# Patient Record
Sex: MALE | Race: WHITE | NOT HISPANIC OR LATINO | Employment: OTHER | URBAN - METROPOLITAN AREA
[De-identification: names, ages, dates, MRNs, and addresses within clinical notes are randomized per-mention and may not be internally consistent; named-entity substitution may affect disease eponyms.]

---

## 2017-01-05 ENCOUNTER — CLINICAL SUPPORT (OUTPATIENT)
Dept: CARDIOLOGY | Facility: CLINIC | Age: 67
End: 2017-01-05
Payer: MEDICARE

## 2017-01-05 DIAGNOSIS — I49.5 SSS (SICK SINUS SYNDROME): ICD-10-CM

## 2017-01-05 DIAGNOSIS — I47.20 VT (VENTRICULAR TACHYCARDIA): ICD-10-CM

## 2017-01-05 PROCEDURE — 93280 PM DEVICE PROGR EVAL DUAL: CPT | Mod: 26,,, | Performed by: INTERNAL MEDICINE

## 2017-04-06 ENCOUNTER — LAB VISIT (OUTPATIENT)
Dept: LAB | Facility: HOSPITAL | Age: 67
End: 2017-04-06
Attending: INTERNAL MEDICINE
Payer: MEDICARE

## 2017-04-06 DIAGNOSIS — E78.5 HYPERLIPIDEMIA, UNSPECIFIED HYPERLIPIDEMIA TYPE: ICD-10-CM

## 2017-04-06 LAB
ALT SERPL W/O P-5'-P-CCNC: 39 U/L
CHOLEST/HDLC SERPL: 3.9 {RATIO}
HDL/CHOLESTEROL RATIO: 25.4 %
HDLC SERPL-MCNC: 169 MG/DL
HDLC SERPL-MCNC: 43 MG/DL
LDLC SERPL CALC-MCNC: 102 MG/DL
NONHDLC SERPL-MCNC: 126 MG/DL
TRIGL SERPL-MCNC: 120 MG/DL

## 2017-04-06 PROCEDURE — 36415 COLL VENOUS BLD VENIPUNCTURE: CPT | Mod: PO

## 2017-04-06 PROCEDURE — 80061 LIPID PANEL: CPT

## 2017-04-06 PROCEDURE — 84460 ALANINE AMINO (ALT) (SGPT): CPT

## 2017-04-19 ENCOUNTER — OFFICE VISIT (OUTPATIENT)
Dept: INTERNAL MEDICINE | Facility: CLINIC | Age: 67
End: 2017-04-19
Payer: MEDICARE

## 2017-04-19 VITALS
BODY MASS INDEX: 29.38 KG/M2 | OXYGEN SATURATION: 98 % | WEIGHT: 205.25 LBS | HEART RATE: 81 BPM | HEIGHT: 70 IN | TEMPERATURE: 97 F | SYSTOLIC BLOOD PRESSURE: 122 MMHG | DIASTOLIC BLOOD PRESSURE: 80 MMHG

## 2017-04-19 DIAGNOSIS — R56.1 SEIZURES, POST-TRAUMATIC: Chronic | ICD-10-CM

## 2017-04-19 DIAGNOSIS — Z12.5 ENCOUNTER FOR SCREENING FOR MALIGNANT NEOPLASM OF PROSTATE: ICD-10-CM

## 2017-04-19 DIAGNOSIS — E78.5 OTHER AND UNSPECIFIED HYPERLIPIDEMIA: Chronic | ICD-10-CM

## 2017-04-19 PROCEDURE — 99214 OFFICE O/P EST MOD 30 MIN: CPT | Mod: S$PBB,,, | Performed by: INTERNAL MEDICINE

## 2017-04-19 PROCEDURE — 99213 OFFICE O/P EST LOW 20 MIN: CPT | Mod: PBBFAC,PO | Performed by: INTERNAL MEDICINE

## 2017-04-19 PROCEDURE — 99999 PR PBB SHADOW E&M-EST. PATIENT-LVL III: CPT | Mod: PBBFAC,,, | Performed by: INTERNAL MEDICINE

## 2017-04-19 RX ORDER — PRAVASTATIN SODIUM 20 MG/1
20 TABLET ORAL DAILY
Qty: 90 TABLET | Refills: 3 | Status: SHIPPED | OUTPATIENT
Start: 2017-04-19 | End: 2018-01-30 | Stop reason: SDUPTHER

## 2017-04-19 NOTE — PROGRESS NOTES
"Subjective:      Patient ID: Adal Loaiza Jr. is a 67 y.o. male.    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 Feeling well and in his usu state of health today. Never smoked.     Review of Systems   Constitutional: Negative for chills and fever.   HENT: Negative for ear pain and sore throat.    Respiratory: Negative for cough, shortness of breath and wheezing.    Cardiovascular: Negative for chest pain and palpitations.   Gastrointestinal: Negative for abdominal pain and blood in stool.   Genitourinary: Negative for dysuria and hematuria.   Neurological: Negative for seizures and syncope.     Objective:   /80 (BP Location: Right arm, Patient Position: Sitting)  Pulse 81  Temp 96.7 °F (35.9 °C) (Tympanic)   Ht 5' 10" (1.778 m)  Wt 93.1 kg (205 lb 4 oz)  SpO2 98%  BMI 29.45 kg/m2    Physical Exam   Constitutional: He is oriented to person, place, and time. He appears well-developed and well-nourished. No distress.   HENT:   Head: Normocephalic and atraumatic.   Mouth/Throat: Oropharynx is clear and moist.   Eyes: EOM are normal. Pupils are equal, round, and reactive to light.   Neck: Neck supple. Carotid bruit is not present. No thyromegaly present.   Cardiovascular: Normal rate and regular rhythm.    Pulmonary/Chest: Breath sounds normal. He has no wheezes. He has no rales.   Abdominal: Soft. Bowel sounds are normal. There is no tenderness.   Musculoskeletal: He exhibits no edema.   Lymphadenopathy:     He has no cervical adenopathy.   Neurological: He is alert and " oriented to person, place, and time.   Skin: Skin is warm and dry.   Psychiatric: He has a normal mood and affect. His behavior is normal.       Assessment:     1. Other and unspecified hyperlipidemia    2. 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 Encounter for screening for malignant neoplasm of prostate       Plan:   Other and unspecified hyperlipidemia  -     TSH; Future; Expected date: 4/19/18  -     PSA, Screening; Future; Expected date: 4/19/18  -     Lipid panel; Future; Expected date: 4/19/18  -     Comprehensive metabolic panel; Future; Expected date: 4/19/18  -     CBC auto differential; Future; Expected date: 4/19/18  -     pravastatin (PRAVACHOL) 20 MG tablet; Take 1 tablet (20 mg total) by mouth once daily.  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

## 2017-04-19 NOTE — MR AVS SNAPSHOT
[facility]  [address] [place] [address]  Phone: [phone]  Fax: [phone]                  Adal Loaiza JrJulissa   2017 8:20 AM   Office Visit    Description:  Male : 1950   Provider:  Huseyin Marrero MD   Department:  [facility] - Internal Medicine           Reason for Visit     Follow-up           Diagnoses this Visit        Comments    Other and unspecified hyperlipidemia         Seizures, post-traumatic         Routine general medical examination at a health care facility         Encounter for screening for malignant neoplasm of prostate                To Do List           Future Appointments        Provider Department Dept Phone    2017 11:00 AM PACEMAKER CLINIC [facility] - Arrhythmia Procedures [phone]    2018 8:00 AM LABORATORY, [facility] [phone]      Goals (5 Years of Data)     None      Follow-Up and Disposition     Return in about 1 year (around 2018), or if symptoms worsen or fail to improve.       These Medications        Disp Refills Start End    pravastatin (PRAVACHOL) 20 MG tablet 90 tablet 3 2017     Take 1 tablet (20 mg total) by mouth once daily. - Oral    Pharmacy: [facility] [address] [place] [address] #: [phone]         [facility]     [facility] Nurse Care Line -  Assistance  Unless otherwise directed by your provider, please contact [facility], our nurse care line that is available for / assistance.     Registered nurses in the [facility] provide: appointment scheduling, clinical advisement, health education, and other advisory services.  Call: [phone] (toll free)               Medications           Message regarding Medications     Verify the changes and/or additions to your medication regime listed below are the same as discussed with your clinician today.  If any of these changes or additions are incorrect, please  "notify your healthcare provider.             Verify that the below list of medications is an accurate representation of the medications you are currently taking.  If none reported, the list may be blank. If incorrect, please contact your healthcare provider. Carry this list with you in case of emergency.           Current Medications     ketoconazole (NIZORAL) 2 % cream Apply topically once daily.    LACTOBACILLUS ACIDOPHILUS (PROBIOTIC ORAL) Take by mouth once daily.    levetiracetam (KEPPRA) 500 MG Tab Take 1 tablet (500 mg total) by mouth 2 (two) times daily.    multivitamin (ONE DAILY MULTIVITAMIN) per tablet Take 1 tablet by mouth once daily.    pravastatin (PRAVACHOL) 20 MG tablet Take 1 tablet (20 mg total) by mouth once daily.           Clinical Reference Information           Your Vitals Were     BP Pulse Temp Height Weight SpO2    122/80 (BP Location: Right arm, Patient Position: Sitting) 81 96.7 °F (35.9 °C) (Tympanic) 5' 10" (1.778 m) 93.1 kg (205 lb 4 oz) 98%    BMI                29.45 kg/m2          Blood Pressure          Most Recent Value    BP  122/80      Allergies as of 4/19/2017     Nsaids (Non-steroidal Anti-inflammatory Drug)    Aspirin    Celecoxib      Immunizations Administered on Date of Encounter - 4/19/2017     None      Orders Placed During Today's Visit     Future Labs/Procedures Expected by Expires    CBC auto differential  4/19/2018 5/24/2018    Comprehensive metabolic panel  4/19/2018 5/24/2018    Lipid panel  4/19/2018 5/24/2018    PSA, Screening  4/19/2018 4/19/2019    TSH  4/19/2018 5/24/2018      Language Assistance Services     ATTENTION: Language assistance services are available, free of charge. Please call [phone].      ATENCIÓN: Si habla español, tiene a stack disposición servicios gratuitos de asistencia lingüística. Llame al [phone].     CHÚ Ý: N?u b?n nói Ti?ng Vi?t, có các d?ch v? h? tr? ngôn ng? mi?n phí dành cho b?n. G?i s? [phone].         [facility] " [facility] complies with applicable Federal civil rights laws and does not discriminate on the basis of race, color, national origin, age, disability, or sex.

## 2017-05-23 DIAGNOSIS — Z95.0 CARDIAC PACEMAKER IN SITU: Primary | ICD-10-CM

## 2017-05-23 DIAGNOSIS — R00.1 BRADYCARDIA, SEVERE SINUS: ICD-10-CM

## 2017-05-30 ENCOUNTER — CLINICAL SUPPORT (OUTPATIENT)
Dept: CARDIOLOGY | Facility: CLINIC | Age: 67
End: 2017-05-30
Payer: MEDICARE

## 2017-05-30 DIAGNOSIS — R00.1 BRADYCARDIA, SEVERE SINUS: ICD-10-CM

## 2017-05-30 DIAGNOSIS — Z95.0 CARDIAC PACEMAKER IN SITU: ICD-10-CM

## 2017-05-30 PROCEDURE — 93280 PM DEVICE PROGR EVAL DUAL: CPT | Mod: 26,,, | Performed by: INTERNAL MEDICINE

## 2017-09-18 ENCOUNTER — TELEPHONE (OUTPATIENT)
Dept: ELECTROPHYSIOLOGY | Facility: CLINIC | Age: 67
End: 2017-09-18

## 2017-09-18 ENCOUNTER — TELEPHONE (OUTPATIENT)
Dept: CARDIOLOGY | Facility: CLINIC | Age: 67
End: 2017-09-18

## 2017-09-18 NOTE — TELEPHONE ENCOUNTER
----- Message from Letitia Galeas MA sent at 9/18/2017  2:56 PM CDT -----  Contact: pt       ----- Message -----  From: Stormy Oden  Sent: 9/18/2017   2:41 PM  To: Susana SCHROEDER Staff    Call pt regarding results of pacemaker test.      [phone]

## 2017-09-18 NOTE — TELEPHONE ENCOUNTER
Returned call to Mr. Loaiza and needed to confirm he's being followed by Dr. Dean regarding pacemaker and last visit was May 30, 2017.

## 2017-09-18 NOTE — TELEPHONE ENCOUNTER
----- Message from Sonja Cardenas sent at 9/18/2017  3:54 PM CDT -----  Contact: Patient  Patient called to speak with the nurse about his records from his last test. He can be contacted at [phone].    Thanks,  Sonja

## 2017-09-22 ENCOUNTER — CLINICAL SUPPORT (OUTPATIENT)
Dept: CARDIOLOGY | Facility: CLINIC | Age: 67
End: 2017-09-22
Payer: MEDICARE

## 2017-09-22 ENCOUNTER — OFFICE VISIT (OUTPATIENT)
Dept: CARDIOLOGY | Facility: CLINIC | Age: 67
End: 2017-09-22
Payer: MEDICARE

## 2017-09-22 VITALS
OXYGEN SATURATION: 97 % | DIASTOLIC BLOOD PRESSURE: 82 MMHG | SYSTOLIC BLOOD PRESSURE: 124 MMHG | WEIGHT: 202.81 LBS | HEIGHT: 70 IN | BODY MASS INDEX: 29.03 KG/M2 | HEART RATE: 71 BPM

## 2017-09-22 DIAGNOSIS — Z95.0 CARDIAC PACEMAKER IN SITU: Primary | Chronic | ICD-10-CM

## 2017-09-22 DIAGNOSIS — R00.1 BRADYCARDIA, SEVERE SINUS: ICD-10-CM

## 2017-09-22 DIAGNOSIS — R56.1 SEIZURES, POST-TRAUMATIC: Chronic | ICD-10-CM

## 2017-09-22 DIAGNOSIS — Z95.0 CARDIAC PACEMAKER IN SITU: Chronic | ICD-10-CM

## 2017-09-22 DIAGNOSIS — K76.0 FATTY LIVER: ICD-10-CM

## 2017-09-22 DIAGNOSIS — I35.1 AORTIC VALVE REGURGITATION, UNSPECIFIED ETIOLOGY: ICD-10-CM

## 2017-09-22 DIAGNOSIS — Z95.0 CARDIAC PACEMAKER IN SITU: ICD-10-CM

## 2017-09-22 DIAGNOSIS — G47.30 SLEEP APNEA, UNSPECIFIED TYPE: Chronic | ICD-10-CM

## 2017-09-22 DIAGNOSIS — E78.5 HYPERLIPIDEMIA, UNSPECIFIED HYPERLIPIDEMIA TYPE: ICD-10-CM

## 2017-09-22 PROCEDURE — 99212 OFFICE O/P EST SF 10 MIN: CPT | Mod: PBBFAC,PO,25 | Performed by: INTERNAL MEDICINE

## 2017-09-22 PROCEDURE — 93005 ELECTROCARDIOGRAM TRACING: CPT | Mod: PBBFAC,PO | Performed by: INTERNAL MEDICINE

## 2017-09-22 PROCEDURE — 1159F MED LIST DOCD IN RCRD: CPT | Mod: ,,, | Performed by: INTERNAL MEDICINE

## 2017-09-22 PROCEDURE — 99215 OFFICE O/P EST HI 40 MIN: CPT | Mod: S$PBB,,, | Performed by: INTERNAL MEDICINE

## 2017-09-22 PROCEDURE — 99999 PR PBB SHADOW E&M-EST. PATIENT-LVL II: CPT | Mod: PBBFAC,,, | Performed by: INTERNAL MEDICINE

## 2017-09-22 PROCEDURE — 93280 PM DEVICE PROGR EVAL DUAL: CPT | Mod: PBBFAC,PO | Performed by: INTERNAL MEDICINE

## 2017-09-22 PROCEDURE — 93010 ELECTROCARDIOGRAM REPORT: CPT | Mod: S$PBB,,, | Performed by: INTERNAL MEDICINE

## 2017-09-22 NOTE — PROGRESS NOTES
Subjective:   Patient ID:  Adal Loaiza Jr. is a 67 y.o. male     Chief complaint:No chief complaint on file.      HPI  Background as recorded in my last note (Aug 2016):  66 man  Initial PPM implant by Dr OCONNELL for a 6 sec pause (/in the midst of a seizure). 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   He had RV lead fx >. 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   He has had no issues with seizures nor syncope. He had a PPM eval 6/2  There were many AMS but no EGMs noted. He has no cardiac Sx and no palps,.      Update since then:  Has had a good year - still works full time, no CV c/o --if anything, feels better this year than last.   PPM eval -- poor A capture thresholds and P waves @1.0 mV >. Stable >> VDD -- doing well. PPM nearing SIMON -- 6 months or so but voltage OK.   I have reviewed the actual image of the ECG tracing obtained today and it shows NSR with normal intervals      Current Outpatient Prescriptions   Medication Sig    ketoconazole (NIZORAL) 2 % cream Apply topically once daily. (Patient taking differently: Apply topically as needed. )    LACTOBACILLUS ACIDOPHILUS (PROBIOTIC ORAL) Take by mouth once daily.    levetiracetam (KEPPRA) 500 MG Tab Take 1 tablet (500 mg total) by mouth 2 (two) times daily.    multivitamin (ONE DAILY MULTIVITAMIN) per tablet Take 1 tablet by mouth once daily.    pravastatin (PRAVACHOL) 20 MG tablet Take 1 tablet (20 mg total) by mouth once daily.     No current facility-administered medications for this visit.      Review of Systems   Constitution: Negative for decreased appetite, weakness, malaise/fatigue, weight gain and weight loss.   Eyes: Negative for blurred vision.    Cardiovascular: Negative for chest pain, claudication, cyanosis, dyspnea on exertion, irregular heartbeat, leg swelling, near-syncope, orthopnea and palpitations.   Respiratory: Negative for cough, shortness of breath, sleep disturbances due to breathing, snoring and wheezing.    Endocrine: Negative for heat intolerance.   Hematologic/Lymphatic: Does not bruise/bleed easily.   Musculoskeletal: Negative for muscle weakness and myalgias.   Gastrointestinal: Negative for melena, nausea and vomiting.   Genitourinary: Negative for nocturia.   Neurological: Negative for excessive daytime sleepiness, dizziness, headaches and light-headedness.   Psychiatric/Behavioral: Negative for depression, memory loss and substance abuse. The patient does not have insomnia and is not nervous/anxious.        Objective:   Physical Exam   Constitutional: He is oriented to person, place, and time. He appears well-developed and well-nourished.   HENT:   Head: Normocephalic and atraumatic.   Right Ear: External ear normal.   Left Ear: External ear normal.   Eyes: Conjunctivae are normal. Pupils are equal, round, and reactive to light. Left conjunctiva is not injected. Left conjunctiva has no hemorrhage.   Neck: Neck supple. No JVD present. No thyromegaly present.   Cardiovascular: Normal rate, regular rhythm and intact distal pulses.  PMI is not displaced.  Exam reveals no gallop, no friction rub, no midsystolic click and no opening snap.    Murmur heard.  High-pitched blowing decrescendo early diastolic murmur is present with a grade of 2/6  at the upper right sternal border radiating to the apex Widely split S2  Pulses:       Carotid pulses are 2+ on the right side, and 2+ on the left side.       Radial pulses are 2+ on the right side, and 2+ on the left side.        Dorsalis pedis pulses are 2+ on the right side, and 2+ on the left side.        Posterior tibial pulses are 2+ on the right side, and 2+ on the left side.   Pulmonary/Chest:  "Effort normal and breath sounds normal. No respiratory distress. He has no wheezes. He has no rales. He exhibits no tenderness.   Device pocket is in excellent repair     Abdominal: Soft. Normal appearance. He exhibits no pulsatile liver. There is no hepatomegaly. There is no tenderness. There is no rigidity.   Musculoskeletal: Normal range of motion. He exhibits no edema or tenderness.        Right knee: He exhibits no swelling.        Left knee: He exhibits no swelling.        Right ankle: He exhibits no swelling.        Left ankle: He exhibits no swelling.        Right lower leg: He exhibits no swelling.        Left lower leg: He exhibits no swelling.        Right foot: There is no swelling.        Left foot: There is no swelling.   Neurological: He is alert and oriented to person, place, and time. He has normal strength and normal reflexes. No cranial nerve deficit. Coordination normal.   Skin: Skin is warm, dry and intact. No rash noted. No cyanosis.   Psychiatric: He has a normal mood and affect. His behavior is normal.   Nursing note and vitals reviewed.    /82   Pulse 71   Ht 5' 10" (1.778 m)   Wt 92 kg (202 lb 13.2 oz)   SpO2 97%   BMI 29.10 kg/m²      Assessment:    New PE findings -- widely split S2 and a D murmur c/w AI-- will eval  1. Cardiac pacemaker in situ    2. Sleep apnea, unspecified type    3. 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 Fatty liver    5. Hyperlipidemia, unspecified hyperlipidemia type    6. Aortic valve regurgitation, unspecified etiology        Plan:      Orders Placed This Encounter   Procedures    EKG 12-lead     Standing Status:   Future     Number of Occurrences:   1     Standing Expiration Date:   9/22/2018    2D echo with color flow doppler     Return in about 1 year (around 9/22/2018), or if symptoms worsen or fail to improve.  There are no discontinued medications.  New Prescriptions    No medications on file     Modified Medications    No " medications on file

## 2017-09-25 NOTE — TELEPHONE ENCOUNTER
----- Message from Micaela Stevens sent at 9/25/2017 11:47 AM CDT -----  Would like to speak to nurse about results. Please call back at [phone]. thanks

## 2017-10-12 ENCOUNTER — APPOINTMENT (OUTPATIENT)
Dept: CARDIOLOGY | Facility: CLINIC | Age: 67
End: 2017-10-12
Payer: MEDICARE

## 2017-10-12 LAB
DIASTOLIC DYSFUNCTION: NO
ESTIMATED PA SYSTOLIC PRESSURE: 25
MITRAL VALVE MOBILITY: NORMAL
MITRAL VALVE REGURGITATION: NORMAL
RETIRED EF AND QEF - SEE NOTES: 70 (ref 55–65)

## 2017-10-12 PROCEDURE — 93306 TTE W/DOPPLER COMPLETE: CPT | Mod: PBBFAC,PO | Performed by: INTERNAL MEDICINE

## 2017-10-20 ENCOUNTER — TELEPHONE (OUTPATIENT)
Dept: CARDIOLOGY | Facility: CLINIC | Age: 67
End: 2017-10-20

## 2017-10-20 NOTE — TELEPHONE ENCOUNTER
----- Message from Gen Meza MD sent at 10/19/2017  5:38 PM CDT -----  Reviewed results. All OK. Please open telephone encounter, call patient and inform him/ her of results,then document in encounter.  Please do not route back to me.   Thanks.

## 2017-10-25 ENCOUNTER — HOSPITAL ENCOUNTER (OUTPATIENT)
Dept: RADIOLOGY | Facility: HOSPITAL | Age: 67
Discharge: HOME | End: 2017-10-25
Attending: NURSE PRACTITIONER
Payer: MEDICARE

## 2017-10-25 ENCOUNTER — OFFICE VISIT (OUTPATIENT)
Dept: INTERNAL MEDICINE | Facility: CLINIC | Age: 67
End: 2017-10-25
Payer: MEDICARE

## 2017-10-25 VITALS
HEART RATE: 91 BPM | TEMPERATURE: 97 F | SYSTOLIC BLOOD PRESSURE: 148 MMHG | HEIGHT: 70 IN | WEIGHT: 202.38 LBS | DIASTOLIC BLOOD PRESSURE: 80 MMHG | BODY MASS INDEX: 28.97 KG/M2

## 2017-10-25 DIAGNOSIS — R10.12 LEFT UPPER QUADRANT PAIN: ICD-10-CM

## 2017-10-25 DIAGNOSIS — Z87.19 HISTORY OF GI BLEED: ICD-10-CM

## 2017-10-25 DIAGNOSIS — R10.12 LEFT UPPER QUADRANT PAIN: Primary | ICD-10-CM

## 2017-10-25 PROCEDURE — 74020 XR ABDOMEN FLAT AND ERECT: CPT | Mod: TC,PO

## 2017-10-25 PROCEDURE — 99214 OFFICE O/P EST MOD 30 MIN: CPT | Mod: PBBFAC,25,PO | Performed by: NURSE PRACTITIONER

## 2017-10-25 PROCEDURE — 99214 OFFICE O/P EST MOD 30 MIN: CPT | Mod: S$PBB,,, | Performed by: NURSE PRACTITIONER

## 2017-10-25 PROCEDURE — 74020 XR ABDOMEN FLAT AND ERECT: CPT | Mod: 26,,, | Performed by: RADIOLOGY

## 2017-10-25 PROCEDURE — 99999 PR PBB SHADOW E&M-EST. PATIENT-LVL IV: CPT | Mod: PBBFAC,,, | Performed by: NURSE PRACTITIONER

## 2017-10-25 NOTE — PROGRESS NOTES
Subjective:       Patient ID: Adal Loaiza Jr. is a 67 y.o. male.    Chief Complaint: Abdominal Pain    LUQ pain x 4 days. The pain does not radiate. He can reproduce the pain with certain movements and with palpation. Associated symptoms include dark urine, constipation, unusual amount of gas. He reports subjective fever.    He reports that he occasional has reflux symptoms (once or twice a month) takes a sheela and it goes away. He reports having constipation (hard, pellet stools) up until yesterday when he had a large soft stool after consuming [employer]. He reports that he eats out a lot and enjoys spicy, greasy foods. He denies sweats, chills, n/v, blood in stool or urine, dysuria, cp, sob, back pain. He reports hx of duodenal ulcer with stomach bleed due to aspirin use many years ago. He has not taken any nsaids. He does not report any change in appetite. He does admit that pain is better after meals and worse when he begins to become hungry again. No hx of SBO, diverticulitis, IBD, IBS, abd surgery.            Review of Systems   Constitutional: Negative for activity change, appetite change, chills, diaphoresis, fatigue, fever (subj ) and unexpected weight change.   Respiratory: Negative for chest tightness and shortness of breath.    Gastrointestinal: Positive for abdominal distention and abdominal pain. Negative for anal bleeding, blood in stool, constipation, diarrhea, nausea, rectal pain and vomiting.   Genitourinary: Negative for decreased urine volume, difficulty urinating, flank pain, frequency, hematuria, testicular pain and urgency.        Dark urine    Musculoskeletal: Negative for arthralgias and myalgias.   Neurological: Negative for dizziness, weakness, light-headedness and headaches.       Objective:      Physical Exam   Constitutional: He is oriented to person, place, and time.   Cardiovascular: Normal rate, regular rhythm and normal heart sounds.    Pulmonary/Chest: Effort normal and breath  sounds normal.   Abdominal: He exhibits distension. There is tenderness in the left upper quadrant. There is no rigidity, no rebound and no guarding.   Musculoskeletal: Normal range of motion.   Neurological: He is alert and oriented to person, place, and time.   Skin: Skin is warm and dry.   Psychiatric: He has a normal mood and affect.       Assessment:       1. Left upper quadrant pain    2. History of GI bleed        Plan:   Left upper quadrant pain  -     CBC auto differential; Future; Expected date: 10/25/2017  -     Comprehensive metabolic panel; Future; Expected date: 10/25/2017  -     Urinalysis; Future; Expected date: 10/25/2017  -     X-Ray Abdomen Flat And Erect; Future; Expected date: 10/25/2017  -     H. PYLORI ANTIBODY, IGG; Future; Expected date: 11/08/2017  -     Lipase; Future; Expected date: 10/25/2017  -     Amylase; Future; Expected date: 10/25/2017  -     US Abdomen Complete; Future; Expected date: 10/25/2017    History of GI bleed      As above  Decrease greasy/bloating foods  Increase fluids  Labs, xray today  US tomorrow  Will call with results and recommendations

## 2017-10-26 ENCOUNTER — TELEPHONE (OUTPATIENT)
Dept: RADIOLOGY | Facility: HOSPITAL | Age: 67
End: 2017-10-26

## 2017-10-27 ENCOUNTER — HOSPITAL ENCOUNTER (OUTPATIENT)
Dept: RADIOLOGY | Facility: HOSPITAL | Age: 67
Discharge: HOME | End: 2017-10-27
Attending: NURSE PRACTITIONER
Payer: MEDICARE

## 2017-10-27 DIAGNOSIS — N28.1 ACQUIRED COMPLEX CYST OF KIDNEY: Primary | ICD-10-CM

## 2017-10-27 DIAGNOSIS — R10.12 LEFT UPPER QUADRANT PAIN: ICD-10-CM

## 2017-10-27 DIAGNOSIS — N28.1 SIMPLE CYST OF KIDNEY: ICD-10-CM

## 2017-10-27 PROCEDURE — 76700 US EXAM ABDOM COMPLETE: CPT | Mod: 26,,, | Performed by: RADIOLOGY

## 2017-10-27 PROCEDURE — 76700 US EXAM ABDOM COMPLETE: CPT | Mod: TC,PO

## 2017-12-13 ENCOUNTER — OFFICE VISIT (OUTPATIENT)
Dept: UROLOGY | Facility: CLINIC | Age: 67
End: 2017-12-13
Payer: MEDICARE

## 2017-12-13 VITALS
BODY MASS INDEX: 28.33 KG/M2 | WEIGHT: 197.88 LBS | HEIGHT: 70 IN | SYSTOLIC BLOOD PRESSURE: 130 MMHG | DIASTOLIC BLOOD PRESSURE: 80 MMHG | HEART RATE: 74 BPM

## 2017-12-13 DIAGNOSIS — Z12.5 PROSTATE CANCER SCREENING: ICD-10-CM

## 2017-12-13 DIAGNOSIS — N28.1 RENAL CYST: Primary | ICD-10-CM

## 2017-12-13 LAB
BILIRUB SERPL-MCNC: NORMAL MG/DL
BLOOD URINE, POC: NORMAL
COLOR, POC UA: YELLOW
GLUCOSE UR QL STRIP: NORMAL
KETONES UR QL STRIP: NORMAL
LEUKOCYTE ESTERASE URINE, POC: NORMAL
NITRITE, POC UA: NORMAL
PH, POC UA: 5
PROTEIN, POC: NORMAL
SPECIFIC GRAVITY, POC UA: 1.02
UROBILINOGEN, POC UA: NORMAL

## 2017-12-13 PROCEDURE — 99212 OFFICE O/P EST SF 10 MIN: CPT | Mod: PBBFAC | Performed by: UROLOGY

## 2017-12-13 PROCEDURE — 81002 URINALYSIS NONAUTO W/O SCOPE: CPT | Mod: PBBFAC | Performed by: UROLOGY

## 2017-12-13 PROCEDURE — 99204 OFFICE O/P NEW MOD 45 MIN: CPT | Mod: S$PBB,,, | Performed by: UROLOGY

## 2017-12-13 PROCEDURE — 99999 PR PBB SHADOW E&M-EST. PATIENT-LVL II: CPT | Mod: PBBFAC,,, | Performed by: UROLOGY

## 2017-12-13 NOTE — PROGRESS NOTES
Chief Complaint: Renal Cyst?    HPI:   12/13/17: 68 yo man referred after recent US for symptoms of constipation revealed bilateral small simple cysts.  No abd/pelvic pain and no exac/rel factors.  No hematuria.  No urolithiasis.  No urinary bother.  No  history.  Normal sexual function.  Finds that he doesn't have much semen when ejaculating, orgasm and all else is normal.  No ED, good libido.    Allergies:  Nsaids (non-steroidal anti-inflammatory drug); Aspirin; and Celecoxib    Medications:  has a current medication list which includes the following prescription(s): lactobacillus acidophilus, levetiracetam, multivitamin, pravastatin, and ketoconazole.    Review of Systems:  General: No fever, chills, fatigability, or weight loss.  Skin: No rashes, itching, or changes in color or texture of skin.  Chest: Denies COOPER, cyanosis, wheezing, cough, and sputum production.  Abdomen: Appetite fine. No weight loss. Denies diarrhea, abdominal pain, hematemesis, or blood in stool.  Musculoskeletal: No joint stiffness or swelling. Denies back pain.  : As above.  All other review of systems negative.    PMH:   has a past medical history of Anemia due to GI blood loss (2012); Arrhythmia requiring replacement of cardiac pacemaker; Cancer; Encounter for blood transfusion; GI bleed due to NSAIDs (2012); Hyperlipidemia; Schwannoma; Seizures; and Sleep apnea.    PSH:   has a past surgical history that includes Cardiac pacemaker placement; Gamma knife; Brain surgery; and Colonoscopy (N/A, 8/25/2016).    FamHx: family history includes Colon cancer in his father.    SocHx:  reports that he has never smoked. He has never used smokeless tobacco. He reports that he does not drink alcohol or use drugs.      Physical Exam:  Vitals:    12/13/17 1104   BP: 130/80   Pulse: 74     General: A&Ox3, no apparent distress, no deformities  Neck: No masses, normal thyroid  Lungs: normal inspiration, no use of accessory muscles  Heart: normal pulse,  no arrhythmias  Abdomen: Soft, NT, ND, no masses, no hernias, no hepatosplenomegaly  Lymphatic: Neck and groin nodes negative  Skin: The skin is warm and dry. No jaundice.  Ext: No c/c/e.  : Test desc christiano, no abnormalities of epididymus. Penis normal, with normal penile and scrotal skin. Meatus normal. Normal rectal tone, no hemorrhoids. Prost 30 gm no nodules or masses appreciated. SV not palpable. Perineum and anus normal.    Labs/Studies:   Urinalysis performed in clinic, summary: UA normal  PSA    7/16: 2.1    Impression/Plan:   1. Renal cysts simple and likely benign.  US/RTC with PSA 1 year to see if any change.

## 2017-12-13 NOTE — LETTER
December 13, 2017      Sandee Rudolph, JANICE  [address] [facility] [address]           [facility]  [address] [place] [address]  Phone: [phone]  Fax: [phone]          Patient: Adal Loaiza Jr.   MR Number: [id number]   YOB: 1950   Date of Visit: 12/13/2017       Dear Sandee Rudolph:    Thank you for referring Adal Loaiza to me for evaluation. Attached you will find relevant portions of my assessment and plan of care.    If you have questions, please do not hesitate to call me. I look forward to following Adal Loaiza along with you.    Sincerely,    Yoni Oates IV, MD    Enclosure  CC:  No Recipients    If you would like to receive this communication electronically, please contact [email or web address] or [phone] to request more information on [facility] [employer] access.    For providers and/or their staff who would like to refer a patient to [facility], please contact us through our one-stop-shop provider referral line, [facility], at [phone].    If you feel you have received this communication in error or would no longer like to receive these types of communications, please e-mail [email or web address]

## 2018-01-24 ENCOUNTER — IMMUNIZATION (OUTPATIENT)
Dept: INTERNAL MEDICINE | Facility: CLINIC | Age: 68
End: 2018-01-24
Payer: MEDICARE

## 2018-01-24 PROCEDURE — 90662 IIV NO PRSV INCREASED AG IM: CPT | Mod: PBBFAC,PO

## 2018-01-30 DIAGNOSIS — E78.49 OTHER HYPERLIPIDEMIA: ICD-10-CM

## 2018-01-30 RX ORDER — PRAVASTATIN SODIUM 20 MG/1
20 TABLET ORAL DAILY
Qty: 90 TABLET | Refills: 0 | Status: SHIPPED | OUTPATIENT
Start: 2018-01-30 | End: 2018-05-02 | Stop reason: SDUPTHER

## 2018-01-30 NOTE — TELEPHONE ENCOUNTER
Patient states that his Neurologist called in Pravastatin 10 mg but he takes the 20 mg tablets normally.

## 2018-01-30 NOTE — TELEPHONE ENCOUNTER
----- Message from Hina Rahman sent at 1/30/2018  9:58 AM CST -----  Contact: pt  He's calling to get a refill..    1. What is the name of the medication you are requesting? Pravastatin  2. What is the dose? 10 mg  3. How do you take the medication? Orally, topically, etc? orally  4. How often do you take this medication? Once daily  5. Do you need a 30 day or 90 day supply? 30  6. How many refills are you requesting? 1  7. What is your preferred pharmacy and location of the pharmacy? [facility] on [facility]   8. Who can we contact with further questions? [phone] (work)

## 2018-04-19 ENCOUNTER — PATIENT OUTREACH (OUTPATIENT)
Dept: ADMINISTRATIVE | Facility: HOSPITAL | Age: 68
End: 2018-04-19

## 2018-04-19 DIAGNOSIS — E78.49 OTHER HYPERLIPIDEMIA: Primary | ICD-10-CM

## 2018-04-19 DIAGNOSIS — Z29.9 PREVENTIVE MEASURE: ICD-10-CM

## 2018-04-24 ENCOUNTER — LAB VISIT (OUTPATIENT)
Dept: LAB | Facility: HOSPITAL | Age: 68
End: 2018-04-24
Attending: INTERNAL MEDICINE
Payer: MEDICARE

## 2018-04-24 DIAGNOSIS — Z12.5 ENCOUNTER FOR SCREENING FOR MALIGNANT NEOPLASM OF PROSTATE: ICD-10-CM

## 2018-04-24 DIAGNOSIS — E78.5 HYPERLIPIDEMIA, UNSPECIFIED HYPERLIPIDEMIA TYPE: Chronic | ICD-10-CM

## 2018-04-24 LAB
ALBUMIN SERPL BCP-MCNC: 3.6 G/DL
ALP SERPL-CCNC: 72 U/L
ALT SERPL W/O P-5'-P-CCNC: 36 U/L
ANION GAP SERPL CALC-SCNC: 11 MMOL/L
AST SERPL-CCNC: 30 U/L
BASOPHILS # BLD AUTO: 0.04 K/UL
BASOPHILS NFR BLD: 0.7 %
BILIRUB SERPL-MCNC: 0.9 MG/DL
BUN SERPL-MCNC: 14 MG/DL
CALCIUM SERPL-MCNC: 9.8 MG/DL
CHLORIDE SERPL-SCNC: 106 MMOL/L
CHOLEST SERPL-MCNC: 175 MG/DL
CHOLEST/HDLC SERPL: 3.4 {RATIO}
CO2 SERPL-SCNC: 25 MMOL/L
COMPLEXED PSA SERPL-MCNC: 2.7 NG/ML
CREAT SERPL-MCNC: 1.3 MG/DL
DIFFERENTIAL METHOD: ABNORMAL
EOSINOPHIL # BLD AUTO: 0.2 K/UL
EOSINOPHIL NFR BLD: 3.3 %
ERYTHROCYTE [DISTWIDTH] IN BLOOD BY AUTOMATED COUNT: 14.1 %
EST. GFR  (AFRICAN AMERICAN): >60 ML/MIN/1.73 M^2
EST. GFR  (NON AFRICAN AMERICAN): 56.1 ML/MIN/1.73 M^2
GLUCOSE SERPL-MCNC: 95 MG/DL
HCT VFR BLD AUTO: 45.9 %
HDLC SERPL-MCNC: 51 MG/DL
HDLC SERPL: 29.1 %
HGB BLD-MCNC: 14.6 G/DL
IMM GRANULOCYTES # BLD AUTO: 0.03 K/UL
IMM GRANULOCYTES NFR BLD AUTO: 0.5 %
LDLC SERPL CALC-MCNC: 106.6 MG/DL
LYMPHOCYTES # BLD AUTO: 1.4 K/UL
LYMPHOCYTES NFR BLD: 24.6 %
MCH RBC QN AUTO: 27.5 PG
MCHC RBC AUTO-ENTMCNC: 31.8 G/DL
MCV RBC AUTO: 87 FL
MONOCYTES # BLD AUTO: 0.6 K/UL
MONOCYTES NFR BLD: 10.7 %
NEUTROPHILS # BLD AUTO: 3.5 K/UL
NEUTROPHILS NFR BLD: 60.2 %
NONHDLC SERPL-MCNC: 124 MG/DL
NRBC BLD-RTO: 0 /100 WBC
PLATELET # BLD AUTO: 218 K/UL
PMV BLD AUTO: 11.3 FL
POTASSIUM SERPL-SCNC: 4.4 MMOL/L
PROT SERPL-MCNC: 6.9 G/DL
RBC # BLD AUTO: 5.3 M/UL
SODIUM SERPL-SCNC: 142 MMOL/L
TRIGL SERPL-MCNC: 87 MG/DL
TSH SERPL DL<=0.005 MIU/L-ACNC: 2.5 UIU/ML
WBC # BLD AUTO: 5.82 K/UL

## 2018-04-24 PROCEDURE — 36415 COLL VENOUS BLD VENIPUNCTURE: CPT | Mod: PO

## 2018-04-24 PROCEDURE — 80053 COMPREHEN METABOLIC PANEL: CPT

## 2018-04-24 PROCEDURE — 80061 LIPID PANEL: CPT

## 2018-04-24 PROCEDURE — 84153 ASSAY OF PSA TOTAL: CPT

## 2018-04-24 PROCEDURE — 85025 COMPLETE CBC W/AUTO DIFF WBC: CPT

## 2018-04-24 PROCEDURE — 84443 ASSAY THYROID STIM HORMONE: CPT

## 2018-05-02 ENCOUNTER — OFFICE VISIT (OUTPATIENT)
Dept: INTERNAL MEDICINE | Facility: CLINIC | Age: 68
End: 2018-05-02
Payer: MEDICARE

## 2018-05-02 VITALS
HEART RATE: 88 BPM | TEMPERATURE: 98 F | BODY MASS INDEX: 28.81 KG/M2 | SYSTOLIC BLOOD PRESSURE: 122 MMHG | HEIGHT: 70 IN | WEIGHT: 201.25 LBS | DIASTOLIC BLOOD PRESSURE: 84 MMHG | OXYGEN SATURATION: 97 %

## 2018-05-02 DIAGNOSIS — E78.49 OTHER HYPERLIPIDEMIA: Primary | ICD-10-CM

## 2018-05-02 DIAGNOSIS — Z95.0 CARDIAC PACEMAKER IN SITU: Chronic | ICD-10-CM

## 2018-05-02 DIAGNOSIS — K76.0 FATTY LIVER: ICD-10-CM

## 2018-05-02 DIAGNOSIS — Z80.0 FAMILY HISTORY OF COLON CANCER: ICD-10-CM

## 2018-05-02 DIAGNOSIS — R68.89 ABNORMAL DIGITAL RECTAL EXAM: ICD-10-CM

## 2018-05-02 DIAGNOSIS — R56.1 SEIZURES, POST-TRAUMATIC: Chronic | ICD-10-CM

## 2018-05-02 DIAGNOSIS — G47.30 SLEEP APNEA, UNSPECIFIED TYPE: Chronic | ICD-10-CM

## 2018-05-02 DIAGNOSIS — K63.5 POLYP OF COLON, UNSPECIFIED PART OF COLON, UNSPECIFIED TYPE: ICD-10-CM

## 2018-05-02 PROCEDURE — 99214 OFFICE O/P EST MOD 30 MIN: CPT | Mod: S$PBB,,, | Performed by: INTERNAL MEDICINE

## 2018-05-02 PROCEDURE — 99999 PR PBB SHADOW E&M-EST. PATIENT-LVL III: CPT | Mod: PBBFAC,,, | Performed by: INTERNAL MEDICINE

## 2018-05-02 PROCEDURE — 99213 OFFICE O/P EST LOW 20 MIN: CPT | Mod: PBBFAC,PO | Performed by: INTERNAL MEDICINE

## 2018-05-02 RX ORDER — PRAVASTATIN SODIUM 20 MG/1
20 TABLET ORAL DAILY
Qty: 90 TABLET | Refills: 3 | Status: SHIPPED | OUTPATIENT
Start: 2018-05-02 | End: 2018-12-28 | Stop reason: SDUPTHER
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
## 2018-12-19 ENCOUNTER — HOSPITAL ENCOUNTER (OUTPATIENT)
Dept: RADIOLOGY | Facility: HOSPITAL | Age: 68
Discharge: HOME | End: 2018-12-19
Attending: UROLOGY
Payer: MEDICARE

## 2018-12-19 ENCOUNTER — OFFICE VISIT (OUTPATIENT)
Dept: UROLOGY | Facility: CLINIC | Age: 68
End: 2018-12-19
Payer: MEDICARE

## 2018-12-19 VITALS
WEIGHT: 200.38 LBS | HEART RATE: 77 BPM | HEIGHT: 70 IN | SYSTOLIC BLOOD PRESSURE: 132 MMHG | BODY MASS INDEX: 28.69 KG/M2 | DIASTOLIC BLOOD PRESSURE: 86 MMHG

## 2018-12-19 DIAGNOSIS — N28.1 RENAL CYST: ICD-10-CM

## 2018-12-19 DIAGNOSIS — N28.1 RENAL CYST: Primary | ICD-10-CM

## 2018-12-19 DIAGNOSIS — Z12.5 SCREENING PSA (PROSTATE SPECIFIC ANTIGEN): ICD-10-CM

## 2018-12-19 LAB
BILIRUB SERPL-MCNC: NORMAL MG/DL
BLOOD URINE, POC: NORMAL
COLOR, POC UA: YELLOW
GLUCOSE UR QL STRIP: NORMAL
KETONES UR QL STRIP: NORMAL
LEUKOCYTE ESTERASE URINE, POC: NORMAL
NITRITE, POC UA: NORMAL
PH, POC UA: 5
PROTEIN, POC: NORMAL
SPECIFIC GRAVITY, POC UA: 1.01
UROBILINOGEN, POC UA: NORMAL

## 2018-12-19 PROCEDURE — 76770 US EXAM ABDO BACK WALL COMP: CPT | Mod: TC,PO

## 2018-12-19 PROCEDURE — 76770 US EXAM ABDO BACK WALL COMP: CPT | Mod: 26,,, | Performed by: RADIOLOGY

## 2018-12-19 PROCEDURE — 99214 OFFICE O/P EST MOD 30 MIN: CPT | Mod: S$PBB,,, | Performed by: UROLOGY

## 2018-12-19 PROCEDURE — 81002 URINALYSIS NONAUTO W/O SCOPE: CPT | Mod: PBBFAC | Performed by: UROLOGY

## 2018-12-19 PROCEDURE — 99213 OFFICE O/P EST LOW 20 MIN: CPT | Mod: PBBFAC,25 | Performed by: UROLOGY

## 2018-12-19 PROCEDURE — 99999 PR PBB SHADOW E&M-EST. PATIENT-LVL III: CPT | Mod: PBBFAC,,, | Performed by: UROLOGY

## 2018-12-19 NOTE — PROGRESS NOTES
Chief Complaint: Renal Cyst?    HPI:   12/19/18: Hasn't had his US yet.  PSA this year reassuring.  Semen is more productive than it was.  Reviewed history in detail.  12/13/17: 66 yo man referred after recent US for symptoms of constipation revealed bilateral small simple cysts.  No abd/pelvic pain and no exac/rel factors.  No hematuria.  No urolithiasis.  No urinary bother.  No  history.  Normal sexual function.  Finds that he doesn't have much semen when ejaculating, orgasm and all else is normal.  No ED, good libido.    Allergies:  Nsaids (non-steroidal anti-inflammatory drug); Aspirin; and Celecoxib    Medications:  has a current medication list which includes the following prescription(s): ketoconazole, lactobacillus acidophilus, levetiracetam, multivitamin, and pravastatin.    Review of Systems:  General: No fever, chills, fatigability, or weight loss.  Skin: No rashes, itching, or changes in color or texture of skin.  Chest: Denies COOPER, cyanosis, wheezing, cough, and sputum production.  Abdomen: Appetite fine. No weight loss. Denies diarrhea, abdominal pain, hematemesis, or blood in stool.  Musculoskeletal: No joint stiffness or swelling. Denies back pain.  : As above.  All other review of systems negative.    PMH:   has a past medical history of Anemia due to GI blood loss (2012), Arrhythmia requiring replacement of cardiac pacemaker, Cancer, Encounter for blood transfusion, GI bleed due to NSAIDs (2012), Hyperlipidemia, Schwannoma, Seizures, and Sleep apnea.    PSH:   has a past surgical history that includes Cardiac pacemaker placement; Gamma knife; Brain surgery; and COLONOSCOPY (N/A, 8/25/2016).    FamHx: family history includes Colon cancer in his father; Hypertension in his unknown relative.    SocHx:  reports that  has never smoked. he has never used smokeless tobacco. He reports that he does not drink alcohol or use drugs.      Physical Exam:  Vitals:    12/19/18 1254   BP: 132/86   Pulse: 77      General: A&Ox3, no apparent distress, no deformities  Neck: No masses, normal thyroid  Lungs: normal inspiration, no use of accessory muscles  Heart: normal pulse, no arrhythmias  Abdomen: Soft, NT, ND, no masses, no hernias, no hepatosplenomegaly  Lymphatic: Neck and groin nodes negative  Skin: The skin is warm and dry. No jaundice.  Ext: No c/c/e.  :   12/17: Test desc christiano, no abnormalities of epididymus. Penis normal, with normal penile and scrotal skin. Meatus normal. Normal rectal tone, no hemorrhoids. Prost 30 gm no nodules or masses appreciated. SV not palpable. Perineum and anus normal.    Labs/Studies:   Urinalysis performed in clinic, summary: UA normal  PSA    7/16: 2.1    4/18: 2.7    Impression/Plan:   1. Renal cysts simple and likely benign.  US now and a US/RTC with PSA 1 year to see if any change.

## 2018-12-28 ENCOUNTER — HOSPITAL ENCOUNTER (OUTPATIENT)
Dept: RADIOLOGY | Facility: HOSPITAL | Age: 68
Discharge: HOME | End: 2018-12-28
Attending: INTERNAL MEDICINE
Payer: COMMERCIAL

## 2018-12-28 ENCOUNTER — OFFICE VISIT (OUTPATIENT)
Dept: INTERNAL MEDICINE | Facility: CLINIC | Age: 68
End: 2018-12-28
Payer: COMMERCIAL

## 2018-12-28 VITALS
DIASTOLIC BLOOD PRESSURE: 88 MMHG | TEMPERATURE: 98 F | HEIGHT: 70 IN | OXYGEN SATURATION: 98 % | SYSTOLIC BLOOD PRESSURE: 120 MMHG | BODY MASS INDEX: 28.95 KG/M2 | WEIGHT: 202.19 LBS | HEART RATE: 84 BPM

## 2018-12-28 DIAGNOSIS — M25.531 RIGHT WRIST PAIN: ICD-10-CM

## 2018-12-28 DIAGNOSIS — M54.2 CERVICALGIA: ICD-10-CM

## 2018-12-28 DIAGNOSIS — M62.838 MUSCLE SPASM: Primary | ICD-10-CM

## 2018-12-28 DIAGNOSIS — M25.511 ACUTE PAIN OF RIGHT SHOULDER: ICD-10-CM
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
## 2018-12-28 DIAGNOSIS — E78.49 OTHER HYPERLIPIDEMIA: ICD-10-CM

## 2018-12-28 PROCEDURE — 73110 X-RAY EXAM OF WRIST: CPT | Mod: TC,FY,PO,RT

## 2018-12-28 PROCEDURE — 73030 X-RAY EXAM OF SHOULDER: CPT | Mod: 26,50,, | Performed by: RADIOLOGY

## 2018-12-28 PROCEDURE — 99214 OFFICE O/P EST MOD 30 MIN: CPT | Mod: S$GLB,,, | Performed by: INTERNAL MEDICINE

## 2018-12-28 PROCEDURE — 73030 X-RAY EXAM OF SHOULDER: CPT | Mod: TC,50,FY,PO

## 2018-12-28 PROCEDURE — 73110 X-RAY EXAM OF WRIST: CPT | Mod: 26,RT,, | Performed by: RADIOLOGY

## 2018-12-28 PROCEDURE — 99214 OFFICE O/P EST MOD 30 MIN: CPT | Mod: PBBFAC,25,PO | Performed by: INTERNAL MEDICINE

## 2018-12-28 PROCEDURE — 99999 PR PBB SHADOW E&M-EST. PATIENT-LVL IV: CPT | Mod: PBBFAC,,, | Performed by: INTERNAL MEDICINE

## 2018-12-28 RX ORDER — PRAVASTATIN SODIUM 20 MG/1
20 TABLET ORAL DAILY
Qty: 90 TABLET | Refills: 1 | Status: SHIPPED | OUTPATIENT
Start: 2018-12-28 | End: 2019-05-09 | Stop reason: SDUPTHER

## 2018-12-28 RX ORDER — NAPROXEN 500 MG/1
500 TABLET ORAL 2 TIMES DAILY WITH MEALS
Qty: 15 TABLET | Refills: 0 | Status: SHIPPED | OUTPATIENT
Start: 2018-12-28 | End: 2019-01-29

## 2018-12-28 RX ORDER — TIZANIDINE 4 MG/1
TABLET ORAL
Qty: 30 TABLET | Refills: 0 | Status: SHIPPED | OUTPATIENT
Start: 2018-12-28 | End: 2019-01-22

## 2018-12-28 NOTE — PATIENT INSTRUCTIONS
Tylenol 500 to 1000 mg every 8 hours as needed for pain.     No ibuprofen, aleve, motrin, advil while on naproxen.
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
## 2019-01-16 ENCOUNTER — PES CALL (OUTPATIENT)
Dept: ADMINISTRATIVE | Facility: CLINIC | Age: 69
End: 2019-01-16

## 2019-01-22 ENCOUNTER — TELEPHONE (OUTPATIENT)
Dept: CARDIOLOGY | Facility: CLINIC | Age: 69
End: 2019-01-22

## 2019-01-22 ENCOUNTER — HOSPITAL ENCOUNTER (OUTPATIENT)
Facility: HOSPITAL | Age: 69
Discharge: HOME | End: 2019-01-22
Attending: EMERGENCY MEDICINE | Admitting: FAMILY MEDICINE
Payer: MEDICARE

## 2019-01-22 VITALS
OXYGEN SATURATION: 96 % | DIASTOLIC BLOOD PRESSURE: 67 MMHG | HEIGHT: 70 IN | BODY MASS INDEX: 28.75 KG/M2 | RESPIRATION RATE: 18 BRPM | TEMPERATURE: 98 F | WEIGHT: 200.81 LBS | HEART RATE: 72 BPM | SYSTOLIC BLOOD PRESSURE: 109 MMHG

## 2019-01-22 DIAGNOSIS — Z95.0 PACEMAKER: Primary | ICD-10-CM

## 2019-01-22 DIAGNOSIS — R07.9 CHEST PAIN, UNSPECIFIED TYPE: ICD-10-CM

## 2019-01-22 DIAGNOSIS — R07.9 CHEST PAIN: Primary | ICD-10-CM

## 2019-01-22 LAB
ALBUMIN SERPL BCP-MCNC: 3.7 G/DL
ALP SERPL-CCNC: 83 U/L
ALT SERPL W/O P-5'-P-CCNC: 43 U/L
ANION GAP SERPL CALC-SCNC: 8 MMOL/L
AORTIC VALVE REGURGITATION: NORMAL
AST SERPL-CCNC: 43 U/L
BASOPHILS # BLD AUTO: 0.02 K/UL
BASOPHILS NFR BLD: 0.3 %
BILIRUB SERPL-MCNC: 0.5 MG/DL
BNP SERPL-MCNC: 79 PG/ML
BUN SERPL-MCNC: 14 MG/DL
CALCIUM SERPL-MCNC: 10 MG/DL
CHLORIDE SERPL-SCNC: 105 MMOL/L
CO2 SERPL-SCNC: 25 MMOL/L
CREAT SERPL-MCNC: 1.1 MG/DL
DIASTOLIC DYSFUNCTION: NO
DIFFERENTIAL METHOD: NORMAL
EOSINOPHIL # BLD AUTO: 0.2 K/UL
EOSINOPHIL NFR BLD: 3.1 %
ERYTHROCYTE [DISTWIDTH] IN BLOOD BY AUTOMATED COUNT: 14 %
EST. GFR  (AFRICAN AMERICAN): >60 ML/MIN/1.73 M^2
EST. GFR  (NON AFRICAN AMERICAN): >60 ML/MIN/1.73 M^2
ESTIMATED PA SYSTOLIC PRESSURE: 27.46
GLUCOSE SERPL-MCNC: 100 MG/DL
HCT VFR BLD AUTO: 43.6 %
HGB BLD-MCNC: 14.5 G/DL
LYMPHOCYTES # BLD AUTO: 2.2 K/UL
LYMPHOCYTES NFR BLD: 33.9 %
MCH RBC QN AUTO: 27.4 PG
MCHC RBC AUTO-ENTMCNC: 33.3 G/DL
MCV RBC AUTO: 82 FL
MITRAL VALVE MOBILITY: NORMAL
MONOCYTES # BLD AUTO: 0.7 K/UL
MONOCYTES NFR BLD: 11.4 %
NEUTROPHILS # BLD AUTO: 3.3 K/UL
NEUTROPHILS NFR BLD: 51.3 %
PLATELET # BLD AUTO: 205 K/UL
PMV BLD AUTO: 10.2 FL
POTASSIUM SERPL-SCNC: 4.2 MMOL/L
PROT SERPL-MCNC: 7 G/DL
RBC # BLD AUTO: 5.29 M/UL
RETIRED EF AND QEF - SEE NOTES: 60 (ref 55–65)
SODIUM SERPL-SCNC: 138 MMOL/L
TRICUSPID VALVE REGURGITATION: NORMAL
TROPONIN I SERPL DL<=0.01 NG/ML-MCNC: 0.01 NG/ML
TROPONIN I SERPL DL<=0.01 NG/ML-MCNC: 0.01 NG/ML
TROPONIN I SERPL DL<=0.01 NG/ML-MCNC: <0.006 NG/ML
WBC # BLD AUTO: 6.4 K/UL

## 2019-01-22 PROCEDURE — 99285 PR EMERGENCY DEPT VISIT,LEVEL V: ICD-10-PCS | Mod: ,,, | Performed by: INTERNAL MEDICINE

## 2019-01-22 PROCEDURE — 99285 EMERGENCY DEPT VISIT HI MDM: CPT | Mod: 25

## 2019-01-22 PROCEDURE — 93306 2D ECHO WITH COLOR FLOW DOPPLER: ICD-10-PCS | Mod: 26,,, | Performed by: INTERNAL MEDICINE

## 2019-01-22 PROCEDURE — 93306 TTE W/DOPPLER COMPLETE: CPT | Mod: 26,,, | Performed by: INTERNAL MEDICINE

## 2019-01-22 PROCEDURE — 80053 COMPREHEN METABOLIC PANEL: CPT

## 2019-01-22 PROCEDURE — 84484 ASSAY OF TROPONIN QUANT: CPT | Mod: 91

## 2019-01-22 PROCEDURE — 93005 ELECTROCARDIOGRAM TRACING: CPT

## 2019-01-22 PROCEDURE — G0378 HOSPITAL OBSERVATION PER HR: HCPCS

## 2019-01-22 PROCEDURE — 25000003 PHARM REV CODE 250: Performed by: EMERGENCY MEDICINE

## 2019-01-22 PROCEDURE — 93010 ELECTROCARDIOGRAM REPORT: CPT | Mod: ,,, | Performed by: INTERNAL MEDICINE

## 2019-01-22 PROCEDURE — 85025 COMPLETE CBC W/AUTO DIFF WBC: CPT

## 2019-01-22 PROCEDURE — 25000003 PHARM REV CODE 250: Performed by: FAMILY MEDICINE

## 2019-01-22 PROCEDURE — 93306 TTE W/DOPPLER COMPLETE: CPT

## 2019-01-22 PROCEDURE — 93010 EKG 12-LEAD: ICD-10-PCS | Mod: ,,, | Performed by: INTERNAL MEDICINE

## 2019-01-22 PROCEDURE — 83880 ASSAY OF NATRIURETIC PEPTIDE: CPT

## 2019-01-22 PROCEDURE — 99285 EMERGENCY DEPT VISIT HI MDM: CPT | Mod: ,,, | Performed by: INTERNAL MEDICINE

## 2019-01-22 RX ORDER — NAPROXEN SODIUM 220 MG/1
81 TABLET, FILM COATED ORAL DAILY
Status: DISCONTINUED | OUTPATIENT
Start: 2019-01-22 | End: 2019-01-22 | Stop reason: HOSPADM

## 2019-01-22 RX ORDER — PRAVASTATIN SODIUM 20 MG/1
20 TABLET ORAL DAILY
Status: DISCONTINUED | OUTPATIENT
Start: 2019-01-22 | End: 2019-01-22 | Stop reason: HOSPADM

## 2019-01-22 RX ORDER — LEVETIRACETAM 500 MG/1
500 TABLET ORAL 2 TIMES DAILY
Status: DISCONTINUED | OUTPATIENT
Start: 2019-01-22 | End: 2019-01-22 | Stop reason: HOSPADM

## 2019-01-22 RX ORDER — SODIUM CHLORIDE 0.9 % (FLUSH) 0.9 %
5 SYRINGE (ML) INJECTION
Status: DISCONTINUED | OUTPATIENT
Start: 2019-01-22 | End: 2019-01-22 | Stop reason: HOSPADM

## 2019-01-22 RX ORDER — NITROGLYCERIN 0.4 MG/1
0.4 TABLET SUBLINGUAL EVERY 5 MIN PRN
Status: DISCONTINUED | OUTPATIENT
Start: 2019-01-22 | End: 2019-01-22 | Stop reason: HOSPADM

## 2019-01-22 RX ADMIN — ASPIRIN 81 MG 81 MG: 81 TABLET ORAL at 08:01

## 2019-01-22 RX ADMIN — LEVETIRACETAM 500 MG: 500 TABLET ORAL at 08:01

## 2019-01-22 RX ADMIN — NITROGLYCERIN 0.5 INCH: 20 OINTMENT TOPICAL at 02:01

## 2019-01-22 NOTE — TELEPHONE ENCOUNTER
Returned call to patient and scheduled Clinic visit for 1/30/19 but advised would check with [facility] for today's check and speak with Dr. Dean to address if needs to add on to 1/30/Cath Lab cases

## 2019-01-22 NOTE — HPI
Mr. Loaiza is a 68 year old male with PMHx of HTN, HLP, SUSU, s/p PPM, Seizures who [place] [facility] [place] sided chest pain which started last PM with associated diaphoresis and nausea. HE reports relief of symptoms with topical NTG. ED workup revealed troponin negative x 3, BNP 79. Hospital medicine called for admission. Cardiology consulted this AM. Patient seen and examined in ED. Denies chest pain on exam today. No shortness of breath, COOPER or palpitations. HE reports noncompliance with CPAP at home lately. NO orthopnea, PND or abdominal bloating. PPM interrogation reveals device at [facility] and needs generator change. ECHO revealed EF 60-65%, -WMA's. Ok to discharge home today per Dr. Boone. Needs OP cardiology and Pulmonary follow up.

## 2019-01-22 NOTE — SUBJECTIVE & OBJECTIVE
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    Aspirin     Celecoxib        No current facility-administered medications on file prior to encounter.      Current Outpatient Medications on File Prior to Encounter   Medication Sig    LACTOBACILLUS ACIDOPHILUS (PROBIOTIC ORAL) Take by mouth once daily.    levetiracetam (KEPPRA) 500 MG Tab Take 1 tablet (500 mg total) by mouth 2 (two) times daily.    multivitamin (ONE DAILY MULTIVITAMIN) per tablet Take 1 tablet by mouth once daily.    pravastatin (PRAVACHOL) 20 MG tablet Take 1 tablet (20 mg total) by mouth once daily.    ketoconazole (NIZORAL) 2 % cream Apply topically once daily. (Patient taking differently: Apply topically as needed. )    naproxen (NAPROSYN) 500 MG tablet Take 1 tablet (500 mg total) by mouth 2 (two) times daily with meals.    [DISCONTINUED] tiZANidine (ZANAFLEX) 4 MG tablet Take 1/2 to 1 tablet at bedtime as needed for muscle spasm     Family History     Problem Relation (Age of Onset)    Colon cancer Father    Hypertension         Tobacco Use    Smoking status: Never Smoker    Smokeless tobacco: Never Used   Substance and Sexual Activity    Alcohol use: No    Drug use: No    Sexual activity: Not on file     Review of Systems   Constitution: Positive for diaphoresis (resolved) and  malaise/fatigue. Negative for weakness.   HENT: Negative for hearing loss and hoarse voice.    Eyes: Negative for blurred vision and visual disturbance.   Cardiovascular: Positive for chest pain (resolved). Negative for claudication, dyspnea on exertion, irregular heartbeat, leg swelling, near-syncope, orthopnea, palpitations, paroxysmal nocturnal dyspnea and syncope.   Respiratory: Positive for snoring. Negative for cough, hemoptysis, shortness of breath, sleep disturbances due to breathing and wheezing.         Noncompliance with CPAP   Endocrine: Negative for cold intolerance and heat intolerance.   Hematologic/Lymphatic: Does not bruise/bleed easily.   Skin: Negative for color change, dry skin and nail changes.   Musculoskeletal: Positive for arthritis. Negative for back pain, joint pain and myalgias.   Gastrointestinal: Negative for bloating, abdominal pain, constipation, nausea and vomiting.   Genitourinary: Negative for dysuria, flank pain, hematuria and hesitancy.   Neurological: Negative for headaches, light-headedness, loss of balance, numbness and paresthesias.   Psychiatric/Behavioral: Negative for altered mental status.   Allergic/Immunologic: Negative for environmental allergies.     Objective:     Vital Signs (Most Recent):  Temp: 97.7 °F (36.5 °C) (01/22/19 1242)  Pulse: 72 (01/22/19 1242)  Resp: 18 (01/22/19 1032)  BP: 109/67 (01/22/19 1242)  SpO2: 96 % (01/22/19 1242) Vital Signs (24h Range):  Temp:  [97.7 °F (36.5 °C)-98.1 °F (36.7 °C)] 97.7 °F (36.5 °C)  Pulse:  [67-84] 72  Resp:  [10-19] 18  SpO2:  [94 %-99 %] 96 %  BP: (107-208)/(61-98) 109/67     Weight: 91.1 kg (200 lb 13.4 oz)  Body mass index is 28.82 kg/m².    SpO2: 96 %  O2 Device (Oxygen Therapy): room air    No intake or output data in the 24 hours ending 01/22/19 1456    Lines/Drains/Airways          None          Physical Exam   Constitutional: He is oriented to person, place, and time. He appears well-developed and well-nourished. No  distress.   HENT:   Head: Normocephalic and atraumatic.   Eyes: Pupils are equal, round, and reactive to light.   Neck: Normal range of motion and full passive range of motion without pain. Neck supple. No JVD present.   Cardiovascular: Normal rate, regular rhythm, S1 normal, S2 normal and intact distal pulses. PMI is not displaced. Exam reveals no distant heart sounds.   No murmur heard.  Pulses:       Radial pulses are 2+ on the right side, and 2+ on the left side.        Dorsalis pedis pulses are 2+ on the right side, and 2+ on the left side.   Pulmonary/Chest: Effort normal and breath sounds normal. No respiratory distress. He has no wheezes.   Abdominal: Soft. Bowel sounds are normal.   Musculoskeletal: Normal range of motion. He exhibits no edema.        Right ankle: He exhibits no swelling.        Left ankle: He exhibits no swelling.   Neurological: He is alert and oriented to person, place, and time.   Skin: Skin is warm and dry. He is not diaphoretic. No cyanosis. Nails show no clubbing.   Psychiatric: He has a normal mood and affect. His speech is normal and behavior is normal. Judgment and thought content normal. Cognition and memory are normal.   Nursing note and vitals reviewed.      Significant Labs:   BMP:   Recent Labs   Lab 01/22/19  0148         K 4.2      CO2 25   BUN 14   CREATININE 1.1   CALCIUM 10.0   , CMP   Recent Labs   Lab 01/22/19  0148      K 4.2      CO2 25      BUN 14   CREATININE 1.1   CALCIUM 10.0   PROT 7.0   ALBUMIN 3.7   BILITOT 0.5   ALKPHOS 83   AST 43*   ALT 43   ANIONGAP 8   ESTGFRAFRICA >60   EGFRNONAA >60   , CBC   Recent Labs   Lab 01/22/19 0148   WBC 6.40   HGB 14.5   HCT 43.6      , Lipid Panel No results for input(s): CHOL, HDL, LDLCALC, TRIG, CHOLHDL in the last 48 hours., Troponin   Recent Labs   Lab 01/22/19  0148 01/22/19  0436 01/22/19  0838   TROPONINI 0.006 0.006 <0.006    and All pertinent lab results from the last 24  hours have been reviewed.    Significant Imaging: Echocardiogram:   2D echo with color flow doppler:   Results for orders placed or performed during the hospital encounter of 01/22/19   2D echo with color flow doppler   Result Value Ref Range    QEF 60 55 - 65    Diastolic Dysfunction No     Aortic Valve Regurgitation TRIVIAL     Est. PA Systolic Pressure 27.46     Mitral Valve Mobility NORMAL     Tricuspid Valve Regurgitation MILD     and X-Ray: CXR: X-Ray Chest 1 View (CXR): No results found for this visit on 01/22/19. and X-Ray Chest PA and Lateral (CXR): No results found for this visit on 01/22/19.

## 2019-01-22 NOTE — TELEPHONE ENCOUNTER
----- Message from Kaur Jack sent at 1/22/2019  3:15 PM CST -----  Contact: pt  The pt wants to be worked in for a hospital f/u appt, the pt gave no date and time, can be reached at [phone]///[id number]

## 2019-01-22 NOTE — ASSESSMENT & PLAN NOTE
His presentation is very concerning for ACS  Will admit for Obs, ASA, repeat CE and he will likely need Stress test. He is already on a statin which we will continue and initial BP was elevated but has trended down

## 2019-01-22 NOTE — ED NOTES
Pt lying in bed visiting in NAD,VSS,RR equal and unlabored. Bed is low, locked, and call light in reach. Side rails up x 2. Wife at bedside. No needs at this time

## 2019-01-22 NOTE — ASSESSMENT & PLAN NOTE
Troponin negative x 3  Denies chest pain on exam  PPM interrogation revealed SIMON, need for generator change  OP follow up with Cardiology and EP  OP follow up with Pulmonary  Continue ASA, Statin

## 2019-01-22 NOTE — H&P
[facility] Hospital Medicine  History & Physical    Patient Name: Adal Loaiza Jr.  MRN: [id number]  Admission Date: 1/22/2019  Attending Physician: No att. providers found   Primary Care Provider: Huseyin Marrero MD         Patient information was obtained from patient and ER records.     Subjective:     Principal Problem:Chest pain    Chief Complaint:   Chief Complaint   Patient presents with    Chest Pain        HPI: Mr. Loaiza is a 69 yo WM who is fairly healthy who presented to the ER complaining of left side chest pain which he describes as pressure with radiation to his neck. His symptoms started earlier last night prior to eating where he felt like he had indigestion and later states he became nauseated and diaphoretic. He rated pain 10/10 and his symptoms were relieved with topical nitro. He denies any previous history of chest pain or known family history of any cardiac issues.    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    Aspirin     Celecoxib        No current facility-administered medications on file prior to encounter.      Current Outpatient Medications on File Prior to Encounter   Medication Sig    LACTOBACILLUS ACIDOPHILUS (PROBIOTIC ORAL) Take by mouth once daily.    levetiracetam (KEPPRA) 500 MG Tab Take 1 tablet (500 mg total) by  mouth 2 (two) times daily.    multivitamin (ONE DAILY MULTIVITAMIN) per tablet Take 1 tablet by mouth once daily.    pravastatin (PRAVACHOL) 20 MG tablet Take 1 tablet (20 mg total) by mouth once daily.    ketoconazole (NIZORAL) 2 % cream Apply topically once daily. (Patient taking differently: Apply topically as needed. )    naproxen (NAPROSYN) 500 MG tablet Take 1 tablet (500 mg total) by mouth 2 (two) times daily with meals.    [DISCONTINUED] tiZANidine (ZANAFLEX) 4 MG tablet Take 1/2 to 1 tablet at bedtime as needed for muscle spasm     Family History     Problem Relation (Age of Onset)    Colon cancer Father    Hypertension         Tobacco Use    Smoking status: Never Smoker    Smokeless tobacco: Never Used   Substance and Sexual Activity    Alcohol use: No    Drug use: No    Sexual activity: Not on file     Review of Systems   Constitutional: Positive for diaphoresis.   HENT: Negative.    Eyes: Negative.    Respiratory: Negative.    Cardiovascular: Positive for chest pain.   Gastrointestinal: Positive for nausea.   Endocrine: Negative.    Genitourinary: Negative.    Musculoskeletal: Negative.    Skin: Negative.    Allergic/Immunologic: Negative.    Neurological: Negative.    Hematological: Negative.    Psychiatric/Behavioral: Negative.      Objective:     Vital Signs (Most Recent):  Temp: 98.1 °F (36.7 °C) (01/22/19 0133)  Pulse: 69 (01/22/19 0531)  Resp: 15 (01/22/19 0531)  BP: 111/77 (01/22/19 0531)  SpO2: 95 % (01/22/19 0531) Vital Signs (24h Range):  Temp:  [98.1 °F (36.7 °C)] 98.1 °F (36.7 °C)  Pulse:  [69-84] 69  Resp:  [10-18] 15  SpO2:  [94 %-99 %] 95 %  BP: (111-208)/(75-98) 111/77     Weight: 91.1 kg (200 lb 13.4 oz)  Body mass index is 28.82 kg/m².    Physical Exam   Constitutional: He is oriented to person, place, and time. He appears well-developed and well-nourished.   HENT:   Head: Normocephalic and atraumatic.   Right Ear: External ear normal.   Left Ear: External ear normal.   Nose:  Nose normal.   Mouth/Throat: Oropharynx is clear and moist.   Eyes: Conjunctivae and EOM are normal. Pupils are equal, round, and reactive to light.   Neck: Normal range of motion. Neck supple.   Cardiovascular: Normal rate, regular rhythm, normal heart sounds and intact distal pulses.   Pulmonary/Chest: Effort normal and breath sounds normal.   Abdominal: Soft. Bowel sounds are normal.   Genitourinary:   Genitourinary Comments: deferred   Musculoskeletal: Normal range of motion.   Neurological: He is alert and oriented to person, place, and time.   Skin: Skin is warm and dry. Capillary refill takes less than 2 seconds.   Psychiatric: He has a normal mood and affect.   Nursing note and vitals reviewed.        CRANIAL NERVES     CN III, IV, VI   Pupils are equal, round, and reactive to light.  Extraocular motions are normal.        Significant Labs:   Recent Lab Results       01/22/19  0436   01/22/19  0148        Albumin   3.7     Alkaline Phosphatase   83     ALT   43     Anion Gap   8     AST   43     Baso #   0.02     Basophil%   0.3     Total Bilirubin   0.5  Comment:  For infants and newborns, interpretation of results should be based  on gestational age, weight and in agreement with clinical  observations.  Premature Infant recommended reference ranges:  Up to 24 hours.............<8.0 mg/dL  Up to 48 hours............<12.0 mg/dL  3-5 days..................<15.0 mg/dL  6-29 days.................<15.0 mg/dL       BNP   79  Comment:  Values of less than 100 pg/ml are consistent with non-CHF populations.     BUN, Bld   14     Calcium   10.0     Chloride   105     CO2   25     Creatinine   1.1     Differential Method   Automated     eGFR if    >60     eGFR if non    >60  Comment:  Calculation used to obtain the estimated glomerular filtration  rate (eGFR) is the CKD-EPI equation.        Eos #   0.2     Eosinophil%   3.1     Glucose   100     Gran # (ANC)   3.3     Gran%   51.3      Hematocrit   43.6     Hemoglobin   14.5     Lymph #   2.2     Lymph%   33.9     MCH   27.4     MCHC   33.3     MCV   82     Mono #   0.7     Mono%   11.4     MPV   10.2     Platelets   205     Potassium   4.2     Total Protein   7.0     RBC   5.29     RDW   14.0     Sodium   138     Troponin I 0.006  Comment:  The reference interval for Troponin I represents the 99th percentile   cutoff   for our facility and is consistent with 3rd generation assay   performance.   0.006  Comment:  The reference interval for Troponin I represents the 99th percentile   cutoff   for our facility and is consistent with 3rd generation assay   performance.       WBC   6.40           Significant Imaging:   Imaging Results          X-Ray Chest AP Portable (In process)                   Assessment/Plan:     * Chest pain    His presentation is very concerning for ACS  Will admit for Obs, ASA, repeat CE and he will likely need Stress test. He is already on a statin which we will continue and initial BP was elevated but has trended down         VTE Risk Mitigation (From admission, onward)    None             Jes Deluca MD  Department of Hospital Medicine   [facility]

## 2019-01-22 NOTE — CONSULTS
[facility]   Cardiology  Consult Note    Patient Name: Adal Loaiza Jr.  MRN: [id number]  Admission Date: 1/22/2019  Hospital Length of Stay: 0 days  Code Status: Prior   Attending Provider: No att. providers found   Consulting Provider: Amie Arias NP  Primary Care Physician: Huseyin Marrero MD  Principal Problem:Chest pain    Patient information was obtained from patient, past medical records and ER records.     Consults  Subjective:     Chief Complaint:  Chest pain     HPI:   Mr. Loaiza is a 68 year old male with PMHx of HTN, HLP, SUSU, s/p PPM, Seizures who presented to [facility] due to left sided chest pain which started last PM with associated diaphoresis and nausea. HE reports relief of symptoms with topical NTG. ED workup revealed troponin negative x 3, BNP 79. Hospital medicine called for admission. Cardiology consulted this AM. Patient seen and examined in ED. Denies chest pain on exam today. No shortness of breath, COOPER or palpitations. HE reports noncompliance with CPAP at home lately. NO orthopnea, PND or abdominal bloating. PPM interrogation reveals device at SIMON and needs generator change. ECHO revealed EF 60-65%, -WMA's. Ok to discharge home today per Dr. Boone. Needs OP cardiology and Pulmonary follow up.     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    Aspirin     Celecoxib        No current facility-administered medications on file prior to encounter.      Current Outpatient Medications on File Prior to Encounter   Medication Sig    LACTOBACILLUS ACIDOPHILUS (PROBIOTIC ORAL) Take by mouth once daily.    levetiracetam (KEPPRA) 500 MG Tab Take 1 tablet (500 mg total) by mouth 2 (two) times daily.    multivitamin (ONE DAILY MULTIVITAMIN) per tablet Take 1 tablet by mouth once daily.    pravastatin (PRAVACHOL) 20 MG tablet Take 1 tablet (20 mg total) by mouth once daily.    ketoconazole (NIZORAL) 2 % cream Apply topically once daily. (Patient taking differently: Apply topically as needed. )    naproxen (NAPROSYN) 500 MG tablet Take 1 tablet (500 mg total) by mouth 2 (two) times daily with meals.    [DISCONTINUED] tiZANidine (ZANAFLEX) 4 MG tablet Take 1/2 to 1 tablet at bedtime as needed for muscle spasm     Family History     Problem Relation (Age of Onset)    Colon cancer Father    Hypertension         Tobacco Use    Smoking status: Never Smoker    Smokeless tobacco: Never Used   Substance and Sexual Activity    Alcohol use: No    Drug use: No    Sexual activity: Not on file     Review of Systems   Constitution: Positive for diaphoresis (resolved) and malaise/fatigue. Negative for weakness.   HENT: Negative for hearing loss and hoarse voice.    Eyes: Negative for blurred vision and visual disturbance.   Cardiovascular: Positive for chest pain (resolved). Negative for claudication, dyspnea on exertion, irregular heartbeat, leg swelling, near-syncope, orthopnea, palpitations, paroxysmal nocturnal dyspnea and syncope.   Respiratory: Positive for snoring. Negative for cough, hemoptysis, shortness of breath, sleep disturbances due to breathing and wheezing.         Noncompliance with CPAP   Endocrine: Negative for cold intolerance and heat intolerance.   Hematologic/Lymphatic: Does not bruise/bleed easily.   Skin: Negative for color change,  dry skin and nail changes.   Musculoskeletal: Positive for arthritis. Negative for back pain, joint pain and myalgias.   Gastrointestinal: Negative for bloating, abdominal pain, constipation, nausea and vomiting.   Genitourinary: Negative for dysuria, flank pain, hematuria and hesitancy.   Neurological: Negative for headaches, light-headedness, loss of balance, numbness and paresthesias.   Psychiatric/Behavioral: Negative for altered mental status.   Allergic/Immunologic: Negative for environmental allergies.     Objective:     Vital Signs (Most Recent):  Temp: 97.7 °F (36.5 °C) (01/22/19 1242)  Pulse: 72 (01/22/19 1242)  Resp: 18 (01/22/19 1032)  BP: 109/67 (01/22/19 1242)  SpO2: 96 % (01/22/19 1242) Vital Signs (24h Range):  Temp:  [97.7 °F (36.5 °C)-98.1 °F (36.7 °C)] 97.7 °F (36.5 °C)  Pulse:  [67-84] 72  Resp:  [10-19] 18  SpO2:  [94 %-99 %] 96 %  BP: (107-208)/(61-98) 109/67     Weight: 91.1 kg (200 lb 13.4 oz)  Body mass index is 28.82 kg/m².    SpO2: 96 %  O2 Device (Oxygen Therapy): room air    No intake or output data in the 24 hours ending 01/22/19 1456    Lines/Drains/Airways          None          Physical Exam   Constitutional: He is oriented to person, place, and time. He appears well-developed and well-nourished. No distress.   HENT:   Head: Normocephalic and atraumatic.   Eyes: Pupils are equal, round, and reactive to light.   Neck: Normal range of motion and full passive range of motion without pain. Neck supple. No JVD present.   Cardiovascular: Normal rate, regular rhythm, S1 normal, S2 normal and intact distal pulses. PMI is not displaced. Exam reveals no distant heart sounds.   No murmur heard.  Pulses:       Radial pulses are 2+ on the right side, and 2+ on the left side.        Dorsalis pedis pulses are 2+ on the right side, and 2+ on the left side.   Pulmonary/Chest: Effort normal and breath sounds normal. No respiratory distress. He has no wheezes.   Abdominal: Soft. Bowel sounds are  normal.   Musculoskeletal: Normal range of motion. He exhibits no edema.        Right ankle: He exhibits no swelling.        Left ankle: He exhibits no swelling.   Neurological: He is alert and oriented to person, place, and time.   Skin: Skin is warm and dry. He is not diaphoretic. No cyanosis. Nails show no clubbing.   Psychiatric: He has a normal mood and affect. His speech is normal and behavior is normal. Judgment and thought content normal. Cognition and memory are normal.   Nursing note and vitals reviewed.      Significant Labs:   BMP:   Recent Labs   Lab 01/22/19  0148         K 4.2      CO2 25   BUN 14   CREATININE 1.1   CALCIUM 10.0   , CMP   Recent Labs   Lab 01/22/19  0148      K 4.2      CO2 25      BUN 14   CREATININE 1.1   CALCIUM 10.0   PROT 7.0   ALBUMIN 3.7   BILITOT 0.5   ALKPHOS 83   AST 43*   ALT 43   ANIONGAP 8   ESTGFRAFRICA >60   EGFRNONAA >60   , CBC   Recent Labs   Lab 01/22/19  0148   WBC 6.40   HGB 14.5   HCT 43.6      , Lipid Panel No results for input(s): CHOL, HDL, LDLCALC, TRIG, CHOLHDL in the last 48 hours., Troponin   Recent Labs   Lab 01/22/19  0148 01/22/19  0436 01/22/19  0838   TROPONINI 0.006 0.006 <0.006    and All pertinent lab results from the last 24 hours have been reviewed.    Significant Imaging: Echocardiogram:   2D echo with color flow doppler:   Results for orders placed or performed during the hospital encounter of 01/22/19   2D echo with color flow doppler   Result Value Ref Range    QEF 60 55 - 65    Diastolic Dysfunction No     Aortic Valve Regurgitation TRIVIAL     Est. PA Systolic Pressure 27.46     Mitral Valve Mobility NORMAL     Tricuspid Valve Regurgitation MILD     and X-Ray: CXR: X-Ray Chest 1 View (CXR): No results found for this visit on 01/22/19. and X-Ray Chest PA and Lateral (CXR): No results found for this visit on 01/22/19.    Assessment and Plan:     * Chest pain    Troponin negative x 3  Denies chest  pain on exam  PPM interrogation revealed SIMON, need for generator change  OP follow up with Cardiology and EP  OP follow up with Pulmonary  Continue ASA, Statin           VTE Risk Mitigation (From admission, onward)    None          Thank you for your consult. I will follow-up with patient. Please contact us if you have any additional questions.    Amie Arias NP  Cardiology   [facility]

## 2019-01-22 NOTE — SUBJECTIVE & OBJECTIVE
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    Aspirin     Celecoxib        No current facility-administered medications on file prior to encounter.      Current Outpatient Medications on File Prior to Encounter   Medication Sig    LACTOBACILLUS ACIDOPHILUS (PROBIOTIC ORAL) Take by mouth once daily.    levetiracetam (KEPPRA) 500 MG Tab Take 1 tablet (500 mg total) by mouth 2 (two) times daily.    multivitamin (ONE DAILY MULTIVITAMIN) per tablet Take 1 tablet by mouth once daily.    pravastatin (PRAVACHOL) 20 MG tablet Take 1 tablet (20 mg total) by mouth once daily.    ketoconazole (NIZORAL) 2 % cream Apply topically once daily. (Patient taking differently: Apply topically as needed. )    naproxen (NAPROSYN) 500 MG tablet Take 1 tablet (500 mg total) by mouth 2 (two) times daily with meals.    [DISCONTINUED] tiZANidine (ZANAFLEX) 4 MG tablet Take 1/2 to 1 tablet at bedtime as needed for muscle spasm     Family History     Problem Relation (Age of Onset)    Colon cancer Father    Hypertension         Tobacco Use    Smoking status: Never Smoker    Smokeless tobacco: Never Used   Substance and Sexual Activity    Alcohol use: No    Drug use: No    Sexual activity: Not on file     Review of Systems   Constitutional: Positive for diaphoresis.   HENT: Negative.     Eyes: Negative.    Respiratory: Negative.    Cardiovascular: Positive for chest pain.   Gastrointestinal: Positive for nausea.   Endocrine: Negative.    Genitourinary: Negative.    Musculoskeletal: Negative.    Skin: Negative.    Allergic/Immunologic: Negative.    Neurological: Negative.    Hematological: Negative.    Psychiatric/Behavioral: Negative.      Objective:     Vital Signs (Most Recent):  Temp: 98.1 °F (36.7 °C) (01/22/19 0133)  Pulse: 69 (01/22/19 0531)  Resp: 15 (01/22/19 0531)  BP: 111/77 (01/22/19 0531)  SpO2: 95 % (01/22/19 0531) Vital Signs (24h Range):  Temp:  [98.1 °F (36.7 °C)] 98.1 °F (36.7 °C)  Pulse:  [69-84] 69  Resp:  [10-18] 15  SpO2:  [94 %-99 %] 95 %  BP: (111-208)/(75-98) 111/77     Weight: 91.1 kg (200 lb 13.4 oz)  Body mass index is 28.82 kg/m².    Physical Exam   Constitutional: He is oriented to person, place, and time. He appears well-developed and well-nourished.   HENT:   Head: Normocephalic and atraumatic.   Right Ear: External ear normal.   Left Ear: External ear normal.   Nose: Nose normal.   Mouth/Throat: Oropharynx is clear and moist.   Eyes: Conjunctivae and EOM are normal. Pupils are equal, round, and reactive to light.   Neck: Normal range of motion. Neck supple.   Cardiovascular: Normal rate, regular rhythm, normal heart sounds and intact distal pulses.   Pulmonary/Chest: Effort normal and breath sounds normal.   Abdominal: Soft. Bowel sounds are normal.   Genitourinary:   Genitourinary Comments: deferred   Musculoskeletal: Normal range of motion.   Neurological: He is alert and oriented to person, place, and time.   Skin: Skin is warm and dry. Capillary refill takes less than 2 seconds.   Psychiatric: He has a normal mood and affect.   Nursing note and vitals reviewed.        CRANIAL NERVES     CN III, IV, VI   Pupils are equal, round, and reactive to light.  Extraocular motions are normal.        Significant Labs:   Recent Lab Results       01/22/19  0439    01/22/19  0148        Albumin   3.7     Alkaline Phosphatase   83     ALT   43     Anion Gap   8     AST   43     Baso #   0.02     Basophil%   0.3     Total Bilirubin   0.5  Comment:  For infants and newborns, interpretation of results should be based  on gestational age, weight and in agreement with clinical  observations.  Premature Infant recommended reference ranges:  Up to 24 hours.............<8.0 mg/dL  Up to 48 hours............<12.0 mg/dL  3-5 days..................<15.0 mg/dL  6-29 days.................<15.0 mg/dL       BNP   79  Comment:  Values of less than 100 pg/ml are consistent with non-CHF populations.     BUN, Bld   14     Calcium   10.0     Chloride   105     CO2   25     Creatinine   1.1     Differential Method   Automated     eGFR if    >60     eGFR if non    >60  Comment:  Calculation used to obtain the estimated glomerular filtration  rate (eGFR) is the CKD-EPI equation.        Eos #   0.2     Eosinophil%   3.1     Glucose   100     Gran # (ANC)   3.3     Gran%   51.3     Hematocrit   43.6     Hemoglobin   14.5     Lymph #   2.2     Lymph%   33.9     MCH   27.4     MCHC   33.3     MCV   82     Mono #   0.7     Mono%   11.4     MPV   10.2     Platelets   205     Potassium   4.2     Total Protein   7.0     RBC   5.29     RDW   14.0     Sodium   138     Troponin I 0.006  Comment:  The reference interval for Troponin I represents the 99th percentile   cutoff   for our facility and is consistent with 3rd generation assay   performance.   0.006  Comment:  The reference interval for Troponin I represents the 99th percentile   cutoff   for our facility and is consistent with 3rd generation assay   performance.       WBC   6.40           Significant Imaging:   Imaging Results          X-Ray Chest AP Portable (In process)

## 2019-01-22 NOTE — ED NOTES
Pt states that he takes his pravastatin in the evenings and does not want to take it at this time.

## 2019-01-22 NOTE — ED PROVIDER NOTES
"SCRIBE #1 NOTE: I, Laurel Monroe, am scribing for, and in the presence of, Moises Mina MD. I have scribed the entire note.       History     Chief Complaint   Patient presents with    Chest Pain     Review of patient's allergies indicates:   Allergen Reactions    Nsaids (non-steroidal anti-inflammatory drug)      Caused GI bleeding.    Aspirin     Celecoxib          History of Present Illness     HPI    1/22/2019, 1:40 AM  History obtained from the patient      History of Present Illness: Adal Loaiza Jr. is a 68 y.o. male patient with a PMHx of HLD and seizures who presents to the Emergency Department for evaluation of CP radiating to L arm which onset gradually last night. Pt states '"I thought it was indigestion". Symptoms are constant and moderate in severity.  No mitigating or exacerbating factors reported. Associated sxs include diaphoresis. Patient denies any fever, chills, leg swelling, N/V, palpitations, SOB, dizziness, cough, and all other sxs at this time. No prior Tx. No further complaints or concerns at this time.       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 END[facility]    Gamma knife      for schwannoma         Family History:  Family History   Problem Relation Age of Onset    Colon cancer Father     Hypertension Unknown        Social History:  Social History     Tobacco Use    Smoking status: Never Smoker    " Smokeless tobacco: Never Used   Substance and Sexual Activity    Alcohol use: No    Drug use: No    Sexual activity: Unknown        Review of Systems     Review of Systems   Constitutional: Positive for diaphoresis. Negative for chills and fever.   HENT: Negative for sore throat.    Respiratory: Negative for cough and shortness of breath.    Cardiovascular: Positive for chest pain (radiating to L arm). Negative for palpitations and leg swelling.   Gastrointestinal: Negative for nausea and vomiting.   Genitourinary: Negative for dysuria.   Musculoskeletal: Negative for back pain.   Skin: Negative for rash.   Neurological: Negative for dizziness and weakness.   Hematological: Does not bruise/bleed easily.   All other systems reviewed and are negative.       Physical Exam     Initial Vitals [01/22/19 0133]   BP Pulse Resp Temp SpO2   (!) 208/98 84 18 98.1 °F (36.7 °C) 98 %      MAP       --          Physical Exam  Nursing Notes and Vital Signs Reviewed.  Constitutional: Patient is in no acute distress. Well-developed and well-nourished.  Head: Atraumatic. Normocephalic.  Eyes: PERRL. EOM intact. Conjunctivae are not pale. No scleral icterus.  ENT: Mucous membranes are moist. Oropharynx is clear and symmetric.    Neck: Supple. Full ROM. No lymphadenopathy.  Cardiovascular: Regular rate. Regular rhythm. No murmurs, rubs, or gallops. Distal pulses are 2+ and symmetric.  Pulmonary/Chest: No respiratory distress. Clear to auscultation bilaterally. No wheezing or rales. Pacemaker to L side.  Abdominal: Soft and non-distended.  There is no tenderness.  No rebound, guarding, or rigidity. Good bowel sounds.  Genitourinary: No CVA tenderness  Musculoskeletal: Moves all extremities. No obvious deformities. No edema. No calf tenderness.  Skin: Warm and dry.  Neurological:  Alert, awake, and appropriate.  Normal speech.  No acute focal neurological deficits are appreciated.  Psychiatric: Normal affect. Good eye contact.  Appropriate in content.     ED Course   Procedures  ED Vital Signs:  Vitals:    01/22/19 0146 01/22/19 0153 01/22/19 0202 01/22/19 0221   BP: (!) 151/92  (!) 166/91 (!) 137/97   Pulse: 70 72 74 77   Resp: 14  10 12   Temp:       TempSrc:       SpO2: 99%  97% 95%   Weight:       Height:        01/22/19 0251 01/22/19 0312 01/22/19 0451 01/22/19 0531   BP: (!) 137/91 139/86 138/75 111/77   Pulse: 81 77 74 69   Resp: 18 13 10 15   Temp:       TempSrc:       SpO2: 95% (!) 94% 95% 95%   Weight:       Height:        01/22/19 0702 01/22/19 0709 01/22/19 0911 01/22/19 1032   BP: 116/69  115/76 107/79   Pulse: 74  72 67   Resp: 18  19 18   Temp:  97.7 °F (36.5 °C)     TempSrc:  Oral     SpO2: 95%  97% 96%   Weight:       Height:        01/22/19 1039 01/22/19 1123 01/22/19 1242   BP: 113/69 116/61 109/67   Pulse:  67 72   Resp:      Temp:   97.7 °F (36.5 °C)   TempSrc:   Oral   SpO2: 98% 98% 96%   Weight:      Height:          Abnormal Lab Results:  Labs Reviewed   COMPREHENSIVE METABOLIC PANEL - Abnormal; Notable for the following components:       Result Value    AST 43 (*)     All other components within normal limits   CBC W/ AUTO DIFFERENTIAL   TROPONIN I   TROPONIN I   B-TYPE NATRIURETIC PEPTIDE   TROPONIN I        All Lab Results:  Results for orders placed or performed during the hospital encounter of 01/22/19   CBC auto differential   Result Value Ref Range    WBC 6.40 3.90 - 12.70 K/uL    RBC 5.29 4.60 - 6.20 M/uL    Hemoglobin 14.5 14.0 - 18.0 g/dL    Hematocrit 43.6 40.0 - 54.0 %    MCV 82 82 - 98 fL    MCH 27.4 27.0 - 31.0 pg    MCHC 33.3 32.0 - 36.0 g/dL    RDW 14.0 11.5 - 14.5 %    Platelets 205 150 - 350 K/uL    MPV 10.2 9.2 - 12.9 fL    Gran # (ANC) 3.3 1.8 - 7.7 K/uL    Lymph # 2.2 1.0 - 4.8 K/uL    Mono # 0.7 0.3 - 1.0 K/uL    Eos # 0.2 0.0 - 0.5 K/uL    Baso # 0.02 0.00 - 0.20 K/uL    Gran% 51.3 38.0 - 73.0 %    Lymph% 33.9 18.0 - 48.0 %    Mono% 11.4 4.0 - 15.0 %    Eosinophil% 3.1 0.0 - 8.0 %    Basophil%  0.3 0.0 - 1.9 %    Differential Method Automated    Comprehensive metabolic panel   Result Value Ref Range    Sodium 138 136 - 145 mmol/L    Potassium 4.2 3.5 - 5.1 mmol/L    Chloride 105 95 - 110 mmol/L    CO2 25 23 - 29 mmol/L    Glucose 100 70 - 110 mg/dL    BUN, Bld 14 8 - 23 mg/dL    Creatinine 1.1 0.5 - 1.4 mg/dL    Calcium 10.0 8.7 - 10.5 mg/dL    Total Protein 7.0 6.0 - 8.4 g/dL    Albumin 3.7 3.5 - 5.2 g/dL    Total Bilirubin 0.5 0.1 - 1.0 mg/dL    Alkaline Phosphatase 83 55 - 135 U/L    AST 43 (H) 10 - 40 U/L    ALT 43 10 - 44 U/L    Anion Gap 8 8 - 16 mmol/L    eGFR if African American >60 >60 mL/min/1.73 m^2    eGFR if non African American >60 >60 mL/min/1.73 m^2   Troponin I #1   Result Value Ref Range    Troponin I 0.006 0.000 - 0.026 ng/mL   Troponin I #2   Result Value Ref Range    Troponin I 0.006 0.000 - 0.026 ng/mL   B-Type natriuretic peptide (BNP)   Result Value Ref Range    BNP 79 0 - 99 pg/mL   Troponin I   Result Value Ref Range    Troponin I <0.006 0.000 - 0.026 ng/mL   2D echo with color flow doppler   Result Value Ref Range    QEF 60 55 - 65    Diastolic Dysfunction No     Aortic Valve Regurgitation TRIVIAL     Est. PA Systolic Pressure 27.46     Mitral Valve Mobility NORMAL     Tricuspid Valve Regurgitation MILD          Imaging Results:  Imaging Results          X-Ray Chest AP Portable (Final result)  Result time 01/22/19 08:16:34    Final result by Neal Garg MD (01/22/19 08:16:34)                 Impression:      Moderate hiatal hernia.      Electronically signed by: Neal Garg MD  Date:    01/22/2019  Time:    08:16             Narrative:    EXAMINATION:  XR CHEST AP PORTABLE    CLINICAL HISTORY:  Chest Pain;    FINDINGS:  Single view of the chest.  Left-sided pacing device demonstrates similar configuration.  Moderate hiatal hernia is noted.    Cardiac silhouette is normal.  The lungs demonstrate no evidence of active disease.  No evidence of pleural effusion or  pneumothorax.  Bones demonstrate degenerative changes.                               Per ED physician, pt's CXR results unremarkable.    The EKG was ordered, reviewed, and independently interpreted by the ED provider.  Interpretation time: 0140  Rate: 78 BPM  Rhythm: normal sinus rhythm  Interpretation: Possible anterior infarct, age undetermined. No STEMI.           The Emergency Provider reviewed the vital signs and test results, which are outlined above.     ED Discussion     3:55 AM: Discussed case with Neal Gould NP ([facility]). Dr. Deluca agrees with current care and management of pt and accepts admission.   Admitting Service: Hospital medicine   Admitting Physician: Dr. Deluca  Admit to: Obs Tele    3:58 AM: Re-evaluated pt. I have discussed test results, shared treatment plan, and the need for admission with patient and family at bedside. Pt and family express understanding at this time and agree with all information. All questions answered. Pt and family have no further questions or concerns at this time. Pt is ready for admit.      ED Medication(s):  Medications   nitroGLYCERIN 2% TD oint ointment 0.5 inch (0.5 inches Transdermal Given 1/22/19 0209)             Medical Decision Making:   Clinical Tests:   Lab Tests: Ordered and Reviewed  Radiological Study: Ordered and Reviewed  Medical Tests: Ordered and Reviewed             Scribe Attestation:   Scribe #1: I performed the above scribed service and the documentation accurately describes the services I performed. I attest to the accuracy of the note.     Attending:   Physician Attestation Statement for Scribe #1: I, Moises Mina MD, personally performed the services described in this documentation, as scribed by Laurel Monroe, in my presence, and it is both accurate and complete.           Clinical Impression       ICD-10-CM ICD-9-CM   1. Chest pain R07.9 786.50       Disposition:   Disposition: Placed in Observation  Condition: Fair          Moises Mina MD  02/01/19 1909

## 2019-01-22 NOTE — ED NOTES
Pt lying in bed in NAD, RR equal and unlabored. Bed is low, locked, and call light in reach. Side rails up x 2. Family at bedside

## 2019-01-22 NOTE — HPI
Mr. Loaiza is a 67 yo WM who is fairly healthy who presented to the ER complaining of left side chest pain which he describes as pressure with radiation to his neck. His symptoms started earlier last night prior to eating where he felt like he had indigestion and later states he became nauseated and diaphoretic. He rated pain 10/10 and his symptoms were relieved with topical nitro. He denies any previous history of chest pain or known family history of any cardiac issues.

## 2019-01-23 ENCOUNTER — TELEPHONE (OUTPATIENT)
Dept: CARDIOLOGY | Facility: CLINIC | Age: 69
End: 2019-01-23

## 2019-01-23 ENCOUNTER — OFFICE VISIT (OUTPATIENT)
Dept: PHYSICAL MEDICINE AND REHAB | Facility: CLINIC | Age: 69
End: 2019-01-23
Payer: MEDICARE

## 2019-01-23 VITALS
BODY MASS INDEX: 28.63 KG/M2 | HEART RATE: 96 BPM | WEIGHT: 200 LBS | DIASTOLIC BLOOD PRESSURE: 84 MMHG | RESPIRATION RATE: 14 BRPM | HEIGHT: 70 IN | SYSTOLIC BLOOD PRESSURE: 139 MMHG

## 2019-01-23 DIAGNOSIS — M79.18 MYOFASCIAL PAIN: Primary | ICD-10-CM

## 2019-01-23 PROCEDURE — 99213 OFFICE O/P EST LOW 20 MIN: CPT | Mod: PBBFAC,PN | Performed by: PHYSICAL MEDICINE & REHABILITATION

## 2019-01-23 PROCEDURE — 99999 PR PBB SHADOW E&M-EST. PATIENT-LVL III: CPT | Mod: PBBFAC,,, | Performed by: PHYSICAL MEDICINE & REHABILITATION

## 2019-01-23 PROCEDURE — 99204 OFFICE O/P NEW MOD 45 MIN: CPT | Mod: S$PBB,,, | Performed by: PHYSICAL MEDICINE & REHABILITATION

## 2019-01-23 PROCEDURE — 99204 PR OFFICE/OUTPT VISIT, NEW, LEVL IV, 45-59 MIN: ICD-10-PCS | Mod: S$PBB,,, | Performed by: PHYSICAL MEDICINE & REHABILITATION

## 2019-01-23 PROCEDURE — 99999 PR PBB SHADOW E&M-EST. PATIENT-LVL III: ICD-10-PCS | Mod: PBBFAC,,, | Performed by: PHYSICAL MEDICINE & REHABILITATION

## 2019-01-23 NOTE — TELEPHONE ENCOUNTER
Returned call to patient--informed that per Amie Arias NP an appointment needs to be scheduled with Dr. Irby for a two week hospital follow up--appointment has been scheduled--patient has been given all appointment information

## 2019-01-23 NOTE — TELEPHONE ENCOUNTER
----- Message from Amie Arias NP sent at 1/23/2019  7:54 AM CST -----  Please call patient    He needs hospital follow up with myself or Dr. Boone and follow up with Pulmonary within next 2 weeks( Dr. Irby)    Thanks  Amie Viveros Back

## 2019-01-23 NOTE — TELEPHONE ENCOUNTER
Pt is already scheduled to see Dr. Boone next week 1/29/19.      I called pt to schedule appt with Dr. Patel no answer left message for pt to call back.

## 2019-01-23 NOTE — HOSPITAL COURSE
Mr Loaiza is a 68 year old male with PMHx Pacemaker placement who presented to [facility] Emergency Room with complaints of chest pain of sudden onset. Chest pain was to the left side of chest radiating to neck. Troponin negative X 3. 2D Echo showed Normal left ventricular systolic function (EF 60-65%) and normal left ventricular diastolic function.   PPM was interrogated and showed near SIMON . Cardiology consulted,  recommend outpatient follow up with EP, Dr Miller and Dr Bauer, ok to discharge. Pt will no complaints of chest pain or shortness of breath. He was seen, examined and deemed suitable for discharge.

## 2019-01-23 NOTE — PROGRESS NOTES
PM&R CLINIC NOTE   Chief Complaint   Patient presents with    Neck Pain    Shoulder Pain     right, to the arm     HPI: This is a 68 y.o.  male being seen in clinic today for follow up of neck and shoulder pain.  He reports increases symptoms again lately-worse with increased arm usage.  He has tightness up to his head as well.  History obtained from patient    Review of Systems:     General- denies lethargy, weight change, fever, chills  Head/neck- denies swallowing difficulties  ENT- denies hearing changes  Cardiovascular-denies chest pain  Pulmonary- denies shortness of breath  GI- denies constipation or bowel incontinence  - denies bladder incontinence  Skin- denies wounds or rashes  Musculoskeletal- denies weakness, +pain  Neurologic- denies numbness and tingling  Psychiatric- denies depressive or psychotic features, denies anxiety  Lymphatic-denies swelling  Endocrine- denies hypoglycemic symptoms/DM history    Physical Examination:  General: Well developed, well nourished male, NAD  HEENT: NCAT EOMI  Pulmonary: Normal respirations      Spinal Examination: CERVICAL  Active ROM is within normal limits.  Inspection: No deformity of spinal alignment.  Palpation:   Tight and tender band in right trapezius, palpable band, tight at splenius capitus as well    Spinal Examination: LUMBAR or THORACIC  Active ROM is within normal limits.  Inspection: No deformity of spinal alignment.      Bilateral Upper and Lower Extremities:  Shoulder/Elbow/Wrist/Hand ROM wnl except mild rot cuff weakness on right. Still  ttp at right wrist -medial aspect near ulnar  knee/Ankle ROM wnl  Bilateral Extremities show normal capillary refill.  No signs of cyanosis, rubor, edema, skin changes, or dysvascular changes of appendages.  Nails appear intact.    Neurological Exam:  Cranial Nerves:  II-XII grossly intact    Manual Muscle Testing: (Motor 5=normal)    RIGHT Upper extremity: Shoulder abduction 5/5, Biceps 5/5, Triceps 5/5, Wrist  extension 5/5, Abductor pollicis brevis 5/5, Ulnar hand intrinsics 5/5,  LEFT Upper extremity: Shoulder abduction 5/5, Biceps 5/5, Triceps 5/5, Wrist extension 5/5, Abductor pollicis brevis 5/5, Ulnar hand intrinsics 5/5,    No focal atrophy is noted of either upper or lower extremity.    Gait: Narrow base and good arm swing.    IMPRESSION/PLAN: This is a 68 y.o.  male with chronic myofascial pain    1. Reorder PT (lewy)-Myofascial release, stretch, ROM, modalities, strengthening  2. Continue NSAIDs prn  3. Ice/heat modalities  4. Fu prn      Sadaf Huerta M.D.  Physical Medicine and Rehab

## 2019-01-23 NOTE — DISCHARGE SUMMARY
[facility]  Hospital Medicine  Discharge Summary      Patient Name: dAal Loaiza Jr.  MRN: [id number]  Admission Date: 1/22/2019  Hospital Length of Stay: 0 days  Discharge Date and Time: 1/22/2019 12:58 PM  Attending Physician: No att. providers found   Discharging Provider: Mic Lucas NP  Primary Care Provider: Huseyin Marrero MD      HPI:   Mr. Loaiza is a 67 yo WM who is fairly healthy who presented to the ER complaining of left side chest pain which he describes as pressure with radiation to his neck. His symptoms started earlier last night prior to eating where he felt like he had indigestion and later states he became nauseated and diaphoretic. He rated pain 10/10 and his symptoms were relieved with topical nitro. He denies any previous history of chest pain or known family history of any cardiac issues.    * No surgery found *      Hospital Course:   Mr Loaiza is a 68 year old male with PMHx Pacemaker placement who presented to [facility] Emergency Room with complaints of chest pain of sudden onset. Chest pain was to the left side of chest radiating to neck. Troponin negative X 3. 2D Echo showed Normal left ventricular systolic function (EF 60-65%) and normal left ventricular diastolic function.   PPM was interrogated and showed near SIMON . Cardiology consulted,  recommend outpatient follow up with EP, Dr Miller and Dr Bauer, ok to discharge. Pt will no complaints of chest pain or shortness of breath. He was seen, examined and deemed suitable for discharge.      Consults:     No new Assessment & Plan notes have been filed under this hospital service since the last note was generated.  Service: Hospital Medicine    Final Active Diagnoses:    Diagnosis Date Noted POA    PRINCIPAL PROBLEM:  Chest pain [R07.9] 01/22/2019 Yes      Problems Resolved During this Admission:       Discharged Condition: stable    Disposition: Home or Self Care    Follow Up:  Follow-up Information     Gen SCHROEDER  MD Susana. Schedule an appointment as soon as possible for a visit in 1 day.    Specialties:  Electrophysiology, Cardiology  Why:  hospital follow up   Contact information:  Radha Concepcion  [facility] [address] [phone]             Kishor Boone Md, MD. Schedule an appointment as soon as possible for a visit in 2 days.    Specialties:  Cardiology, Internal Medicine  Why:  hospital follow up   Contact information:  [address]  [place] [address] [phone]                 Patient Instructions:      Diet Cardiac     Notify your health care provider if you experience any of the following:  persistent nausea and vomiting or diarrhea     Notify your health care provider if you experience any of the following:  severe uncontrolled pain     Activity as tolerated       Significant Diagnostic Studies: Labs:   CMP   Recent Labs   Lab 01/22/19 0148      K 4.2      CO2 25      BUN 14   CREATININE 1.1   CALCIUM 10.0   PROT 7.0   ALBUMIN 3.7   BILITOT 0.5   ALKPHOS 83   AST 43*   ALT 43   ANIONGAP 8   ESTGFRAFRICA >60   EGFRNONAA >60    and CBC   Recent Labs   Lab 01/22/19 0148   WBC 6.40   HGB 14.5   HCT 43.6          Pending Diagnostic Studies:     None         Medications:  Reconciled Home Medications:      Medication List      CHANGE how you take these medications    ketoconazole 2 % cream  Commonly known as:  NIZORAL  Apply topically once daily.  What changed:    · when to take this  · reasons to take this        CONTINUE taking these medications    levETIRAcetam 500 MG Tab  Commonly known as:  KEPPRA  Take 1 tablet (500 mg total) by mouth 2 (two) times daily.     naproxen 500 MG tablet  Commonly known as:  NAPROSYN  Take 1 tablet (500 mg total) by mouth 2 (two) times daily with meals.     ONE DAILY MULTIVITAMIN per tablet  Generic drug:  multivitamin  Take 1 tablet by mouth once daily.     pravastatin 20 MG tablet  Commonly known as:  PRAVACHOL  Take 1 tablet (20 mg total) by  mouth once daily.     PROBIOTIC ORAL  Take by mouth once daily.            Indwelling Lines/Drains at time of discharge:   Lines/Drains/Airways          None          Time spent on the discharge of patient: 40 minutes  Patient was seen and examined on the date of discharge and determined to be suitable for discharge.         Mic Lucas NP  Department of Hospital Medicine  [facility]

## 2019-01-23 NOTE — TELEPHONE ENCOUNTER
----- Message from Sonja Cardenas sent at 1/23/2019  9:49 AM CST -----  Contact: Patient  Patient returned call. He can be contacted at [phone].    Thanks,  Sonja

## 2019-01-29 ENCOUNTER — OFFICE VISIT (OUTPATIENT)
Dept: CARDIOLOGY | Facility: CLINIC | Age: 69
End: 2019-01-29
Payer: MEDICARE

## 2019-01-29 VITALS
HEART RATE: 92 BPM | HEIGHT: 70 IN | BODY MASS INDEX: 28.5 KG/M2 | SYSTOLIC BLOOD PRESSURE: 130 MMHG | WEIGHT: 199.06 LBS | DIASTOLIC BLOOD PRESSURE: 80 MMHG

## 2019-01-29 DIAGNOSIS — Z95.0 CARDIAC PACEMAKER IN SITU: Chronic | ICD-10-CM

## 2019-01-29 DIAGNOSIS — Z45.010 PACEMAKER AT END OF BATTERY LIFE: ICD-10-CM

## 2019-01-29 DIAGNOSIS — H91.90 HEARING LOSS, UNSPECIFIED HEARING LOSS TYPE, UNSPECIFIED LATERALITY: ICD-10-CM

## 2019-01-29 DIAGNOSIS — T82.110A FAILURE OF PACEMAKER LEAD, INITIAL ENCOUNTER: ICD-10-CM

## 2019-01-29 DIAGNOSIS — G47.39 OTHER SLEEP APNEA: Chronic | ICD-10-CM

## 2019-01-29 DIAGNOSIS — E78.49 OTHER HYPERLIPIDEMIA: ICD-10-CM

## 2019-01-29 DIAGNOSIS — R07.89 OTHER CHEST PAIN: Primary | ICD-10-CM

## 2019-01-29 PROCEDURE — 99214 PR OFFICE/OUTPT VISIT, EST, LEVL IV, 30-39 MIN: ICD-10-PCS | Mod: S$PBB,,, | Performed by: INTERNAL MEDICINE

## 2019-01-29 PROCEDURE — 99214 OFFICE O/P EST MOD 30 MIN: CPT | Mod: PBBFAC | Performed by: INTERNAL MEDICINE

## 2019-01-29 PROCEDURE — 99214 OFFICE O/P EST MOD 30 MIN: CPT | Mod: S$PBB,,, | Performed by: INTERNAL MEDICINE

## 2019-01-29 PROCEDURE — 99999 PR PBB SHADOW E&M-EST. PATIENT-LVL IV: ICD-10-PCS | Mod: PBBFAC,,, | Performed by: INTERNAL MEDICINE

## 2019-01-29 PROCEDURE — 99999 PR PBB SHADOW E&M-EST. PATIENT-LVL IV: CPT | Mod: PBBFAC,,, | Performed by: INTERNAL MEDICINE

## 2019-01-29 NOTE — PROGRESS NOTES
Subjective:   Patient ID:  Adal Loaiza Jr. is a 68 y.o. male who presents for cardiac consult of Chest Pain (hosp f/u)      HPI  The patient came in today for cardiac consult of Chest Pain (hosp f/u)    1/29/19  68 year old male with HTN, HLP, SUSU, s/p PPM, Seizures who presented to [facility]- due to left sided chest pain last week which started with associated diaphoresis and nausea. He reported relief of symptoms with topical NTG. ED workup revealed troponin negative x 3, BNP 79. Hospital medicine called for admission. No shortness of breath, COOPER or palpitations. HE reports noncompliance with CPAP at home lately. NO orthopnea, PND or abdominal bloating. PPM interrogation reveals device at SIMON and needs generator change. ECHO revealed EF 60-65%, -WMA's.     He initially had PPM implanted after brain surgery. Has occasional light chest pain, CXR shows moderate hiatal hernia. PT also has a significant cough.     Patient feels no sob, no leg swelling, no PND, no palpitation, no dizziness, no syncope, no CNS symptoms.    Patient is compliant with medications.      2D ECHO 01/22/2019   CONCLUSIONS     1 - Normal left ventricular systolic function (EF 60-65%).     2 - Normal left ventricular diastolic function.     3 - Normal right ventricular systolic function .     4 - No wall motion abnormalities.     5 - Concentric hypertrophy.     6 - The estimated PA systolic pressure is greater than 27 mmHg.     7 - Trivial aortic regurgitation.     8 - Mild tricuspid regurgitation.               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    Seizures     because of brain tumor    Sleep apnea        Past Surgical History:   Procedure Laterality Date    BRAIN SURGERY      CARDIAC PACEMAKER PLACEMENT       COLONOSCOPY N/A 8/25/2016    Performed by [facility]guin [facility]    Gamma knife      for schwannoma       Social History     Tobacco Use    Smoking status: Never Smoker    Smokeless tobacco: Never Used   Substance Use Topics    Alcohol use: No    Drug use: No       Family History   Problem Relation Age of Onset    Colon cancer Father     Hypertension Unknown           Medication List           Accurate as of 1/29/19  9:15 AM. If you have any questions, ask your nurse or doctor.               CHANGE how you take these medications    ketoconazole 2 % cream  Commonly known as:  NIZORAL  Apply topically once daily.  What changed:    · when to take this  · reasons to take this        CONTINUE taking these medications    levETIRAcetam 500 MG Tab  Commonly known as:  KEPPRA  Take 1 tablet (500 mg total) by mouth 2 (two) times daily.     ONE DAILY MULTIVITAMIN per tablet  Generic drug:  multivitamin     pravastatin 20 MG tablet  Commonly known as:  PRAVACHOL  Take 1 tablet (20 mg total) by mouth once daily.     PROBIOTIC ORAL        STOP taking these medications    naproxen 500 MG tablet  Commonly known as:  NAPROSYN  Stopped by:  Kishor Boone Md, MD            Review of Systems   Constitutional: Negative.    HENT: Negative.    Eyes: Negative.    Respiratory: Positive for cough. Negative for shortness of breath.    Cardiovascular: Positive for chest pain. Negative for palpitations.   Gastrointestinal: Positive for abdominal pain.   Genitourinary: Negative.    Musculoskeletal: Negative.    Skin: Negative.    Neurological: Negative.    Endo/Heme/Allergies: Negative.    Psychiatric/Behavioral: Negative.    All 12 systems otherwise negative.      Wt Readings from Last 3 Encounters:   01/29/19 90.3 kg (199 lb 1.2 oz)   01/23/19 90.7 kg (200 lb)   01/22/19 91.1 kg (200 lb 13.4 oz)     Temp Readings from Last 3 Encounters:   01/22/19 97.7 °F (36.5 °C) (Oral)   12/28/18 98.1 °F (36.7 °C) (Oral)   05/02/18 97.8  "°F (36.6 °C) (Tympanic)     BP Readings from Last 3 Encounters:   01/29/19 130/80   01/23/19 139/84   01/22/19 109/67     Pulse Readings from Last 3 Encounters:   01/29/19 92   01/23/19 96   01/22/19 72       /80 (BP Method: Large (Manual))   Pulse 92   Ht 5' 10" (1.778 m)   Wt 90.3 kg (199 lb 1.2 oz)   BMI 28.56 kg/m²     Objective:   Physical Exam   Constitutional: He is oriented to person, place, and time. He appears well-developed and well-nourished. No distress.   HENT:   Head: Normocephalic and atraumatic.   Nose: Nose normal.   Mouth/Throat: Oropharynx is clear and moist.   Eyes: Conjunctivae and EOM are normal. No scleral icterus.   Neck: Normal range of motion. Neck supple. No JVD present. No thyromegaly present.   Cardiovascular: Normal rate, regular rhythm, S1 normal and S2 normal. Exam reveals no gallop, no S3, no S4 and no friction rub.   No murmur heard.  Pulmonary/Chest: Effort normal and breath sounds normal. No stridor. No respiratory distress. He has no wheezes. He has no rales. He exhibits no tenderness.   Abdominal: Soft. Bowel sounds are normal. He exhibits no distension and no mass. There is no tenderness. There is no rebound.   Genitourinary:   Genitourinary Comments: Deferred   Musculoskeletal: Normal range of motion. He exhibits no edema, tenderness or deformity.   Lymphadenopathy:     He has no cervical adenopathy.   Neurological: He is alert and oriented to person, place, and time. He exhibits normal muscle tone. Coordination normal.   Skin: Skin is warm and dry. No rash noted. He is not diaphoretic. No erythema. No pallor.   Psychiatric: He has a normal mood and affect. His behavior is normal. Judgment and thought content normal.   Nursing note and vitals reviewed.      Lab Results   Component Value Date     01/22/2019    K 4.2 01/22/2019     01/22/2019    CO2 25 01/22/2019    BUN 14 01/22/2019    CREATININE 1.1 01/22/2019     01/22/2019    AST 43 (H) " 01/22/2019    ALT 43 01/22/2019    ALBUMIN 3.7 01/22/2019    PROT 7.0 01/22/2019    BILITOT 0.5 01/22/2019    WBC 6.40 01/22/2019    HGB 14.5 01/22/2019    HCT 43.6 01/22/2019    MCV 82 01/22/2019     01/22/2019    INR 1.0 10/12/2012    TSH 2.495 04/24/2018    CHOL 175 04/24/2018    HDL 51 04/24/2018    LDLCALC 106.6 04/24/2018    TRIG 87 04/24/2018    BNP 79 01/22/2019     Assessment:      1. Other chest pain    2. Hearing loss, unspecified hearing loss type, unspecified laterality    3. Cardiac pacemaker in situ    4. Other hyperlipidemia    5. Failure of pacemaker lead, initial encounter    6. Other sleep apnea    7. Pacemaker at end of battery life        Plan:   1. Chest pain, atypical  - neg enzymes and ECHO  - discussed stress test after PPM and cough resolve  - hernia can be causing atypical referred pain - discuss with PCP    2. Pacemaker, end of life  - Dr. Meza appt tomorrow   - no pacing needed while in hosp    3. SUSU  - refer for sleep study    4. Hearing loss  - ENT referreal    5. HLD  - cont statin    Thank you for allowing me to participate in this patient's care. Please do not hesitate to contact me with any questions or concerns. Consult note has been forwarded to the referral physician.

## 2019-01-30 ENCOUNTER — OFFICE VISIT (OUTPATIENT)
Dept: CARDIOLOGY | Facility: CLINIC | Age: 69
End: 2019-01-30
Payer: MEDICARE

## 2019-01-30 ENCOUNTER — CLINICAL SUPPORT (OUTPATIENT)
Dept: CARDIOLOGY | Facility: CLINIC | Age: 69
End: 2019-01-30
Payer: MEDICARE

## 2019-01-30 ENCOUNTER — OFFICE VISIT (OUTPATIENT)
Dept: OTOLARYNGOLOGY | Facility: CLINIC | Age: 69
End: 2019-01-30
Payer: MEDICARE

## 2019-01-30 ENCOUNTER — CLINICAL SUPPORT (OUTPATIENT)
Dept: AUDIOLOGY | Facility: CLINIC | Age: 69
End: 2019-01-30
Payer: MEDICARE

## 2019-01-30 VITALS
HEART RATE: 76 BPM | HEIGHT: 70 IN | DIASTOLIC BLOOD PRESSURE: 84 MMHG | TEMPERATURE: 98 F | WEIGHT: 199.06 LBS | BODY MASS INDEX: 28.5 KG/M2 | SYSTOLIC BLOOD PRESSURE: 137 MMHG

## 2019-01-30 VITALS
SYSTOLIC BLOOD PRESSURE: 142 MMHG | HEART RATE: 88 BPM | BODY MASS INDEX: 28.5 KG/M2 | DIASTOLIC BLOOD PRESSURE: 86 MMHG | HEIGHT: 70 IN | WEIGHT: 199.06 LBS

## 2019-01-30 DIAGNOSIS — R56.1 SEIZURES, POST-TRAUMATIC: Chronic | ICD-10-CM

## 2019-01-30 DIAGNOSIS — Z95.0 CARDIAC PACEMAKER IN SITU: ICD-10-CM

## 2019-01-30 DIAGNOSIS — T82.110A FAILURE OF PACEMAKER LEAD, INITIAL ENCOUNTER: ICD-10-CM

## 2019-01-30 DIAGNOSIS — H69.92 ETD (EUSTACHIAN TUBE DYSFUNCTION), LEFT: ICD-10-CM

## 2019-01-30 DIAGNOSIS — J20.8 VIRAL BRONCHITIS: ICD-10-CM

## 2019-01-30 DIAGNOSIS — E78.49 OTHER HYPERLIPIDEMIA: ICD-10-CM

## 2019-01-30 DIAGNOSIS — R00.1 BRADYCARDIA, SEVERE SINUS: ICD-10-CM

## 2019-01-30 DIAGNOSIS — H90.3 SENSORINEURAL HEARING LOSS, BILATERAL: ICD-10-CM

## 2019-01-30 DIAGNOSIS — G47.39 OTHER SLEEP APNEA: Chronic | ICD-10-CM

## 2019-01-30 DIAGNOSIS — H90.3 SENSORINEURAL HEARING LOSS (SNHL) OF BOTH EARS: Primary | ICD-10-CM

## 2019-01-30 DIAGNOSIS — Z45.010 PACEMAKER AT END OF BATTERY LIFE: Primary | ICD-10-CM

## 2019-01-30 DIAGNOSIS — H69.92 CHRONIC EUSTACHIAN TUBE DYSFUNCTION, LEFT: Primary | ICD-10-CM

## 2019-01-30 PROCEDURE — 93010 EKG 12-LEAD: ICD-10-PCS | Mod: S$PBB,,, | Performed by: INTERNAL MEDICINE

## 2019-01-30 PROCEDURE — 99203 PR OFFICE/OUTPT VISIT, NEW, LEVL III, 30-44 MIN: ICD-10-PCS | Mod: S$PBB,,, | Performed by: PHYSICIAN ASSISTANT

## 2019-01-30 PROCEDURE — 99213 OFFICE O/P EST LOW 20 MIN: CPT | Mod: PBBFAC,PN | Performed by: PHYSICIAN ASSISTANT

## 2019-01-30 PROCEDURE — 92567 TYMPANOMETRY: CPT | Mod: PBBFAC,PN | Performed by: AUDIOLOGIST-HEARING AID FITTER

## 2019-01-30 PROCEDURE — 93005 ELECTROCARDIOGRAM TRACING: CPT | Mod: PBBFAC,PO | Performed by: INTERNAL MEDICINE

## 2019-01-30 PROCEDURE — 99215 OFFICE O/P EST HI 40 MIN: CPT | Mod: S$PBB,,, | Performed by: INTERNAL MEDICINE

## 2019-01-30 PROCEDURE — 99215 PR OFFICE/OUTPT VISIT, EST, LEVL V, 40-54 MIN: ICD-10-PCS | Mod: S$PBB,,, | Performed by: INTERNAL MEDICINE

## 2019-01-30 PROCEDURE — 93010 ELECTROCARDIOGRAM REPORT: CPT | Mod: S$PBB,,, | Performed by: INTERNAL MEDICINE

## 2019-01-30 PROCEDURE — 99203 OFFICE O/P NEW LOW 30 MIN: CPT | Mod: S$PBB,,, | Performed by: PHYSICIAN ASSISTANT

## 2019-01-30 PROCEDURE — 92557 COMPREHENSIVE HEARING TEST: CPT | Mod: PBBFAC,PN | Performed by: AUDIOLOGIST-HEARING AID FITTER

## 2019-01-30 PROCEDURE — 99999 PR PBB SHADOW E&M-EST. PATIENT-LVL III: ICD-10-PCS | Mod: PBBFAC,,, | Performed by: INTERNAL MEDICINE

## 2019-01-30 PROCEDURE — 99999 PR PBB SHADOW E&M-EST. PATIENT-LVL III: ICD-10-PCS | Mod: PBBFAC,,, | Performed by: PHYSICIAN ASSISTANT

## 2019-01-30 PROCEDURE — 99999 PR PBB SHADOW E&M-EST. PATIENT-LVL III: CPT | Mod: PBBFAC,,, | Performed by: INTERNAL MEDICINE

## 2019-01-30 PROCEDURE — 99999 PR PBB SHADOW E&M-EST. PATIENT-LVL III: CPT | Mod: PBBFAC,,, | Performed by: PHYSICIAN ASSISTANT

## 2019-01-30 PROCEDURE — 99213 OFFICE O/P EST LOW 20 MIN: CPT | Mod: PBBFAC,PN,27,25 | Performed by: INTERNAL MEDICINE

## 2019-01-30 RX ORDER — FLUTICASONE PROPIONATE 50 MCG
2 SPRAY, SUSPENSION (ML) NASAL DAILY
Qty: 1 BOTTLE | Refills: 12 | Status: SHIPPED | OUTPATIENT
Start: 2019-01-30 | End: 2019-05-09

## 2019-01-30 NOTE — LETTER
January 30, 2019      Kishor Boone MD  [address] [facility] [facility] [address] [facility] [place] [address]           [facility]  [address]  [place] [address]  Phone: [phone]  Fax: [phone]          Patient: Adal Loaiza Jr.   MR Number: [id number]   YOB: 1950   Date of Visit: 1/30/2019       Dear Dr. Kishor Boone:    Thank you for referring Adal Loaiza to me for evaluation. Attached you will find relevant portions of my assessment and plan of care.    If you have questions, please do not hesitate to call me. I look forward to following Adal Loaiza along with you.    Sincerely,    Rosa Jack PA-C    Enclosure  CC:  No Recipients    If you would like to receive this communication electronically, please contact [email or web address] or [phone] to request more information on [employer] access.    For providers and/or their staff who would like to refer a patient to [facility], please contact us through our one-stop-shop provider referral line, [facility] , at [phone].    If you feel you have received this communication in error or would no longer like to receive these types of communications, please e-mail [email or web address]

## 2019-01-30 NOTE — PROGRESS NOTES
"Subjective:       Patient ID: Adal Loaiza Jr. is a 68 y.o. male.    Chief Complaint: Hearing screening    Patient is a very pleasant 68 y.o. male here to see me today for the first time for evaluation of hearing loss.  He reports hearing loss that has been gradually progressing over the last 2 years.  He has not noted any difference in hearing between the ears, with either ear being the better hearing ear.  He has not noted any tinnitus in either ear.  He has not had any recent issues with ear pain or ear drainage.  He has a family history of hearing loss, and has not had any previous otologic surgery.  He has a history of significant loud noise exposure (duck hunting). He denies issues with dizziness.  He had issues recently with chest pain, nausea and lightheadedness.  He was seen in ED and has since been told his pacemaker battery is dying.  He had previously audiogram about 2 years ago with "borderline" hearing loss.  He also mentions both ears popping or needing to pop when on flights few times per year.  He does not smoke.        Review of Systems   Constitutional: Negative for activity change, appetite change and fever.   HENT: Positive for hearing loss. Negative for congestion, ear discharge, ear pain, nosebleeds, postnasal drip, rhinorrhea, sinus pressure, sinus pain, sneezing, sore throat, tinnitus and trouble swallowing.    Eyes: Negative for discharge.   Respiratory: Negative for cough and shortness of breath.         SUSU   Cardiovascular: Positive for chest pain (recently seen in ED).   Gastrointestinal: Positive for nausea. Negative for diarrhea and vomiting.   Musculoskeletal: Negative for gait problem.   Allergic/Immunologic: Negative for environmental allergies and food allergies.   Neurological: Positive for seizures and light-headedness. Negative for dizziness and headaches.   Hematological: Negative for adenopathy.   Psychiatric/Behavioral: Negative for confusion.       Objective:    "   Physical Exam   Constitutional: He is oriented to person, place, and time. Vital signs are normal. He appears well-developed and well-nourished. He is cooperative. No distress.   HENT:   Head: Normocephalic and atraumatic.   Right Ear: Hearing, tympanic membrane, external ear and ear canal normal. Tympanic membrane is not erythematous. No middle ear effusion.   Left Ear: Hearing, external ear and ear canal normal. Tympanic membrane is retracted. Tympanic membrane is not erythematous.  No middle ear effusion.   Nose: Mucosal edema present. No rhinorrhea, nasal deformity or septal deviation. Right sinus exhibits no maxillary sinus tenderness and no frontal sinus tenderness. Left sinus exhibits no maxillary sinus tenderness and no frontal sinus tenderness.   Mouth/Throat: Uvula is midline, oropharynx is clear and moist and mucous membranes are normal. No trismus in the jaw. Normal dentition. No uvula swelling. No oropharyngeal exudate or posterior oropharyngeal edema.   Eyes: Conjunctivae and EOM are normal. Pupils are equal, round, and reactive to light. Right eye exhibits no chemosis. Left eye exhibits no chemosis. Right conjunctiva is not injected. Left conjunctiva is not injected. No scleral icterus.   Neck: Trachea normal and phonation normal. No tracheal tenderness present. No tracheal deviation present. No thyroid mass and no thyromegaly present.   Cardiovascular: Intact distal pulses.   Pulmonary/Chest: Effort normal. No accessory muscle usage or stridor. No respiratory distress.   Lymphadenopathy:        Head (right side): No submental, no submandibular, no preauricular and no posterior auricular adenopathy present.        Head (left side): No submental, no submandibular, no preauricular and no posterior auricular adenopathy present.     He has no cervical adenopathy.        Right cervical: No superficial cervical and no deep cervical adenopathy present.       Left cervical: No superficial cervical and no  deep cervical adenopathy present.   Neurological: He is alert and oriented to person, place, and time. No cranial nerve deficit.   Skin: Skin is warm and dry. No rash noted. No erythema.   Psychiatric: He has a normal mood and affect. His behavior is normal. Thought content normal.         AUDIOGRAM:          Assessment:       1. Sensorineural hearing loss (SNHL) of both ears    2. ETD (Eustachian tube dysfunction), left        Plan:         We reviewed the patient's recent audiogram and hearing loss in detail.  We also discussed that he is an excellent candidate for hearing aids, if and when he the patient is motivated.  He was given handouts with information and pricing of hearing aids, and will contact audiology when ready to proceed.  We also discussed the use hearing protection when exposed to loud noise, including lawn equipment.     We had a long discussion regarding the anatomy and function of the eustachian tube.  We discussed that the eustachian tube acts as a pump to keep the appropriate amount of pressure behind the ear drum.  I gave the patient a prescription for a nasal steroid spray to be used on a daily basis, and we discussed that it will take 2-3 weeks of daily use to achieve maximal effectiveness.  I would recommend he use Afrin nasal spray 30 minutes prior to takeoff and landing during flights.

## 2019-01-30 NOTE — PROGRESS NOTES
Subjective:   Patient ID:  Adal Loaiza Jr. is a 68 y.o. male     Chief complaint:Hyperlipidemia      HPI    Background (Aug 2016):  66 man  Initial PPM implant by Dr OCONNELL for a 6 sec pause (in the midst of a seizure). 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   He had RV lead fx >. 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   He has had no issues with seizures nor syncope. He had a PPM eval 6/2  There were many AMS but no EGMs noted. He has no cardiac Sx and no palps,.      Update since 9/22/2017:  Has had a good year - still works full time, no CV c/o --if anything, feels better this year than last.   PPM eval -- poor A capture thresholds and P waves @1.0 mV >. Stable >> VDD -- doing well. PPM nearing SIMON -- 6 months or so but voltage OK.   I have reviewed the actual image of the ECG tracing obtained today and it shows NSR with normal intervals        Update since then:  Last week he was seen in ER at [facility] due to awakening with CP, nausea and clamminess -- near syncopal -- w/up was neg except for PPM at EOL -- he was in NSR   He is here to follow up on that   He feels well today -- getting over a recent, likely viral, bronchitis  I have reviewed the actual image of the ECG tracing obtained today and it shows NSR with normal intervals    Current Outpatient Medications   Medication Sig    fluticasone (FLONASE) 50 mcg/actuation nasal spray 2 sprays (100 mcg total) by Each Nare route once daily.    LACTOBACILLUS ACIDOPHILUS (PROBIOTIC ORAL) Take by mouth once daily.    levetiracetam (KEPPRA) 500 MG Tab Take 1 tablet (500 mg total) by mouth 2 (two) times daily.    multivitamin (ONE DAILY MULTIVITAMIN) per tablet Take 1 tablet by  mouth once daily.    pravastatin (PRAVACHOL) 20 MG tablet Take 1 tablet (20 mg total) by mouth once daily.     No current facility-administered medications for this visit.      Review of Systems   Constitution: Negative for decreased appetite, weakness, malaise/fatigue, weight gain and weight loss.   Eyes: Negative for blurred vision.   Cardiovascular: Negative for chest pain, claudication, cyanosis, dyspnea on exertion, irregular heartbeat, leg swelling, near-syncope, orthopnea and palpitations.   Respiratory: Negative for cough, shortness of breath, sleep disturbances due to breathing, snoring and wheezing.    Endocrine: Negative for heat intolerance.   Hematologic/Lymphatic: Does not bruise/bleed easily.   Musculoskeletal: Negative for muscle weakness and myalgias.   Gastrointestinal: Negative for melena, nausea and vomiting.   Genitourinary: Negative for nocturia.   Neurological: Negative for excessive daytime sleepiness, dizziness, headaches and light-headedness.   Psychiatric/Behavioral: Negative for depression, memory loss and substance abuse. The patient does not have insomnia and is not nervous/anxious.        Objective:   Physical Exam   Constitutional: He is oriented to person, place, and time. He appears well-developed and well-nourished.   HENT:   Head: Normocephalic and atraumatic.   Right Ear: External ear normal.   Left Ear: External ear normal.   Eyes: Conjunctivae are normal. Pupils are equal, round, and reactive to light. Left conjunctiva is not injected. Left conjunctiva has no hemorrhage.   Neck: Neck supple. No JVD present. No thyromegaly present.   Cardiovascular: Normal rate, regular rhythm, normal heart sounds and intact distal pulses. PMI is not displaced. Exam reveals no gallop, no friction rub, no midsystolic click and no opening snap.   No murmur heard.  Pulses:       Carotid pulses are 2+ on the right side, and 2+ on the left side.       Radial pulses are 2+ on the right side, and 2+  "on the left side.        Dorsalis pedis pulses are 2+ on the right side, and 2+ on the left side.        Posterior tibial pulses are 2+ on the right side, and 2+ on the left side.   Pulmonary/Chest: Effort normal. No respiratory distress. He has wheezes. He has rhonchi in the right middle field, the right lower field, the left middle field and the left lower field. He has rales. He exhibits no tenderness.   Device pocket is in excellent repair  Rhonchi become less post cough     Abdominal: Soft. Normal appearance. He exhibits no pulsatile liver. There is no hepatomegaly. There is no tenderness. There is no rigidity.   Musculoskeletal: Normal range of motion. He exhibits no edema or tenderness.        Right knee: He exhibits no swelling.        Left knee: He exhibits no swelling.        Right ankle: He exhibits no swelling.        Left ankle: He exhibits no swelling.        Right lower leg: He exhibits no swelling.        Left lower leg: He exhibits no swelling.        Right foot: There is no swelling.        Left foot: There is no swelling.   Neurological: He is alert and oriented to person, place, and time. He has normal strength and normal reflexes. No cranial nerve deficit. Coordination normal.   Skin: Skin is warm, dry and intact. No rash noted. No cyanosis.   Psychiatric: He has a normal mood and affect. His behavior is normal.   Nursing note and vitals reviewed.    BP (!) 142/86 (BP Location: Left arm, Patient Position: Sitting, BP Method: Medium (Manual))   Pulse 88   Ht 5' 10" (1.778 m)   Wt 90.3 kg (199 lb 1.2 oz)   BMI 28.56 kg/m²      Assessment:      1. Pacemaker at end of battery life    2. Failure of pacemaker lead, initial encounter    3. Other sleep apnea    4. 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 Other hyperlipidemia    6. Viral bronchitis        Plan:    I discussed the fact that his PPM may or may not be still needed -- he prefers a replacement  I have discussed the " procedure in detail with the patient. I described its benefits and risks. I reviewed alternative therapies and discussed their potential value. The patient was given ample opportunity to express concerns and ask questions and I provided appropriate responses and  answers to such.The patient understands and agrees to proceed.  Consent form was signed today by patient and myself and appropriately witnessed.     No orders of the defined types were placed in this encounter.    Follow-up for post op.  Medications Discontinued During This Encounter   Medication Reason    ketoconazole (NIZORAL) 2 % cream Therapy completed           Medication List           Accurate as of 1/30/19  4:44 PM. If you have any questions, ask your nurse or doctor.               START taking these medications    fluticasone 50 mcg/actuation nasal spray  Commonly known as:  FLONASE  2 sprays (100 mcg total) by Each Nare route once daily.  Started by:  Rosa Jack PA-C        CONTINUE taking these medications    levETIRAcetam 500 MG Tab  Commonly known as:  KEPPRA  Take 1 tablet (500 mg total) by mouth 2 (two) times daily.     ONE DAILY MULTIVITAMIN per tablet  Generic drug:  multivitamin     pravastatin 20 MG tablet  Commonly known as:  PRAVACHOL  Take 1 tablet (20 mg total) by mouth once daily.     PROBIOTIC ORAL        STOP taking these medications    ketoconazole 2 % cream  Commonly known as:  NIZORAL  Stopped by:  Gen Meza MD           Where to Get Your Medications      These medications were sent to [facility] [address] [place] [id number] [facility]  [address] [facility] [address]    Phone:  [phone]   · fluticasone 50 mcg/actuation nasal spray

## 2019-01-30 NOTE — PROGRESS NOTES
Referring Provider: OYLANDA Andino Jr. was seen 01/30/2019 for an audiological evaluation.  Patient complains of of bilateral longstanding hearing loss. He had an evaluation completed 2 years ago and reports being a borderline candidate for hearing aids. He reports a decrease in hearing since that time. Denies tinnitus, otalgia, aural fullness, and vertigo.    Results reveal a normal to moderately severe sensorineural hearing loss 250-8000 Hz for the right ear, and a mild-to-severe sensorineural hearing loss 250-8000 Hz for the left ear.   Speech Reception Thresholds were  20 dBHL for the right ear and 20 dBHL for the left ear.   Word recognition scores were excellent for the right ear and excellent for the left ear.   Tympanograms were Type Ad, hypermobile for the right ear and Type C, abnormal for the left ear.    Patient was counseled on the above findings.    Recommendations:  1. ENT for ETD  2. Binaural hearing aids are recommended. Information was provided.

## 2019-01-30 NOTE — H&P (VIEW-ONLY)
Subjective:   Patient ID:  Adal Loaiza Jr. is a 68 y.o. male     Chief complaint:Hyperlipidemia      HPI    Background (Aug 2016):  66 man  Initial PPM implant by Dr OCONNELL for a 6 sec pause (in the midst of a seizure). 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   He had RV lead fx >. 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   He has had no issues with seizures nor syncope. He had a PPM eval 6/2  There were many AMS but no EGMs noted. He has no cardiac Sx and no palps,.      Update since 9/22/2017:  Has had a good year - still works full time, no CV c/o --if anything, feels better this year than last.   PPM eval -- poor A capture thresholds and P waves @1.0 mV >. Stable >> VDD -- doing well. PPM nearing SIMON -- 6 months or so but voltage OK.   I have reviewed the actual image of the ECG tracing obtained today and it shows NSR with normal intervals        Update since then:  Last week he was seen in ER at [facility] due to awakening with CP, nausea and clamminess -- near syncopal -- w/up was neg except for PPM at EOL -- he was in NSR   He is here to follow up on that   He feels well today -- getting over a recent, likely viral, bronchitis  I have reviewed the actual image of the ECG tracing obtained today and it shows NSR with normal intervals    Current Outpatient Medications   Medication Sig    fluticasone (FLONASE) 50 mcg/actuation nasal spray 2 sprays (100 mcg total) by Each Nare route once daily.    LACTOBACILLUS ACIDOPHILUS (PROBIOTIC ORAL) Take by mouth once daily.    levetiracetam (KEPPRA) 500 MG Tab Take 1 tablet (500 mg total) by mouth 2 (two) times daily.    multivitamin (ONE DAILY MULTIVITAMIN) per tablet Take 1 tablet by  mouth once daily.    pravastatin (PRAVACHOL) 20 MG tablet Take 1 tablet (20 mg total) by mouth once daily.     No current facility-administered medications for this visit.      Review of Systems   Constitution: Negative for decreased appetite, weakness, malaise/fatigue, weight gain and weight loss.   Eyes: Negative for blurred vision.   Cardiovascular: Negative for chest pain, claudication, cyanosis, dyspnea on exertion, irregular heartbeat, leg swelling, near-syncope, orthopnea and palpitations.   Respiratory: Negative for cough, shortness of breath, sleep disturbances due to breathing, snoring and wheezing.    Endocrine: Negative for heat intolerance.   Hematologic/Lymphatic: Does not bruise/bleed easily.   Musculoskeletal: Negative for muscle weakness and myalgias.   Gastrointestinal: Negative for melena, nausea and vomiting.   Genitourinary: Negative for nocturia.   Neurological: Negative for excessive daytime sleepiness, dizziness, headaches and light-headedness.   Psychiatric/Behavioral: Negative for depression, memory loss and substance abuse. The patient does not have insomnia and is not nervous/anxious.        Objective:   Physical Exam   Constitutional: He is oriented to person, place, and time. He appears well-developed and well-nourished.   HENT:   Head: Normocephalic and atraumatic.   Right Ear: External ear normal.   Left Ear: External ear normal.   Eyes: Conjunctivae are normal. Pupils are equal, round, and reactive to light. Left conjunctiva is not injected. Left conjunctiva has no hemorrhage.   Neck: Neck supple. No JVD present. No thyromegaly present.   Cardiovascular: Normal rate, regular rhythm, normal heart sounds and intact distal pulses. PMI is not displaced. Exam reveals no gallop, no friction rub, no midsystolic click and no opening snap.   No murmur heard.  Pulses:       Carotid pulses are 2+ on the right side, and 2+ on the left side.       Radial pulses are 2+ on the right side, and 2+  "on the left side.        Dorsalis pedis pulses are 2+ on the right side, and 2+ on the left side.        Posterior tibial pulses are 2+ on the right side, and 2+ on the left side.   Pulmonary/Chest: Effort normal. No respiratory distress. He has wheezes. He has rhonchi in the right middle field, the right lower field, the left middle field and the left lower field. He has rales. He exhibits no tenderness.   Device pocket is in excellent repair  Rhonchi become less post cough     Abdominal: Soft. Normal appearance. He exhibits no pulsatile liver. There is no hepatomegaly. There is no tenderness. There is no rigidity.   Musculoskeletal: Normal range of motion. He exhibits no edema or tenderness.        Right knee: He exhibits no swelling.        Left knee: He exhibits no swelling.        Right ankle: He exhibits no swelling.        Left ankle: He exhibits no swelling.        Right lower leg: He exhibits no swelling.        Left lower leg: He exhibits no swelling.        Right foot: There is no swelling.        Left foot: There is no swelling.   Neurological: He is alert and oriented to person, place, and time. He has normal strength and normal reflexes. No cranial nerve deficit. Coordination normal.   Skin: Skin is warm, dry and intact. No rash noted. No cyanosis.   Psychiatric: He has a normal mood and affect. His behavior is normal.   Nursing note and vitals reviewed.    BP (!) 142/86 (BP Location: Left arm, Patient Position: Sitting, BP Method: Medium (Manual))   Pulse 88   Ht 5' 10" (1.778 m)   Wt 90.3 kg (199 lb 1.2 oz)   BMI 28.56 kg/m²      Assessment:      1. Pacemaker at end of battery life    2. Failure of pacemaker lead, initial encounter    3. Other sleep apnea    4. 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 Other hyperlipidemia    6. Viral bronchitis        Plan:    I discussed the fact that his PPM may or may not be still needed -- he prefers a replacement  I have discussed the " procedure in detail with the patient. I described its benefits and risks. I reviewed alternative therapies and discussed their potential value. The patient was given ample opportunity to express concerns and ask questions and I provided appropriate responses and  answers to such.The patient understands and agrees to proceed.  Consent form was signed today by patient and myself and appropriately witnessed.     No orders of the defined types were placed in this encounter.    Follow-up for post op.  Medications Discontinued During This Encounter   Medication Reason    ketoconazole (NIZORAL) 2 % cream Therapy completed           Medication List           Accurate as of 1/30/19  4:44 PM. If you have any questions, ask your nurse or doctor.               START taking these medications    fluticasone 50 mcg/actuation nasal spray  Commonly known as:  FLONASE  2 sprays (100 mcg total) by Each Nare route once daily.  Started by:  Rosa Jack PA-C        CONTINUE taking these medications    levETIRAcetam 500 MG Tab  Commonly known as:  KEPPRA  Take 1 tablet (500 mg total) by mouth 2 (two) times daily.     ONE DAILY MULTIVITAMIN per tablet  Generic drug:  multivitamin     pravastatin 20 MG tablet  Commonly known as:  PRAVACHOL  Take 1 tablet (20 mg total) by mouth once daily.     PROBIOTIC ORAL        STOP taking these medications    ketoconazole 2 % cream  Commonly known as:  NIZORAL  Stopped by:  Gen Meza MD           Where to Get Your Medications      These medications were sent to [facility] [address] [place] [id number] [facility]  [address] [facility] [address]    Phone:  [phone]   · fluticasone 50 mcg/actuation nasal spray

## 2019-02-01 NOTE — PROGRESS NOTES
Pt presented for device check - seen by Radha Fitzgerald during [facility] admission - unable to interrogate device, no magnet response.  PPM at EOL, pt seen by Dr. Dean today, gen change scheduled for Feb 13th.

## 2019-02-08 ENCOUNTER — OFFICE VISIT (OUTPATIENT)
Dept: PULMONOLOGY | Facility: CLINIC | Age: 69
End: 2019-02-08
Payer: MEDICARE

## 2019-02-08 VITALS
HEIGHT: 70 IN | SYSTOLIC BLOOD PRESSURE: 118 MMHG | BODY MASS INDEX: 28.56 KG/M2 | RESPIRATION RATE: 17 BRPM | OXYGEN SATURATION: 97 % | WEIGHT: 199.5 LBS | DIASTOLIC BLOOD PRESSURE: 84 MMHG | HEART RATE: 75 BPM

## 2019-02-08 DIAGNOSIS — R07.89 OTHER CHEST PAIN: ICD-10-CM

## 2019-02-08 DIAGNOSIS — G47.33 OSA (OBSTRUCTIVE SLEEP APNEA): Primary | ICD-10-CM

## 2019-02-08 PROCEDURE — 99999 PR PBB SHADOW E&M-EST. PATIENT-LVL III: ICD-10-PCS | Mod: PBBFAC,,, | Performed by: NURSE PRACTITIONER

## 2019-02-08 PROCEDURE — 99214 PR OFFICE/OUTPT VISIT, EST, LEVL IV, 30-39 MIN: ICD-10-PCS | Mod: S$PBB,ICN,, | Performed by: NURSE PRACTITIONER

## 2019-02-08 PROCEDURE — 99999 PR PBB SHADOW E&M-EST. PATIENT-LVL III: CPT | Mod: PBBFAC,,, | Performed by: NURSE PRACTITIONER

## 2019-02-08 PROCEDURE — 99214 OFFICE O/P EST MOD 30 MIN: CPT | Mod: S$PBB,ICN,, | Performed by: NURSE PRACTITIONER

## 2019-02-08 PROCEDURE — 99213 OFFICE O/P EST LOW 20 MIN: CPT | Mod: PBBFAC,PN | Performed by: NURSE PRACTITIONER

## 2019-02-08 NOTE — LETTER
February 8, 2019      Kishor Boone MD  [address] [facility] [place] [address]           [facility]  [address]  [place] [address]  Phone: [phone]  Fax: [phone]          Patient: Adal Loaiza Jr.   MR Number: [id number]   YOB: 1950   Date of Visit: 2/8/2019       Dear Dr. Kishor Boone:    Thank you for referring Adal Loaiza to me for evaluation. Attached you will find relevant portions of my assessment and plan of care.    If you have questions, please do not hesitate to call me. I look forward to following Adal Loaiza along with you.    Sincerely,    Elizabeth Lejeune, NP    Enclosure  CC:  No Recipients    If you would like to receive this communication electronically, please contact [email or web address] or [phone] to request more information on [employer] access.    For providers and/or their staff who would like to refer a patient to [facility], please contact us through our one-stop-shop provider referral line, [facility] , at [phone].    If you feel you have received this communication in error or would no longer like to receive these types of communications, please e-mail [email or web address]

## 2019-02-08 NOTE — PROGRESS NOTES
"Subjective:      Patient ID: Adal Loaiza Jr. is a 68 y.o. male.    Chief Complaint: Sleep Apnea    HPI  Patient presents to the office today for evaluation of sleep apnea.  Last seen in 2011 with severe sleep apnea.  His CPAP is outdated, and he has not used in quite some time.  He recently presented to the emergency room for chest pain.  He requires pacemaker replacement.  He has snoring and witnessed apneas.  Restless sleep.  Daytime hypersomnolence.       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    Review of Systems   Respiratory: Positive for snoring.    Psychiatric/Behavioral: Positive for sleep disturbance.   All other systems reviewed and are negative.    Objective:      Physical Exam   Constitutional: He is oriented to person, place, and time. He appears well-developed and well-nourished.   HENT:   Head: Normocephalic and atraumatic.   Mouth/Throat: Oropharynx is clear and moist.   Neck: Normal range of motion. Neck supple.   Cardiovascular: Normal rate and regular rhythm.   Pulmonary/Chest: Effort normal and breath sounds normal.   Abdominal: Soft. Bowel sounds are normal.   Musculoskeletal: He exhibits no edema.   Neurological: He is alert and oriented to person, place, and time.   Skin: Skin is warm and dry.   Psychiatric: He has a normal mood and affect.       Assessment:       1. SUSU (obstructive sleep apnea)    2. Other chest pain        Outpatient Encounter Medications as of 2/8/2019 "   Medication Sig Dispense Refill    fluticasone (FLONASE) 50 mcg/actuation nasal spray 2 sprays (100 mcg total) by Each Nare route once daily. 1 Bottle 12    LACTOBACILLUS ACIDOPHILUS (PROBIOTIC ORAL) Take by mouth once daily.      levetiracetam (KEPPRA) 500 MG Tab Take 1 tablet (500 mg total) by mouth 2 (two) times daily. 60 tablet 11    multivitamin (ONE DAILY MULTIVITAMIN) per tablet Take 1 tablet by mouth once daily.      pravastatin (PRAVACHOL) 20 MG tablet Take 1 tablet (20 mg total) by mouth once daily. 90 tablet 1     No facility-administered encounter medications on file as of 2/8/2019.      Orders Placed This Encounter   Procedures    Polysomnogram (CPAP will be added if patient meets diagnostic criteria.)     Standing Status:   Future     Standing Expiration Date:   2/8/2020     Plan:   Polysomnogram for evaluation of sleep apnea.  Reinitiate CPAP at that time.

## 2019-02-08 NOTE — PATIENT INSTRUCTIONS
Your provider has scheduled you for a sleep study.   You should be receiving a phone call from the sleep lab shortly after your study has been approved by your insurance. Please make sure you have your current phone numbers in the [facility] system. If you do not hear from anyone in the next 10 business days, please call the sleep lab at [phone] to schedule your sleep study. The sleep studies are performed at [facility] seven nights a week.  When you are scheduling your sleep study, they will also make you a follow up appointment with your provider. This follow up appointment will be 10-14 days after your sleep study to review the results. If it is noted that you do have sleep apnea on your initial sleep study, you may receive a call back for a second night study with the CPAP before you come back to the office.

## 2019-02-12 ENCOUNTER — NURSE TRIAGE (OUTPATIENT)
Dept: ADMINISTRATIVE | Facility: CLINIC | Age: 69
End: 2019-02-12

## 2019-02-12 DIAGNOSIS — Z45.010 PACEMAKER GENERATOR END OF LIFE: Primary | ICD-10-CM

## 2019-02-12 DIAGNOSIS — R06.02 SOB (SHORTNESS OF BREATH) ON EXERTION: ICD-10-CM

## 2019-02-13 ENCOUNTER — TELEPHONE (OUTPATIENT)
Dept: CARDIOLOGY | Facility: CLINIC | Age: 69
End: 2019-02-13

## 2019-02-13 ENCOUNTER — ANESTHESIA (OUTPATIENT)
Dept: CARDIOLOGY | Facility: HOSPITAL | Age: 69
End: 2019-02-13
Payer: MEDICARE

## 2019-02-13 ENCOUNTER — ANESTHESIA EVENT (OUTPATIENT)
Dept: CARDIOLOGY | Facility: HOSPITAL | Age: 69
End: 2019-02-13
Payer: MEDICARE

## 2019-02-13 ENCOUNTER — HOSPITAL ENCOUNTER (OUTPATIENT)
Facility: HOSPITAL | Age: 69
Discharge: HOME | End: 2019-02-13
Attending: INTERNAL MEDICINE | Admitting: INTERNAL MEDICINE
Payer: MEDICARE

## 2019-02-13 VITALS
HEIGHT: 70 IN | BODY MASS INDEX: 28.49 KG/M2 | HEART RATE: 72 BPM | RESPIRATION RATE: 15 BRPM | SYSTOLIC BLOOD PRESSURE: 135 MMHG | DIASTOLIC BLOOD PRESSURE: 76 MMHG | TEMPERATURE: 98 F | OXYGEN SATURATION: 99 % | WEIGHT: 199 LBS

## 2019-02-13 DIAGNOSIS — R06.02 SOB (SHORTNESS OF BREATH) ON EXERTION: ICD-10-CM

## 2019-02-13 DIAGNOSIS — I49.9 ARRHYTHMIA: ICD-10-CM

## 2019-02-13 DIAGNOSIS — Z45.010 PACEMAKER AT END OF BATTERY LIFE: ICD-10-CM

## 2019-02-13 DIAGNOSIS — Z45.010 PACEMAKER GENERATOR END OF LIFE: ICD-10-CM

## 2019-02-13 DIAGNOSIS — R56.1 POST TRAUMATIC SEIZURE: ICD-10-CM

## 2019-02-13 LAB
APTT BLDCRRT: 25 SEC
INR PPP: 1
PROTHROMBIN TIME: 10.6 SEC

## 2019-02-13 PROCEDURE — 33228 REMV&REPLC PM GEN DUAL LEAD: CPT

## 2019-02-13 PROCEDURE — 33228 REMV&REPLC PM GEN DUAL LEAD: CPT | Mod: ,,, | Performed by: INTERNAL MEDICINE

## 2019-02-13 PROCEDURE — 85610 PROTHROMBIN TIME: CPT

## 2019-02-13 PROCEDURE — 25000003 PHARM REV CODE 250: Performed by: NURSE ANESTHETIST, CERTIFIED REGISTERED

## 2019-02-13 PROCEDURE — 37000009 HC ANESTHESIA EA ADD 15 MINS: Performed by: INTERNAL MEDICINE

## 2019-02-13 PROCEDURE — 93010 EKG 12-LEAD: ICD-10-PCS | Mod: 76,,, | Performed by: INTERNAL MEDICINE

## 2019-02-13 PROCEDURE — 99024 POSTOP FOLLOW-UP VISIT: CPT | Mod: GC,,, | Performed by: INTERNAL MEDICINE

## 2019-02-13 PROCEDURE — 63600175 PHARM REV CODE 636 W HCPCS: Performed by: NURSE ANESTHETIST, CERTIFIED REGISTERED

## 2019-02-13 PROCEDURE — 33228 EP LAB PROCEDURE: ICD-10-PCS | Mod: ,,, | Performed by: INTERNAL MEDICINE

## 2019-02-13 PROCEDURE — 99024 PR POST-OP FOLLOW-UP VISIT: ICD-10-PCS | Mod: GC,,, | Performed by: INTERNAL MEDICINE

## 2019-02-13 PROCEDURE — 63600175 PHARM REV CODE 636 W HCPCS

## 2019-02-13 PROCEDURE — 63600175 PHARM REV CODE 636 W HCPCS: Performed by: INTERNAL MEDICINE

## 2019-02-13 PROCEDURE — 93010 ELECTROCARDIOGRAM REPORT: CPT | Mod: ,,, | Performed by: INTERNAL MEDICINE

## 2019-02-13 PROCEDURE — 25000003 PHARM REV CODE 250

## 2019-02-13 PROCEDURE — 37000008 HC ANESTHESIA 1ST 15 MINUTES: Performed by: INTERNAL MEDICINE

## 2019-02-13 PROCEDURE — 85730 THROMBOPLASTIN TIME PARTIAL: CPT

## 2019-02-13 RX ORDER — MIDAZOLAM HYDROCHLORIDE 1 MG/ML
INJECTION, SOLUTION INTRAMUSCULAR; INTRAVENOUS
Status: DISCONTINUED | OUTPATIENT
Start: 2019-02-13 | End: 2019-02-13

## 2019-02-13 RX ORDER — CEFADROXIL 500 MG/1
500 CAPSULE ORAL EVERY 12 HOURS
Qty: 5 CAPSULE | Refills: 0 | Status: SHIPPED | OUTPATIENT
Start: 2019-02-13 | End: 2019-02-19

## 2019-02-13 RX ORDER — CEFAZOLIN SODIUM 2 G/50ML
2 SOLUTION INTRAVENOUS
Status: COMPLETED | OUTPATIENT
Start: 2019-02-13 | End: 2019-02-13

## 2019-02-13 RX ORDER — SODIUM CHLORIDE 9 MG/ML
INJECTION, SOLUTION INTRAVENOUS CONTINUOUS PRN
Status: DISCONTINUED | OUTPATIENT
Start: 2019-02-13 | End: 2019-02-13

## 2019-02-13 RX ORDER — PROPOFOL 10 MG/ML
VIAL (ML) INTRAVENOUS
Status: DISCONTINUED | OUTPATIENT
Start: 2019-02-13 | End: 2019-02-13

## 2019-02-13 RX ORDER — LIDOCAINE HYDROCHLORIDE 10 MG/ML
INJECTION INFILTRATION; PERINEURAL
Status: DISCONTINUED | OUTPATIENT
Start: 2019-02-13 | End: 2019-02-13

## 2019-02-13 RX ORDER — OXYCODONE AND ACETAMINOPHEN 5; 325 MG/1; MG/1
1 TABLET ORAL EVERY 4 HOURS PRN
Status: DISCONTINUED | OUTPATIENT
Start: 2019-02-13 | End: 2019-02-13 | Stop reason: HOSPADM

## 2019-02-13 RX ORDER — ACETAMINOPHEN 325 MG/1
650 TABLET ORAL EVERY 4 HOURS PRN
Status: DISCONTINUED | OUTPATIENT
Start: 2019-02-13 | End: 2019-02-13 | Stop reason: HOSPADM

## 2019-02-13 RX ADMIN — PROPOFOL 20 MG: 10 INJECTION, EMULSION INTRAVENOUS at 12:02

## 2019-02-13 RX ADMIN — SODIUM CHLORIDE: 0.9 INJECTION, SOLUTION INTRAVENOUS at 11:02

## 2019-02-13 RX ADMIN — CEFAZOLIN SODIUM 2 G: 2 SOLUTION INTRAVENOUS at 12:02

## 2019-02-13 RX ADMIN — MIDAZOLAM HYDROCHLORIDE 2 MG: 1 INJECTION, SOLUTION INTRAMUSCULAR; INTRAVENOUS at 12:02

## 2019-02-13 RX ADMIN — MIDAZOLAM HYDROCHLORIDE 2 MG: 1 INJECTION, SOLUTION INTRAMUSCULAR; INTRAVENOUS at 11:02

## 2019-02-13 RX ADMIN — SODIUM CHLORIDE 1.5 G: 900 INJECTION, SOLUTION INTRAVENOUS at 01:02

## 2019-02-13 RX ADMIN — LIDOCAINE HYDROCHLORIDE 50 MG: 10 INJECTION, SOLUTION INFILTRATION; PERINEURAL at 12:02

## 2019-02-13 NOTE — ANESTHESIA PREPROCEDURE EVALUATION
02/13/2019  Adal Loaiza Jr. is a 68 y.o., male.    Anesthesia Evaluation    I have reviewed the Patient Summary Reports.    I have reviewed the Nursing Notes.   I have reviewed the Medications.     Review of Systems  Anesthesia Hx:  No problems with previous Anesthesia    Hematology/Oncology:         -- Anemia:   Cardiovascular:   Pacemaker Dysrhythmias atrial fibrillation hyperlipidemia ECG has been reviewed. CONCLUSIONS     1 - Normal left ventricular systolic function (EF 60-65%).     2 - Normal left ventricular diastolic function.     3 - Normal right ventricular systolic function .     4 - No wall motion abnormalities.     5 - Concentric hypertrophy.     6 - The estimated PA systolic pressure is greater than 27 mmHg.     7 - Trivial aortic regurgitation.     8 - Mild tricuspid regurgitation.     Normal sinus rhythm  Possible Anterior infarct (cited on or before 22-SEP-2017)  Abnormal ECG  When compared with ECG of 22-JAN-2019 01:40,  No significant change was found  Confirmed by MD WOO, MONA ([id number]) on 1/31/2019 10:07:50 PM   Pulmonary:   Sleep Apnea, CPAP    Neurological:   Seizures, well controlled        Physical Exam  General:  Well nourished    Airway/Jaw/Neck:  Airway Findings: Tongue: Normal General Airway Assessment: Adult  Mallampati: I  TM Distance: Normal, at least 6 cm      Dental:  Dental Findings: In tact   Chest/Lungs:  Chest/Lungs Findings:    Heart/Vascular:  Heart Findings:            Anesthesia Plan  Type of Anesthesia, risks & benefits discussed:  Anesthesia Type:  MAC  Patient's Preference:   Intra-op Monitoring Plan: standard ASA monitors  Intra-op Monitoring Plan Comments:   Post Op Pain Control Plan: multimodal analgesia  Post Op Pain Control Plan Comments:   Induction:   IV  Beta Blocker:  Patient is not currently on a Beta-Blocker (No further documentation required).        Informed Consent: Patient understands risks and agrees with Anesthesia plan.  Questions answered. Anesthesia consent signed with patient.  ASA Score: 3     Day of Surgery Review of History & Physical:  There are no significant changes.          Ready For Surgery From Anesthesia Perspective.

## 2019-02-13 NOTE — TRANSFER OF CARE
"Anesthesia Transfer of Care Note    Patient: Adal Loaiza Jr.    Procedure(s) Performed: Procedure(s) (LRB):  REPLACEMENT, PULSE GENERATOR, CARDIAC PACEMAKER (Left)    Patient location: Cath Lab    Anesthesia Type: MAC    Transport from OR: Transported from OR on room air with adequate spontaneous ventilation    Post pain: adequate analgesia    Post assessment: no apparent anesthetic complications    Post vital signs: stable    Level of consciousness: awake, alert and oriented    Nausea/Vomiting: no nausea/vomiting    Complications: none    Transfer of care protocol was followed      Last vitals:   Visit Vitals  BP (!) 138/90   Pulse 78   Temp 36.5 °C (97.7 °F) (Oral)   Resp 20   Ht 5' 10" (1.778 m)   Wt 90.3 kg (199 lb)   SpO2 98%   BMI 28.55 kg/m²     "

## 2019-02-13 NOTE — TELEPHONE ENCOUNTER
Returned call to clarify and needs 5 doses not days.    Adair PASTRANA    ----- Message from Jocelin Taveras sent at 2/13/2019  3:33 PM CST -----  Contact: [facility]  Pharmacy is calling to clarify an RX.  RX name:  cefadroxil (DURICEF) 500 MG Cap  What do they need to clarify:  QTY and Dir  Comments: [facility] [phone].

## 2019-02-13 NOTE — ANESTHESIA POSTPROCEDURE EVALUATION
"Anesthesia Post Evaluation    Patient: Adal Loaiza Jr.    Procedure(s) Performed: Procedure(s) (LRB):  REPLACEMENT, PULSE GENERATOR, CARDIAC PACEMAKER (Left)    Final Anesthesia Type: MAC  Patient location during evaluation: Cath Lab  Patient participation: Yes- Able to Participate  Level of consciousness: awake and alert  Post-procedure vital signs: reviewed and stable  Pain management: adequate  Airway patency: patent  PONV status at discharge: No PONV  Anesthetic complications: no      Cardiovascular status: blood pressure returned to baseline  Respiratory status: unassisted and spontaneous ventilation  Hydration status: euvolemic  Follow-up not needed.        Visit Vitals  BP (!) 138/90   Pulse 78   Temp 36.5 °C (97.7 °F) (Oral)   Resp 20   Ht 5' 10" (1.778 m)   Wt 90.3 kg (199 lb)   SpO2 98%   BMI 28.55 kg/m²       Pain/Dona Score: No Data Recorded      "

## 2019-02-13 NOTE — DISCHARGE INSTRUCTIONS
ACTIVITY: Avoid heavy lifting or straining for 6 weeks          You may shower after 2 days          No tub bath until incision site is healed                   WOUND CARE: It is not unusual to have tenderness at the incision site. Remove the dressing as instructed by your physician    DISCOMFORT: For general discomfort at the incision site, ou may take over the counter acetaminophen as directed on the bottle.    SIGNS AND SYMPTOMS TO REPORT: Call your physician for the following:          Fever greater than 101          Pain not relieved by medication          Swelling, drainage, warmth, or redness at incision site          Shortness of breath                     Hiccoughs within the first 48 hours that are persistent                     Increased fatigue with normal activity          Drowsiness that doesn't get better                 Weakness or dizziness that doesn't get better                 Repeated vomiting      IF YOU RECEIVED SEDATION TODAY, PLEASE FOLLOW THE  INSTRUCTIONS BELOW:     · For the next 8 hours, you should be watched by a responsible adult. This person should make sure your condition is not getting worse.  · Don't drink any alcohol for the next 24 hours.  · Don't drive, operate dangerous machinery, or make important business or personal decisions during the next 24 hours.  · Your healthcare provider may tell you not to take any medicine by mouth for pain or sleep in the next 4 hours. These medicines may react with the medicines you were given in the hospital. This could cause a much stronger response than usual.

## 2019-02-13 NOTE — INTERVAL H&P NOTE
The patient has been examined and the H&P has been reviewed:  There have been no sig changes since above was noted first   He is here for elective PPM replacement  I have discussed the procedure in detail with the patient. I described its benefits and risks. I reviewed alternative therapies and discussed their potential value. The patient was given ample opportunity to express concerns and ask questions and I provided appropriate responses and  answers to such.The patient understands and agrees to proceed.  Consent form was signed  by patient and myself and appropriately witnessed.       Anesthesia/Surgery risks, benefits and alternative options discussed and understood by patient/family.          Active Hospital Problems    Diagnosis  POA    Pacemaker generator end of life [Z45.010]  Not Applicable     Priority: High      Resolved Hospital Problems   No resolved problems to display.

## 2019-02-13 NOTE — ANESTHESIA RELEASE NOTE
"Anesthesia Release from PACU Note    Patient: Adal Loaiza Jr.    Procedure(s) Performed: Procedure(s) (LRB):  REPLACEMENT, PULSE GENERATOR, CARDIAC PACEMAKER (Left)    Anesthesia type: MAC    Post pain: Adequate analgesia    Post assessment: no apparent anesthetic complications, tolerated procedure well and no evidence of recall    Last Vitals:   Visit Vitals  BP (!) 138/90   Pulse 78   Temp 36.5 °C (97.7 °F) (Oral)   Resp 20   Ht 5' 10" (1.778 m)   Wt 90.3 kg (199 lb)   SpO2 98%   BMI 28.55 kg/m²       Post vital signs: stable    Level of consciousness: awake, alert  and oriented    Nausea/Vomiting: no nausea/no vomiting    Complications: none    Airway Patency: patent    Respiratory: unassisted, spontaneous ventilation, room air    Cardiovascular: stable and blood pressure at baseline    Hydration: euvolemic  "

## 2019-02-14 ENCOUNTER — TELEPHONE (OUTPATIENT)
Dept: ELECTROPHYSIOLOGY | Facility: CLINIC | Age: 69
End: 2019-02-14

## 2019-02-14 NOTE — TELEPHONE ENCOUNTER
----- Message from Keli Dias LPN sent at 2/14/2019  2:05 PM CST -----  Contact: [facility]   It's done already.  ----- Message -----  From: Ning Ritchie RN  Sent: 2/14/2019   2:02 PM  To: VICTORIANO Stokes,    Looks like patient had device procedure on 2-13-19 in [place]. Can you please follow-up.    Thank you,  Ning Ritchie RN        ----- Message -----  From: Patti Damon  Sent: 2/14/2019  12:29 PM  To: Ning Ritchie RN    [facility] need clarification on medication cefadroxil (DURICEF) 500 MG Cap. Please call [facility] [address] [place] [id number] [facility] [phone] (Phone)[phone] (Fax). Thank you.

## 2019-02-14 NOTE — TELEPHONE ENCOUNTER
----- Message from Patti Damon sent at 2/14/2019 12:29 PM CST -----  Contact: [facility]   [facility] need clarification on medication cefadroxil (DURICEF) 500 MG Cap. Please call [facility] [address] [place] [id number] [facility] [phone] (Phone)[phone] (Fax). Thank you.

## 2019-02-14 NOTE — TELEPHONE ENCOUNTER
Forwarded message to 's nurse Keli Dias in [place]- as lucia had device procexdure in [place] on 2-13-19.      Zeyad KIDD CCM

## 2019-02-15 NOTE — DISCHARGE SUMMARY
Patient admitted for PPM generator change due to EOL characteristics-- this was done w/o issues and the pocket was revised too.   He recovered promptly from his moderate sedation and he will be sent home to his own care --- see instructions and follow up in separate note

## 2019-02-18 ENCOUNTER — CLINICAL SUPPORT (OUTPATIENT)
Dept: CARDIOLOGY | Facility: CLINIC | Age: 69
End: 2019-02-18
Attending: INTERNAL MEDICINE
Payer: MEDICARE

## 2019-02-18 DIAGNOSIS — R00.1 SEVERE SINUS BRADYCARDIA: ICD-10-CM

## 2019-02-18 DIAGNOSIS — Z95.0 CARDIAC PACEMAKER IN SITU: ICD-10-CM

## 2019-02-18 PROCEDURE — 93294 PACEMAKER REMOTE: ICD-10-PCS | Mod: ,,, | Performed by: INTERNAL MEDICINE

## 2019-02-18 PROCEDURE — 93296 REM INTERROG EVL PM/IDS: CPT | Mod: PBBFAC,PN | Performed by: INTERNAL MEDICINE

## 2019-02-18 PROCEDURE — 93294 REM INTERROG EVL PM/LDLS PM: CPT | Mod: ,,, | Performed by: INTERNAL MEDICINE

## 2019-02-19 ENCOUNTER — OFFICE VISIT (OUTPATIENT)
Dept: INTERNAL MEDICINE | Facility: CLINIC | Age: 69
End: 2019-02-19
Payer: MEDICARE

## 2019-02-19 VITALS
HEART RATE: 63 BPM | WEIGHT: 202.63 LBS | TEMPERATURE: 98 F | HEIGHT: 70 IN | OXYGEN SATURATION: 100 % | SYSTOLIC BLOOD PRESSURE: 138 MMHG | BODY MASS INDEX: 29.01 KG/M2 | DIASTOLIC BLOOD PRESSURE: 84 MMHG

## 2019-02-19 DIAGNOSIS — R68.89 ABNORMAL DIGITAL RECTAL EXAM: ICD-10-CM

## 2019-02-19 DIAGNOSIS — Z80.0 FAMILY HISTORY OF COLON CANCER: ICD-10-CM

## 2019-02-19 DIAGNOSIS — K76.0 FATTY LIVER: ICD-10-CM

## 2019-02-19 DIAGNOSIS — G47.30 SLEEP APNEA, UNSPECIFIED TYPE: Chronic | ICD-10-CM

## 2019-02-19 DIAGNOSIS — Z45.010 PACEMAKER GENERATOR END OF LIFE: ICD-10-CM

## 2019-02-19 DIAGNOSIS — T82.110A FAILURE OF PACEMAKER LEAD, INITIAL ENCOUNTER: ICD-10-CM

## 2019-02-19 DIAGNOSIS — Z95.0 CARDIAC PACEMAKER IN SITU: Chronic | ICD-10-CM

## 2019-02-19 DIAGNOSIS — M79.18 MYOFASCIAL PAIN: ICD-10-CM

## 2019-02-19 DIAGNOSIS — E78.49 OTHER HYPERLIPIDEMIA: ICD-10-CM

## 2019-02-19 DIAGNOSIS — R56.1 SEIZURES, POST-TRAUMATIC: Chronic | ICD-10-CM

## 2019-02-19 DIAGNOSIS — N28.1 RENAL CYST: ICD-10-CM

## 2019-02-19 DIAGNOSIS — H90.3 SENSORINEURAL HEARING LOSS (SNHL) OF BOTH EARS: ICD-10-CM

## 2019-02-19 DIAGNOSIS — K63.5 POLYP OF COLON, UNSPECIFIED PART OF COLON, UNSPECIFIED TYPE: ICD-10-CM

## 2019-02-19 DIAGNOSIS — Z00.00 ENCOUNTER FOR PREVENTIVE HEALTH EXAMINATION: Primary | ICD-10-CM

## 2019-02-19 DIAGNOSIS — Z45.010 PACEMAKER AT END OF BATTERY LIFE: ICD-10-CM

## 2019-02-19 DIAGNOSIS — D32.9 MENINGIOMA: ICD-10-CM

## 2019-02-19 DIAGNOSIS — R07.89 OTHER CHEST PAIN: ICD-10-CM

## 2019-02-19 DIAGNOSIS — G25.0 ESSENTIAL TREMOR: ICD-10-CM

## 2019-02-19 DIAGNOSIS — K64.8 INTERNAL HEMORRHOIDS: ICD-10-CM

## 2019-02-19 PROBLEM — J20.8 VIRAL BRONCHITIS: Status: RESOLVED | Noted: 2019-01-30 | Resolved: 2019-02-19

## 2019-02-19 PROCEDURE — 99214 OFFICE O/P EST MOD 30 MIN: CPT | Mod: PBBFAC,PN | Performed by: PHYSICIAN ASSISTANT

## 2019-02-19 PROCEDURE — G0439 PPPS, SUBSEQ VISIT: HCPCS | Mod: S$GLB,,, | Performed by: PHYSICIAN ASSISTANT

## 2019-02-19 PROCEDURE — 99999 PR PBB SHADOW E&M-EST. PATIENT-LVL IV: CPT | Mod: PBBFAC,,, | Performed by: PHYSICIAN ASSISTANT

## 2019-02-19 PROCEDURE — 99999 PR PBB SHADOW E&M-EST. PATIENT-LVL IV: ICD-10-PCS | Mod: PBBFAC,,, | Performed by: PHYSICIAN ASSISTANT

## 2019-02-19 PROCEDURE — G0439 PR MEDICARE ANNUAL WELLNESS SUBSEQUENT VISIT: ICD-10-PCS | Mod: S$GLB,,, | Performed by: PHYSICIAN ASSISTANT

## 2019-02-19 NOTE — PATIENT INSTRUCTIONS
Counseling and Referral of Other Preventative  (Italic type indicates deductible and co-insurance are waived)    Patient Name: Adal Loaiza  Today's Date: 2/19/2019    Health Maintenance       Date Due Completion Date    TETANUS VACCINE 12/19/2017 12/19/2007    Pneumococcal Vaccine (65+ Low/Medium Risk) (2 of 2 - PPSV23) 04/22/2019 6/2/2015    Lipid Panel 04/24/2019 4/24/2018    Colonoscopy 08/25/2021 8/25/2016        No orders of the defined types were placed in this encounter.    The following information is provided to all patients.  This information is to help you find resources for any of the problems found today that may be affecting your health:                Living healthy guide: www.[facility].[place].gov      Understanding Diabetes: www.diabetes.org      Eating healthy: www.cdc.gov/healthyweight      CDC home safety checklist: www.cdc.gov/steadi/patient.html      Agency on Aging: www.goea.[place].[facility]      Alcoholics anonymous (AA): www.aa.org      Physical Activity: www.gosia.nih.gov/su4xzie      Tobacco use: www.quitwithusla.org

## 2019-02-19 NOTE — PROGRESS NOTES
"Adal Loaiza presented for a  Medicare AWV and comprehensive Health Risk Assessment today. The following components were reviewed and updated:    · Medical history  · Family History  · Social history  · Allergies and Current Medications  · Health Risk Assessment  · Health Maintenance  · Care Team     ** See Completed Assessments for Annual Wellness Visit within the encounter summary.**       The following assessments were completed:  · Living Situation  · CAGE  · Depression Screening  · Timed Get Up and Go  · Whisper Test  · Cognitive Function Screening  · Nutrition Screening  · ADL Screening  · PAQ Screening    Vitals:    02/19/19 1405   BP: 138/84   Pulse: 63   Temp: 97.5 °F (36.4 °C)   TempSrc: Tympanic   SpO2: 100%   Weight: 91.9 kg (202 lb 9.6 oz)   Height: 5' 10" (1.778 m)     Body mass index is 29.07 kg/m².  Physical Exam   Constitutional: He is oriented to person, place, and time. Vital signs are normal. He appears well-developed and well-nourished. He is active. No distress.   HENT:   Head: Normocephalic and atraumatic.   Cardiovascular: Normal rate, regular rhythm and normal heart sounds. Exam reveals no gallop and no friction rub.   No murmur heard.  Pulmonary/Chest: Effort normal and breath sounds normal. No respiratory distress. He has no wheezes. He has no rales.   Neurological: He is alert and oriented to person, place, and time.   Skin: Skin is warm. He is not diaphoretic.   Psychiatric: He has a normal mood and affect. His behavior is normal. Judgment and thought content normal.   Nursing note and vitals reviewed.        Diagnoses and health risks identified today and associated recommendations/orders:    1. Encounter for preventive health examination  -completed today. Due for tetanus. Advised that if he gets bit or cut, needs to come in for tetanus.    2. Sleep apnea, unspecified type  -Diagnosed years ago. CPAP outdated. Scheduled for sleep study this week  -Continue current treatment plan as " previously prescribed with your pulmonologist    3. Sensorineural hearing loss (SNHL) of both ears  -audiogram 1/30/2019  -in process of getting hearing aids    4. 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 Continue current treatment plan as previously prescribed with your neurologist, Dr. Sepulveda    5. Failure of pacemaker lead, initial encounter  -resolved. Replaced 2/13/2019 Dr. Cleo Rouse.    6. Pacemaker generator end of life  -resolved. Replaced 2/13/2019 Dr. Cleo Rouse.    7. Pacemaker at end of battery life  -resolved. Replaced 2/13/2019 Dr. Cleo Rouse.    8. Myofascial pain  --Stable and controlled. Continue current treatment plan as previously prescribed with your PCP.     9. Meningioma  -resected 80%. Closely monitored by  Dr. Sepulveda.     10. Internal hemorrhoids  -Stable and controlled. No bleeding. Continue current treatment plan as previously prescribed with your PCP.     11. Other hyperlipidemia  -Stable and controlled on pravastatin. Continue current treatment plan as previously prescribed with your PCP.   Lab Results   Component Value Date    CHOL 175 04/24/2018    CHOL 169 04/06/2017    CHOL 157 02/02/2016     Lab Results   Component Value Date    HDL 51 04/24/2018    HDL 43 04/06/2017    HDL 48 02/02/2016     Lab Results   Component Value Date    LDLCALC 106.6 04/24/2018    LDLCALC 102.0 04/06/2017    LDLCALC 94.8 02/02/2016     Lab Results   Component Value Date    TRIG 87 04/24/2018    TRIG 120 04/06/2017    TRIG 71 02/02/2016     Lab Results   Component Value Date    CHOLHDL 29.1 04/24/2018    CHOLHDL 25.4 04/06/2017    CHOLHDL 30.6 02/02/2016       12. Fatty liver  Lab Results   Component Value Date    ALT 43 01/22/2019    AST 43 (H) 01/22/2019    ALKPHOS 83 01/22/2019    BILITOT 0.5 01/22/2019   -Stable and controlled. Continue current treatment plan as previously prescribed with your PCP.     13. Family  history of colon cancer  -father at age 90.     14. Essential tremor  -genetic. Stable not causing him any distress.     15. Polyp of colon, unspecified part of colon, unspecified type  -up to date on colonoscopy  -every 5 years.    16. Other chest pain  -resolved. Had this due to his pacemaker battery being dead. Resolved since replacement of pacemaker.    17. Cardiac pacemaker in situ  -Stable and controlled. Continue current treatment plan as previously prescribed with your cardiologsit.     18. Abnormal digital rectal exam  -Stable and controlled. Continue current treatment plan as previously prescribed with your urologist, Dr. Oates  Lab Results   Component Value Date    PSA 2.7 04/24/2018    PSA 2.1 07/25/2016    PSA 1.76 03/29/2012     19. Renal cyst  -monitored by urology.   -U/S retroperitoneal 12/19/2018  -Stable and controlled. Continue current treatment plan as previously prescribed with your urologist, Dr. Oates.    Provided Adal with a 5-10 year written screening schedule and personal prevention plan. Recommendations were developed using the USPSTF age appropriate recommendations. Education, counseling, and referrals were provided as needed. After Visit Summary printed and given to patient which includes a list of additional screenings\tests needed.    Follow-up if symptoms worsen or fail to improve.    Gayathri Jett PA-C  I offered to discuss end of life issues, including information on how to make advance directives that the patient could use to name someone who would make medical decisions on their behalf if they became too ill to make themselves.    ___Patient declined  _X_Patient is interested, I provided paper work and offered to discuss.

## 2019-02-20 DIAGNOSIS — R00.1 SEVERE SINUS BRADYCARDIA: ICD-10-CM

## 2019-02-20 DIAGNOSIS — Z95.0 CARDIAC PACEMAKER IN SITU: Primary | ICD-10-CM

## 2019-02-23 ENCOUNTER — HOSPITAL ENCOUNTER (OUTPATIENT)
Dept: SLEEP MEDICINE | Facility: HOSPITAL | Age: 69
Discharge: HOME | End: 2019-02-23
Attending: INTERNAL MEDICINE
Payer: MEDICARE

## 2019-02-23 DIAGNOSIS — Z72.821 INADEQUATE SLEEP HYGIENE: ICD-10-CM

## 2019-02-23 DIAGNOSIS — G47.33 OBSTRUCTIVE SLEEP APNEA: Primary | ICD-10-CM

## 2019-02-23 PROCEDURE — 95810 POLYSOM 6/> YRS 4/> PARAM: CPT | Mod: 26,,, | Performed by: PSYCHOLOGIST

## 2019-02-23 PROCEDURE — 95810 POLYSOM 6/> YRS 4/> PARAM: CPT

## 2019-02-23 PROCEDURE — 95810 PR POLYSOMNOGRAPHY, 4 OR MORE: ICD-10-PCS | Mod: 26,,, | Performed by: PSYCHOLOGIST

## 2019-02-27 ENCOUNTER — CLINICAL SUPPORT (OUTPATIENT)
Dept: CARDIOLOGY | Facility: CLINIC | Age: 69
End: 2019-02-27
Payer: MEDICARE

## 2019-02-27 DIAGNOSIS — R00.1 SEVERE SINUS BRADYCARDIA: ICD-10-CM

## 2019-02-27 DIAGNOSIS — Z95.0 CARDIAC PACEMAKER IN SITU: ICD-10-CM

## 2019-02-27 PROCEDURE — 93280 PM DEVICE PROGR EVAL DUAL: CPT | Mod: PBBFAC,PN | Performed by: INTERNAL MEDICINE

## 2019-02-27 PROCEDURE — 93280 PACEMAKER PROGRAMMING: ICD-10-PCS | Mod: 26,S$PBB,, | Performed by: INTERNAL MEDICINE

## 2019-03-04 NOTE — PROCEDURES
Patient Name: Mohamud Loaiza  Date of Report: 3-4-19     Date of PS2019   [facility] No.: [id number]   : 1950                         Time of PS:26:49 PM - 4:51:37 AM  Sex:  Male   Age:  69   Weight:  199.0 lbs Height:  5  10             Type of PSG:  Diagnostic and CPAP revaluation    REASONS FOR REFERRAL: Mr. Loaiza is a 69 year old male, referred to Elizabeth LeJeune, APRN, and the SD by Dr. Kishor Boone for diagnostic (and possibly CPAP) re - evaluation of previously diagnosed obstructive sleep apnea / hypopnea syndrome (OSAHS) established on 11, with an Apnea + Hypopnea Index = 42.1 events / hr asleep, severe OSAHS; the CPAP titration found 8 cm to be an optimal treatment pressure.  His CPAP machine is outdated, and he has not been using it for quite some time, but appears to want to start treatment again.  Ms LeJeune requested polysomnography  based on the patients reported snoring, witnessed apneas, and daytime hypersomnolence.  Dr. Huseyin Marrero is the patients primary care physician.     STUDY PARAMETERS: This diagnostic study involved analysis of the patient's sleep pattern while breathing unassisted. The study was performed with a sleep technologist in attendance for the entire test period, with video monitoring throughout the study, and routine laboratory clinical parameters recorded:  NOTE: The polysomnography electrophysiological record for the patient has been reviewed in its entirety by Dr. Gonzalez.    SUMMARY STATEMENTS  DIAGNOSTIC IMPRESSIONS  G47.33  /  327.23  Moderate Obstructive Sleep Apnea, Adult (OSAHS)  Z72.821 /  V69.4  Inadequate Sleep Hygiene  K21.9     /  530.1  r/o Sleep - Related Gastroesophageal Reflux      PRIMARY TREATMENT RECOMMENDATIONS  Treat, or refer to Sleep Disorders Center.  1. The diagnostic polysomnography revealed a moderate obstructive sleep apnea / hypopnea syndrome (A + H Index = 26.6 events / hr asleep with 16.9 respiratory event - related  arousals / hr asleep for the study, plus an additional 10.9  RERAs / hr asleep (respiratory effort -  related arousals).  The mean SpO2 value was 92.6 %, moderate, minimum oxygen saturation during sleep was 85 %, and waking baseline SpO2 was 99 %.  Sporadic, mild to moderately loud snoring was noted.  A  CPAP titration polysomnography is recommended.      2. As respiratory events had a significant supine positional tendency, a device to discourage sleep in the supine position is recommended to reduce respiratory events and snoring. Weight loss to the normal range is recommended as it can decrease respiratory events and snoring in overweight patients.  3. The following changes in sleep hygiene / sleep - related behavior are recommended after medical treatments are successful   Set time for sleep to number of hours of sleep needed for adequate daytime functioning (7.5 to 9.0 hrs / night).   Regular bedtimes and wake times, including weekends: Total sleep time / night should not be more than one hour more             than usual, and bedtime or wake time should not be more than one hour earlier or later than usual.     Do not attempt to make up lost sleep by extending sleep periods.     Avoid caffeinated beverages after 6:00 pm or within 4 hours of bedtime.    SECONDARY TREATMENT RECOMMENDATIONS  Treat, or refer to SDC if problems are not satisfactorily resolved by the above.  1. Follow - up inquiry regarding possible sleep - related gastroesophageal reflux (please see SDI).    See below for a complete interpretation of data from the polysomnography and Sleep Disorders Inventory.     Thank you for referring this patient to the [facility].      Isaak Gonzalez, Ph.D., ABPP; Diplomate, American Board of Sleep Medicine

## 2019-03-05 ENCOUNTER — PATIENT MESSAGE (OUTPATIENT)
Dept: PULMONOLOGY | Facility: CLINIC | Age: 69
End: 2019-03-05

## 2019-03-05 ENCOUNTER — TELEPHONE (OUTPATIENT)
Dept: PULMONOLOGY | Facility: CLINIC | Age: 69
End: 2019-03-05

## 2019-03-05 DIAGNOSIS — G47.33 OSA (OBSTRUCTIVE SLEEP APNEA): Primary | ICD-10-CM

## 2019-03-08 ENCOUNTER — CLINICAL SUPPORT (OUTPATIENT)
Dept: PULMONOLOGY | Facility: CLINIC | Age: 69
End: 2019-03-08
Payer: MEDICARE

## 2019-03-08 ENCOUNTER — HOSPITAL ENCOUNTER (OUTPATIENT)
Dept: RADIOLOGY | Facility: HOSPITAL | Age: 69
Discharge: HOME | End: 2019-03-08
Attending: INTERNAL MEDICINE
Payer: MEDICARE

## 2019-03-08 ENCOUNTER — TELEPHONE (OUTPATIENT)
Dept: PULMONOLOGY | Facility: CLINIC | Age: 69
End: 2019-03-08

## 2019-03-08 ENCOUNTER — OFFICE VISIT (OUTPATIENT)
Dept: PULMONOLOGY | Facility: CLINIC | Age: 69
End: 2019-03-08
Payer: MEDICARE

## 2019-03-08 VITALS
RESPIRATION RATE: 16 BRPM | DIASTOLIC BLOOD PRESSURE: 80 MMHG | HEART RATE: 102 BPM | BODY MASS INDEX: 28.92 KG/M2 | SYSTOLIC BLOOD PRESSURE: 132 MMHG | HEIGHT: 70 IN | OXYGEN SATURATION: 95 % | WEIGHT: 202 LBS

## 2019-03-08 DIAGNOSIS — Z87.09 HISTORY OF ASTHMA: ICD-10-CM

## 2019-03-08 DIAGNOSIS — R06.02 SOB (SHORTNESS OF BREATH): ICD-10-CM

## 2019-03-08 DIAGNOSIS — R06.02 SOB (SHORTNESS OF BREATH): Primary | ICD-10-CM

## 2019-03-08 DIAGNOSIS — G47.33 OBSTRUCTIVE SLEEP APNEA: Primary | Chronic | ICD-10-CM

## 2019-03-08 LAB
BRPFT: NORMAL
FEF 25 75 CHG: 20 %
FEF 25 75 LLN: 1.07
FEF 25 75 POST REF: 77.3 %
FEF 25 75 PRE REF: 64.4 %
FEF 25 75 REF: 2.43
FET100 CHG: -1.7 %
FEV1 CHG: 2.9 %
FEV1 FVC CHG: 3 %
FEV1 FVC LLN: 63
FEV1 FVC POST REF: 88.5 %
FEV1 FVC PRE REF: 85.9 %
FEV1 FVC REF: 76
FEV1 LLN: 2.28
FEV1 POST REF: 107 %
FEV1 PRE REF: 104 %
FEV1 REF: 3.15
FEV6 CHG: 1.5 %
FEV6 LLN: 3.17
FEV6 POST REF: 110.3 %
FEV6 POST: 4.47 L (ref 3.17–4.93)
FEV6 PRE REF: 108.7 %
FEV6 PRE: 4.41 L (ref 3.17–4.93)
FEV6 REF: 4.05
FVC CHG: -0.1 %
FVC LLN: 3.08
FVC POST REF: 120.6 %
FVC PRE REF: 120.7 %
FVC REF: 4.14
PEF CHG: -3 %
PEF LLN: 5.98
PEF POST REF: 78 %
PEF PRE REF: 80.4 %
PEF REF: 8.23
POST FEF 25 75: 1.88 L/S (ref 1.07–4.35)
POST FET 100: 16.47 SEC
POST FEV1 FVC: 67.49 % (ref 63.01–88.03)
POST FEV1: 3.37 L (ref 2.28–3.96)
POST FVC: 4.99 L (ref 3.08–5.21)
POST PEF: 6.42 L/S (ref 5.98–10.48)
PRE FEF 25 75: 1.57 L/S (ref 1.07–4.35)
PRE FET 100: 16.75 SEC
PRE FEV1 FVC: 65.51 % (ref 63.01–88.03)
PRE FEV1: 3.27 L (ref 2.28–3.96)
PRE FVC: 5 L (ref 3.08–5.21)
PRE PEF: 6.62 L/S (ref 5.98–10.48)

## 2019-03-08 PROCEDURE — 99213 OFFICE O/P EST LOW 20 MIN: CPT | Mod: PBBFAC,25,PN | Performed by: INTERNAL MEDICINE

## 2019-03-08 PROCEDURE — 94060 PR EVAL OF BRONCHOSPASM: ICD-10-PCS | Mod: 26,S$PBB,, | Performed by: INTERNAL MEDICINE

## 2019-03-08 PROCEDURE — 71046 XR CHEST PA AND LATERAL: ICD-10-PCS | Mod: 26,,, | Performed by: RADIOLOGY

## 2019-03-08 PROCEDURE — 99214 PR OFFICE/OUTPT VISIT, EST, LEVL IV, 30-39 MIN: ICD-10-PCS | Mod: 25,S$PBB,, | Performed by: INTERNAL MEDICINE

## 2019-03-08 PROCEDURE — 99999 PR PBB SHADOW E&M-EST. PATIENT-LVL III: ICD-10-PCS | Mod: PBBFAC,,, | Performed by: INTERNAL MEDICINE

## 2019-03-08 PROCEDURE — 71046 X-RAY EXAM CHEST 2 VIEWS: CPT | Mod: TC

## 2019-03-08 PROCEDURE — 99999 PR PBB SHADOW E&M-EST. PATIENT-LVL III: CPT | Mod: PBBFAC,,, | Performed by: INTERNAL MEDICINE

## 2019-03-08 PROCEDURE — 94060 EVALUATION OF WHEEZING: CPT | Mod: PBBFAC,PN

## 2019-03-08 PROCEDURE — 71046 X-RAY EXAM CHEST 2 VIEWS: CPT | Mod: 26,,, | Performed by: RADIOLOGY

## 2019-03-08 PROCEDURE — 99214 OFFICE O/P EST MOD 30 MIN: CPT | Mod: 25,S$PBB,, | Performed by: INTERNAL MEDICINE

## 2019-03-08 PROCEDURE — 94060 EVALUATION OF WHEEZING: CPT | Mod: 26,S$PBB,, | Performed by: INTERNAL MEDICINE

## 2019-04-01 ENCOUNTER — TELEPHONE (OUTPATIENT)
Dept: PULMONOLOGY | Facility: CLINIC | Age: 69
End: 2019-04-01

## 2019-04-01 NOTE — TELEPHONE ENCOUNTER
returned patient call advised to call wilda @ [phone] regarding insurance.   patient stated understanding      ----- Message from Berta Paredes sent at 4/1/2019 11:13 AM CDT -----  Contact: [phone]  Would like to consult with the nurse, patients  Would like to know what is going on with his CPAP machine, patients would like to speak with someone as soon as possible, please call back at [phone], thank gilbert

## 2019-04-08 ENCOUNTER — TELEPHONE (OUTPATIENT)
Dept: PULMONOLOGY | Facility: CLINIC | Age: 69
End: 2019-04-08

## 2019-04-08 NOTE — TELEPHONE ENCOUNTER
Returned patient call and advised of appointment with Rae with [facility] 04/09/19 @11 am . Patient stated understanding

## 2019-04-30 ENCOUNTER — OFFICE VISIT (OUTPATIENT)
Dept: CARDIOLOGY | Facility: CLINIC | Age: 69
End: 2019-04-30
Payer: MEDICARE

## 2019-04-30 VITALS
DIASTOLIC BLOOD PRESSURE: 78 MMHG | SYSTOLIC BLOOD PRESSURE: 122 MMHG | BODY MASS INDEX: 29.2 KG/M2 | WEIGHT: 203.94 LBS | HEART RATE: 76 BPM | HEIGHT: 70 IN

## 2019-04-30 DIAGNOSIS — G47.33 OBSTRUCTIVE SLEEP APNEA: Chronic | ICD-10-CM

## 2019-04-30 DIAGNOSIS — E78.49 OTHER HYPERLIPIDEMIA: ICD-10-CM

## 2019-04-30 DIAGNOSIS — R07.89 OTHER CHEST PAIN: ICD-10-CM

## 2019-04-30 DIAGNOSIS — Z95.0 CARDIAC PACEMAKER IN SITU: Primary | Chronic | ICD-10-CM

## 2019-04-30 PROCEDURE — 99214 PR OFFICE/OUTPT VISIT, EST, LEVL IV, 30-39 MIN: ICD-10-PCS | Mod: S$PBB,,, | Performed by: INTERNAL MEDICINE

## 2019-04-30 PROCEDURE — 99999 PR PBB SHADOW E&M-EST. PATIENT-LVL III: ICD-10-PCS | Mod: PBBFAC,,, | Performed by: INTERNAL MEDICINE

## 2019-04-30 PROCEDURE — 99999 PR PBB SHADOW E&M-EST. PATIENT-LVL III: CPT | Mod: PBBFAC,,, | Performed by: INTERNAL MEDICINE

## 2019-04-30 PROCEDURE — 99214 OFFICE O/P EST MOD 30 MIN: CPT | Mod: S$PBB,,, | Performed by: INTERNAL MEDICINE

## 2019-04-30 PROCEDURE — 99213 OFFICE O/P EST LOW 20 MIN: CPT | Mod: PBBFAC | Performed by: INTERNAL MEDICINE

## 2019-04-30 NOTE — PROGRESS NOTES
Subjective:   Patient ID:  Adal Loaiza Jr. is a 69 y.o. male who presents for cardiac consult of Follow-up      HPI  The patient came in today for cardiac consult of Follow-up      Adal Loaiza Jr. is a 69 y.o. male pt with  HTN, HLP, SUSU, s/p PPM, Seizures here for follow up CV eval.     1/29/19  [place] [facility] [place] sided chest pain last week which started with associated diaphoresis and nausea. He reported relief of symptoms with topical NTG. ED workup revealed troponin negative x 3, BNP 79. Hospital medicine called for admission. No shortness of breath, COOPER or palpitations. HE reports noncompliance with CPAP at home lately. NO orthopnea, PND or abdominal bloating. PPM interrogation reveals device at SIMON and needs generator change. ECHO revealed EF 60-65%, -WMA's. He initially had PPM implanted after brain surgery. Has occasional light chest pain, CXR shows moderate hiatal hernia. PT also has a significant cough.     4/30/19  He had PPM implanted in Feb 2019 by Dr. Cleo Rouse. No further CP/SOB. Does have large hiatal hernia on recent CXR otherwise doing well. BP and HR normal today. Overall doing well. Had sleep study, abnormal using CPAP compliant.     Patient feels no sob, no leg swelling, no PND, no palpitation, no dizziness, no syncope, no CNS symptoms.    Patient is compliant with medications.      2D ECHO 01/22/2019   CONCLUSIONS     1 - Normal left ventricular systolic function (EF 60-65%).     2 - Normal left ventricular diastolic function.     3 - Normal right ventricular systolic function .     4 - No wall motion abnormalities.     5 - Concentric hypertrophy.     6 - The estimated PA systolic pressure is greater than 27 mmHg.     7 - Trivial aortic regurgitation.     8 - Mild tricuspid regurgitation.               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    Seizures     because of brain tumor    Sleep apnea     Spinal cord disease     meningioma       Past Surgical History:   Procedure Laterality Date    BRAIN SURGERY      CARDIAC PACEMAKER PLACEMENT      COLONOSCOPY N/A 8/25/2016    Performed by Morris [facility]guin [facility] at [facility] ENDO    Gamma knife      for schwannoma    REPLACEMENT, PULSE GENERATOR, CARDIAC PACEMAKER Left 2/13/2019    Performed by Gen Meza MD at [facility] CATH LAB       Social History     Tobacco Use    Smoking status: Never Smoker    Smokeless tobacco: Never Used   Substance Use Topics    Alcohol use: No     Comment: socially    Drug use: No       Family History   Problem Relation Age of Onset    Colon cancer Father     Hypertension Unknown        Patient's Medications   New Prescriptions    No medications on file   Previous Medications    FLUTICASONE (FLONASE) 50 MCG/ACTUATION NASAL SPRAY    2 sprays (100 mcg total) by Each Nare route once daily.    LACTOBACILLUS ACIDOPHILUS (PROBIOTIC ORAL)    Take by mouth once daily.    LEVETIRACETAM (KEPPRA) 500 MG TAB    Take 1 tablet (500 mg total) by mouth 2 (two) times daily.    MULTIVITAMIN (ONE DAILY MULTIVITAMIN) PER TABLET    Take 1 tablet by mouth once daily.    PRAVASTATIN (PRAVACHOL) 20 MG TABLET    Take 1 tablet (20 mg total) by mouth once daily.   Modified Medications    No medications on file   Discontinued Medications    No medications on file       Review of Systems   Constitutional: Negative.    HENT: Negative.    Eyes: Negative.    Respiratory: Positive for cough. Negative for shortness of breath.    Cardiovascular: Positive for chest pain. Negative for palpitations.   Gastrointestinal: Positive for abdominal pain.   Genitourinary: Negative.    Musculoskeletal: Negative.    Skin: Negative.    Neurological: Negative.   "  Endo/Heme/Allergies: Negative.    Psychiatric/Behavioral: Negative.    All 12 systems otherwise negative.      Wt Readings from Last 3 Encounters:   04/30/19 92.5 kg (203 lb 14.8 oz)   03/08/19 91.6 kg (202 lb)   02/19/19 91.9 kg (202 lb 9.6 oz)     Temp Readings from Last 3 Encounters:   02/19/19 97.5 °F (36.4 °C) (Tympanic)   02/13/19 97.8 °F (36.6 °C)   01/30/19 97.6 °F (36.4 °C) (Tympanic)     BP Readings from Last 3 Encounters:   04/30/19 122/78   03/08/19 132/80   02/19/19 138/84     Pulse Readings from Last 3 Encounters:   04/30/19 76   03/08/19 102   02/19/19 63       /78 (Patient Position: Sitting, BP Method: Small (Manual))   Pulse 76   Ht 5' 10" (1.778 m)   Wt 92.5 kg (203 lb 14.8 oz)   BMI 29.26 kg/m²     Objective:   Physical Exam   Constitutional: He is oriented to person, place, and time. He appears well-developed and well-nourished. No distress.   HENT:   Head: Normocephalic and atraumatic.   Nose: Nose normal.   Mouth/Throat: Oropharynx is clear and moist.   Eyes: Conjunctivae and EOM are normal. No scleral icterus.   Neck: Normal range of motion. Neck supple. No JVD present. No thyromegaly present.   Cardiovascular: Normal rate, regular rhythm, S1 normal and S2 normal. Exam reveals no gallop, no S3, no S4 and no friction rub.   No murmur heard.  Pulmonary/Chest: Effort normal and breath sounds normal. No stridor. No respiratory distress. He has no wheezes. He has no rales. He exhibits no tenderness.   Chest wall s/p PPM - no erythema, nontender   Abdominal: Soft. Bowel sounds are normal. He exhibits no distension and no mass. There is no tenderness. There is no rebound.   Genitourinary:   Genitourinary Comments: Deferred   Musculoskeletal: Normal range of motion. He exhibits no edema, tenderness or deformity.   Lymphadenopathy:     He has no cervical adenopathy.   Neurological: He is alert and oriented to person, place, and time. He exhibits normal muscle tone. Coordination normal. "   Skin: Skin is warm and dry. No rash noted. He is not diaphoretic. No erythema. No pallor.   Psychiatric: He has a normal mood and affect. His behavior is normal. Judgment and thought content normal.   Nursing note and vitals reviewed.      Lab Results   Component Value Date     01/22/2019    K 4.2 01/22/2019     01/22/2019    CO2 25 01/22/2019    BUN 14 01/22/2019    CREATININE 1.1 01/22/2019     01/22/2019    AST 43 (H) 01/22/2019    ALT 43 01/22/2019    ALBUMIN 3.7 01/22/2019    PROT 7.0 01/22/2019    BILITOT 0.5 01/22/2019    WBC 6.40 01/22/2019    HGB 14.5 01/22/2019    HCT 43.6 01/22/2019    MCV 82 01/22/2019     01/22/2019    INR 1.0 02/13/2019    TSH 2.495 04/24/2018    CHOL 175 04/24/2018    HDL 51 04/24/2018    LDLCALC 106.6 04/24/2018    TRIG 87 04/24/2018    BNP 79 01/22/2019     Assessment:      1. Cardiac pacemaker in situ    2. Other hyperlipidemia    3. Obstructive sleep apnea    4. Other chest pain        Plan:   1. Chest pain, atypical - resolved  - neg enzymes and ECHO  - discussed stress test after PPM if needed but defer for now  - hernia can be causing atypical referred pain - discuss with PCP    2. Pacemaker implanted  - f/u in pacemaker clinic as scheduled     3. SUSU  - cont CPAP    4. Hearing loss  - needs hearing aids, had ENT eval    5. HLD  - cont statin    Thank you for allowing me to participate in this patient's care. Please do not hesitate to contact me with any questions or concerns. Consult note has been forwarded to the referral physician.

## 2019-05-01 ENCOUNTER — LAB VISIT (OUTPATIENT)
Dept: LAB | Facility: HOSPITAL | Age: 69
End: 2019-05-01
Attending: INTERNAL MEDICINE
Payer: MEDICARE

## 2019-05-01 DIAGNOSIS — E78.49 OTHER HYPERLIPIDEMIA: ICD-10-CM

## 2019-05-01 LAB
ALBUMIN SERPL BCP-MCNC: 3.5 G/DL (ref 3.5–5.2)
ALP SERPL-CCNC: 61 U/L (ref 55–135)
ALT SERPL W/O P-5'-P-CCNC: 24 U/L (ref 10–44)
ANION GAP SERPL CALC-SCNC: 6 MMOL/L (ref 8–16)
AST SERPL-CCNC: 26 U/L (ref 10–40)
BASOPHILS # BLD AUTO: 0.03 K/UL (ref 0–0.2)
BASOPHILS NFR BLD: 0.6 % (ref 0–1.9)
BILIRUB SERPL-MCNC: 0.7 MG/DL (ref 0.1–1)
BUN SERPL-MCNC: 14 MG/DL (ref 8–23)
CALCIUM SERPL-MCNC: 9.2 MG/DL (ref 8.7–10.5)
CHLORIDE SERPL-SCNC: 107 MMOL/L (ref 95–110)
CHOLEST SERPL-MCNC: 160 MG/DL (ref 120–199)
CHOLEST/HDLC SERPL: 3.6 {RATIO} (ref 2–5)
CO2 SERPL-SCNC: 27 MMOL/L (ref 23–29)
CREAT SERPL-MCNC: 1.1 MG/DL (ref 0.5–1.4)
DIFFERENTIAL METHOD: ABNORMAL
EOSINOPHIL # BLD AUTO: 0.2 K/UL (ref 0–0.5)
EOSINOPHIL NFR BLD: 3.9 % (ref 0–8)
ERYTHROCYTE [DISTWIDTH] IN BLOOD BY AUTOMATED COUNT: 13.5 % (ref 11.5–14.5)
EST. GFR  (AFRICAN AMERICAN): >60 ML/MIN/1.73 M^2
EST. GFR  (NON AFRICAN AMERICAN): >60 ML/MIN/1.73 M^2
GLUCOSE SERPL-MCNC: 89 MG/DL (ref 70–110)
HCT VFR BLD AUTO: 40.9 % (ref 40–54)
HDLC SERPL-MCNC: 44 MG/DL (ref 40–75)
HDLC SERPL: 27.5 % (ref 20–50)
HGB BLD-MCNC: 12.7 G/DL (ref 14–18)
IMM GRANULOCYTES # BLD AUTO: 0.01 K/UL (ref 0–0.04)
IMM GRANULOCYTES NFR BLD AUTO: 0.2 % (ref 0–0.5)
LDLC SERPL CALC-MCNC: 99.4 MG/DL (ref 63–159)
LYMPHOCYTES # BLD AUTO: 1.4 K/UL (ref 1–4.8)
LYMPHOCYTES NFR BLD: 26.6 % (ref 18–48)
MCH RBC QN AUTO: 25.6 PG (ref 27–31)
MCHC RBC AUTO-ENTMCNC: 31.1 G/DL (ref 32–36)
MCV RBC AUTO: 82 FL (ref 82–98)
MONOCYTES # BLD AUTO: 0.7 K/UL (ref 0.3–1)
MONOCYTES NFR BLD: 13.2 % (ref 4–15)
NEUTROPHILS # BLD AUTO: 2.9 K/UL (ref 1.8–7.7)
NEUTROPHILS NFR BLD: 55.5 % (ref 38–73)
NONHDLC SERPL-MCNC: 116 MG/DL
NRBC BLD-RTO: 0 /100 WBC
PLATELET # BLD AUTO: 229 K/UL (ref 150–350)
PMV BLD AUTO: 11.2 FL (ref 9.2–12.9)
POTASSIUM SERPL-SCNC: 4.7 MMOL/L (ref 3.5–5.1)
PROT SERPL-MCNC: 6.6 G/DL (ref 6–8.4)
RBC # BLD AUTO: 4.97 M/UL (ref 4.6–6.2)
SODIUM SERPL-SCNC: 140 MMOL/L (ref 136–145)
TRIGL SERPL-MCNC: 83 MG/DL (ref 30–150)
WBC # BLD AUTO: 5.16 K/UL (ref 3.9–12.7)

## 2019-05-01 PROCEDURE — 80061 LIPID PANEL: CPT

## 2019-05-01 PROCEDURE — 36415 COLL VENOUS BLD VENIPUNCTURE: CPT

## 2019-05-01 PROCEDURE — 80053 COMPREHEN METABOLIC PANEL: CPT

## 2019-05-01 PROCEDURE — 85025 COMPLETE CBC W/AUTO DIFF WBC: CPT

## 2019-05-09 ENCOUNTER — OFFICE VISIT (OUTPATIENT)
Dept: INTERNAL MEDICINE | Facility: CLINIC | Age: 69
End: 2019-05-09
Payer: MEDICARE

## 2019-05-09 VITALS
OXYGEN SATURATION: 97 % | DIASTOLIC BLOOD PRESSURE: 86 MMHG | HEART RATE: 97 BPM | SYSTOLIC BLOOD PRESSURE: 126 MMHG | TEMPERATURE: 98 F | WEIGHT: 202.81 LBS | BODY MASS INDEX: 29.1 KG/M2

## 2019-05-09 DIAGNOSIS — R56.1 SEIZURES, POST-TRAUMATIC: Chronic | ICD-10-CM

## 2019-05-09 DIAGNOSIS — G47.33 OBSTRUCTIVE SLEEP APNEA: Chronic | ICD-10-CM

## 2019-05-09 DIAGNOSIS — Z23 NEED FOR PNEUMOCOCCAL VACCINATION: ICD-10-CM

## 2019-05-09 DIAGNOSIS — E78.49 OTHER HYPERLIPIDEMIA: Primary | ICD-10-CM

## 2019-05-09 PROCEDURE — 99214 PR OFFICE/OUTPT VISIT, EST, LEVL IV, 30-39 MIN: ICD-10-PCS | Mod: S$PBB,,, | Performed by: INTERNAL MEDICINE

## 2019-05-09 PROCEDURE — 99213 OFFICE O/P EST LOW 20 MIN: CPT | Mod: PBBFAC,25 | Performed by: INTERNAL MEDICINE

## 2019-05-09 PROCEDURE — 99999 PR PBB SHADOW E&M-EST. PATIENT-LVL III: ICD-10-PCS | Mod: PBBFAC,,, | Performed by: INTERNAL MEDICINE

## 2019-05-09 PROCEDURE — G0009 ADMIN PNEUMOCOCCAL VACCINE: HCPCS | Mod: PBBFAC

## 2019-05-09 PROCEDURE — 99214 OFFICE O/P EST MOD 30 MIN: CPT | Mod: S$PBB,,, | Performed by: INTERNAL MEDICINE

## 2019-05-09 PROCEDURE — 99999 PR PBB SHADOW E&M-EST. PATIENT-LVL III: CPT | Mod: PBBFAC,,, | Performed by: INTERNAL MEDICINE

## 2019-05-09 RX ORDER — PRAVASTATIN SODIUM 20 MG/1
20 TABLET ORAL DAILY
Qty: 90 TABLET | Refills: 3 | Status: SHIPPED | OUTPATIENT
Start: 2019-05-09 | End: 2020-05-26 | Stop reason: SDUPTHER

## 2019-05-12 NOTE — PROGRESS NOTES
Subjective:      Patient ID: Adal Loaiza Jr. is a 69 y.o. male.    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)    Cardiac pacemaker in situ    Fatty liver    Obstructive sleep apnea    Myofascial pain    Hyperlipidemia    Pacemaker lead fracture    Abnormal digital rectal exam    Family history of colon cancer    Colon polyps    Internal hemorrhoids    Chest pain    Pacemaker at end of battery life    Pacemaker generator end of life    Sensorineural hearing loss (SNHL) of both ears    Essential tremor    Meningioma    Renal cyst    Inadequate sleep hygiene     Past Medical History:   Diagnosis Date    Anemia due to GI blood loss 2012    Arrhythmia requiring replacement of cardiac pacemaker     Brain tumor (benign) 1999    Meningioma    Encounter for blood transfusion     GI bleed due to NSAIDs 2012    Hyperlipidemia     Renal cyst 2/19/2019    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  Compliant with meds without significant side effects.   Feeling well. No new c/o.     Review of Systems   Constitutional: Negative for chills and fever.   HENT: Negative for ear pain and sore throat.    Respiratory: Negative for cough.    Cardiovascular: Negative for chest pain.   Gastrointestinal: Negative for abdominal pain and blood in stool.   Genitourinary: Negative for dysuria and hematuria.   Neurological: Negative for seizures and syncope.     Objective:   /86 (BP Location: Left arm, Patient Position: Sitting, BP Method: Medium (Manual))   Pulse 97   Temp 98.2 °F (36.8 °C) (Tympanic)   Wt 92 kg (202 lb 13.2 oz)   SpO2 97%   BMI 29.10 kg/m²     Physical Exam   Constitutional: He is oriented to person, place, and time. He appears  well-developed and well-nourished. No distress.   HENT:   Head: Normocephalic and atraumatic.   Mouth/Throat: Oropharynx is clear and moist.   Eyes: Pupils are equal, round, and reactive to light. EOM are normal.   Neck: Neck supple. Carotid bruit is not present. No thyromegaly present.   Cardiovascular: Normal rate and regular rhythm.   Pulmonary/Chest: Breath sounds normal. He has no wheezes. He has no rales.   Abdominal: Soft. Bowel sounds are normal. There is no tenderness.   Musculoskeletal: He exhibits no edema.   Lymphadenopathy:     He has no cervical adenopathy.   Neurological: He is alert and oriented to person, place, and time.   Skin: Skin is warm and dry.   Psychiatric: He has a normal mood and affect. His behavior is normal.       Assessment:     1. Other hyperlipidemia    2. Obstructive sleep apnea    3. 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 Need for pneumococcal vaccination      Plan:   Other hyperlipidemia  Controlled    -     pravastatin (PRAVACHOL) 20 MG tablet; Take 1 tablet (20 mg total) by mouth once daily.  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)  Stable  Need for pneumococcal vaccination  -     (In Office Administered) Pneumococcal Polysaccharide Vaccine (23 Valent) (SQ/IM)          Lab Frequency Next Occurrence   US Retroperitoneal Complete (Kidney and Once 12/19/2018   PSA, Screening Once 12/19/2018   Comprehensive metabolic panel Once 05/08/2020   Lipid panel Once 05/08/2020   TSH Once 05/08/2020   Pacemaker programming     Pacemaker remote     Pacemaker programming         Problem List Items Addressed This Visit        Neuro    Seizures, 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 Sees asif            Cardiac/Vascular    Hyperlipidemia - Primary    Relevant Medications    pravastatin (PRAVACHOL) 20 MG tablet    Other Relevant Orders    Comprehensive metabolic panel    Lipid panel    TSH       Other    Obstructive sleep apnea (Chronic)    Overview     On Cpap           Other Visit Diagnoses     Need for pneumococcal vaccination        Relevant Orders    (In Office Administered) Pneumococcal Polysaccharide Vaccine (23 Valent) (SQ/IM) (Completed)          Follow up if symptoms worsen or fail to improve.

## 2019-05-14 ENCOUNTER — OFFICE VISIT (OUTPATIENT)
Dept: SLEEP MEDICINE | Facility: CLINIC | Age: 69
End: 2019-05-14
Payer: MEDICARE

## 2019-05-14 VITALS
HEART RATE: 83 BPM | OXYGEN SATURATION: 96 % | DIASTOLIC BLOOD PRESSURE: 74 MMHG | BODY MASS INDEX: 29.35 KG/M2 | SYSTOLIC BLOOD PRESSURE: 120 MMHG | HEIGHT: 70 IN | WEIGHT: 205 LBS | RESPIRATION RATE: 18 BRPM

## 2019-05-14 DIAGNOSIS — G47.33 OBSTRUCTIVE SLEEP APNEA: Chronic | ICD-10-CM

## 2019-05-14 PROCEDURE — 99999 PR PBB SHADOW E&M-EST. PATIENT-LVL III: ICD-10-PCS | Mod: PBBFAC,,, | Performed by: NURSE PRACTITIONER

## 2019-05-14 PROCEDURE — 99999 PR PBB SHADOW E&M-EST. PATIENT-LVL III: CPT | Mod: PBBFAC,,, | Performed by: NURSE PRACTITIONER

## 2019-05-14 PROCEDURE — 99213 PR OFFICE/OUTPT VISIT, EST, LEVL III, 20-29 MIN: ICD-10-PCS | Mod: S$PBB,,, | Performed by: NURSE PRACTITIONER

## 2019-05-14 PROCEDURE — 99213 OFFICE O/P EST LOW 20 MIN: CPT | Mod: PBBFAC,PN | Performed by: NURSE PRACTITIONER

## 2019-05-14 PROCEDURE — 99213 OFFICE O/P EST LOW 20 MIN: CPT | Mod: S$PBB,,, | Performed by: NURSE PRACTITIONER

## 2019-05-14 NOTE — PROGRESS NOTES
"Subjective:      Patient ID: Adal Loaiza Jr. is a 69 y.o. male.    Chief Complaint: Sleep Apnea    Presents to office for review of AutoPAP therapy. Recent replacement. Patient states improved symptoms with use of AutoPAP. Sleeping more soundly. Waking up feeling more refreshed. Improved daytime sleepiness. Patient states he is benefiting from use of the AutoPAP.       /74   Pulse 83   Resp 18   Ht 5' 10" (1.778 m)   Wt 93 kg (205 lb 0.4 oz)   SpO2 96%   BMI 29.42 kg/m²   Body mass index is 29.42 kg/m².    Review of Systems   Constitutional: Negative.    HENT: Negative.    Respiratory: Negative.    Cardiovascular: Negative.    Musculoskeletal: Negative.    Gastrointestinal: Negative.    Neurological: Negative.    Psychiatric/Behavioral: Negative.      Objective:      Physical Exam   Constitutional: He is oriented to person, place, and time. He appears well-developed and well-nourished.   HENT:   Head: Normocephalic and atraumatic.   Nose: Nose normal.   Mouth/Throat: Uvula is midline and oropharynx is clear and moist.   Neck: Trachea normal and normal range of motion. Neck supple. No thyroid mass and no thyromegaly present.   Cardiovascular: Normal rate, regular rhythm and normal heart sounds.   Pulmonary/Chest: Effort normal and breath sounds normal. He has no wheezes. He has no rhonchi. He has no rales. Chest wall is not dull to percussion.   Abdominal: Soft. He exhibits no mass. There is no hepatosplenomegaly or splenomegaly. There is no tenderness.   Musculoskeletal: Normal range of motion. He exhibits no edema.   Neurological: He is alert and oriented to person, place, and time.   Skin: Skin is warm and dry.   Psychiatric: He has a normal mood and affect.     Personal Diagnostic Review    Initial Compliance Period  Mask:  Compliance Summary  4/9/2019 - 5/8/2019 (30 days)  Days with Device Usage 27 days  Days without Device Usage 3 days  Percent Days with Device Usage 90.0%  Cumulative Usage 9 " days 20 hrs. 46 mins.  Maximum Usage (1 Day) 12 hrs. 11 mins. 38 secs.  Average Usage (All Days) 7 hrs. 53 mins. 32 secs.  Average Usage (Days Used) 8 hrs. 46 mins. 8 secs.  Minimum Usage (1 Day) 6 hrs. 29 mins. 32 secs.  Percent of Days with Usage >= 4 Hours 90.0%  Percent of Days with Usage < 4 Hours 10.0%  Date Range  Total Blower Time 10 days 1 hrs. 52 mins. 21 secs.  Average AHI 1.9  Auto-CPAP Summary  Auto-CPAP Mean Pressure 6.9 cmH2O  Auto-CPAP Peak Average Pressure 8.0 cmH2O  Average Device Pressure <= 90% of Time 8.4 cmH2O  Average Time in Large Leak Per Day 2 mins. 22 sec    Assessment:       1. Obstructive sleep apnea        Outpatient Encounter Medications as of 5/14/2019   Medication Sig Dispense Refill    LACTOBACILLUS ACIDOPHILUS (PROBIOTIC ORAL) Take by mouth once daily.      levetiracetam (KEPPRA) 500 MG Tab Take 1 tablet (500 mg total) by mouth 2 (two) times daily. 60 tablet 11    multivitamin (ONE DAILY MULTIVITAMIN) per tablet Take 1 tablet by mouth once daily.      pravastatin (PRAVACHOL) 20 MG tablet Take 1 tablet (20 mg total) by mouth once daily. 90 tablet 3     No facility-administered encounter medications on file as of 5/14/2019.      No orders of the defined types were placed in this encounter.    Plan:        Problem List Items Addressed This Visit        Other    Obstructive sleep apnea (Chronic)    Overview     On Autopap. Doing well on PAP settings. Patient is compliant. Follow up annually in sleep clinic

## 2019-06-06 ENCOUNTER — CLINICAL SUPPORT (OUTPATIENT)
Dept: CARDIOLOGY | Facility: CLINIC | Age: 69
End: 2019-06-06
Attending: INTERNAL MEDICINE
Payer: MEDICARE

## 2019-06-06 ENCOUNTER — CLINICAL SUPPORT (OUTPATIENT)
Dept: CARDIOLOGY | Facility: CLINIC | Age: 69
End: 2019-06-06
Payer: MEDICARE

## 2019-06-06 ENCOUNTER — OFFICE VISIT (OUTPATIENT)
Dept: CARDIOLOGY | Facility: CLINIC | Age: 69
End: 2019-06-06
Payer: MEDICARE

## 2019-06-06 VITALS
SYSTOLIC BLOOD PRESSURE: 112 MMHG | WEIGHT: 200 LBS | DIASTOLIC BLOOD PRESSURE: 76 MMHG | BODY MASS INDEX: 28.63 KG/M2 | HEIGHT: 70 IN

## 2019-06-06 DIAGNOSIS — I48.0 PAF (PAROXYSMAL ATRIAL FIBRILLATION): Primary | ICD-10-CM

## 2019-06-06 DIAGNOSIS — R56.1 SEIZURES, POST-TRAUMATIC: Chronic | ICD-10-CM

## 2019-06-06 DIAGNOSIS — G25.0 ESSENTIAL TREMOR: ICD-10-CM

## 2019-06-06 DIAGNOSIS — Z95.0 CARDIAC PACEMAKER IN SITU: Chronic | ICD-10-CM

## 2019-06-06 DIAGNOSIS — I47.20 VT (VENTRICULAR TACHYCARDIA): Primary | ICD-10-CM

## 2019-06-06 DIAGNOSIS — I47.20 VT (VENTRICULAR TACHYCARDIA): ICD-10-CM

## 2019-06-06 DIAGNOSIS — Z95.0 CARDIAC PACEMAKER IN SITU: ICD-10-CM

## 2019-06-06 DIAGNOSIS — R00.1 SEVERE SINUS BRADYCARDIA: ICD-10-CM

## 2019-06-06 DIAGNOSIS — K64.8 INTERNAL HEMORRHOIDS: ICD-10-CM

## 2019-06-06 DIAGNOSIS — E78.49 OTHER HYPERLIPIDEMIA: ICD-10-CM

## 2019-06-06 DIAGNOSIS — D32.9 MENINGIOMA: ICD-10-CM

## 2019-06-06 PROCEDURE — 93280 PACEMAKER PROGRAMMING: ICD-10-PCS | Mod: 26,S$PBB,, | Performed by: INTERNAL MEDICINE

## 2019-06-06 PROCEDURE — 99999 PR PBB SHADOW E&M-EST. PATIENT-LVL II: ICD-10-PCS | Mod: PBBFAC,,, | Performed by: INTERNAL MEDICINE

## 2019-06-06 PROCEDURE — 99214 OFFICE O/P EST MOD 30 MIN: CPT | Mod: S$PBB,,, | Performed by: INTERNAL MEDICINE

## 2019-06-06 PROCEDURE — 99999 PR PBB SHADOW E&M-EST. PATIENT-LVL II: CPT | Mod: PBBFAC,,, | Performed by: INTERNAL MEDICINE

## 2019-06-06 PROCEDURE — 93270 REMOTE 30 DAY ECG REV/REPORT: CPT | Mod: PBBFAC | Performed by: INTERNAL MEDICINE

## 2019-06-06 PROCEDURE — 99212 OFFICE O/P EST SF 10 MIN: CPT | Mod: PBBFAC,25 | Performed by: INTERNAL MEDICINE

## 2019-06-06 PROCEDURE — 93280 PM DEVICE PROGR EVAL DUAL: CPT | Mod: PBBFAC | Performed by: INTERNAL MEDICINE

## 2019-06-06 PROCEDURE — 99214 PR OFFICE/OUTPT VISIT, EST, LEVL IV, 30-39 MIN: ICD-10-PCS | Mod: S$PBB,,, | Performed by: INTERNAL MEDICINE

## 2019-06-06 NOTE — PROGRESS NOTES
Subjective:   Patient ID:  Adal Loaiza Jr. is a 69 y.o. male     Chief complaint:Pacemaker Check    HPI    Background (Aug 2016):  66 man  Initial PPM implant by Dr OCONNELL for a 6 sec pause (in the midst of a seizure). 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   He had RV lead fx >. 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   He has had no issues with seizures nor syncope. He had a PPM eval 6/2  There were many AMS but no EGMs noted. He has no cardiac Sx and no palps,.      Update since 9/22/2017:  Has had a good year - still works full time, no CV c/o --if anything, feels better this year than last.   PPM eval -- poor A capture thresholds and P waves @1.0 mV >. Stable >> VDD -- doing well. PPM nearing SIMON -- 6 months or so but voltage OK.   I have reviewed the actual image of the ECG tracing obtained today and it shows NSR with normal intervals        Update 1/30/19:  Last week he was seen in ER at [facility] due to awakening with CP, nausea and clamminess -- near syncopal -- w/up was neg except for PPM at EOL -- he was in NSR   He is here to follow up on that   He feels well today -- getting over a recent, likely viral, bronchitis  I have reviewed the actual image of the ECG tracing obtained today and it shows NSR with normal intervals    Update since then:  He has had a PPM replacement per his wishes  He came back for eval today and his PPM reveals runs of PAF < a day long  These are ASxc  Current Outpatient Medications   Medication Sig    LACTOBACILLUS ACIDOPHILUS (PROBIOTIC ORAL) Take by mouth once daily.    levetiracetam (KEPPRA) 500 MG Tab Take 1 tablet (500 mg total) by mouth 2 (two) times daily.    multivitamin (ONE DAILY  MULTIVITAMIN) per tablet Take 1 tablet by mouth once daily.    pravastatin (PRAVACHOL) 20 MG tablet Take 1 tablet (20 mg total) by mouth once daily.     No current facility-administered medications for this visit.      Review of Systems   Constitution: Positive for weight loss. Negative for decreased appetite, malaise/fatigue and weight gain.   Eyes: Negative for blurred vision.   Cardiovascular: Negative for chest pain, claudication, cyanosis, dyspnea on exertion, irregular heartbeat, leg swelling, near-syncope, orthopnea and palpitations.   Respiratory: Negative for cough, shortness of breath, sleep disturbances due to breathing, snoring and wheezing.    Endocrine: Negative for heat intolerance.   Hematologic/Lymphatic: Bruises/bleeds easily.   Musculoskeletal: Negative for muscle weakness and myalgias.   Gastrointestinal: Negative for melena, nausea and vomiting.   Genitourinary: Negative for nocturia.   Neurological: Negative for excessive daytime sleepiness, dizziness, headaches, light-headedness and weakness.   Psychiatric/Behavioral: Negative for depression, memory loss and substance abuse. The patient does not have insomnia and is not nervous/anxious.        Objective:   Physical Exam   Constitutional: He is oriented to person, place, and time. He appears well-developed and well-nourished.   HENT:   Head: Normocephalic and atraumatic.   Right Ear: External ear normal.   Left Ear: External ear normal.   Eyes: Pupils are equal, round, and reactive to light. Conjunctivae are normal. Left conjunctiva is not injected. Left conjunctiva has no hemorrhage.   Neck: Neck supple. No JVD present. No thyromegaly present.   Cardiovascular: Normal rate, regular rhythm, normal heart sounds and intact distal pulses. PMI is not displaced. Exam reveals no gallop, no friction rub, no midsystolic click and no opening snap.   No murmur heard.  Pulses:       Carotid pulses are 2+ on the right side, and 2+ on the left side.        "Radial pulses are 2+ on the right side, and 2+ on the left side.        Dorsalis pedis pulses are 2+ on the right side, and 2+ on the left side.        Posterior tibial pulses are 2+ on the right side, and 2+ on the left side.   Pulmonary/Chest: Effort normal and breath sounds normal. No respiratory distress. He has no wheezes. He has no rales. He exhibits no tenderness.   Device pocket is in excellent repair     Abdominal: Soft. Normal appearance. He exhibits no pulsatile liver. There is no hepatomegaly. There is no tenderness. There is no rigidity.   Musculoskeletal: Normal range of motion. He exhibits no edema or tenderness.        Right knee: He exhibits no swelling.        Left knee: He exhibits no swelling.        Right ankle: He exhibits no swelling.        Left ankle: He exhibits no swelling.        Right lower leg: He exhibits no swelling.        Left lower leg: He exhibits no swelling.        Right foot: There is no swelling.        Left foot: There is no swelling.   Neurological: He is alert and oriented to person, place, and time. He has normal strength and normal reflexes. No cranial nerve deficit. Coordination normal.   Skin: Skin is warm, dry and intact. No rash noted. No cyanosis.   Psychiatric: He has a normal mood and affect. His behavior is normal.   Nursing note and vitals reviewed.    /76 (BP Location: Right arm, Patient Position: Sitting, BP Method: Large (Manual))   Ht 5' 10" (1.778 m)   Wt 90.7 kg (200 lb)   BMI 28.70 kg/m²      Assessment:            This patient has a CHADS2 score of  0  and a [id number] score of  1      1. PAF (paroxysmal atrial fibrillation)    2. Cardiac pacemaker in situ    3. Meningioma    4. Internal hemorrhoids    5. 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 Essential tremor    7. Other hyperlipidemia        Plan:    We will screen him for ARTESIA but he appears to have a CHADS score that is too low for enrollment.   No orders of the " defined types were placed in this encounter.    Follow up in about 6 months (around 12/6/2019), or if symptoms worsen or fail to improve.  There are no discontinued medications.     Medication List with Changes/Refills   Current Medications    LACTOBACILLUS ACIDOPHILUS (PROBIOTIC ORAL)    Take by mouth once daily.    LEVETIRACETAM (KEPPRA) 500 MG TAB    Take 1 tablet (500 mg total) by mouth 2 (two) times daily.    MULTIVITAMIN (ONE DAILY MULTIVITAMIN) PER TABLET    Take 1 tablet by mouth once daily.    PRAVASTATIN (PRAVACHOL) 20 MG TABLET    Take 1 tablet (20 mg total) by mouth once daily.

## 2019-06-07 NOTE — PROGRESS NOTES
Dr. Dean reviewed interrogation, inspected device pocket, and discussed with patient.  Pt will return to clinic in 6 months for [place] pacemaker check, remote transmission in 3 months.

## 2019-06-16 PROBLEM — Z45.010 PACEMAKER GENERATOR END OF LIFE: Status: RESOLVED | Noted: 2019-02-13 | Resolved: 2019-06-16

## 2019-06-16 PROBLEM — I48.0 PAF (PAROXYSMAL ATRIAL FIBRILLATION): Status: ACTIVE | Noted: 2019-06-16

## 2019-06-16 PROBLEM — Z45.010 PACEMAKER AT END OF BATTERY LIFE: Status: RESOLVED | Noted: 2019-01-29 | Resolved: 2019-06-16

## 2019-07-10 PROCEDURE — 93272 CARDIAC EVENT MONITOR - 30 DAYS: ICD-10-PCS | Mod: ,,, | Performed by: INTERNAL MEDICINE

## 2019-07-10 PROCEDURE — 93272 ECG/REVIEW INTERPRET ONLY: CPT | Mod: ,,, | Performed by: INTERNAL MEDICINE

## 2019-07-15 ENCOUNTER — TELEPHONE (OUTPATIENT)
Dept: CARDIOLOGY | Facility: CLINIC | Age: 69
End: 2019-07-15

## 2019-07-15 NOTE — TELEPHONE ENCOUNTER
----- Message from Jennifer Galeas LPN sent at 7/15/2019 12:09 PM CDT -----  Contact: self [phone]      ----- Message -----  From: Reanna Ma  Sent: 7/15/2019   9:44 AM  To: Mely Iverson Staff    Type:  Patient Returning Call    Who Called:Adal Loaiza  Who Left Message for Patient:Sanjuana  Does the patient know what this is regarding?:test results  Would the patient rather a call back or a response via [facility]? Call back   Best Call Back Number:[phone]  Additional Information:

## 2019-07-25 ENCOUNTER — TELEPHONE (OUTPATIENT)
Dept: INTERNAL MEDICINE | Facility: CLINIC | Age: 69
End: 2019-07-25

## 2019-07-25 NOTE — TELEPHONE ENCOUNTER
----- Message from Alexsandra Hawkins sent at 7/25/2019  2:16 PM CDT -----  Contact: self  Type:  RX Refill Request    Who Called: Adal  Refill or New Rx:refill  RX Name and Strength:pravastatin (PRAVACHOL) 20 MG tablet  How is the patient currently taking it? (ex. 1XDay):1xday  Is this a 30 day or 90 day RX:90  Preferred Pharmacy with phone number:  [facility] [address] [place] [id number] [facility]  [address] [facility] [address]  Phone: [phone] Fax: [phone]      Local or Mail Order:local  Ordering Provider:Elida  Would the patient rather a call back or a response via [facility]? call  Best Call Back Number:[phone]  Additional Information:

## 2019-07-25 NOTE — TELEPHONE ENCOUNTER
Pt stated he needs a refill of pravastatin.  Notified pt that refill was sent to [facility] on 5/9/19 for a 3-month supply with 3 refills.  Pt stated pharmacist told him he doesn't have any refills on file.  Spoke with Sara at pharmacy who stated she is filling prescription now.  Notified pt who verbalized understanding.

## 2019-09-10 ENCOUNTER — CLINICAL SUPPORT (OUTPATIENT)
Dept: CARDIOLOGY | Facility: CLINIC | Age: 69
End: 2019-09-10
Attending: INTERNAL MEDICINE
Payer: MEDICARE

## 2019-09-10 DIAGNOSIS — Z95.0 CARDIAC PACEMAKER IN SITU: ICD-10-CM

## 2019-09-10 DIAGNOSIS — R00.1 SEVERE SINUS BRADYCARDIA: ICD-10-CM

## 2019-09-10 PROCEDURE — 93294 PACEMAKER REMOTE: ICD-10-PCS | Mod: ,,, | Performed by: INTERNAL MEDICINE

## 2019-09-10 PROCEDURE — 93294 REM INTERROG EVL PM/LDLS PM: CPT | Mod: ,,, | Performed by: INTERNAL MEDICINE

## 2019-09-10 PROCEDURE — 93296 REM INTERROG EVL PM/IDS: CPT | Mod: PBBFAC | Performed by: INTERNAL MEDICINE

## 2019-10-31 ENCOUNTER — OFFICE VISIT (OUTPATIENT)
Dept: CARDIOLOGY | Facility: CLINIC | Age: 69
End: 2019-10-31
Payer: MEDICARE

## 2019-10-31 VITALS
HEART RATE: 85 BPM | DIASTOLIC BLOOD PRESSURE: 80 MMHG | WEIGHT: 208.31 LBS | BODY MASS INDEX: 29.89 KG/M2 | SYSTOLIC BLOOD PRESSURE: 112 MMHG

## 2019-10-31 DIAGNOSIS — R07.89 OTHER CHEST PAIN: Primary | ICD-10-CM

## 2019-10-31 DIAGNOSIS — E78.49 OTHER HYPERLIPIDEMIA: ICD-10-CM

## 2019-10-31 DIAGNOSIS — I48.0 PAF (PAROXYSMAL ATRIAL FIBRILLATION): ICD-10-CM

## 2019-10-31 DIAGNOSIS — G47.33 OBSTRUCTIVE SLEEP APNEA: Chronic | ICD-10-CM

## 2019-10-31 DIAGNOSIS — Z95.0 CARDIAC PACEMAKER IN SITU: Chronic | ICD-10-CM

## 2019-10-31 PROCEDURE — 99214 PR OFFICE/OUTPT VISIT, EST, LEVL IV, 30-39 MIN: ICD-10-PCS | Mod: S$PBB,,, | Performed by: INTERNAL MEDICINE

## 2019-10-31 PROCEDURE — 99999 PR PBB SHADOW E&M-EST. PATIENT-LVL III: CPT | Mod: PBBFAC,,, | Performed by: INTERNAL MEDICINE

## 2019-10-31 PROCEDURE — 99214 OFFICE O/P EST MOD 30 MIN: CPT | Mod: S$PBB,,, | Performed by: INTERNAL MEDICINE

## 2019-10-31 PROCEDURE — 99999 PR PBB SHADOW E&M-EST. PATIENT-LVL III: ICD-10-PCS | Mod: PBBFAC,,, | Performed by: INTERNAL MEDICINE

## 2019-10-31 PROCEDURE — 99213 OFFICE O/P EST LOW 20 MIN: CPT | Mod: PBBFAC | Performed by: INTERNAL MEDICINE

## 2019-10-31 NOTE — PROGRESS NOTES
Subjective:   Patient ID:  Adal Loaiza Jr. is a 69 y.o. male who presents for cardiac consult of Hyperlipidemia (6 mon f/u )      HPI  The patient came in today for cardiac consult of Hyperlipidemia (6 mon f/u )      Adal Loaiza Jr. is a 69 y.o. male pt with  HTN, HLP, SUSU, s/p PPM, Seizures here for follow up CV eval.     1/29/19  [place] [facility] [place] sided chest pain last week which started with associated diaphoresis and nausea. He reported relief of symptoms with topical NTG. ED workup revealed troponin negative x 3, BNP 79. Hospital medicine called for admission. No shortness of breath, COOPER or palpitations. HE reports noncompliance with CPAP at home lately. NO orthopnea, PND or abdominal bloating. PPM interrogation reveals device at [facility] and needs generator change. ECHO revealed EF 60-65%, -WMA's. He initially had PPM implanted after brain surgery. Has occasional light chest pain, CXR shows moderate hiatal hernia. PT also has a significant cough.     4/30/19  He had PPM implanted in Feb 2019 by Dr. Cleo Rouse. No further CP/SOB. Does have large hiatal hernia on recent CXR otherwise doing well. BP and HR normal today. Overall doing well. Had sleep study, abnormal using CPAP compliant.     10/31/19  Overall has been doing well. NO CP but mild atypical hernia pain symptoms. Has been walking without COOPER. PPM with brief PAF but overall no sx.     Patient feels no sob, no leg swelling, no PND, no palpitation, no dizziness, no syncope, no CNS symptoms.    Patient is compliant with medications.      2D ECHO 01/22/2019   CONCLUSIONS     1 - Normal left ventricular systolic function (EF 60-65%).     2 - Normal left ventricular diastolic function.     3 - Normal right ventricular systolic function .     4 - No wall motion abnormalities.     5 - Concentric hypertrophy.     6 - The estimated PA systolic pressure is greater than 27 mmHg.     7 - Trivial aortic regurgitation.     8 - Mild tricuspid  regurgitation.               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    Seizures     because of brain tumor    Sleep apnea     Spinal cord disease     meningioma       Past Surgical History:   Procedure Laterality Date    BRAIN SURGERY      CARDIAC PACEMAKER PLACEMENT      COLONOSCOPY N/A 8/25/2016    Procedure: COLONOSCOPY;  Surgeon: Morris Olguin MD;  Location: [facility] ENDO;  Service: Endoscopy;  Laterality: N/A;    Gamma knife      for schwannoma    REPLACEMENT OF PACEMAKER GENERATOR Left 2/13/2019    Procedure: REPLACEMENT, PULSE GENERATOR, CARDIAC PACEMAKER;  Surgeon: Gen Meza MD;  Location: [facility] CATH LAB;  Service: Cardiology;  Laterality: Left;  current device MDT/waiting for MD input       Social History     Tobacco Use    Smoking status: Never Smoker    Smokeless tobacco: Never Used   Substance Use Topics    Alcohol use: No     Comment: socially    Drug use: No       Family History   Problem Relation Age of Onset    Colon cancer Father     Hypertension Unknown        Patient's Medications   New Prescriptions    No medications on file   Previous Medications    LACTOBACILLUS ACIDOPHILUS (PROBIOTIC ORAL)    Take by mouth once daily.    LEVETIRACETAM (KEPPRA) 500 MG TAB    Take 1 tablet (500 mg total) by mouth 2 (two) times daily.    MULTIVITAMIN (ONE DAILY MULTIVITAMIN) PER TABLET    Take 1 tablet by mouth once daily.    PRAVASTATIN (PRAVACHOL) 20 MG TABLET    Take 1 tablet (20 mg total) by mouth once daily.   Modified Medications    No medications on file   Discontinued Medications    No medications on file       Review of Systems   Constitutional:  Negative.    HENT: Negative.    Eyes: Negative.    Respiratory: Positive for cough. Negative for shortness of breath.    Cardiovascular: Positive for chest pain. Negative for palpitations.   Gastrointestinal: Positive for abdominal pain.   Genitourinary: Negative.    Musculoskeletal: Negative.    Skin: Negative.    Neurological: Negative.    Endo/Heme/Allergies: Negative.    Psychiatric/Behavioral: Negative.    All 12 systems otherwise negative.      Wt Readings from Last 3 Encounters:   10/31/19 94.5 kg (208 lb 5.4 oz)   06/06/19 90.7 kg (200 lb)   05/14/19 93 kg (205 lb 0.4 oz)     Temp Readings from Last 3 Encounters:   05/09/19 98.2 °F (36.8 °C) (Tympanic)   02/19/19 97.5 °F (36.4 °C) (Tympanic)   02/13/19 97.8 °F (36.6 °C)     BP Readings from Last 3 Encounters:   10/31/19 112/80   06/06/19 112/76   05/14/19 120/74     Pulse Readings from Last 3 Encounters:   10/31/19 85   05/14/19 83   05/09/19 97       /80 (BP Location: Right arm, Patient Position: Sitting, BP Method: Medium (Manual))   Pulse 85   Wt 94.5 kg (208 lb 5.4 oz)   BMI 29.89 kg/m²     Objective:   Physical Exam   Constitutional: He is oriented to person, place, and time. He appears well-developed and well-nourished. No distress.   HENT:   Head: Normocephalic and atraumatic.   Nose: Nose normal.   Mouth/Throat: Oropharynx is clear and moist.   Eyes: Conjunctivae and EOM are normal. No scleral icterus.   Neck: Normal range of motion. Neck supple. No JVD present. No thyromegaly present.   Cardiovascular: Normal rate, regular rhythm, S1 normal and S2 normal. Exam reveals no gallop, no S3, no S4 and no friction rub.   No murmur heard.  Pulmonary/Chest: Effort normal and breath sounds normal. No stridor. No respiratory distress. He has no wheezes. He has no rales. He exhibits no tenderness.   Chest wall s/p PPM - no erythema, nontender   Abdominal: Soft. Bowel sounds are normal. He exhibits no distension and no mass. There is no tenderness.  There is no rebound.   Genitourinary:   Genitourinary Comments: Deferred   Musculoskeletal: Normal range of motion. He exhibits no edema, tenderness or deformity.   Lymphadenopathy:     He has no cervical adenopathy.   Neurological: He is alert and oriented to person, place, and time. He exhibits normal muscle tone. Coordination normal.   Skin: Skin is warm and dry. No rash noted. He is not diaphoretic. No erythema. No pallor.   Psychiatric: He has a normal mood and affect. His behavior is normal. Judgment and thought content normal.   Nursing note and vitals reviewed.      Lab Results   Component Value Date     05/01/2019    K 4.7 05/01/2019     05/01/2019    CO2 27 05/01/2019    BUN 14 05/01/2019    CREATININE 1.1 05/01/2019    GLU 89 05/01/2019    AST 26 05/01/2019    ALT 24 05/01/2019    ALBUMIN 3.5 05/01/2019    PROT 6.6 05/01/2019    BILITOT 0.7 05/01/2019    WBC 5.16 05/01/2019    HGB 12.7 (L) 05/01/2019    HCT 40.9 05/01/2019    MCV 82 05/01/2019     05/01/2019    INR 1.0 02/13/2019    TSH 2.495 04/24/2018    CHOL 160 05/01/2019    HDL 44 05/01/2019    LDLCALC 99.4 05/01/2019    TRIG 83 05/01/2019    BNP 79 01/22/2019     Assessment:      1. Other chest pain    2. Cardiac pacemaker in situ    3. Other hyperlipidemia    4. PAF (paroxysmal atrial fibrillation)    5. Obstructive sleep apnea        Plan:   1. Chest pain, atypical - resolved  - neg enzymes and ECHO  - discussed stress test after PPM if needed but defer for now  - hernia can be causing atypical referred pain - discuss with PCP    2. Pacemaker implanted  - f/u in pacemaker clinic as scheduled     3. SUSU  - cont CPAP    4. Hearing loss  - needs hearing aids, had ENT eval    5. HLD  - cont statin    Thank you for allowing me to participate in this patient's care. Please do not hesitate to contact me with any questions or concerns. Consult note has been forwarded to the referral physician.

## 2019-12-03 ENCOUNTER — TELEPHONE (OUTPATIENT)
Dept: RADIOLOGY | Facility: HOSPITAL | Age: 69
End: 2019-12-03

## 2019-12-04 ENCOUNTER — HOSPITAL ENCOUNTER (OUTPATIENT)
Dept: RADIOLOGY | Facility: HOSPITAL | Age: 69
Discharge: HOME | End: 2019-12-04
Attending: UROLOGY
Payer: MEDICARE

## 2019-12-04 DIAGNOSIS — N28.1 RENAL CYST: ICD-10-CM

## 2019-12-04 DIAGNOSIS — Z12.5 SCREENING PSA (PROSTATE SPECIFIC ANTIGEN): ICD-10-CM

## 2019-12-04 PROCEDURE — 76770 US EXAM ABDO BACK WALL COMP: CPT | Mod: 26,,, | Performed by: RADIOLOGY

## 2019-12-04 PROCEDURE — 76770 US EXAM ABDO BACK WALL COMP: CPT | Mod: TC

## 2019-12-04 PROCEDURE — 76770 US RETROPERITONEAL COMPLETE: ICD-10-PCS | Mod: 26,,, | Performed by: RADIOLOGY

## 2019-12-18 ENCOUNTER — PES CALL (OUTPATIENT)
Dept: ADMINISTRATIVE | Facility: CLINIC | Age: 69
End: 2019-12-18

## 2019-12-19 ENCOUNTER — OFFICE VISIT (OUTPATIENT)
Dept: UROLOGY | Facility: CLINIC | Age: 69
End: 2019-12-19
Payer: MEDICARE

## 2019-12-19 VITALS — DIASTOLIC BLOOD PRESSURE: 82 MMHG | WEIGHT: 208 LBS | BODY MASS INDEX: 29.84 KG/M2 | SYSTOLIC BLOOD PRESSURE: 128 MMHG

## 2019-12-19 DIAGNOSIS — N28.1 RENAL CYST: Primary | ICD-10-CM

## 2019-12-19 DIAGNOSIS — Z12.5 PROSTATE CANCER SCREENING: ICD-10-CM

## 2019-12-19 LAB
BILIRUB SERPL-MCNC: NORMAL MG/DL
BLOOD URINE, POC: NORMAL
COLOR, POC UA: YELLOW
GLUCOSE UR QL STRIP: NORMAL
KETONES UR QL STRIP: NORMAL
LEUKOCYTE ESTERASE URINE, POC: NORMAL
NITRITE, POC UA: NORMAL
PH, POC UA: 6
PROTEIN, POC: NORMAL
SPECIFIC GRAVITY, POC UA: 1.01
UROBILINOGEN, POC UA: NORMAL

## 2019-12-19 PROCEDURE — 1126F AMNT PAIN NOTED NONE PRSNT: CPT | Mod: ,,, | Performed by: UROLOGY

## 2019-12-19 PROCEDURE — 1159F PR MEDICATION LIST DOCUMENTED IN MEDICAL RECORD: ICD-10-PCS | Mod: ,,, | Performed by: UROLOGY

## 2019-12-19 PROCEDURE — 99213 PR OFFICE/OUTPT VISIT, EST, LEVL III, 20-29 MIN: ICD-10-PCS | Mod: S$PBB,,, | Performed by: UROLOGY

## 2019-12-19 PROCEDURE — 81002 URINALYSIS NONAUTO W/O SCOPE: CPT | Mod: PBBFAC | Performed by: UROLOGY

## 2019-12-19 PROCEDURE — 99999 PR PBB SHADOW E&M-EST. PATIENT-LVL III: CPT | Mod: PBBFAC,,, | Performed by: UROLOGY

## 2019-12-19 PROCEDURE — 1159F MED LIST DOCD IN RCRD: CPT | Mod: ,,, | Performed by: UROLOGY

## 2019-12-19 PROCEDURE — 99213 OFFICE O/P EST LOW 20 MIN: CPT | Mod: S$PBB,,, | Performed by: UROLOGY

## 2019-12-19 PROCEDURE — 99213 OFFICE O/P EST LOW 20 MIN: CPT | Mod: PBBFAC | Performed by: UROLOGY

## 2019-12-19 PROCEDURE — 1126F PR PAIN SEVERITY QUANTIFIED, NO PAIN PRESENT: ICD-10-PCS | Mod: ,,, | Performed by: UROLOGY

## 2019-12-19 PROCEDURE — 99999 PR PBB SHADOW E&M-EST. PATIENT-LVL III: ICD-10-PCS | Mod: PBBFAC,,, | Performed by: UROLOGY

## 2019-12-19 NOTE — PROGRESS NOTES
Chief Complaint: Renal Cyst?    HPI:   12/19/19:  Indep assessment of imaging agree with report: 1.3 cm mildly complex cyst right kidney in a bilateral background of small cysts no larger than prior.  Was worked up for ventral hernia with some pain after eating.    12/19/18: Hasn't had his US yet.  PSA this year reassuring.  Semen is more productive than it was.  Reviewed history in detail.  12/13/17: 66 yo man referred after recent US for symptoms of constipation revealed bilateral small simple cysts.  No abd/pelvic pain and no exac/rel factors.  No hematuria.  No urolithiasis.  No urinary bother.  No  history.  Normal sexual function.  Finds that he doesn't have much semen when ejaculating, orgasm and all else is normal.  No ED, good libido.    Allergies:  Nsaids (non-steroidal anti-inflammatory drug); Aspirin; and Celecoxib    Medications:  has a current medication list which includes the following prescription(s): lactobacillus acidophilus, levetiracetam, multivitamin, and pravastatin.    Review of Systems:  General: No fever, chills, fatigability, or weight loss.  Skin: No rashes, itching, or changes in color or texture of skin.  Chest: Denies COOPER, cyanosis, wheezing, cough, and sputum production.  Abdomen: Appetite fine. No weight loss. Denies diarrhea, abdominal pain, hematemesis, or blood in stool.  Musculoskeletal: No joint stiffness or swelling. Denies back pain.  : As above.  All other review of systems negative.    PMH:   has a past medical history of Anemia due to GI blood loss (2012), Arrhythmia requiring replacement of cardiac pacemaker, Brain tumor (benign) (1999), Encounter for blood transfusion, GI bleed due to NSAIDs (2012), Hyperlipidemia, PAF (paroxysmal atrial fibrillation) (6/16/2019), Renal cyst (2/19/2019), Schwannoma, Seizures, Sleep apnea, and Spinal cord disease.    PSH:   has a past surgical history that includes Cardiac pacemaker placement; Gamma knife; Brain surgery; Colonoscopy  (N/A, 8/25/2016); and Replacement of pacemaker generator (Left, 2/13/2019).    FamHx: family history includes Colon cancer in his father; Hypertension in his unknown relative.    SocHx:  reports that he has never smoked. He has never used smokeless tobacco. He reports that he does not drink alcohol or use drugs.      Physical Exam:  There were no vitals filed for this visit.  General: A&Ox3, no apparent distress, no deformities  Neck: No masses, normal thyroid  Lungs: normal inspiration, no use of accessory muscles  Heart: normal pulse, no arrhythmias  Abdomen: Soft, NT, ND, no midline hernia evident  Skin: The skin is warm and dry. No jaundice.  Ext: No c/c/e.  :   12/17: Test desc christiano, no abnormalities of epididymus. Penis normal, with normal penile and scrotal skin. Meatus normal. Normal rectal tone, no hemorrhoids. Prost 30 gm no nodules or masses appreciated. SV not palpable. Perineum and anus normal.    Labs/Studies:   Urinalysis performed in clinic, summary: UA normal  PSA    7/16: 2.1    4/18: 2.7    12/19: 2.9    Impression/Plan:   1. Renal cysts simple and likely benign.  US/RTC 1 year to see if any change.  2. Low risk of prostate cancer  3. Hernia not physically evident.

## 2020-01-06 ENCOUNTER — CLINICAL SUPPORT (OUTPATIENT)
Dept: CARDIOLOGY | Facility: CLINIC | Age: 70
End: 2020-01-06
Attending: INTERNAL MEDICINE
Payer: MEDICARE

## 2020-01-06 DIAGNOSIS — R00.1 SEVERE SINUS BRADYCARDIA: ICD-10-CM

## 2020-01-06 DIAGNOSIS — Z95.0 CARDIAC PACEMAKER IN SITU: ICD-10-CM

## 2020-01-06 PROCEDURE — 93280 PACEMAKER PROGRAMMING: ICD-10-PCS | Mod: 26,S$PBB,, | Performed by: INTERNAL MEDICINE

## 2020-01-06 PROCEDURE — 93280 PM DEVICE PROGR EVAL DUAL: CPT | Mod: PBBFAC | Performed by: INTERNAL MEDICINE

## 2020-01-23 ENCOUNTER — OFFICE VISIT (OUTPATIENT)
Dept: UROLOGY | Facility: CLINIC | Age: 70
End: 2020-01-23
Payer: MEDICARE

## 2020-01-23 ENCOUNTER — HOSPITAL ENCOUNTER (OUTPATIENT)
Dept: RADIOLOGY | Facility: HOSPITAL | Age: 70
Discharge: HOME | End: 2020-01-23
Attending: UROLOGY
Payer: MEDICARE

## 2020-01-23 VITALS
BODY MASS INDEX: 29.76 KG/M2 | HEIGHT: 70 IN | DIASTOLIC BLOOD PRESSURE: 80 MMHG | SYSTOLIC BLOOD PRESSURE: 152 MMHG | HEART RATE: 82 BPM | WEIGHT: 207.88 LBS

## 2020-01-23 DIAGNOSIS — Z12.5 PROSTATE CANCER SCREENING: ICD-10-CM

## 2020-01-23 DIAGNOSIS — I10 HYPERTENSION, UNSPECIFIED TYPE: ICD-10-CM

## 2020-01-23 DIAGNOSIS — N50.819 TESTALGIA: ICD-10-CM

## 2020-01-23 DIAGNOSIS — N28.1 RENAL CYST: ICD-10-CM

## 2020-01-23 DIAGNOSIS — N50.819 TESTALGIA: Primary | ICD-10-CM

## 2020-01-23 PROCEDURE — 99214 OFFICE O/P EST MOD 30 MIN: CPT | Mod: S$PBB,,, | Performed by: UROLOGY

## 2020-01-23 PROCEDURE — 1159F MED LIST DOCD IN RCRD: CPT | Mod: ,,, | Performed by: UROLOGY

## 2020-01-23 PROCEDURE — 99999 PR PBB SHADOW E&M-EST. PATIENT-LVL III: CPT | Mod: PBBFAC,,, | Performed by: UROLOGY

## 2020-01-23 PROCEDURE — 1125F PR PAIN SEVERITY QUANTIFIED, PAIN PRESENT: ICD-10-PCS | Mod: ,,, | Performed by: UROLOGY

## 2020-01-23 PROCEDURE — 99999 PR PBB SHADOW E&M-EST. PATIENT-LVL III: ICD-10-PCS | Mod: PBBFAC,,, | Performed by: UROLOGY

## 2020-01-23 PROCEDURE — 76870 US EXAM SCROTUM: CPT | Mod: 26,,, | Performed by: RADIOLOGY

## 2020-01-23 PROCEDURE — 81002 URINALYSIS NONAUTO W/O SCOPE: CPT | Mod: PBBFAC | Performed by: UROLOGY

## 2020-01-23 PROCEDURE — 99214 PR OFFICE/OUTPT VISIT, EST, LEVL IV, 30-39 MIN: ICD-10-PCS | Mod: S$PBB,,, | Performed by: UROLOGY

## 2020-01-23 PROCEDURE — 1125F AMNT PAIN NOTED PAIN PRSNT: CPT | Mod: ,,, | Performed by: UROLOGY

## 2020-01-23 PROCEDURE — 1159F PR MEDICATION LIST DOCUMENTED IN MEDICAL RECORD: ICD-10-PCS | Mod: ,,, | Performed by: UROLOGY

## 2020-01-23 PROCEDURE — 76870 US EXAM SCROTUM: CPT | Mod: TC

## 2020-01-23 PROCEDURE — 76870 US SCROTUM AND TESTICLES: ICD-10-PCS | Mod: 26,,, | Performed by: RADIOLOGY

## 2020-01-23 PROCEDURE — 99213 OFFICE O/P EST LOW 20 MIN: CPT | Mod: PBBFAC | Performed by: UROLOGY

## 2020-01-23 RX ORDER — DOXYCYCLINE 100 MG/1
100 CAPSULE ORAL EVERY 12 HOURS
Qty: 28 CAPSULE | Refills: 0 | Status: SHIPPED | OUTPATIENT
Start: 2020-01-23 | End: 2020-02-06

## 2020-01-23 NOTE — PROGRESS NOTES
"Chief Complaint: Renal Cyst?    HPI:   1/23/20: For a couple of months has felt a painful swollen "gland" over the top of the right testicle.  Reviewed history in detail.   Max size is about a navy bean.  Tender but not swollen today.   12/19/19:  Indep assessment of imaging agree with report: 1.3 cm mildly complex cyst right kidney in a bilateral background of small cysts no larger than prior.  Was worked up for ventral hernia with some pain after eating.    12/19/18: Hasn't had his US yet.  PSA this year reassuring.  Semen is more productive than it was.  Reviewed history in detail.  12/13/17: 68 yo man referred after recent US for symptoms of constipation revealed bilateral small simple cysts.  No abd/pelvic pain and no exac/rel factors.  No hematuria.  No urolithiasis.  No urinary bother.  No  history.  Normal sexual function.  Finds that he doesn't have much semen when ejaculating, orgasm and all else is normal.  No ED, good libido.    Allergies:  Nsaids (non-steroidal anti-inflammatory drug); Aspirin; and Celecoxib    Medications:  has a current medication list which includes the following prescription(s): lactobacillus acidophilus, levetiracetam, multivitamin, and pravastatin.    Review of Systems:  General: No fever, chills, fatigability, or weight loss.  Skin: No rashes, itching, or changes in color or texture of skin.  Chest: Denies COOPER, cyanosis, wheezing, cough, and sputum production.  Abdomen: Appetite fine. No weight loss. Denies diarrhea, abdominal pain, hematemesis, or blood in stool.  Musculoskeletal: No joint stiffness or swelling. Denies back pain.  : As above.  All other review of systems negative.    PMH:   has a past medical history of Anemia due to GI blood loss (2012), Arrhythmia requiring replacement of cardiac pacemaker, Brain tumor (benign) (1999), Encounter for blood transfusion, GI bleed due to NSAIDs (2012), Hyperlipidemia, PAF (paroxysmal atrial fibrillation) (6/16/2019), Renal " cyst (2/19/2019), Schwannoma, Seizures, Sleep apnea, and Spinal cord disease.    PSH:   has a past surgical history that includes Cardiac pacemaker placement; Gamma knife; Brain surgery; Colonoscopy (N/A, 8/25/2016); and Replacement of pacemaker generator (Left, 2/13/2019).    FamHx: family history includes Colon cancer in his father; Hypertension in his unknown relative.    SocHx:  reports that he has never smoked. He has never used smokeless tobacco. He reports that he does not drink alcohol or use drugs.      Physical Exam:  Vitals:    01/23/20 1309   BP: (!) 152/80   Pulse: 82     General: A&Ox3, no apparent distress, no deformities  Neck: No masses, normal thyroid  Lungs: normal inspiration, no use of accessory muscles  Heart: normal pulse, no arrhythmias  Abdomen: Soft, NT, ND, no midline hernia evident  Skin: The skin is warm and dry. No jaundice.  Ext: No c/c/e.  :   12/17: Test desc christiano, no abnormalities of epididymus. Penis normal, with normal penile and scrotal skin. Meatus normal. Normal rectal tone, no hemorrhoids. Prost 30 gm no nodules or masses appreciated. SV not palpable. Perineum and anus normal.    Labs/Studies:   Urinalysis performed in clinic, summary: UA normal  PSA    7/16: 2.1    4/18: 2.7    12/19: 2.9    Impression/Plan:   1. Renal cysts simple and likely benign.  US/RTC 1 year to see if any change as prev planned.  2. Low risk of prostate cancer  3. Hernia not physically evident.  New problem of testalgia/mass will get scrotal US and RTC.  Doxy on presumption of mild orchitis.  4. HTN: discussed; not taking BP meds will watch with time.

## 2020-02-22 NOTE — PROGRESS NOTES
From: Vani Brownlee  To: Consuelo Khanna DO  Sent: 2/18/2020 7:43 AM CST  Subject: Medication Question    Hi Dr. العراقي,  So I did complete the vaginal gel medication and i feel still like I have sporadic itching. Would you mind putting in an order for valtrex?   Subjective:       Patient ID: Adal Loaiza Jr. is a 69 y.o. male.    Chief Complaint:  Known Moderate SUSU  CPAP titration and machine ordered  Recent PPM change.  Had presented ER with Cough, SOB  Symptoms resolve  Never smoker  Hx Asthma family: father and son  Spirometry: NORMAL  I have reviewed the patient's medical history in detail and updated the computerized patient record.      The following portions of the patient's history were reviewed and updated as appropriate:   He  has a past medical history of Anemia due to GI blood loss (2012), Arrhythmia requiring replacement of cardiac pacemaker, Brain tumor (benign) (1999), Encounter for blood transfusion, GI bleed due to NSAIDs (2012), Hyperlipidemia, Renal cyst (2/19/2019), Schwannoma, Seizures, Sleep apnea, and Spinal cord disease.  He does not have any pertinent problems on file.  He  has a past surgical history that includes Cardiac pacemaker placement; Gamma knife; Brain surgery; Colonoscopy (N/A, 8/25/2016); and Replacement of pacemaker generator (Left, 2/13/2019).  His family history includes Colon cancer in his father; Hypertension in his unknown relative.  He  reports that  has never smoked. he has never used smokeless tobacco. He reports that he does not drink alcohol or use drugs.  He has a current medication list which includes the following prescription(s): lactobacillus acidophilus, levetiracetam, multivitamin, pravastatin, and fluticasone.  Current Outpatient Medications on File Prior to Visit   Medication Sig Dispense Refill    LACTOBACILLUS ACIDOPHILUS (PROBIOTIC ORAL) Take by mouth once daily.      levetiracetam (KEPPRA) 500 MG Tab Take 1 tablet (500 mg total) by mouth 2 (two) times daily. 60 tablet 11    multivitamin (ONE DAILY MULTIVITAMIN) per tablet Take 1 tablet by mouth once daily.      pravastatin (PRAVACHOL) 20 MG tablet Take 1 tablet (20 mg total) by mouth once daily. 90 tablet 1    fluticasone (FLONASE) 50 mcg/actuation nasal  "spray 2 sprays (100 mcg total) by Each Nare route once daily. 1 Bottle 12     No current facility-administered medications on file prior to visit.      He is allergic to nsaids (non-steroidal anti-inflammatory drug); aspirin; and celecoxib..    Review of Systems   All other systems reviewed and are negative.        Objective:     Vitals:    03/08/19 1409   BP: 132/80   Pulse: 102   Resp: 16   SpO2: 95%   Weight: 91.6 kg (202 lb)   Height: 5' 10" (1.778 m)     Physical Exam   Constitutional: He is oriented to person, place, and time. Vital signs are normal. He appears well-developed and well-nourished. He does not have a sickly appearance. He does not appear ill.       HENT:   Head: Normocephalic and atraumatic.   Nose: Nose normal.   Mouth/Throat: Oropharynx is clear and moist.   Eyes: Conjunctivae and EOM are normal. Pupils are equal, round, and reactive to light.   Neck: Normal range of motion. Neck supple. No tracheal deviation present. No thyromegaly present.   Cardiovascular: Normal rate, normal heart sounds and intact distal pulses.   No murmur heard.  Pulmonary/Chest: Effort normal and breath sounds normal. No respiratory distress. He has no wheezes.   Abdominal: Soft. Bowel sounds are normal.   Musculoskeletal: Normal range of motion. He exhibits no edema or deformity.   Lymphadenopathy:     He has no cervical adenopathy.   Neurological: He is alert and oriented to person, place, and time. No cranial nerve deficit.   Skin: Skin is warm and dry. Capillary refill takes 2 to 3 seconds.   Psychiatric: He has a normal mood and affect.   Nursing note and vitals reviewed.        Data Review:   CBC:   Lab Results   Component Value Date    WBC 6.40 01/22/2019    RBC 5.29 01/22/2019    HGB 14.5 01/22/2019    HCT 43.6 01/22/2019     01/22/2019     BMP:   Lab Results   Component Value Date     01/22/2019     01/22/2019    K 4.2 01/22/2019     01/22/2019    CO2 25 01/22/2019    BUN 14 01/22/2019 "    CREATININE 1.1 01/22/2019    CALCIUM 10.0 01/22/2019     Diagnostics: Chest x-ray:   FINDINGS:  Cardiac silhouette is normal in size.  Large hiatal hernia and cardiac pacing device again noted.    The lungs are clear bilaterally.  No acute osseous findings demonstrated.            Pulmonary function tests: FEV1: 3.27L  (104 % predicted), FVC:  5.00L (120.7 % predicted), FEV1/FVC:  66  NORMAL         PSG  REASONS FOR REFERRAL: Mr. Loaiza is a 69 year old male, referred to Elizabeth LeJeune, APRN, and the SD by Dr. Kishor Boone for diagnostic (and possibly CPAP) re - evaluation of previously diagnosed obstructive sleep apnea / hypopnea syndrome  (OSAHS) established on 4-06-11, with an Apnea + Hypopnea Index = 42.1 events / hr asleep, severe OSAHS; the CPAP  titration found 8 cm to be an optimal treatment pressure. His CPAP machine is outdated, and he has not been using it for ?quite  some time?, but appears to want to start treatment again. Ms LeJeune requested polysomnography based on the patient?s  reported snoring, witnessed apneas, and daytime hypersomnolence. Dr. Huseyin Marrero is the patient?s primary care physician.  STUDY PARAMETERS: This diagnostic study involved analysis of the patient's sleep pattern while breathing unassisted. The  study was performed with a sleep technologist in attendance for the entire test period, with video monitoring throughout the  study, and routine laboratory clinical parameters recorded: NOTE: The polysomnography electrophysiological record for the  patient has been reviewed in its entirety by Dr. Gonzalez.  SUMMARY STATEMENTS  DIAGNOSTIC IMPRESSIONS  G47.33 / 327.23 Moderate Obstructive Sleep Apnea, Adult (OSAHS)  Z72.821 / V69.4 Inadequate Sleep Hygiene  K21.9 / 530.1 r/o Sleep - Related Gastroesophageal Reflux  PRIMARY TREATMENT RECOMMENDATIONS Treat, or refer to [facility].  1. The diagnostic polysomnography revealed a moderate obstructive sleep apnea / hypopnea  syndrome (A + H Index = 26.6  events / hr asleep with 16.9 respiratory event - related arousals / hr asleep for the study, plus an additional 10.9 RERAs / hr  asleep (respiratory effort - related arousals). The mean SpO2 value was 92.6 %, moderate, minimum oxygen saturation  during sleep was 85 %, and waking baseline SpO2 was 99 %. Sporadic, mild to moderately loud snoring was noted. A  CPAP titration polysomnography is recommended.  Assessment:     Problem List Items Addressed This Visit     Obstructive sleep apnea - Primary (Chronic)      Other Visit Diagnoses     History of asthma            Plan:      No symptoms  Normal spirometry  Follow up with Ms Lejeune      Follow-up if symptoms worsen or fail to improve: With CHELLY.    This note was prepared using voice recognition system and is likely to have sound alike errors that may have been overlooked even after proof reading.  Please call me with any questions    Discussed diagnosis, its evaluation, treatment and usual course. All questions answered.    Thank you for the courtesy of participating in the care of this patient    Michelet Irby MD

## 2020-03-06 ENCOUNTER — PATIENT OUTREACH (OUTPATIENT)
Dept: ADMINISTRATIVE | Facility: OTHER | Age: 70
End: 2020-03-06

## 2020-03-09 ENCOUNTER — OFFICE VISIT (OUTPATIENT)
Dept: UROLOGY | Facility: CLINIC | Age: 70
End: 2020-03-09
Payer: MEDICARE

## 2020-03-09 VITALS
SYSTOLIC BLOOD PRESSURE: 126 MMHG | HEIGHT: 70 IN | DIASTOLIC BLOOD PRESSURE: 78 MMHG | WEIGHT: 211.75 LBS | BODY MASS INDEX: 30.31 KG/M2

## 2020-03-09 DIAGNOSIS — N50.3 EPIDIDYMAL CYST: ICD-10-CM

## 2020-03-09 DIAGNOSIS — N28.1 RENAL CYST: Primary | ICD-10-CM

## 2020-03-09 PROCEDURE — 99999 PR PBB SHADOW E&M-EST. PATIENT-LVL II: CPT | Mod: PBBFAC,,, | Performed by: UROLOGY

## 2020-03-09 PROCEDURE — 99213 OFFICE O/P EST LOW 20 MIN: CPT | Mod: S$PBB,,, | Performed by: UROLOGY

## 2020-03-09 PROCEDURE — 99212 OFFICE O/P EST SF 10 MIN: CPT | Mod: PBBFAC | Performed by: UROLOGY

## 2020-03-09 PROCEDURE — 99213 PR OFFICE/OUTPT VISIT, EST, LEVL III, 20-29 MIN: ICD-10-PCS | Mod: S$PBB,,, | Performed by: UROLOGY

## 2020-03-09 PROCEDURE — 99999 PR PBB SHADOW E&M-EST. PATIENT-LVL II: ICD-10-PCS | Mod: PBBFAC,,, | Performed by: UROLOGY

## 2020-03-09 PROCEDURE — 81002 URINALYSIS NONAUTO W/O SCOPE: CPT | Mod: PBBFAC | Performed by: UROLOGY

## 2020-03-09 NOTE — PROGRESS NOTES
"Chief Complaint: Renal Cyst?    HPI:   3/9/20:  Indep assessment of imaging agree with report: small bilateral epididymal cysts of no concern.  Reviewed history in detail. Testalgia has improved.  1/23/20: For a couple of months has felt a painful swollen "gland" over the top of the right testicle.  Reviewed history in detail.   Max size is about a navy bean.  Tender but not swollen today.   12/19/19:  Indep assessment of imaging agree with report: 1.3 cm mildly complex cyst right kidney in a bilateral background of small cysts no larger than prior.  Was worked up for ventral hernia with some pain after eating.    12/19/18: Hasn't had his US yet.  PSA this year reassuring.  Semen is more productive than it was.  Reviewed history in detail.  12/13/17: 66 yo man referred after recent US for symptoms of constipation revealed bilateral small simple cysts.  No abd/pelvic pain and no exac/rel factors.  No hematuria.  No urolithiasis.  No urinary bother.  No  history.  Normal sexual function.  Finds that he doesn't have much semen when ejaculating, orgasm and all else is normal.  No ED, good libido.    Allergies:  Nsaids (non-steroidal anti-inflammatory drug); Aspirin; and Celecoxib    Medications:  has a current medication list which includes the following prescription(s): lactobacillus acidophilus, levetiracetam, multivitamin, and pravastatin.    Review of Systems:  General: No fever, chills, fatigability, or weight loss.  Skin: No rashes, itching, or changes in color or texture of skin.  Chest: Denies COOPER, cyanosis, wheezing, cough, and sputum production.  Abdomen: Appetite fine. No weight loss. Denies diarrhea, abdominal pain, hematemesis, or blood in stool.  Musculoskeletal: No joint stiffness or swelling. Denies back pain.  : As above.  All other review of systems negative.    PMH:   has a past medical history of Anemia due to GI blood loss (2012), Arrhythmia requiring replacement of cardiac pacemaker, Brain " tumor (benign) (1999), Encounter for blood transfusion, GI bleed due to NSAIDs (2012), Hyperlipidemia, PAF (paroxysmal atrial fibrillation) (6/16/2019), Renal cyst (2/19/2019), Schwannoma, Seizures, Sleep apnea, and Spinal cord disease.    PSH:   has a past surgical history that includes Cardiac pacemaker placement; Gamma knife; Brain surgery; Colonoscopy (N/A, 8/25/2016); and Replacement of pacemaker generator (Left, 2/13/2019).    FamHx: family history includes Colon cancer in his father; Hypertension in his unknown relative.    SocHx:  reports that he has never smoked. He has never used smokeless tobacco. He reports that he does not drink alcohol or use drugs.      Physical Exam:  Vitals:    03/09/20 0926   BP: 126/78     General: A&Ox3, no apparent distress, no deformities  Neck: No masses, normal thyroid  Lungs: normal inspiration, no use of accessory muscles  Heart: normal pulse, no arrhythmias  Abdomen: Soft, NT, ND, no midline hernia evident  Skin: The skin is warm and dry. No jaundice.  Ext: No c/c/e.  :   12/17: Test desc christiano, no abnormalities of epididymus. Penis normal, with normal penile and scrotal skin. Meatus normal. Normal rectal tone, no hemorrhoids. Prost 30 gm no nodules or masses appreciated. SV not palpable. Perineum and anus normal.    Labs/Studies:   Urinalysis performed in clinic, summary: UA normal  PSA    7/16: 2.1    4/18: 2.7    12/19: 2.9    Impression/Plan:   1. Renal cysts simple and likely benign.  US/RTC 1 year to see if any change as prev planned.  2. Low risk of prostate cancer  3. Hernia not physically evident.  Discussed benign epid cysts; diet; low sodium, etc.  4. HTN: discussed; better today than last time

## 2020-03-30 ENCOUNTER — TELEPHONE (OUTPATIENT)
Dept: INTERNAL MEDICINE | Facility: CLINIC | Age: 70
End: 2020-03-30

## 2020-03-30 NOTE — TELEPHONE ENCOUNTER
Pt injured his left knee last Thursday. He states he is in a lot of pain. I advised him to go to urgent care or the ER to be evaluated and treated, he verbalized understanding.

## 2020-03-30 NOTE — TELEPHONE ENCOUNTER
----- Message from Kristina Claros sent at 3/30/2020 11:11 AM CDT -----  Contact: pt   Pt called insisting on speaking with the office. Pt stated that he blew out his knee and needs a referral to see someone    Pt can be reached at [phone]

## 2020-04-21 ENCOUNTER — TELEPHONE (OUTPATIENT)
Dept: INTERNAL MEDICINE | Facility: CLINIC | Age: 70
End: 2020-04-21

## 2020-04-21 NOTE — TELEPHONE ENCOUNTER
----- Message from Tash Jennings sent at 4/21/2020  1:14 PM CDT -----  Contact: pt  Calling for a referral to an orthopedic surgeon for a partial knee replacement.

## 2020-04-21 NOTE — TELEPHONE ENCOUNTER
Spoke to pt, virtual visit scheduled w/ Dr. Marrero on April 28th @ 11:20, pt verbalized understanding/joaquin

## 2020-04-28 ENCOUNTER — OFFICE VISIT (OUTPATIENT)
Dept: INTERNAL MEDICINE | Facility: CLINIC | Age: 70
End: 2020-04-28
Payer: MEDICARE

## 2020-04-28 ENCOUNTER — TELEPHONE (OUTPATIENT)
Dept: ORTHOPEDICS | Facility: CLINIC | Age: 70
End: 2020-04-28

## 2020-04-28 ENCOUNTER — TELEPHONE (OUTPATIENT)
Dept: INTERNAL MEDICINE | Facility: CLINIC | Age: 70
End: 2020-04-28

## 2020-04-28 DIAGNOSIS — D32.9 MENINGIOMA: ICD-10-CM

## 2020-04-28 DIAGNOSIS — M25.462 PAIN AND SWELLING OF LEFT KNEE: ICD-10-CM

## 2020-04-28 DIAGNOSIS — I48.0 PAF (PAROXYSMAL ATRIAL FIBRILLATION): ICD-10-CM

## 2020-04-28 DIAGNOSIS — G40.209 PARTIAL SYMPTOMATIC EPILEPSY WITH COMPLEX PARTIAL SEIZURES, NOT INTRACTABLE, WITHOUT STATUS EPILEPTICUS: ICD-10-CM

## 2020-04-28 DIAGNOSIS — M25.562 ACUTE PAIN OF LEFT KNEE: Primary | ICD-10-CM

## 2020-04-28 DIAGNOSIS — M25.562 PAIN AND SWELLING OF LEFT KNEE: ICD-10-CM

## 2020-04-28 PROBLEM — R07.9 CHEST PAIN: Status: RESOLVED | Noted: 2019-01-22 | Resolved: 2020-04-28

## 2020-04-28 PROCEDURE — 99213 OFFICE O/P EST LOW 20 MIN: CPT | Mod: 95,,, | Performed by: INTERNAL MEDICINE

## 2020-04-28 PROCEDURE — 99213 PR OFFICE/OUTPT VISIT, EST, LEVL III, 20-29 MIN: ICD-10-PCS | Mod: 95,,, | Performed by: INTERNAL MEDICINE

## 2020-04-28 NOTE — TELEPHONE ENCOUNTER
----- Message from Rupinder Vanessa sent at 4/28/2020  8:34 AM CDT -----  Contact: pt  Would like to consult with nurse regarding him not being able to download [email or web address], please give a call back at [phone].            Thanks,  Rupinder CORTES

## 2020-04-28 NOTE — PROGRESS NOTES
Subjective:      Patient ID: Adal Loaiza Jr. is a 70 y.o. male.    Chief Complaint: No chief complaint on file.    The patient location is: home  The chief complaint leading to consultation is: knee pain  Visit type: audiovisual  Total time spent with patient:   Each patient to whom he or she provides medical services by telemedicine is:  (1) informed of the relationship between the physician and patient and the respective role of any other health care provider with respect to management of the patient; and (2) notified that he or she may decline to receive medical services by telemedicine and may withdraw from such care at any time.    Notes:     Knee Pain    Incident onset:   5 weeks ago, felt pop while walking. Injury mechanism: walking. The pain is present in the left knee. The quality of the pain is described as aching. The pain is moderate. The pain has been intermittent since onset. He reports no foreign bodies present. He has tried ice (tylenol) for the symptoms. The treatment provided mild relief.     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)    Cardiac pacemaker in situ    Fatty liver    Obstructive sleep apnea    Myofascial pain    Hyperlipidemia    Pacemaker lead fracture    Abnormal digital rectal exam    Family history of colon cancer    Colon polyps    Internal hemorrhoids    Sensorineural hearing loss (SNHL) of both ears    Essential tremor    Meningioma    Renal cyst    Inadequate sleep hygiene    PAF (paroxysmal atrial fibrillation)     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   HENT: Negative for ear pain and sore throat.    Respiratory: Negative for cough.    Cardiovascular: Negative for chest pain.   Gastrointestinal: Negative for abdominal pain and blood in stool.   Genitourinary: Negative for dysuria and hematuria.   Neurological: Negative for seizures and syncope.     Objective:   There were no vitals taken for this visit.    Physical Exam   Constitutional: He is oriented to person, place, and time. He appears well-developed and well-nourished.   Neck: Normal range of motion.   Pulmonary/Chest: Effort normal.   Musculoskeletal: He exhibits edema (left knee. no redness. good rom).   Neurological: He is alert and oriented to person, place, and time.   Psychiatric: He has a normal mood and affect. His behavior is normal.       Assessment:     1. Acute pain of left knee    2. PAF (paroxysmal atrial fibrillation)    3. Pain and swelling of left knee    4. Meningioma    5. Partial symptomatic epilepsy with complex partial seizures, not intractable, without status epilepticus      Plan:   Acute pain of left knee  -     Ambulatory referral/consult to Orthopedics; Future; Expected date: 05/05/2020    PAF (paroxysmal atrial fibrillation)   chronic.  Currently asymptomatic  Pain and swelling of left knee  -     Ambulatory referral/consult to Orthopedics; Future; Expected date: 05/05/2020    Meningioma   continue recommendations and follow-up as per Neurology  Partial symptomatic epilepsy with complex partial seizures, not intractable, without status epilepticus   no recent seizures.  Continue current medications and recommendations as per Neurology      Lab Frequency Next Occurrence   Comprehensive metabolic panel Once 05/08/2020   Lipid panel Once 05/08/2020   TSH Once 05/08/2020   PSA, Screening  Once 12/19/2019   US Retroperitoneal Complete (Kidney and Once 12/19/2019   PSA, Screening Once 12/19/2019   US Retroperitoneal Complete (Kidney and Once 12/19/2019   Pacemaker remote     Cardiac device check - [facility]         Problem List Items Addressed This Visit        Cardiac/Vascular    PAF (paroxysmal atrial fibrillation)       Oncology    Meningioma    Overview     Sees neuro med           Other Visit Diagnoses     Acute pain of left knee    -  Primary    Relevant Orders    Ambulatory referral/consult to Orthopedics    Pain and swelling of left knee        Relevant Orders    Ambulatory referral/consult to Orthopedics    Partial symptomatic epilepsy with complex partial seizures, not intractable, without status epilepticus              Follow up in about 4 weeks (around 5/26/2020), or if symptoms worsen or fail to improve.

## 2020-04-28 NOTE — TELEPHONE ENCOUNTER
Spoke to pt, he stated he does not have enough storage on his phone to download the [employer] ana. I informed Mr. chatterjee that I changed his virtual visit to an audio only call. Pt verbalized understanding/joaquin

## 2020-05-07 ENCOUNTER — PATIENT OUTREACH (OUTPATIENT)
Dept: ADMINISTRATIVE | Facility: OTHER | Age: 70
End: 2020-05-07

## 2020-05-08 ENCOUNTER — HOSPITAL ENCOUNTER (OUTPATIENT)
Dept: RADIOLOGY | Facility: HOSPITAL | Age: 70
Discharge: HOME | End: 2020-05-08
Attending: FAMILY MEDICINE
Payer: MEDICARE

## 2020-05-08 ENCOUNTER — OFFICE VISIT (OUTPATIENT)
Dept: ORTHOPEDICS | Facility: CLINIC | Age: 70
End: 2020-05-08
Payer: MEDICARE

## 2020-05-08 VITALS
HEART RATE: 66 BPM | BODY MASS INDEX: 30.21 KG/M2 | HEIGHT: 70 IN | WEIGHT: 211 LBS | DIASTOLIC BLOOD PRESSURE: 81 MMHG | SYSTOLIC BLOOD PRESSURE: 126 MMHG

## 2020-05-08 DIAGNOSIS — M25.562 LEFT KNEE PAIN, UNSPECIFIED CHRONICITY: Primary | ICD-10-CM

## 2020-05-08 DIAGNOSIS — M25.561 RIGHT KNEE PAIN, UNSPECIFIED CHRONICITY: Primary | ICD-10-CM

## 2020-05-08 DIAGNOSIS — M25.561 RIGHT KNEE PAIN, UNSPECIFIED CHRONICITY: ICD-10-CM

## 2020-05-08 DIAGNOSIS — Z95.0 CARDIAC PACEMAKER IN SITU: Chronic | ICD-10-CM

## 2020-05-08 PROCEDURE — 99213 OFFICE O/P EST LOW 20 MIN: CPT | Mod: PBBFAC,25 | Performed by: FAMILY MEDICINE

## 2020-05-08 PROCEDURE — 73564 XR KNEE ORTHO LEFT WITH FLEXION: ICD-10-PCS | Mod: 26,LT,, | Performed by: RADIOLOGY

## 2020-05-08 PROCEDURE — 99214 OFFICE O/P EST MOD 30 MIN: CPT | Mod: S$PBB,,, | Performed by: FAMILY MEDICINE

## 2020-05-08 PROCEDURE — 99214 PR OFFICE/OUTPT VISIT, EST, LEVL IV, 30-39 MIN: ICD-10-PCS | Mod: S$PBB,,, | Performed by: FAMILY MEDICINE

## 2020-05-08 PROCEDURE — 73562 X-RAY EXAM OF KNEE 3: CPT | Mod: TC,RT

## 2020-05-08 PROCEDURE — 73564 X-RAY EXAM KNEE 4 OR MORE: CPT | Mod: 26,LT,, | Performed by: RADIOLOGY

## 2020-05-08 PROCEDURE — 99999 PR PBB SHADOW E&M-EST. PATIENT-LVL III: ICD-10-PCS | Mod: PBBFAC,,, | Performed by: FAMILY MEDICINE

## 2020-05-08 PROCEDURE — 99999 PR PBB SHADOW E&M-EST. PATIENT-LVL III: CPT | Mod: PBBFAC,,, | Performed by: FAMILY MEDICINE

## 2020-05-08 PROCEDURE — 73562 XR KNEE ORTHO LEFT WITH FLEXION: ICD-10-PCS | Mod: 26,RT,, | Performed by: RADIOLOGY

## 2020-05-08 PROCEDURE — 73562 X-RAY EXAM OF KNEE 3: CPT | Mod: 26,RT,, | Performed by: RADIOLOGY

## 2020-05-08 NOTE — LETTER
May 8, 2020      Huseyin Marrero MD  [address]  [place] [address]           [facility]  [address]  [place] [address]  Phone: [phone]  Fax: [phone]          Patient: Adal Loaiza Jr.   MR Number: [id number]   YOB: 1950   Date of Visit: 5/8/2020       Dear Dr. Huseyin Marrero:    Thank you for referring Adal Loaiza to me for evaluation. Attached you will find relevant portions of my assessment and plan of care.    If you have questions, please do not hesitate to call me. I look forward to following Adal Loaiza along with you.    Sincerely,    Attila Mclain MD    Enclosure  CC:  No Recipients    If you would like to receive this communication electronically, please contact [email or web address] or [phone] to request more information on [employer] access.    For providers and/or their staff who would like to refer a patient to [facility], please contact us through our one-stop-shop provider referral line, [facility], at [phone].    If you feel you have received this communication in error or would no longer like to receive these types of communications, please e-mail [email or web address]

## 2020-05-08 NOTE — PATIENT INSTRUCTIONS
Medial  brace    CT scan left knee ( patient has pacemaker)    Ice to knee 3 times a day for 15 min as needed to reduce swelling    See Dr. Avery Mclain after CT scan to further evaluate knee injury

## 2020-05-08 NOTE — PROGRESS NOTES
Subjective:     Patient ID: Adal Loaiza Jr. is a 70 y.o. male.    Chief Complaint: Pain and Swelling of the Left Knee      HPI:  Patient was walking down a her but 6 weeks ago and felt a snap and dropped to the ground.  Since that time he has had significant swelling in the knee although it has reduced over the last few weeks but he still has limitations to extension of the knee feeling of giving way and has to wear compression sleeve to be able to reduce swelling and stabilizes walking.  No significant pain.  No physical therapy thus far.    He talked to a orthopedic friend if he has had a town who did x-rays and told him that he might need a knee joint replacement.  He prefers to not have a replacement but is willing to have what ever procedure is needed to help restore his knee to adequate function especially since she has 9 grandchildren to interact with and he likes to be active outdoors such as hunting.    No recent fever weight loss chills shortness of breath or chest pain.  He does have a pacemaker because he had a benign brain tumor removed several years ago.    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    Seizures     because of brain tumor    Sleep apnea     Spinal cord disease     meningioma     Past Surgical History:   Procedure Laterality Date    BRAIN SURGERY      CARDIAC PACEMAKER PLACEMENT      COLONOSCOPY N/A 8/25/2016    Procedure: COLONOSCOPY;  Surgeon: Morris Olguin MD;  [facility];  Service: Endoscopy;  Laterality: N/A;    Gamma knife      for schwannoma    REPLACEMENT OF PACEMAKER GENERATOR Left 2/13/2019    Procedure: REPLACEMENT,  PULSE GENERATOR, CARDIAC PACEMAKER;  Surgeon: Gen Meza MD;  Location: [facility] CATH LAB;  Service: Cardiology;  Laterality: Left;  current device MDT/waiting for MD input     Family History   Problem Relation Age of Onset    Colon cancer Father     Hypertension Unknown      Social History     Socioeconomic History    Marital status:      Spouse name: Not on file    Number of children: Not on file    Years of education: Not on file    Highest education level: Not on file   Occupational History    Not on file   Social Needs    Financial resource strain: Not on file    Food insecurity:     Worry: Not on file     Inability: Not on file    Transportation needs:     Medical: Not on file     Non-medical: Not on file   Tobacco Use    Smoking status: Never Smoker    Smokeless tobacco: Never Used   Substance and Sexual Activity    Alcohol use: No     Comment: socially    Drug use: No    Sexual activity: Not on file   Lifestyle    Physical activity:     Days per week: Not on file     Minutes per session: Not on file    Stress: Not on file   Relationships    Social connections:     Talks on phone: Not on file     Gets together: Not on file     Attends Gnosticism service: Not on file     Active member of club or organization: Not on file     Attends meetings of clubs or organizations: Not on file     Relationship status: Not on file   Other Topics Concern    Not on file   Social History Narrative    Not on file       Current Outpatient Medications:     LACTOBACILLUS ACIDOPHILUS (PROBIOTIC ORAL), Take by mouth once daily., Disp: , Rfl:     levetiracetam (KEPPRA) 500 MG Tab, Take 1 tablet (500 mg total) by mouth 2 (two) times daily., Disp: 60 tablet, Rfl: 11    multivitamin (ONE DAILY MULTIVITAMIN) per tablet, Take 1 tablet by mouth once daily., Disp: , Rfl:     pravastatin (PRAVACHOL) 20 MG tablet, Take 1 tablet (20 mg total) by mouth once daily., Disp: 90 tablet, Rfl: 3  Review of patient's  allergies indicates:   Allergen Reactions    Nsaids (non-steroidal anti-inflammatory drug)      Caused GI bleeding.    Aspirin     Celecoxib      Review of Systems   Constitutional: Negative for chills, fever and weight loss.   Respiratory: Negative for shortness of breath.    Cardiovascular: Negative for chest pain and palpitations.       Objective:   Body mass index is 30.28 kg/m².  Vitals:    05/08/20 0935   BP: 126/81   Pulse: 66           Ortho/SPM Exam well-nourished well-developed alert an oriented very pleasant adult male ambulatory with antalgic gait and in no acute distress    Respiratory effort is normal      Left knee- obvious 1 to 2+ generalized edema of the joint, no discoloration    Patient's range of motion is 5° to 90°.    No significant varus or valgus laxity and negative Lachman's but difficult exam due to the amount of swelling.    Patient has tenderness palpation along the medial joint line and the medial collateral ligament.    Neurovascular intact    Psychiatric good affect cognition    IMAGING: X-ray Knee Ortho Left with Flexion  Narrative: EXAMINATION:  XR KNEE ORTHO LEFT WITH FLEXION    CLINICAL HISTORY:  . Pain in right knee    TECHNIQUE:  AP standing of both knees, PA flexion standing views of both knees, and Merchant views of both knees were performed. A lateral of the left knee was also performed.    COMPARISON:  None    FINDINGS:  Moderate joint effusion suspected on the left.  No acute fractures or dislocations visualized.  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  Multiple ossific densities project over the lateral compartment and midline which are suspicious for intra-articular bodies.Mild degenerative findings are present at the patellofemoral and medial compartments of the right knee.  Impression: As above.    Electronically signed by: Nacho Danielle  MD  Date:    05/08/2020  Time:    09:39       Radiographs / Imaging : Relevant imaging results reviewed by me and interpreted by me, discussed with the patient and / or family -the films you with patient and there is collapse of the medial compartment and moderate tricompartmental changes in a few loose bodies are evident.      Assessment:     Encounter Diagnoses   Name Primary?    Left knee pain, unspecified chronicity Yes    Cardiac pacemaker in situ         Plan:   Left knee pain, unspecified chronicity  -     CT Knee Without Contrast Left; Future; Expected date: 05/08/2020    Cardiac pacemaker in situ     Discussion-the patient would like to try a medial  brace today and will begin application of ice to the knee and have a CT scan done on the knee and follow-up soon with Dr. Avery Mclain since he may need a knee procedure for his problem to improve.    I appreciate the opportunity to see this very nice man today and help with his left knee problem.    The patient verbalized good understanding of the medical issues discussed today and expressed appreciation for the care provided.  Patient was given the opportunity to ask questions and be an active participant in their medical care. Patient had no further questions or concerns at this time.     The patient was encouraged to participate in appropriate physical activity.      Attila Mclain M.D.  [facility]        This note was dictated using voice recognition software and may have sound a like errors.

## 2020-05-14 DIAGNOSIS — M17.12 PRIMARY OSTEOARTHRITIS OF LEFT KNEE: Primary | ICD-10-CM

## 2020-05-26 ENCOUNTER — TELEPHONE (OUTPATIENT)
Dept: RADIOLOGY | Facility: HOSPITAL | Age: 70
End: 2020-05-26

## 2020-05-26 ENCOUNTER — HOSPITAL ENCOUNTER (OUTPATIENT)
Dept: RADIOLOGY | Facility: HOSPITAL | Age: 70
Discharge: HOME | End: 2020-05-26
Attending: FAMILY MEDICINE
Payer: MEDICARE

## 2020-05-26 ENCOUNTER — OFFICE VISIT (OUTPATIENT)
Dept: INTERNAL MEDICINE | Facility: CLINIC | Age: 70
End: 2020-05-26
Payer: MEDICARE

## 2020-05-26 VITALS
DIASTOLIC BLOOD PRESSURE: 74 MMHG | TEMPERATURE: 96 F | HEART RATE: 102 BPM | BODY MASS INDEX: 30.02 KG/M2 | WEIGHT: 209.19 LBS | OXYGEN SATURATION: 98 % | SYSTOLIC BLOOD PRESSURE: 122 MMHG

## 2020-05-26 DIAGNOSIS — D32.9 MENINGIOMA: ICD-10-CM

## 2020-05-26 DIAGNOSIS — G47.33 OBSTRUCTIVE SLEEP APNEA: Chronic | ICD-10-CM

## 2020-05-26 DIAGNOSIS — M25.562 LEFT KNEE PAIN, UNSPECIFIED CHRONICITY: ICD-10-CM

## 2020-05-26 DIAGNOSIS — G25.0 ESSENTIAL TREMOR: ICD-10-CM

## 2020-05-26 DIAGNOSIS — K76.0 FATTY LIVER: ICD-10-CM

## 2020-05-26 DIAGNOSIS — E78.49 OTHER HYPERLIPIDEMIA: Primary | ICD-10-CM

## 2020-05-26 DIAGNOSIS — I48.0 PAF (PAROXYSMAL ATRIAL FIBRILLATION): ICD-10-CM

## 2020-05-26 PROCEDURE — 99999 PR PBB SHADOW E&M-EST. PATIENT-LVL III: CPT | Mod: PBBFAC,,, | Performed by: INTERNAL MEDICINE

## 2020-05-26 PROCEDURE — 73700 CT LOWER EXTREMITY W/O DYE: CPT | Mod: TC,LT

## 2020-05-26 PROCEDURE — 99213 OFFICE O/P EST LOW 20 MIN: CPT | Mod: PBBFAC,25 | Performed by: INTERNAL MEDICINE

## 2020-05-26 PROCEDURE — 73700 CT LOWER EXTREMITY W/O DYE: CPT | Mod: 26,LT,, | Performed by: RADIOLOGY

## 2020-05-26 PROCEDURE — 99214 OFFICE O/P EST MOD 30 MIN: CPT | Mod: S$PBB,,, | Performed by: INTERNAL MEDICINE

## 2020-05-26 PROCEDURE — 73700 CT KNEE WITHOUT CONTRAST LEFT: ICD-10-PCS | Mod: 26,LT,, | Performed by: RADIOLOGY

## 2020-05-26 PROCEDURE — 99214 PR OFFICE/OUTPT VISIT, EST, LEVL IV, 30-39 MIN: ICD-10-PCS | Mod: S$PBB,,, | Performed by: INTERNAL MEDICINE

## 2020-05-26 PROCEDURE — 99999 PR PBB SHADOW E&M-EST. PATIENT-LVL III: ICD-10-PCS | Mod: PBBFAC,,, | Performed by: INTERNAL MEDICINE

## 2020-05-26 RX ORDER — PRAVASTATIN SODIUM 20 MG/1
20 TABLET ORAL DAILY
Qty: 90 TABLET | Refills: 3 | Status: SHIPPED | OUTPATIENT
Start: 2020-05-26 | End: 2021-09-09

## 2020-05-28 NOTE — PROGRESS NOTES
Subjective:      Patient ID: Adal Loaiza Jr. is a 70 y.o. male.    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)    Cardiac pacemaker in situ    Fatty liver    Obstructive sleep apnea    Myofascial pain    Hyperlipidemia    Pacemaker lead fracture    Abnormal digital rectal exam    Family history of colon cancer    Colon polyps    Internal hemorrhoids    Sensorineural hearing loss (SNHL) of both ears    Essential tremor    Meningioma    Renal cyst    Inadequate sleep hygiene    PAF (paroxysmal atrial fibrillation)    Left knee pain     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   HENT: Negative for ear pain and sore throat.    Respiratory: Negative for cough.    Cardiovascular: Negative for chest pain.   Gastrointestinal: Negative for abdominal pain and blood in stool.   Genitourinary: Negative for dysuria and hematuria.   Neurological: Negative for seizures and syncope.     Objective:   /74 (BP Location: Left arm, Patient Position: Sitting, BP Method: Large (Manual))   Pulse 102   Temp 96.4 °F (35.8 °C) (Tympanic)   Wt 94.9 kg (209 lb 3.5 oz)   SpO2 98%   BMI 30.02 kg/m²     Physical Exam   Constitutional: He is oriented to person, place, and time. He appears well-developed and well-nourished. No distress.   HENT:   Head: Normocephalic and atraumatic.   Mouth/Throat: Oropharynx is clear  and moist.   Eyes: Pupils are equal, round, and reactive to light. EOM are normal.   Neck: Neck supple. No thyromegaly present.   Cardiovascular: Normal rate and regular rhythm.   Pulmonary/Chest: Breath sounds normal. He has no wheezes. He has no rales.   Abdominal: Soft. Bowel sounds are normal. There is no tenderness.   Musculoskeletal: He exhibits no edema.   Lymphadenopathy:     He has no cervical adenopathy.   Neurological: He is alert and oriented to person, place, and time.   Skin: Skin is warm and dry.   Psychiatric: He has a normal mood and affect. His behavior is normal.     No visits with results within 2 Week(s) from this visit.   Latest known visit with results is:   Lab Visit on 05/08/2020   Component Date Value Ref Range Status    Sodium 05/08/2020 142  136 - 145 mmol/L Final    Potassium 05/08/2020 4.5  3.5 - 5.1 mmol/L Final    Chloride 05/08/2020 110  95 - 110 mmol/L Final    CO2 05/08/2020 25  23 - 29 mmol/L Final    Glucose 05/08/2020 92  70 - 110 mg/dL Final    BUN, Bld 05/08/2020 17  8 - 23 mg/dL Final    Creatinine 05/08/2020 1.1  0.5 - 1.4 mg/dL Final    Calcium 05/08/2020 9.0  8.7 - 10.5 mg/dL Final    Total Protein 05/08/2020 6.8  6.0 - 8.4 g/dL Final    Albumin 05/08/2020 3.7  3.5 - 5.2 g/dL Final    Total Bilirubin 05/08/2020 0.6  0.1 - 1.0 mg/dL Final    Comment: For infants and newborns, interpretation of results should be based  on gestational age, weight and in agreement with clinical  observations.  Premature Infant recommended reference ranges:  Up to 24 hours.............<8.0 mg/dL  Up to 48 hours............<12.0 mg/dL  3-5 days..................<15.0 mg/dL  6-29 days.................<15.0 mg/dL      Alkaline Phosphatase 05/08/2020 61  55 - 135 U/L Final    AST 05/08/2020 26  10 - 40 U/L Final    ALT 05/08/2020 27  10 - 44 U/L Final    Anion Gap 05/08/2020 7* 8 - 16 mmol/L Final    eGFR if African American 05/08/2020 >60.0  >60 mL/min/1.73 m^2 Final    eGFR if  non  05/08/2020 >60.0  >60 mL/min/1.73 m^2 Final    Comment: Calculation used to obtain the estimated glomerular filtration  rate (eGFR) is the CKD-EPI equation.       Cholesterol 05/08/2020 148  120 - 199 mg/dL Final    Comment: The National Cholesterol Education Program (NCEP) has set the  following guidelines (reference ranges) for Cholesterol:  Optimal.....................<200 mg/dL  Borderline High.............200-239 mg/dL  High........................> or = 240 mg/dL      Triglycerides 05/08/2020 65  30 - 150 mg/dL Final    Comment: The National Cholesterol Education Program (NCEP) has set the  following guidelines (reference values) for triglycerides:  Normal......................<150 mg/dL  Borderline High.............150-199 mg/dL  High........................200-499 mg/dL      HDL 05/08/2020 45  40 - 75 mg/dL Final    Comment: The National Cholesterol Education Program (NCEP) has set the  following guidelines (reference values) for HDL Cholesterol:  Low...............<40 mg/dL  Optimal...........>60 mg/dL      LDL Cholesterol 05/08/2020 90.0  63.0 - 159.0 mg/dL Final    Comment: The National Cholesterol Education Program (NCEP) has set the  following guidelines (reference values) for LDL Cholesterol:  Optimal.......................<130 mg/dL  Borderline High...............130-159 mg/dL  High..........................160-189 mg/dL  Very High.....................>190 mg/dL      Hdl/Cholesterol Ratio 05/08/2020 30.4  20.0 - 50.0 % Final    Total Cholesterol/HDL Ratio 05/08/2020 3.3  2.0 - 5.0 Final    Non-HDL Cholesterol 05/08/2020 103  mg/dL Final    Comment: Risk category and Non-HDL cholesterol goals:  Coronary heart disease (CHD)or equivalent (10-year risk of CHD >20%):  Non-HDL cholesterol goal     <130 mg/dL  Two or more CHD risk factors and 10-year risk of CHD <= 20%:  Non-HDL cholesterol goal     <160 mg/dL  0 to 1 CHD risk factor:  Non-HDL cholesterol goal     <190 mg/dL       TSH 05/08/2020 2.850  0.400 - 4.000 uIU/mL Final       Assessment:     1. Other hyperlipidemia    2. Essential tremor    3. PAF (paroxysmal atrial fibrillation)    4. Meningioma    5. Fatty liver    6. Obstructive sleep apnea      Plan:   Other hyperlipidemia  controlled  -     pravastatin (PRAVACHOL) 20 MG tablet; Take 1 tablet (20 mg total) by mouth once daily.  Dispense: 90 tablet; Refill: 3  -     Comprehensive metabolic panel; Future; Expected date: 05/26/2021  -     Lipid Panel; Future; Expected date: 05/26/2021  -     CBC auto differential; Future; Expected date: 05/26/2021    Essential tremor  stable  PAF (paroxysmal atrial fibrillation)  stable  Meningioma  Up to date with neuro  Fatty liver    Obstructive sleep apnea  Stable on cpap      Lab Frequency Next Occurrence   PSA, Screening Once 12/19/2019   US Retroperitoneal Complete (Kidney and Once 12/19/2019   PSA, Screening Once 12/19/2019   US Retroperitoneal Complete (Kidney and Once 12/19/2019   Ambulatory referral/consult to Orthopedics Once 05/05/2020   Comprehensive metabolic panel Once 05/26/2021   Lipid Panel Once 05/26/2021   CBC auto differential Once 05/26/2021   Pacemaker remote     Cardiac device check - [facility]         Problem List Items Addressed This Visit        Neuro    Essential tremor       Cardiac/Vascular    Hyperlipidemia - Primary    Relevant Medications    pravastatin (PRAVACHOL) 20 MG tablet    Other Relevant Orders    Comprehensive metabolic panel    Lipid Panel    CBC auto differential    PAF (paroxysmal atrial fibrillation)       Oncology    Meningioma    Overview     Sees neuro med            GI    Fatty liver       Other    Obstructive sleep apnea (Chronic)    Overview     Dx 26.6/hr. On Autopap. Doing well on PAP settings. Patient is compliant. Follow up annually in sleep clinic               Follow up in about 1 year (around 5/26/2021), or if symptoms worsen or fail to improve.

## 2020-06-01 ENCOUNTER — PATIENT OUTREACH (OUTPATIENT)
Dept: ADMINISTRATIVE | Facility: OTHER | Age: 70
End: 2020-06-01

## 2020-06-03 ENCOUNTER — OFFICE VISIT (OUTPATIENT)
Dept: ORTHOPEDICS | Facility: CLINIC | Age: 70
End: 2020-06-03
Payer: MEDICARE

## 2020-06-03 ENCOUNTER — HOSPITAL ENCOUNTER (OUTPATIENT)
Dept: RADIOLOGY | Facility: HOSPITAL | Age: 70
Discharge: HOME | End: 2020-06-03
Attending: ORTHOPAEDIC SURGERY
Payer: MEDICARE

## 2020-06-03 ENCOUNTER — TELEPHONE (OUTPATIENT)
Dept: ORTHOPEDICS | Facility: CLINIC | Age: 70
End: 2020-06-03

## 2020-06-03 VITALS
DIASTOLIC BLOOD PRESSURE: 77 MMHG | WEIGHT: 209 LBS | SYSTOLIC BLOOD PRESSURE: 129 MMHG | HEART RATE: 94 BPM | BODY MASS INDEX: 29.92 KG/M2 | HEIGHT: 70 IN

## 2020-06-03 DIAGNOSIS — M25.562 PAIN AND SWELLING OF LEFT KNEE: ICD-10-CM

## 2020-06-03 DIAGNOSIS — R60.9 EDEMA, UNSPECIFIED TYPE: ICD-10-CM

## 2020-06-03 DIAGNOSIS — M25.462 SWELLING OF KNEE JOINT, LEFT: ICD-10-CM

## 2020-06-03 DIAGNOSIS — M25.562 LEFT KNEE PAIN, UNSPECIFIED CHRONICITY: ICD-10-CM

## 2020-06-03 DIAGNOSIS — M25.462 PAIN AND SWELLING OF LEFT KNEE: ICD-10-CM

## 2020-06-03 DIAGNOSIS — M17.12 PRIMARY OSTEOARTHRITIS OF LEFT KNEE: Primary | ICD-10-CM

## 2020-06-03 PROCEDURE — 93971 EXTREMITY STUDY: CPT | Mod: 26,LT,, | Performed by: RADIOLOGY

## 2020-06-03 PROCEDURE — 93971 EXTREMITY STUDY: CPT | Mod: TC,LT

## 2020-06-03 PROCEDURE — 93971 US LOWER EXTREMITY VEINS LEFT: ICD-10-PCS | Mod: 26,LT,, | Performed by: RADIOLOGY

## 2020-06-03 PROCEDURE — 99213 OFFICE O/P EST LOW 20 MIN: CPT | Mod: PBBFAC,25 | Performed by: ORTHOPAEDIC SURGERY

## 2020-06-03 PROCEDURE — 99214 OFFICE O/P EST MOD 30 MIN: CPT | Mod: S$PBB,,, | Performed by: ORTHOPAEDIC SURGERY

## 2020-06-03 PROCEDURE — 99999 PR PBB SHADOW E&M-EST. PATIENT-LVL III: ICD-10-PCS | Mod: PBBFAC,,, | Performed by: ORTHOPAEDIC SURGERY

## 2020-06-03 PROCEDURE — 99214 PR OFFICE/OUTPT VISIT, EST, LEVL IV, 30-39 MIN: ICD-10-PCS | Mod: S$PBB,,, | Performed by: ORTHOPAEDIC SURGERY

## 2020-06-03 PROCEDURE — 99999 PR PBB SHADOW E&M-EST. PATIENT-LVL III: CPT | Mod: PBBFAC,,, | Performed by: ORTHOPAEDIC SURGERY

## 2020-06-03 NOTE — PROGRESS NOTES
Patient ID: Adal Loaiza Jr.  YOB: 1950  MRN: [id number]    Chief Complaint: No chief complaint on file.    Referred By: Dr. Attila Mclain     History of Present Illness: Adal Loaiza Jr. is a right-hand dominant 70 y.o. male presents today for a follow up of his LEFT knee and to review CT results.  The patient states that about 10 weeks ago he stepped onto the left knee off of a curb and felt a popping sensation with severe pain.  X-rays at that time revealed significant arthritis of the medial compartment of the left knee with loose bodies in the lateral compartment.  The patient states that he has had significant swelling of the left knee since that time.  He has been wearing a medial  brace daily which has reduced his pain.  He denies taking any Tylenol or NSAIDs for pain relief.  He has not had any physical therapy for his knee.  He did receive one corticosteroid injection of the left knee.  Patient states that his pain is not made worse with ambulation.  If he does not wear his brace throughout the day he notes significant pain while he tries to sleep.  Today, he notes that he has some left distal calf pain.  He denies any fevers, chills, numbness, or tingling.    Knee Pain    The pain is present in the left knee. This is a chronic problem. The current episode started more than 1 month ago. The problem occurs intermittently. The problem has been unchanged. The quality of the pain is described as aching and tightness. The pain is at a severity of 6/10. Associated symptoms include a limited range of motion and stiffness. Pertinent negatives include no fever or numbness. The symptoms are aggravated by activity, walking and lying down. He has tried heat, cold, brace/corset and NSAIDs for the symptoms. The treatment provided mild relief. Physical therapy was not tried.      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    Seizures     because of brain tumor    Sleep apnea     Spinal cord disease     meningioma     Past Surgical History:   Procedure Laterality Date    BRAIN SURGERY      CARDIAC PACEMAKER PLACEMENT      COLONOSCOPY N/A 8/25/2016    Procedure: COLONOSCOPY;  Surgeon: Morris Olguin MD;  Location: [facility] ENDO;  Service: Endoscopy;  Laterality: N/A;    Gamma knife      for schwannoma    REPLACEMENT OF PACEMAKER GENERATOR Left 2/13/2019    Procedure: REPLACEMENT, PULSE GENERATOR, CARDIAC PACEMAKER;  Surgeon: Gen Meza MD;  Location: [facility] CATH LAB;  Service: Cardiology;  Laterality: Left;  current device MDT/waiting for MD input     Family History   Problem Relation Age of Onset    Colon cancer Father     Hypertension Unknown      Social History     Socioeconomic History    Marital status:      Spouse name: Not on file    Number of children: Not on file    Years of education: Not on file    Highest education level: Not on file   Occupational History    Not on file   Social Needs    Financial resource strain: Not on file    Food insecurity:     Worry: Not on file     Inability: Not on file    Transportation needs:     Medical: Not on file     Non-medical: Not on file   Tobacco Use    Smoking status: Never Smoker    Smokeless tobacco: Never Used   Substance and Sexual Activity    Alcohol use: No     Comment: socially    Drug use: No    Sexual activity: Not on file   Lifestyle    Physical activity:     Days per week: Not on file     Minutes per session: Not on file    Stress: Not on file   Relationships    Social connections:     Talks on phone: Not on file     Gets together: Not on  file     Attends Jainism service: Not on file     Active member of club or organization: Not on file     Attends meetings of clubs or organizations: Not on file     Relationship status: Not on file   Other Topics Concern    Not on file   Social History Narrative    Not on file     Medication List with Changes/Refills   Current Medications    LACTOBACILLUS ACIDOPHILUS (PROBIOTIC ORAL)    Take by mouth once daily.    LEVETIRACETAM (KEPPRA) 500 MG TAB    Take 1 tablet (500 mg total) by mouth 2 (two) times daily.    MULTIVITAMIN (ONE DAILY MULTIVITAMIN) PER TABLET    Take 1 tablet by mouth once daily.    PRAVASTATIN (PRAVACHOL) 20 MG TABLET    Take 1 tablet (20 mg total) by mouth once daily.     Review of patient's allergies indicates:   Allergen Reactions    Nsaids (non-steroidal anti-inflammatory drug)      Caused GI bleeding.    Aspirin     Celecoxib      Review of Systems   Constitution: Negative for chills and fever.   HENT: Negative for ear discharge and hearing loss.    Eyes: Negative for blurred vision and visual disturbance.   Cardiovascular: Negative for chest pain and leg swelling.   Respiratory: Negative for cough and shortness of breath.    Endocrine: Negative for polyuria.   Hematologic/Lymphatic: Negative for bleeding problem.   Skin: Negative for rash.   Musculoskeletal: Positive for joint pain and stiffness. Negative for back pain, joint swelling, muscle cramps and muscle weakness.   Gastrointestinal: Negative for nausea and vomiting.   Genitourinary: Negative for hematuria.   Neurological: Negative for loss of balance, numbness and paresthesias.   Psychiatric/Behavioral: Negative for altered mental status.       Physical Exam:   There is no height or weight on file to calculate BMI.  There were no vitals filed for this visit.   GENERAL: Well appearing, appropriate for stated age, no acute distress.  CARDIOVASCULAR: Pulses regular by peripheral palpation.  PULMONARY: Respirations are even and  non-labored.  NEURO: Awake, alert, and oriented x 3.  PSYCH: Mood & affect are appropriate.  HEENT: Head is normocephalic and atraumatic.  Ortho/SPM Exam  Left knee: Normal gait. Painless active and passive range of motion from 0 to 120 degrees. Medial joint line tenderness. No lateral joint line tenderness. No joint laxity with varus stress testing. No joint laxity with valgus stress testing, however significant pain induced. No masses palpated in the popliteal fossa. 5/5 muscle strength with flexion and extension of the knee. Neurovascularly intact distally.  Positive tenderness of the distal and proximal aspects of the left calf with mild swelling compared to the right. No cords palpated.  No overlying erythema.  5/5 muscle strength with dorsiflexion and plantarflexion of the ankle.   DP and PT pulses are 2+ and symmetrical.    Imaging:    CT Knee Without Contrast Left  Narrative: EXAMINATION:  CT KNEE WITHOUT CONTRAST LEFT    CLINICAL HISTORY:  left knee pain;  Pain in left knee    TECHNIQUE:  Routine noncontrast imaging of the knee performed.    COMPARISON:  05/08/2020 radiograph    FINDINGS:  Tricompartmental degenerative osteophyte findings present with significant medial compartment joint space loss.  Medial compartment subcortical cystic change in sclerosis noted.  Six and 7 mm calcific loose bodies noted within the posterolateral joint space.  No acute fracture appreciated.  Small joint effusion noted.  Mild prepatellar soft tissue edema noted.  Impression: Tricompartmental degenerative changes present most advanced within the medial compartment with small loose bodies within the posterolateral joint space.  Small joint effusion.    Electronically signed by: Moises Mazariegos MD  Date:    05/26/2020  Time:    13:14    Relevant imaging results reviewed and interpreted by me, discussed with the patient and / or family today.    Other Tests:     No other tests performed today.    Assessment:  Adal Loaiza   is a 70 y.o. male with left knee pain and radiographic findings consistent with osteoarthritis of the left knee.   Pain and swelling to left knee following stepping off a curb onto a concrete surface   Medial joint line tenderness   CT findings with medial compartment joint space loss, posterolateral loose bodies, osteophytes   Patient had corticosteroid injection at outside Orthopedic Clinic approximately 9-10 weeks ago    Dx: left knee OA, loose bodies, recent knee injury    Plan:  · US left leg, r/o DVT  · Visco left knee  · Deferred steroid injection today  · Continue brace  · PT @ [facility] - OA protcol, advance activities as tolerated  · Follow-up 2 months after visco injections   Treatment plan was developed with input from the patient/family, and they expressed understanding and agreement with the plan. All questions were answered today.    Follow-up:  2 months or sooner if there are any problems between now and then.    Disclaimer: This note was prepared using a voice recognition system and is likely to have sound alike errors within the text.

## 2020-06-03 NOTE — PATIENT INSTRUCTIONS
Dx: left knee OA, loose bodies, recent knee injury    Plan:  · US left leg, r/o DVT  · Visco left knee  · Deferred steroid injection today  · Continue brace  · PT @ [facility] - OA protcol, advance activities as tolerated  · Follow-up 2 months after visco injections    Thank you for choosing [facility] and Dr. Avery Mclain for your orthopedic & sports medicine care. It is our goal to provide you with exceptional care that will help keep you healthy, active, and get you back in the game.    If you felt that you received exemplary care today, please consider leaving us feedback on [employer] at [email or web address]     Please do not hesitate to reach out to us via email, phone, or MyChart with any questions, concerns, or feedback.    If you are experiencing pain/discomfort ,or have questions after 5pm and would like to be connected to the [facility] on-call team, please call this number and specify which Sports Medicine provider is treating you: [phone]

## 2020-06-12 ENCOUNTER — PES CALL (OUTPATIENT)
Dept: ADMINISTRATIVE | Facility: CLINIC | Age: 70
End: 2020-06-12

## 2020-06-18 ENCOUNTER — PATIENT OUTREACH (OUTPATIENT)
Dept: ADMINISTRATIVE | Facility: OTHER | Age: 70
End: 2020-06-18

## 2020-06-18 ENCOUNTER — TELEPHONE (OUTPATIENT)
Dept: CARDIOLOGY | Facility: CLINIC | Age: 70
End: 2020-06-18

## 2020-06-18 NOTE — TELEPHONE ENCOUNTER
Patient wants to have In clinic visit only  ----- Message from Lauren Houston sent at 6/18/2020  7:09 AM CDT -----  patient needs call back regarding today's appointment..[phone] (home)

## 2020-06-30 ENCOUNTER — TELEPHONE (OUTPATIENT)
Dept: ORTHOPEDICS | Facility: CLINIC | Age: 70
End: 2020-06-30

## 2020-07-15 ENCOUNTER — PATIENT OUTREACH (OUTPATIENT)
Dept: ADMINISTRATIVE | Facility: OTHER | Age: 70
End: 2020-07-15

## 2020-07-16 ENCOUNTER — OFFICE VISIT (OUTPATIENT)
Dept: ORTHOPEDICS | Facility: CLINIC | Age: 70
End: 2020-07-16
Payer: MEDICARE

## 2020-07-16 ENCOUNTER — OFFICE VISIT (OUTPATIENT)
Dept: INTERNAL MEDICINE | Facility: CLINIC | Age: 70
End: 2020-07-16
Payer: MEDICARE

## 2020-07-16 VITALS
SYSTOLIC BLOOD PRESSURE: 132 MMHG | HEIGHT: 70 IN | HEART RATE: 90 BPM | BODY MASS INDEX: 29.61 KG/M2 | DIASTOLIC BLOOD PRESSURE: 82 MMHG | OXYGEN SATURATION: 99 % | WEIGHT: 206.81 LBS | TEMPERATURE: 97 F

## 2020-07-16 VITALS
SYSTOLIC BLOOD PRESSURE: 128 MMHG | HEIGHT: 70 IN | WEIGHT: 209 LBS | BODY MASS INDEX: 29.92 KG/M2 | DIASTOLIC BLOOD PRESSURE: 77 MMHG | HEART RATE: 86 BPM

## 2020-07-16 DIAGNOSIS — G40.209 PARTIAL SYMPTOMATIC EPILEPSY WITH COMPLEX PARTIAL SEIZURES, NOT INTRACTABLE, WITHOUT STATUS EPILEPTICUS: ICD-10-CM

## 2020-07-16 DIAGNOSIS — T82.110A FAILURE OF PACEMAKER LEAD, INITIAL ENCOUNTER: ICD-10-CM

## 2020-07-16 DIAGNOSIS — M17.12 PRIMARY OSTEOARTHRITIS OF LEFT KNEE: Primary | ICD-10-CM

## 2020-07-16 DIAGNOSIS — G25.0 ESSENTIAL TREMOR: ICD-10-CM

## 2020-07-16 DIAGNOSIS — M25.562 CHRONIC PAIN OF LEFT KNEE: ICD-10-CM

## 2020-07-16 DIAGNOSIS — N28.1 RENAL CYST: ICD-10-CM

## 2020-07-16 DIAGNOSIS — Z80.0 FAMILY HISTORY OF COLON CANCER: ICD-10-CM

## 2020-07-16 DIAGNOSIS — Z00.00 ENCOUNTER FOR PREVENTIVE HEALTH EXAMINATION: Primary | ICD-10-CM

## 2020-07-16 DIAGNOSIS — E78.2 MIXED HYPERLIPIDEMIA: ICD-10-CM

## 2020-07-16 DIAGNOSIS — I48.0 PAF (PAROXYSMAL ATRIAL FIBRILLATION): ICD-10-CM

## 2020-07-16 DIAGNOSIS — R56.1 SEIZURES, POST-TRAUMATIC: Chronic | ICD-10-CM

## 2020-07-16 DIAGNOSIS — M79.18 MYOFASCIAL PAIN: ICD-10-CM

## 2020-07-16 DIAGNOSIS — D32.9 MENINGIOMA: ICD-10-CM

## 2020-07-16 DIAGNOSIS — K64.8 INTERNAL HEMORRHOIDS: ICD-10-CM

## 2020-07-16 DIAGNOSIS — H90.3 SENSORINEURAL HEARING LOSS (SNHL) OF BOTH EARS: ICD-10-CM

## 2020-07-16 DIAGNOSIS — G47.33 OBSTRUCTIVE SLEEP APNEA: Chronic | ICD-10-CM

## 2020-07-16 DIAGNOSIS — Z95.0 CARDIAC PACEMAKER IN SITU: Chronic | ICD-10-CM

## 2020-07-16 DIAGNOSIS — K63.5 POLYP OF COLON, UNSPECIFIED PART OF COLON, UNSPECIFIED TYPE: ICD-10-CM

## 2020-07-16 DIAGNOSIS — G89.29 CHRONIC PAIN OF LEFT KNEE: ICD-10-CM

## 2020-07-16 DIAGNOSIS — R68.89 ABNORMAL DIGITAL RECTAL EXAM: ICD-10-CM

## 2020-07-16 DIAGNOSIS — Z72.821 INADEQUATE SLEEP HYGIENE: ICD-10-CM

## 2020-07-16 DIAGNOSIS — K76.0 FATTY LIVER: ICD-10-CM

## 2020-07-16 PROCEDURE — 99214 OFFICE O/P EST MOD 30 MIN: CPT | Mod: PBBFAC,27,25 | Performed by: PHYSICIAN ASSISTANT

## 2020-07-16 PROCEDURE — 20610 LARGE JOINT ASPIRATION/INJECTION: L KNEE: ICD-10-PCS | Mod: S$PBB,LT,, | Performed by: FAMILY MEDICINE

## 2020-07-16 PROCEDURE — 99999 PR PBB SHADOW E&M-EST. PATIENT-LVL III: ICD-10-PCS | Mod: PBBFAC,,, | Performed by: FAMILY MEDICINE

## 2020-07-16 PROCEDURE — 20610 DRAIN/INJ JOINT/BURSA W/O US: CPT | Mod: PBBFAC | Performed by: FAMILY MEDICINE

## 2020-07-16 PROCEDURE — 99499 UNLISTED E&M SERVICE: CPT | Mod: S$PBB,,, | Performed by: FAMILY MEDICINE

## 2020-07-16 PROCEDURE — 99999 PR PBB SHADOW E&M-EST. PATIENT-LVL IV: CPT | Mod: PBBFAC,,, | Performed by: PHYSICIAN ASSISTANT

## 2020-07-16 PROCEDURE — G0439 PPPS, SUBSEQ VISIT: HCPCS | Mod: S$GLB,,, | Performed by: PHYSICIAN ASSISTANT

## 2020-07-16 PROCEDURE — G0439 PR MEDICARE ANNUAL WELLNESS SUBSEQUENT VISIT: ICD-10-PCS | Mod: S$GLB,,, | Performed by: PHYSICIAN ASSISTANT

## 2020-07-16 PROCEDURE — 99499 NO LOS: ICD-10-PCS | Mod: S$PBB,,, | Performed by: FAMILY MEDICINE

## 2020-07-16 PROCEDURE — 99999 PR PBB SHADOW E&M-EST. PATIENT-LVL III: CPT | Mod: PBBFAC,,, | Performed by: FAMILY MEDICINE

## 2020-07-16 PROCEDURE — 99213 OFFICE O/P EST LOW 20 MIN: CPT | Mod: PBBFAC,25 | Performed by: FAMILY MEDICINE

## 2020-07-16 PROCEDURE — 99999 PR PBB SHADOW E&M-EST. PATIENT-LVL IV: ICD-10-PCS | Mod: PBBFAC,,, | Performed by: PHYSICIAN ASSISTANT

## 2020-07-16 RX ADMIN — Medication 20 MG: at 11:07

## 2020-07-16 NOTE — PROCEDURES
Large Joint Aspiration/Injection: L knee    Date/Time: 7/16/2020 11:30 AM  Performed by: Attila Mclain MD  Authorized by: Attila Mclain MD     Indications:  Pain  Site marked: the procedure site was marked    Timeout: prior to procedure the correct patient, procedure, and site was verified    Prep: patient was prepped and draped in usual sterile fashion    Approach:  Anterolateral  Location:  Knee  Site:  L knee  Medications:  20 mg sodium hyaluronate (EUFLEXXA) 10 mg/mL(mw 2.4 -3.6 million)  Patient tolerance:  Patient tolerated the procedure well with no immediate complications

## 2020-07-16 NOTE — PATIENT INSTRUCTIONS
Counseling and Referral of Other Preventative  (Italic type indicates deductible and co-insurance are waived)    Patient Name: Adal Loaiza  Today's Date: 7/16/2020    Health Maintenance       Date Due Completion Date    Shingles Vaccine (2 of 3) 06/17/2014 4/22/2014    TETANUS VACCINE 12/19/2017 12/19/2007    Influenza Vaccine (1) 09/01/2020 11/12/2019    Lipid Panel 05/08/2021 5/8/2020    Colorectal Cancer Screening 08/25/2021 8/25/2016        No orders of the defined types were placed in this encounter.    The following information is provided to all patients.  This information is to help you find resources for any of the problems found today that may be affecting your health:                Living healthy guide: www.[facility].gov      Understanding Diabetes: www.diabetes.org      Eating healthy: www.cdc.gov/healthyweight      [facility] home safety checklist: www.cdc.gov/steadi/patient.html      Agency on Aging: www.goea.[place].gov      Alcoholics anonymous (AA): www.aa.org      Physical Activity: www.gosia.nih.gov/ta1uqcc      Tobacco use: www.quitwithusla.org

## 2020-07-16 NOTE — PATIENT INSTRUCTIONS
Ice 3 times a day for 15 min for next 48 hr decreased activity during that time.    Recheck 1 week for next injection

## 2020-07-16 NOTE — LETTER
July 16, 2020      Avery Mclain MD  [address]  [place] [address]           [facility]  [address]  [place] [address]  Phone: [phone]  Fax: [phone]          Patient: Adal Loaiza Jr.   MR Number: [id number]   YOB: 1950   Date of Visit: 7/16/2020       Dear Dr. Avery Mclain:    Thank you for referring Adal Loaiza to me for evaluation. Attached you will find relevant portions of my assessment and plan of care.    If you have questions, please do not hesitate to call me. I look forward to following Adal Loaiza along with you.    Sincerely,    Attila Mclain MD    Enclosure  CC:  No Recipients    If you would like to receive this communication electronically, please contact [email or web address] or [phone] to request more information on [employer] Link access.    For providers and/or their staff who would like to refer a patient to [facility], please contact us through our one-stop-shop provider referral line, [facility], at [phone].    If you feel you have received this communication in error or would no longer like to receive these types of communications, please e-mail [email or web address]

## 2020-07-16 NOTE — PROGRESS NOTES
Subjective:     Patient ID: Adal Loaiza Jr. is a 70 y.o. male.    Chief Complaint: Pain of the Left Knee      HPI:  History of osteoarthritis of left knee an ordered to get Euflexxa injections by Dr. Avery Mclain and is here for 1st injection today.  No recent illness.    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 ENDO;  Service: Endoscopy;  Laterality: N/A;    Gamma knife      for schwannoma    REPLACEMENT OF PACEMAKER GENERATOR Left 2/13/2019    Procedure: REPLACEMENT, PULSE GENERATOR, CARDIAC PACEMAKER;  Surgeon: Gen Meza MD;  Location: [facility] CATH LAB;  Service: Cardiology;  Laterality: Left;  current device MDT/waiting for MD input     Family History   Problem Relation Age of Onset    Colon cancer Father     Hypertension Unknown      Social History     Socioeconomic History    Marital status:      Spouse name: Not on file    Number of children: Not on file    Years of education: Not on file    Highest education level: Not on file   Occupational History    Not on file   Social Needs    Financial resource strain: Not on file    Food insecurity     Worry: Not on file     Inability: Not on file    Transportation needs     Medical: Not on file     Non-medical: Not on file   Tobacco Use    Smoking status: Never Smoker    Smokeless tobacco: Never  Used   Substance and Sexual Activity    Alcohol use: No     Comment: socially    Drug use: No    Sexual activity: Not on file   Lifestyle    Physical activity     Days per week: Not on file     Minutes per session: Not on file    Stress: Not on file   Relationships    Social connections     Talks on phone: Not on file     Gets together: Not on file     Attends Taoism service: Not on file     Active member of club or organization: Not on file     Attends meetings of clubs or organizations: Not on file     Relationship status: Not on file   Other Topics Concern    Not on file   Social History Narrative    Not on file       Current Outpatient Medications:     LACTOBACILLUS ACIDOPHILUS (PROBIOTIC ORAL), Take by mouth once daily., Disp: , Rfl:     levetiracetam (KEPPRA) 500 MG Tab, Take 1 tablet (500 mg total) by mouth 2 (two) times daily., Disp: 60 tablet, Rfl: 11    multivitamin (ONE DAILY MULTIVITAMIN) per tablet, Take 1 tablet by mouth once daily., Disp: , Rfl:     pravastatin (PRAVACHOL) 20 MG tablet, Take 1 tablet (20 mg total) by mouth once daily., Disp: 90 tablet, Rfl: 3  Review of patient's allergies indicates:   Allergen Reactions    Nsaids (non-steroidal anti-inflammatory drug)      Caused GI bleeding.    Aspirin     Celecoxib      ROS    Objective:   Body mass index is 29.99 kg/m².  Vitals:    07/16/20 1148   BP: 128/77   Pulse: 86           Ortho/SPM Exam    IMAGING: US Lower Extremity Veins Left  Narrative: EXAMINATION:  US LOWER EXTREMITY VEINS LEFT    CLINICAL HISTORY:  r/o DVT; Pain in left knee    TECHNIQUE:  Duplex and color flow Doppler evaluation of the left lower extremity veins was performed.    COMPARISON:  None    FINDINGS:  Thigh veins: The left common femoral, femoral, popliteal, proximal medial saphenous, and deep femoral veins are patent and free of thrombus. The veins are normally compressible and have normal phasic flow and augmentation response.    Calf veins:The paired  peroneal and posterior tibial calf veins are patent.  Impression: No evidence of deep venous thrombosis in the left lower extremity.    Electronically signed by: Mahesh Chamberlain DO  Date:    06/03/2020  Time:    11:44       Radiographs / Imaging : Relevant imaging results reviewed by me and interpreted by me, discussed with the patient and / or family       Assessment:     Encounter Diagnosis   Name Primary?    Primary osteoarthritis of left knee Yes        Plan:   Primary osteoarthritis of left knee  -     Large Joint Aspiration/Injection: L knee        The patient verbalized good understanding of the medical issues discussed today and expressed appreciation for the care provided.  Patient was given the opportunity to ask questions and be an active participant in their medical care. Patient had no further questions or concerns at this time.     The patient was encouraged to participate in appropriate physical activity.      Attila Mclain M.D.  [facility]        This note was dictated using voice recognition software and may have sound a like errors.

## 2020-07-16 NOTE — PROGRESS NOTES
"  Adal Loaiza presented for a  Medicare AWV and comprehensive Health Risk Assessment today. The following components were reviewed and updated:    · Medical history  · Family History  · Social history  · Allergies and Current Medications  · Health Risk Assessment  · Health Maintenance  · Care Team     ** See Completed Assessments for Annual Wellness Visit within the encounter summary.**       The following assessments were completed:  · Living Situation  · CAGE  · Depression Screening  · Timed Get Up and Go  · Whisper Test  · Cognitive Function Screening  · Nutrition Screening  · ADL Screening  · PAQ Screening    Vitals:    07/16/20 1315   BP: 132/82   BP Location: Right arm   Patient Position: Sitting   BP Method: Large (Manual)   Pulse: 90   Temp: 97 °F (36.1 °C)   TempSrc: Tympanic   SpO2: 99%   Weight: 93.8 kg (206 lb 12.7 oz)   Height: 5' 10" (1.778 m)     Body mass index is 29.67 kg/m².  Physical Exam  Vitals signs and nursing note reviewed.   Constitutional:       General: He is not in acute distress.     Appearance: He is well-developed. He is not diaphoretic.   HENT:      Head: Normocephalic and atraumatic.   Cardiovascular:      Rate and Rhythm: Normal rate and regular rhythm.      Heart sounds: Normal heart sounds. No murmur. No friction rub. No gallop.    Pulmonary:      Effort: Pulmonary effort is normal. No respiratory distress.      Breath sounds: Normal breath sounds. No wheezing or rales.   Skin:     General: Skin is warm.   Neurological:      Mental Status: He is alert and oriented to person, place, and time.   Psychiatric:         Behavior: Behavior normal.         Thought Content: Thought content normal.         Judgment: Judgment normal.           Diagnoses and health risks identified today and associated recommendations/orders:    1. Encounter for preventive health examination  -pt states that he got the new shingles vaccine at [facility]  -tetanus if needed  -pt did well on all assessments " today    2. 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 Continue current treatment plan as previously prescribed with your neurosurgeon and neurologist.     3. Cardiac pacemaker in situ  -Stable and controlled. Continue current treatment plan as previously prescribed with your cardiologist, Dr. Meza. Scheduled for follow up with him next week.     4. Failure of pacemaker lead, initial encounter  -Stable and controlled. Continue current treatment plan as previously prescribed with your cardiologist, Dr. Meza. Scheduled for follow up with him next week.   -resolved    5. PAF (paroxysmal atrial fibrillation)  -Stable and controlled. Continue current treatment plan as previously prescribed with your cardiologist.    6. Obstructive sleep apnea  -Stable and controlled with nightly CPAP. Continue current treatment plan as previously prescribed with your sleep specialists.    7. Fatty liver  -Stable and controlled. Continue current treatment plan as previously prescribed with your PCP and gastroenterologist.   Lab Results   Component Value Date    ALT 27 05/08/2020    AST 26 05/08/2020    ALKPHOS 61 05/08/2020    BILITOT 0.6 05/08/2020       8. Myofascial pain  -Stable and controlled with pt/ot. Continue current treatment plan as previously prescribed with your physical therapist team.    9. Mixed hyperlipidemia  Lab Results   Component Value Date    CHOL 148 05/08/2020    CHOL 160 05/01/2019    CHOL 175 04/24/2018     Lab Results   Component Value Date    HDL 45 05/08/2020    HDL 44 05/01/2019    HDL 51 04/24/2018     Lab Results   Component Value Date    LDLCALC 90.0 05/08/2020    LDLCALC 99.4 05/01/2019    LDLCALC 106.6 04/24/2018     Lab Results   Component Value Date    TRIG 65 05/08/2020    TRIG 83 05/01/2019    TRIG 87 04/24/2018     Lab Results   Component Value Date    CHOLHDL 30.4 05/08/2020    CHOLHDL 27.5 05/01/2019    CHOLHDL 29.1 04/24/2018     -Stable and  controlled on pravastatin. Continue current treatment plan as previously prescribed with your PCP.       10. Family history of colon cancer  -up to date with colonoscopy  -Stable and controlled. Continue current treatment plan as previously prescribed with your PCP and gastroenterologist.    11. Abnormal digital rectal exam  -Stable and controlled. Continue current treatment plan as previously prescribed with your urologist.    12. Polyp of colon, unspecified part of colon, unspecified type  -Stable and controlled. Continue current treatment plan as previously prescribed with your PCP and gastroenterologist.    13. Internal hemorrhoids  -Stable and controlled. Continue current treatment plan as previously prescribed with your PCP and gastroenterologist.    14. Sensorineural hearing loss (SNHL) of both ears  -Stable and controlled with bilateral hearing aids. Continue current treatment plan as previously prescribed with your audiologist.     15. Essential tremor  -problem resolved     16. Meningioma  -Resected. Stable and controlled. Close follow up with neuro team. Continue current treatment plan as previously prescribed with your neurosurgeon, Dr. Sepulveda and Dr. Cortes.     17. Renal cyst  -U/S 12/4/2019  -Stable and controlled. Continue current treatment plan as previously prescribed with your urologist, Dr. Oates.    18. Inadequate sleep hygiene  -Stable and controlled. Continue current treatment plan as previously prescribed with your PCP.     19. Chronic pain of left knee  -Stable and controlled. Continue current treatment plan as previously prescribed with your orthopedist.     20. Partial symptomatic epilepsy with complex partial seizures, not intractable, without status epilepticus  -Stable and controlled on keppra. Continue current treatment plan as previously prescribed with your neurologist and neurosurgeon.        Provided Adal with a 5-10 year written screening schedule and personal prevention  plan. Recommendations were developed using the USPSTF age appropriate recommendations. Education, counseling, and referrals were provided as needed. After Visit Summary printed and given to patient which includes a list of additional screenings\tests needed.    Follow up in about 1 year (around 7/16/2021), or if symptoms worsen or fail to improve.    Gayathri Jett PA-C  I offered to discuss end of life issues, including information on how to make advance directives that the patient could use to name someone who would make medical decisions on their behalf if they became too ill to make themselves.    ___Patient declined  _X_Patient is interested, I provided paper work and offered to discuss.

## 2020-07-23 ENCOUNTER — OFFICE VISIT (OUTPATIENT)
Dept: ORTHOPEDICS | Facility: CLINIC | Age: 70
End: 2020-07-23
Payer: MEDICARE

## 2020-07-23 VITALS
HEIGHT: 70 IN | DIASTOLIC BLOOD PRESSURE: 79 MMHG | SYSTOLIC BLOOD PRESSURE: 134 MMHG | WEIGHT: 206.81 LBS | BODY MASS INDEX: 29.61 KG/M2 | HEART RATE: 77 BPM

## 2020-07-23 DIAGNOSIS — M17.12 PRIMARY OSTEOARTHRITIS OF LEFT KNEE: Primary | ICD-10-CM

## 2020-07-23 PROCEDURE — 99213 OFFICE O/P EST LOW 20 MIN: CPT | Mod: PBBFAC | Performed by: FAMILY MEDICINE

## 2020-07-23 PROCEDURE — 20610 LARGE JOINT ASPIRATION/INJECTION: L KNEE: ICD-10-PCS | Mod: S$PBB,LT,, | Performed by: FAMILY MEDICINE

## 2020-07-23 PROCEDURE — 20610 DRAIN/INJ JOINT/BURSA W/O US: CPT | Mod: PBBFAC | Performed by: FAMILY MEDICINE

## 2020-07-23 PROCEDURE — 99999 PR PBB SHADOW E&M-EST. PATIENT-LVL III: CPT | Mod: PBBFAC,,, | Performed by: FAMILY MEDICINE

## 2020-07-23 PROCEDURE — 99499 UNLISTED E&M SERVICE: CPT | Mod: S$PBB,,, | Performed by: FAMILY MEDICINE

## 2020-07-23 PROCEDURE — 99999 PR PBB SHADOW E&M-EST. PATIENT-LVL III: ICD-10-PCS | Mod: PBBFAC,,, | Performed by: FAMILY MEDICINE

## 2020-07-23 PROCEDURE — 99499 NO LOS: ICD-10-PCS | Mod: S$PBB,,, | Performed by: FAMILY MEDICINE

## 2020-07-23 RX ADMIN — Medication 20 MG: at 11:07

## 2020-07-23 NOTE — PROGRESS NOTES
Subjective:     Patient ID: Adal Loaiza Jr. is a 70 y.o. male.    Chief Complaint: Pain of the Left Knee      HPI:  Patient had initial injection discomfort last time but states it was better after injection today and that he actually had very good results starting the day after the injection last week that he had reduce pain swelling discomfort in the knee and so he is excited about his ongoing improvement.    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    Seizures     because of brain tumor    Sleep apnea     Spinal cord disease     meningioma     Past Surgical History:   Procedure Laterality Date    BRAIN SURGERY      CARDIAC PACEMAKER PLACEMENT      COLONOSCOPY N/A 8/25/2016    Procedure: COLONOSCOPY;  Surgeon: Morris Olguin MD;  Location: [facility] ENDO;  Service: Endoscopy;  Laterality: N/A;    FRACTURE SURGERY Right     right jaw    Gamma knife      for schwannoma    REPLACEMENT OF PACEMAKER GENERATOR Left 2/13/2019    Procedure: REPLACEMENT, PULSE GENERATOR, CARDIAC PACEMAKER;  Surgeon: Gen Meza MD;  Location: [facility] CATH LAB;  Service: Cardiology;  Laterality: Left;  current device MDT/waiting for MD input     Family History   Problem Relation Age of Onset    Colon cancer Father     Hypertension Unknown      Social History     Socioeconomic History    Marital status:      Spouse name: Not on file    Number of children: Not on file    Years of education: Not on file    Highest education level: Not on file   Occupational History    Not on file   Social Needs    Financial resource strain: Not on file    Food insecurity     Worry: Not on file      Inability: Not on file    Transportation needs     Medical: Not on file     Non-medical: Not on file   Tobacco Use    Smoking status: Never Smoker    Smokeless tobacco: Never Used   Substance and Sexual Activity    Alcohol use: No     Comment: socially    Drug use: No    Sexual activity: Not on file   Lifestyle    Physical activity     Days per week: Not on file     Minutes per session: Not on file    Stress: Not on file   Relationships    Social connections     Talks on phone: Not on file     Gets together: Not on file     Attends Evangelical service: Not on file     Active member of club or organization: Not on file     Attends meetings of clubs or organizations: Not on file     Relationship status: Not on file   Other Topics Concern    Not on file   Social History Narrative    Not on file       Current Outpatient Medications:     LACTOBACILLUS ACIDOPHILUS (PROBIOTIC ORAL), Take by mouth once daily., Disp: , Rfl:     levetiracetam (KEPPRA) 500 MG Tab, Take 1 tablet (500 mg total) by mouth 2 (two) times daily., Disp: 60 tablet, Rfl: 11    multivitamin (ONE DAILY MULTIVITAMIN) per tablet, Take 1 tablet by mouth once daily., Disp: , Rfl:     pravastatin (PRAVACHOL) 20 MG tablet, Take 1 tablet (20 mg total) by mouth once daily., Disp: 90 tablet, Rfl: 3  Review of patient's allergies indicates:   Allergen Reactions    Nsaids (non-steroidal anti-inflammatory drug)      Caused GI bleeding.    Aspirin     Celecoxib      ROS    Objective:   Body mass index is 29.67 kg/m².  Vitals:    07/23/20 1122   BP: 134/79   Pulse: 77           Ortho/SPM Exam    IMAGING: US Lower Extremity Veins Left  Narrative: EXAMINATION:  US LOWER EXTREMITY VEINS LEFT    CLINICAL HISTORY:  r/o DVT; Pain in left knee    TECHNIQUE:  Duplex and color flow Doppler evaluation of the left lower extremity veins was performed.    COMPARISON:  None    FINDINGS:  Thigh veins: The left common femoral, femoral, popliteal, proximal medial  saphenous, and deep femoral veins are patent and free of thrombus. The veins are normally compressible and have normal phasic flow and augmentation response.    Calf veins:The paired peroneal and posterior tibial calf veins are patent.  Impression: No evidence of deep venous thrombosis in the left lower extremity.    Electronically signed by: Mahesh Chamberlain DO  Date:    06/03/2020  Time:    11:44       Radiographs / Imaging : Relevant imaging results reviewed by me and interpreted by me, discussed with the patient and / or family       Assessment:     Encounter Diagnosis   Name Primary?    Primary osteoarthritis of left knee Yes        Plan:   Primary osteoarthritis of left knee  -     Large Joint Aspiration/Injection: L knee        The patient verbalized good understanding of the medical issues discussed today and expressed appreciation for the care provided.  Patient was given the opportunity to ask questions and be an active participant in their medical care. Patient had no further questions or concerns at this time.     The patient was encouraged to participate in appropriate physical activity.      Attila Mclain M.D.  [facility]        This note was dictated using voice recognition software and may have sound a like errors.

## 2020-07-23 NOTE — PROCEDURES
Large Joint Aspiration/Injection: L knee    Date/Time: 7/23/2020 11:30 AM  Performed by: Attila Mclain MD  Authorized by: Attila Mclain MD     Indications:  Pain  Site marked: the procedure site was marked    Timeout: prior to procedure the correct patient, procedure, and site was verified    Prep: patient was prepped and draped in usual sterile fashion    Approach:  Anterolateral  Location:  Knee  Site:  L knee  Medications:  20 mg sodium hyaluronate (EUFLEXXA) 10 mg/mL(mw 2.4 -3.6 million)  Patient tolerance:  Patient tolerated the procedure well with no immediate complications

## 2020-07-26 ENCOUNTER — PATIENT OUTREACH (OUTPATIENT)
Dept: ADMINISTRATIVE | Facility: OTHER | Age: 70
End: 2020-07-26

## 2020-07-27 ENCOUNTER — OFFICE VISIT (OUTPATIENT)
Dept: CARDIOLOGY | Facility: CLINIC | Age: 70
End: 2020-07-27
Payer: MEDICARE

## 2020-07-27 ENCOUNTER — HOSPITAL ENCOUNTER (OUTPATIENT)
Dept: CARDIOLOGY | Facility: HOSPITAL | Age: 70
Discharge: HOME | End: 2020-07-27
Attending: INTERNAL MEDICINE
Payer: MEDICARE

## 2020-07-27 VITALS
SYSTOLIC BLOOD PRESSURE: 130 MMHG | WEIGHT: 209 LBS | BODY MASS INDEX: 29.99 KG/M2 | DIASTOLIC BLOOD PRESSURE: 70 MMHG | OXYGEN SATURATION: 98 % | HEART RATE: 85 BPM

## 2020-07-27 DIAGNOSIS — R00.1 SEVERE SINUS BRADYCARDIA: ICD-10-CM

## 2020-07-27 DIAGNOSIS — Z95.0 CARDIAC PACEMAKER IN SITU: Chronic | ICD-10-CM

## 2020-07-27 DIAGNOSIS — Z95.0 CARDIAC PACEMAKER IN SITU: ICD-10-CM

## 2020-07-27 DIAGNOSIS — D32.9 MENINGIOMA: ICD-10-CM

## 2020-07-27 DIAGNOSIS — I49.5 SSS (SICK SINUS SYNDROME): Primary | ICD-10-CM

## 2020-07-27 DIAGNOSIS — K76.0 FATTY LIVER: ICD-10-CM

## 2020-07-27 DIAGNOSIS — K64.8 INTERNAL HEMORRHOIDS: ICD-10-CM

## 2020-07-27 DIAGNOSIS — T82.110A FAILURE OF PACEMAKER LEAD, INITIAL ENCOUNTER: ICD-10-CM

## 2020-07-27 DIAGNOSIS — G47.33 OBSTRUCTIVE SLEEP APNEA: Chronic | ICD-10-CM

## 2020-07-27 DIAGNOSIS — I48.0 PAF (PAROXYSMAL ATRIAL FIBRILLATION): ICD-10-CM

## 2020-07-27 DIAGNOSIS — E78.2 MIXED HYPERLIPIDEMIA: ICD-10-CM

## 2020-07-27 DIAGNOSIS — Z72.821 INADEQUATE SLEEP HYGIENE: ICD-10-CM

## 2020-07-27 DIAGNOSIS — I48.0 PAF (PAROXYSMAL ATRIAL FIBRILLATION): Primary | ICD-10-CM

## 2020-07-27 PROCEDURE — 93280 PM DEVICE PROGR EVAL DUAL: CPT | Mod: 26,,, | Performed by: INTERNAL MEDICINE

## 2020-07-27 PROCEDURE — 93010 EKG 12-LEAD: ICD-10-PCS | Mod: ,,, | Performed by: INTERNAL MEDICINE

## 2020-07-27 PROCEDURE — 99215 OFFICE O/P EST HI 40 MIN: CPT | Mod: S$PBB,,, | Performed by: INTERNAL MEDICINE

## 2020-07-27 PROCEDURE — 93010 ELECTROCARDIOGRAM REPORT: CPT | Mod: ,,, | Performed by: INTERNAL MEDICINE

## 2020-07-27 PROCEDURE — 93005 ELECTROCARDIOGRAM TRACING: CPT

## 2020-07-27 PROCEDURE — 99999 PR PBB SHADOW E&M-EST. PATIENT-LVL III: CPT | Mod: PBBFAC,,, | Performed by: INTERNAL MEDICINE

## 2020-07-27 PROCEDURE — 93280 CARDIAC DEVICE CHECK - IN CLINIC & HOSPITAL: ICD-10-PCS | Mod: 26,,, | Performed by: INTERNAL MEDICINE

## 2020-07-27 PROCEDURE — 99215 PR OFFICE/OUTPT VISIT, EST, LEVL V, 40-54 MIN: ICD-10-PCS | Mod: S$PBB,,, | Performed by: INTERNAL MEDICINE

## 2020-07-27 PROCEDURE — 99213 OFFICE O/P EST LOW 20 MIN: CPT | Mod: PBBFAC,25 | Performed by: INTERNAL MEDICINE

## 2020-07-27 PROCEDURE — 99999 PR PBB SHADOW E&M-EST. PATIENT-LVL III: ICD-10-PCS | Mod: PBBFAC,,, | Performed by: INTERNAL MEDICINE

## 2020-07-27 PROCEDURE — 93280 PM DEVICE PROGR EVAL DUAL: CPT

## 2020-07-27 NOTE — PROGRESS NOTES
Subjective:   Patient ID:  Adal Loaiza Jr. is a 70 y.o. male     Chief complaint:SSS    HPI  70 man  Background (Aug 2016):  Initial PPM implant by Dr OCONNELL for a 6 sec pause (in the midst of a seizure). 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   He had RV lead fx >. 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   He has had no issues with seizures nor syncope. He had a PPM eval 6/2  There were many AMS but no EGMs noted. He has no cardiac Sx and no palps,.      Update since 9/22/2017:  Has had a good year - still works full time, no CV c/o --if anything, feels better this year than last.   PPM eval -- poor A capture thresholds and P waves @1.0 mV >. Stable >> VDD -- doing well. PPM nearing SIMON -- 6 months or so but voltage OK.   I have reviewed the actual image of the ECG tracing obtained today and it shows NSR with normal intervals        Update 1/30/19:  Last week he was seen in ER at [facility] due to awakening with CP, nausea and clamminess -- near syncopal -- w/up was neg except for PPM at EOL -- he was in NSR   He is here to follow up on that   He feels well today -- getting over a recent, likely viral, bronchitis  I have reviewed the actual image of the ECG tracing obtained today and it shows NSR with normal intervals     Update 06/06/2019:  He has had a PPM replacement per his wishes  He came back for eval today and his PPM reveals runs of PAF < a day long  These are ASxc    Update since then:  I wanted her screened to be enrolled in [employer].  However, this was not done.  Today, his PPM eval reveals intermittent AMS but a lot of this seems to be due to noise.  However, there were a few short episodes of were associated with a fast  ventricular rate and these are almost certainly atrial fibrillation.  Exact duration is unclear.  Note that HR V cut off was at 175 beats per minute.    Clinically, he has no cardiovascular symptoms.  He has no palpitations and no faints.  He does not have dyspnea on exertion or orthopnea.  He uses his CPAP for the treatment of sleep apnea.  He has hurt his left knee in may require TKR eventually.  He would be able to proceed with it with relatively good cardiovascular risk profile.  Current Outpatient Medications   Medication Sig    LACTOBACILLUS ACIDOPHILUS (PROBIOTIC ORAL) Take by mouth once daily.    levetiracetam (KEPPRA) 500 MG Tab Take 1 tablet (500 mg total) by mouth 2 (two) times daily.    multivitamin (ONE DAILY MULTIVITAMIN) per tablet Take 1 tablet by mouth once daily.    pravastatin (PRAVACHOL) 20 MG tablet Take 1 tablet (20 mg total) by mouth once daily.     No current facility-administered medications for this visit.      Review of Systems   Constitution: Negative for decreased appetite, malaise/fatigue, weight gain and weight loss.   Eyes: Negative for blurred vision.   Cardiovascular: Negative for chest pain, claudication, cyanosis, dyspnea on exertion, irregular heartbeat, leg swelling, near-syncope, orthopnea and palpitations.   Respiratory: Negative for cough, shortness of breath, sleep disturbances due to breathing, snoring and wheezing.    Endocrine: Negative for heat intolerance.   Hematologic/Lymphatic: Does not bruise/bleed easily.   Musculoskeletal: Negative for muscle weakness and myalgias.   Gastrointestinal: Negative for melena, nausea and vomiting.   Genitourinary: Negative for nocturia.   Neurological: Negative for excessive daytime sleepiness, dizziness, headaches, light-headedness and weakness.   Psychiatric/Behavioral: Negative for depression, memory loss and substance abuse. The patient does not have insomnia and is not nervous/anxious.        Objective:   Physical Exam    Constitutional: He is oriented to person, place, and time. He appears well-developed and well-nourished.   HENT:   Head: Normocephalic and atraumatic.   Right Ear: External ear normal.   Left Ear: External ear normal.   Eyes: Pupils are equal, round, and reactive to light. Conjunctivae are normal. Left conjunctiva is not injected. Left conjunctiva has no hemorrhage.   Neck: Neck supple. No JVD present. No thyromegaly present.   Cardiovascular: Normal rate, regular rhythm, normal heart sounds and intact distal pulses. PMI is not displaced. Exam reveals no gallop, no friction rub, no midsystolic click and no opening snap.   No murmur heard.  Pulses:       Carotid pulses are 2+ on the right side and 2+ on the left side.       Radial pulses are 2+ on the right side and 2+ on the left side.        Dorsalis pedis pulses are 2+ on the right side and 2+ on the left side.        Posterior tibial pulses are 2+ on the right side and 2+ on the left side.   Pulmonary/Chest: Effort normal and breath sounds normal. No respiratory distress. He has no wheezes. He has no rales. He exhibits no tenderness.   Device pocket is in excellent repair     Abdominal: Soft. Normal appearance. He exhibits no pulsatile liver. There is no hepatomegaly. There is no abdominal tenderness. There is no rigidity.   Musculoskeletal: Normal range of motion.         General: No tenderness or edema.      Right knee: He exhibits no swelling.      Left knee: He exhibits no swelling.      Right ankle: He exhibits no swelling.      Left ankle: He exhibits no swelling.      Right lower leg: He exhibits no swelling.      Left lower leg: He exhibits no swelling.      Right foot: No swelling.      Left foot: No swelling.   Neurological: He is alert and oriented to person, place, and time. He has normal strength and normal reflexes. No cranial nerve deficit. Coordination normal.   Skin: Skin is warm, dry and intact. No rash noted. No cyanosis.   Psychiatric: He  has a normal mood and affect. His behavior is normal.   Nursing note and vitals reviewed.    /70 (BP Location: Right arm, Patient Position: Sitting, BP Method: Medium (Manual))   Pulse 85   Wt 94.8 kg (209 lb)   SpO2 98%   BMI 29.99 kg/m²      Assessment:      1. SSS (sick sinus syndrome)    2. Failure of pacemaker lead, initial encounter    3. Cardiac pacemaker in situ    4. PAF (paroxysmal atrial fibrillation)    5. Mixed hyperlipidemia    6. Obstructive sleep apnea    7. Meningioma    8. Fatty liver    9. Internal hemorrhoids    10. Inadequate sleep hygiene        Plan:    Decrease HR we cut off to 125 beats per minute so that we can  more of the true AFib.  After that, we will enroll him in [facility] three-month for in-house pacemaker clinic eval again.  No orders of the defined types were placed in this encounter.    No follow-ups on file.  There are no discontinued medications.     Medication List with Changes/Refills   Current Medications    LACTOBACILLUS ACIDOPHILUS (PROBIOTIC ORAL)    Take by mouth once daily.    LEVETIRACETAM (KEPPRA) 500 MG TAB    Take 1 tablet (500 mg total) by mouth 2 (two) times daily.    MULTIVITAMIN (ONE DAILY MULTIVITAMIN) PER TABLET    Take 1 tablet by mouth once daily.    PRAVASTATIN (PRAVACHOL) 20 MG TABLET    Take 1 tablet (20 mg total) by mouth once daily.

## 2020-07-28 ENCOUNTER — OFFICE VISIT (OUTPATIENT)
Dept: SLEEP MEDICINE | Facility: CLINIC | Age: 70
End: 2020-07-28
Payer: MEDICARE

## 2020-07-28 VITALS
WEIGHT: 211.63 LBS | OXYGEN SATURATION: 99 % | HEART RATE: 74 BPM | BODY MASS INDEX: 30.3 KG/M2 | HEIGHT: 70 IN | DIASTOLIC BLOOD PRESSURE: 76 MMHG | RESPIRATION RATE: 16 BRPM | SYSTOLIC BLOOD PRESSURE: 138 MMHG

## 2020-07-28 DIAGNOSIS — I48.0 PAF (PAROXYSMAL ATRIAL FIBRILLATION): ICD-10-CM

## 2020-07-28 DIAGNOSIS — G47.33 OBSTRUCTIVE SLEEP APNEA: Primary | Chronic | ICD-10-CM

## 2020-07-28 PROCEDURE — 99999 PR PBB SHADOW E&M-EST. PATIENT-LVL III: ICD-10-PCS | Mod: PBBFAC,,, | Performed by: NURSE PRACTITIONER

## 2020-07-28 PROCEDURE — 99214 PR OFFICE/OUTPT VISIT, EST, LEVL IV, 30-39 MIN: ICD-10-PCS | Mod: S$PBB,,, | Performed by: NURSE PRACTITIONER

## 2020-07-28 PROCEDURE — 99214 OFFICE O/P EST MOD 30 MIN: CPT | Mod: S$PBB,,, | Performed by: NURSE PRACTITIONER

## 2020-07-28 PROCEDURE — 99999 PR PBB SHADOW E&M-EST. PATIENT-LVL III: CPT | Mod: PBBFAC,,, | Performed by: NURSE PRACTITIONER

## 2020-07-28 PROCEDURE — 99213 OFFICE O/P EST LOW 20 MIN: CPT | Mod: PBBFAC | Performed by: NURSE PRACTITIONER

## 2020-07-28 NOTE — PROGRESS NOTES
"Subjective:      Patient ID: Adal Loaiza Jr. is a 70 y.o. male.    Chief Complaint: Sleep Apnea    HPI  Presents to office for review of AutoPAP therapy. Patient states improved symptoms with use of AutoPAP. Sleeping more soundly. Waking up feeling more refreshed. Improved daytime sleepiness. Patient states he is benefiting from use of the AutoPAP.     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)    Cardiac pacemaker in situ    Fatty liver    Obstructive sleep apnea    Myofascial pain    Hyperlipidemia    Pacemaker lead fracture    Abnormal digital rectal exam    Family history of colon cancer    Colon polyps    Internal hemorrhoids    Sensorineural hearing loss (SNHL) of both ears    Essential tremor    Meningioma    Renal cyst    Inadequate sleep hygiene    PAF (paroxysmal atrial fibrillation)    Left knee pain    Partial symptomatic epilepsy with complex partial seizures, not intractable, without status epilepticus    SSS (sick sinus syndrome)       /76   Pulse 74   Resp 16   Ht 5' 10" (1.778 m)   Wt 96 kg (211 lb 10.3 oz)   SpO2 99%   BMI 30.37 kg/m²   Body mass index is 30.37 kg/m².    Review of Systems   Constitutional: Negative.    HENT: Negative.    Respiratory: Negative.    Cardiovascular: Negative.    Musculoskeletal: Negative.    Gastrointestinal: Negative.    Neurological: Negative.    Psychiatric/Behavioral: Negative.      Objective:      Physical Exam  Constitutional:       Appearance: He is well-developed.   HENT:      Head: Normocephalic and atraumatic.      Mouth/Throat:      Pharynx: Uvula midline.      Comments: Wearing mask due to COVID-19 protocol  Neck:      Musculoskeletal: Normal range of motion and neck supple.      Thyroid: No thyroid mass or thyromegaly.      Trachea: Trachea normal.   Cardiovascular:      Rate and Rhythm: Normal rate and regular rhythm.      Heart sounds: Normal heart sounds. "   Pulmonary:      Effort: Pulmonary effort is normal.      Breath sounds: Normal breath sounds. No wheezing, rhonchi or rales.   Chest:      Chest wall: There is no dullness to percussion.   Abdominal:      Palpations: Abdomen is soft. There is no splenomegaly or mass.      Tenderness: There is no abdominal tenderness.   Musculoskeletal: Normal range of motion.   Skin:     General: Skin is warm and dry.   Neurological:      Mental Status: He is alert and oriented to person, place, and time.   Psychiatric:         Mood and Affect: Mood normal.         Behavior: Behavior normal.       Personal Diagnostic Review  Review of PAP download. Special study media link attached to this encounter. Normal AHI and compliant.     Assessment:       1. Obstructive sleep apnea    2. PAF (paroxysmal atrial fibrillation)        Outpatient Encounter Medications as of 7/28/2020   Medication Sig Dispense Refill    LACTOBACILLUS ACIDOPHILUS (PROBIOTIC ORAL) Take by mouth once daily.      levetiracetam (KEPPRA) 500 MG Tab Take 1 tablet (500 mg total) by mouth 2 (two) times daily. 60 tablet 11    multivitamin (ONE DAILY MULTIVITAMIN) per tablet Take 1 tablet by mouth once daily.      pravastatin (PRAVACHOL) 20 MG tablet Take 1 tablet (20 mg total) by mouth once daily. 90 tablet 3     No facility-administered encounter medications on file as of 7/28/2020.      Orders Placed This Encounter   Procedures    CPAP/BIPAP SUPPLIES     90 day supply with 3 refills.     Order Specific Question:   Type of mask:     Answer:   Nasal     Order Specific Question:   Headgear?     Answer:   Yes     Order Specific Question:   Tubing?     Answer:   Yes     Order Specific Question:   Humidifier chamber?     Answer:   Yes     Order Specific Question:   Chin strap?     Answer:   Yes     Order Specific Question:   Filters?     Answer:   Yes     Order Specific Question:   Cushions?     Answer:   Yes     Order Specific Question:   Length of need (1-99 months):      Answer:   99     Plan:        Problem List Items Addressed This Visit        Cardiac/Vascular    PAF (paroxysmal atrial fibrillation)       Other    Obstructive sleep apnea - Primary (Chronic)    Overview     Dx 26.6/hr. On Autopap. Doing well on PAP settings. Patient is compliant. Follow up annually in sleep clinic         Relevant Orders    CPAP/BIPAP SUPPLIES

## 2020-07-30 ENCOUNTER — OFFICE VISIT (OUTPATIENT)
Dept: ORTHOPEDICS | Facility: CLINIC | Age: 70
End: 2020-07-30
Payer: MEDICARE

## 2020-07-30 VITALS
DIASTOLIC BLOOD PRESSURE: 79 MMHG | HEART RATE: 70 BPM | BODY MASS INDEX: 30.3 KG/M2 | WEIGHT: 211.63 LBS | SYSTOLIC BLOOD PRESSURE: 121 MMHG | HEIGHT: 70 IN

## 2020-07-30 DIAGNOSIS — M17.12 PRIMARY OSTEOARTHRITIS OF LEFT KNEE: Primary | ICD-10-CM

## 2020-07-30 PROCEDURE — 99213 OFFICE O/P EST LOW 20 MIN: CPT | Mod: PBBFAC,25 | Performed by: FAMILY MEDICINE

## 2020-07-30 PROCEDURE — 99999 PR PBB SHADOW E&M-EST. PATIENT-LVL III: CPT | Mod: PBBFAC,,, | Performed by: FAMILY MEDICINE

## 2020-07-30 PROCEDURE — 99999 PR PBB SHADOW E&M-EST. PATIENT-LVL III: ICD-10-PCS | Mod: PBBFAC,,, | Performed by: FAMILY MEDICINE

## 2020-07-30 PROCEDURE — 99499 NO LOS: ICD-10-PCS | Mod: S$PBB,,, | Performed by: FAMILY MEDICINE

## 2020-07-30 PROCEDURE — 20610 DRAIN/INJ JOINT/BURSA W/O US: CPT | Mod: PBBFAC | Performed by: FAMILY MEDICINE

## 2020-07-30 PROCEDURE — 99499 UNLISTED E&M SERVICE: CPT | Mod: S$PBB,,, | Performed by: FAMILY MEDICINE

## 2020-07-30 PROCEDURE — 20610 LARGE JOINT ASPIRATION/INJECTION: L KNEE: ICD-10-PCS | Mod: S$PBB,LT,, | Performed by: FAMILY MEDICINE

## 2020-07-30 RX ADMIN — Medication 20 MG: at 11:07

## 2020-07-30 NOTE — PROGRESS NOTES
Subjective:     Patient ID: Adal Loaiza Jr. is a 70 y.o. male.    Chief Complaint: Pain of the Left Knee      HPI:  Says he is doing extremely well with the Euflexxa injections and his knee has improved each time and he no longer has pain and he feels physical therapy is making his knee and his overall body much stronger from week to week.  He plans to start biking in a few weeks but will start using gradually progressed.  Will get Euflexxa 3.  To left knee today.    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    Seizures     because of brain tumor    Sleep apnea     Spinal cord disease     meningioma     Past Surgical History:   Procedure Laterality Date    BRAIN SURGERY      CARDIAC PACEMAKER PLACEMENT      COLONOSCOPY N/A 8/25/2016    Procedure: COLONOSCOPY;  Surgeon: Morris Olguin MD;  Location: [facility] ENDO;  Service: Endoscopy;  Laterality: N/A;    FRACTURE SURGERY Right     right jaw    Gamma knife      for schwannoma    REPLACEMENT OF PACEMAKER GENERATOR Left 2/13/2019    Procedure: REPLACEMENT, PULSE GENERATOR, CARDIAC PACEMAKER;  Surgeon: Gen Meza MD;  Location: [facility] CATH LAB;  Service: Cardiology;  Laterality: Left;  current device MDT/waiting for MD input     Family History   Problem Relation Age of Onset    Colon cancer Father     Hypertension Unknown      Social History     Socioeconomic History    Marital status:      Spouse name: Not on file    Number of children: Not on file    Years of education: Not on file    Highest education level: Not on file   Occupational History    Not on file   Social Needs    Financial resource strain:  Not on file    Food insecurity     Worry: Not on file     Inability: Not on file    Transportation needs     Medical: Not on file     Non-medical: Not on file   Tobacco Use    Smoking status: Never Smoker    Smokeless tobacco: Never Used   Substance and Sexual Activity    Alcohol use: No     Comment: socially    Drug use: No    Sexual activity: Not on file   Lifestyle    Physical activity     Days per week: Not on file     Minutes per session: Not on file    Stress: Not on file   Relationships    Social connections     Talks on phone: Not on file     Gets together: Not on file     Attends Jewish service: Not on file     Active member of club or organization: Not on file     Attends meetings of clubs or organizations: Not on file     Relationship status: Not on file   Other Topics Concern    Not on file   Social History Narrative    Not on file       Current Outpatient Medications:     LACTOBACILLUS ACIDOPHILUS (PROBIOTIC ORAL), Take by mouth once daily., Disp: , Rfl:     levetiracetam (KEPPRA) 500 MG Tab, Take 1 tablet (500 mg total) by mouth 2 (two) times daily., Disp: 60 tablet, Rfl: 11    multivitamin (ONE DAILY MULTIVITAMIN) per tablet, Take 1 tablet by mouth once daily., Disp: , Rfl:     pravastatin (PRAVACHOL) 20 MG tablet, Take 1 tablet (20 mg total) by mouth once daily., Disp: 90 tablet, Rfl: 3  Review of patient's allergies indicates:   Allergen Reactions    Nsaids (non-steroidal anti-inflammatory drug)      Caused GI bleeding.    Aspirin     Celecoxib      ROS    Objective:   Body mass index is 30.37 kg/m².  Vitals:    07/30/20 1132   BP: 121/79   Pulse: 70           Ortho/SPM Exam    IMAGING: US Lower Extremity Veins Left  Narrative: EXAMINATION:  US LOWER EXTREMITY VEINS LEFT    CLINICAL HISTORY:  r/o DVT; Pain in left knee    TECHNIQUE:  Duplex and color flow Doppler evaluation of the left lower extremity veins was performed.    COMPARISON:  None    FINDINGS:  Thigh veins: The left  common femoral, femoral, popliteal, proximal medial saphenous, and deep femoral veins are patent and free of thrombus. The veins are normally compressible and have normal phasic flow and augmentation response.    Calf veins:The paired peroneal and posterior tibial calf veins are patent.  Impression: No evidence of deep venous thrombosis in the left lower extremity.    Electronically signed by: Mahesh Chamberlain DO  Date:    06/03/2020  Time:    11:44       Radiographs / Imaging : Relevant imaging results reviewed by me and interpreted by me, discussed with the patient and / or family       Assessment:     Encounter Diagnosis   Name Primary?    Primary osteoarthritis of left knee Yes        Plan:   Primary osteoarthritis of left knee  -     Large Joint Aspiration/Injection: L knee        The patient verbalized good understanding of the medical issues discussed today and expressed appreciation for the care provided.  Patient was given the opportunity to ask questions and be an active participant in their medical care. Patient had no further questions or concerns at this time.     The patient was encouraged to participate in appropriate physical activity.      Attila Mclain M.D.  [facility]        This note was dictated using voice recognition software and may have sound a like errors.

## 2020-07-30 NOTE — PROCEDURES
Large Joint Aspiration/Injection: L knee    Date/Time: 7/30/2020 11:30 AM  Performed by: Attila Mclain MD  Authorized by: Attila Mclain MD     Indications:  Pain  Site marked: the procedure site was marked    Timeout: prior to procedure the correct patient, procedure, and site was verified    Prep: patient was prepped and draped in usual sterile fashion    Approach:  Anterolateral  Location:  Knee  Site:  L knee  Medications:  20 mg sodium hyaluronate (EUFLEXXA) 10 mg/mL(mw 2.4 -3.6 million)  Patient tolerance:  Patient tolerated the procedure well with no immediate complications

## 2020-07-30 NOTE — PATIENT INSTRUCTIONS
Ice 3 to 4 times a day for 15 min of next 48 hr and reduce activity during that time    Voltaren gel 3 times a day as needed    Recheck here 6 weeks or sooner if any problem    Gradually increase physical activity over the next few weeks

## 2020-08-15 LAB
AV DELAY - LONGEST: 325 MSEC
BATTERY VOLTAGE (V): 3.01 V
IMPEDANCE RA LEAD (NATIVE): 280 OHMS
IMPEDANCE RA LEAD: 750 OHMS
OHS CV DC PP MS1: 1 MS
OHS CV DC PP V1: 3 V
P/R-WAVE RA LEAD (NATIVE): 4.6 MV
P/R-WAVE RA LEAD: 0.9 MV
THRESHOLD MS RA LEAD: 1 MS
THRESHOLD V RA LEAD: 1.25 V

## 2020-09-11 ENCOUNTER — OFFICE VISIT (OUTPATIENT)
Dept: ORTHOPEDICS | Facility: CLINIC | Age: 70
End: 2020-09-11
Payer: MEDICARE

## 2020-09-11 VITALS
SYSTOLIC BLOOD PRESSURE: 127 MMHG | WEIGHT: 211.63 LBS | BODY MASS INDEX: 30.3 KG/M2 | HEART RATE: 78 BPM | HEIGHT: 70 IN | DIASTOLIC BLOOD PRESSURE: 78 MMHG

## 2020-09-11 DIAGNOSIS — M25.562 LEFT KNEE PAIN, UNSPECIFIED CHRONICITY: Primary | ICD-10-CM

## 2020-09-11 PROCEDURE — 99213 PR OFFICE/OUTPT VISIT, EST, LEVL III, 20-29 MIN: ICD-10-PCS | Mod: S$PBB,,, | Performed by: FAMILY MEDICINE

## 2020-09-11 PROCEDURE — 99999 PR PBB SHADOW E&M-EST. PATIENT-LVL II: ICD-10-PCS | Mod: PBBFAC,,, | Performed by: FAMILY MEDICINE

## 2020-09-11 PROCEDURE — 99999 PR PBB SHADOW E&M-EST. PATIENT-LVL II: CPT | Mod: PBBFAC,,, | Performed by: FAMILY MEDICINE

## 2020-09-11 PROCEDURE — 99213 OFFICE O/P EST LOW 20 MIN: CPT | Mod: S$PBB,,, | Performed by: FAMILY MEDICINE

## 2020-09-11 PROCEDURE — 99212 OFFICE O/P EST SF 10 MIN: CPT | Mod: PBBFAC | Performed by: FAMILY MEDICINE

## 2020-09-11 NOTE — PROGRESS NOTES
Subjective:     Patient ID: Adal Loaiza Jr. is a 70 y.o. male.    Chief Complaint: Pain of the Left Knee      HPI:  6 weeks post Euflexxa injection recheck  Problem - osteoarthritis and pain of left knee    Duration - months    Severity -    Pain - 2 over 10   Limitations - minimal, does not try heavy lifting    Previous Treatment - physical therapy has been very helpful in the patient is not needing his brace very much.  He is using kinesio tape around the left patella which is very effective for him.  He says PT has improved his leg strength and he continues to exercise at home.    Other Information - thinks the Euflexxa injection was very helpful to reduce pain and improve mobility.      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    Seizures     because of brain tumor    Sleep apnea     Spinal cord disease     meningioma     Past Surgical History:   Procedure Laterality Date    BRAIN SURGERY      CARDIAC PACEMAKER PLACEMENT      COLONOSCOPY N/A 8/25/2016    Procedure: COLONOSCOPY;  Surgeon: Morris Olguin MD;  Location: [facility] ENDO;  Service: Endoscopy;  Laterality: N/A;    FRACTURE SURGERY Right     right jaw    Gamma knife      for schwannoma    REPLACEMENT OF PACEMAKER GENERATOR Left 2/13/2019    Procedure: REPLACEMENT, PULSE GENERATOR, CARDIAC PACEMAKER;  Surgeon: Gen Meza MD;  Location: [facility] CATH LAB;  Service: Cardiology;  Laterality: Left;  current device MDT/waiting for MD input     Family History   Problem Relation Age of Onset    Colon cancer Father     Hypertension Unknown      Social History     Socioeconomic History    Marital status:       Spouse name: Not on file    Number of children: Not on file    Years of education: Not on file    Highest education level: Not on file   Occupational History    Not on file   Social Needs    Financial resource strain: Not on file    Food insecurity     Worry: Not on file     Inability: Not on file    Transportation needs     Medical: Not on file     Non-medical: Not on file   Tobacco Use    Smoking status: Never Smoker    Smokeless tobacco: Never Used   Substance and Sexual Activity    Alcohol use: No     Comment: socially    Drug use: No    Sexual activity: Not on file   Lifestyle    Physical activity     Days per week: Not on file     Minutes per session: Not on file    Stress: Not on file   Relationships    Social connections     Talks on phone: Not on file     Gets together: Not on file     Attends Oriental orthodox service: Not on file     Active member of club or organization: Not on file     Attends meetings of clubs or organizations: Not on file     Relationship status: Not on file   Other Topics Concern    Not on file   Social History Narrative    Not on file       Current Outpatient Medications:     LACTOBACILLUS ACIDOPHILUS (PROBIOTIC ORAL), Take by mouth once daily., Disp: , Rfl:     levetiracetam (KEPPRA) 500 MG Tab, Take 1 tablet (500 mg total) by mouth 2 (two) times daily., Disp: 60 tablet, Rfl: 11    multivitamin (ONE DAILY MULTIVITAMIN) per tablet, Take 1 tablet by mouth once daily., Disp: , Rfl:     pravastatin (PRAVACHOL) 20 MG tablet, Take 1 tablet (20 mg total) by mouth once daily., Disp: 90 tablet, Rfl: 3  Review of patient's allergies indicates:   Allergen Reactions    Nsaids (non-steroidal anti-inflammatory drug)      Caused GI bleeding.    Aspirin     Celecoxib      Review of Systems   Constitutional: Negative for chills, fever and weight loss.   Respiratory: Negative for shortness of breath.    Cardiovascular: Negative for chest pain and palpitations.       Objective:    Body mass index is 30.37 kg/m².  Vitals:    09/11/20 1115   BP: 127/78   Pulse: 78           Ortho/SPM Exam  General - A&O, NAD.     Respiratory Effort - Normal    Extremity (Body Part) - left knee     -No acute deformity/swelling    ROM - 0-120    TTP - none    Stability -good, negative Lachman's    Strength - excellent 4 to 5/5    Neurovascular Intact -     Other - gait is good, wears tape around patella    Psychiatric - Affect & Cognition WNL      Plan for Improvement - continue physical therapy and home exercise program and patella taping as desired.  Recheck in 3-4 months and we will consider his next round of viscosupplementation if desired.      IMAGING: Cardiac device check - In Clinic [facility]  Verified       Radiographs / Imaging : Relevant imaging results reviewed by me and interpreted by me, discussed with the patient and / or family       Assessment:     Encounter Diagnosis   Name Primary?    Left knee pain, unspecified chronicity Yes        Plan:   Left knee pain, unspecified chronicity        The patient verbalized good understanding of the medical issues discussed today and expressed appreciation for the care provided.  Patient was given the opportunity to ask questions and be an active participant in their medical care. Patient had no further questions or concerns at this time.     The patient was encouraged to participate in appropriate physical activity.      Attila Mclain M.D.  [facility]        This note was dictated using voice recognition software and may have sound a like errors.

## 2020-10-16 DIAGNOSIS — Z95.0 CARDIAC PACEMAKER IN SITU: ICD-10-CM

## 2020-10-16 DIAGNOSIS — R00.1 SEVERE SINUS BRADYCARDIA: Primary | ICD-10-CM

## 2020-10-26 ENCOUNTER — CLINICAL SUPPORT (OUTPATIENT)
Dept: CARDIOLOGY | Facility: HOSPITAL | Age: 70
End: 2020-10-26
Payer: MEDICARE

## 2020-10-26 DIAGNOSIS — Z95.0 PRESENCE OF CARDIAC PACEMAKER: ICD-10-CM

## 2020-10-26 PROCEDURE — 99999 PR PBB SHADOW E&M-EST. PATIENT-LVL I: ICD-10-PCS | Mod: PBBFAC,,,

## 2020-10-26 PROCEDURE — 93294 REM INTERROG EVL PM/LDLS PM: CPT | Mod: ,,, | Performed by: INTERNAL MEDICINE

## 2020-10-26 PROCEDURE — 93294 CARDIAC DEVICE CHECK - REMOTE: ICD-10-PCS | Mod: ,,, | Performed by: INTERNAL MEDICINE

## 2020-10-26 PROCEDURE — 99211 OFF/OP EST MAY X REQ PHY/QHP: CPT | Mod: PBBFAC

## 2020-10-26 PROCEDURE — 99999 PR PBB SHADOW E&M-EST. PATIENT-LVL I: CPT | Mod: PBBFAC,,,

## 2020-11-02 ENCOUNTER — CLINICAL SUPPORT (OUTPATIENT)
Dept: CARDIOLOGY | Facility: HOSPITAL | Age: 70
End: 2020-11-02
Attending: INTERNAL MEDICINE
Payer: MEDICARE

## 2020-11-02 DIAGNOSIS — Z95.0 CARDIAC PACEMAKER IN SITU: ICD-10-CM

## 2020-11-02 DIAGNOSIS — R00.1 SEVERE SINUS BRADYCARDIA: ICD-10-CM

## 2020-11-02 PROCEDURE — 93280 PM DEVICE PROGR EVAL DUAL: CPT

## 2020-11-02 PROCEDURE — 93280 PM DEVICE PROGR EVAL DUAL: CPT | Mod: 26,,, | Performed by: INTERNAL MEDICINE

## 2020-11-02 PROCEDURE — 99999 PR PBB SHADOW E&M-EST. PATIENT-LVL I: ICD-10-PCS | Mod: PBBFAC,,,

## 2020-11-02 PROCEDURE — 93280 CARDIAC DEVICE CHECK - IN CLINIC & HOSPITAL: ICD-10-PCS | Mod: 26,,, | Performed by: INTERNAL MEDICINE

## 2020-11-02 PROCEDURE — 99999 PR PBB SHADOW E&M-EST. PATIENT-LVL I: CPT | Mod: PBBFAC,,,

## 2020-11-02 PROCEDURE — 99211 OFF/OP EST MAY X REQ PHY/QHP: CPT | Mod: PBBFAC,25

## 2020-11-03 ENCOUNTER — OFFICE VISIT (OUTPATIENT)
Dept: CARDIOLOGY | Facility: CLINIC | Age: 70
End: 2020-11-03
Payer: MEDICARE

## 2020-11-03 VITALS
SYSTOLIC BLOOD PRESSURE: 126 MMHG | HEIGHT: 70 IN | OXYGEN SATURATION: 97 % | HEART RATE: 97 BPM | BODY MASS INDEX: 30.15 KG/M2 | WEIGHT: 210.63 LBS | RESPIRATION RATE: 16 BRPM | DIASTOLIC BLOOD PRESSURE: 68 MMHG

## 2020-11-03 DIAGNOSIS — I48.0 PAF (PAROXYSMAL ATRIAL FIBRILLATION): ICD-10-CM

## 2020-11-03 DIAGNOSIS — E78.2 MIXED HYPERLIPIDEMIA: ICD-10-CM

## 2020-11-03 DIAGNOSIS — G47.33 OBSTRUCTIVE SLEEP APNEA: Chronic | ICD-10-CM

## 2020-11-03 DIAGNOSIS — Z95.0 CARDIAC PACEMAKER IN SITU: Primary | Chronic | ICD-10-CM

## 2020-11-03 DIAGNOSIS — I49.5 SSS (SICK SINUS SYNDROME): ICD-10-CM

## 2020-11-03 PROCEDURE — 99999 PR PBB SHADOW E&M-EST. PATIENT-LVL III: CPT | Mod: PBBFAC,,, | Performed by: INTERNAL MEDICINE

## 2020-11-03 PROCEDURE — 99214 PR OFFICE/OUTPT VISIT, EST, LEVL IV, 30-39 MIN: ICD-10-PCS | Mod: S$PBB,,, | Performed by: INTERNAL MEDICINE

## 2020-11-03 PROCEDURE — 99999 PR PBB SHADOW E&M-EST. PATIENT-LVL III: ICD-10-PCS | Mod: PBBFAC,,, | Performed by: INTERNAL MEDICINE

## 2020-11-03 PROCEDURE — 99214 OFFICE O/P EST MOD 30 MIN: CPT | Mod: S$PBB,,, | Performed by: INTERNAL MEDICINE

## 2020-11-03 PROCEDURE — 99213 OFFICE O/P EST LOW 20 MIN: CPT | Mod: PBBFAC | Performed by: INTERNAL MEDICINE

## 2020-11-03 NOTE — PROGRESS NOTES
Subjective:   Patient ID:  Adal Loaiza Jr. is a 70 y.o. male who presents for cardiac consult of Follow-up      HPI  The patient came in today for cardiac consult of Follow-up      Adal Loaiza Jr. is a 70 y.o. male pt with  HTN, HLP, SUSU, SSS, PAF s/p PPM, Seizures here for follow up CV eval.     1/29/19  He presented to [facility]- due to left sided chest pain last week which started with associated diaphoresis and nausea. He reported relief of symptoms with topical NTG. ED workup revealed troponin negative x 3, BNP 79. Hospital medicine called for admission. No shortness of breath, COOPER or palpitations. HE reports noncompliance with CPAP at home lately. NO orthopnea, PND or abdominal bloating. PPM interrogation reveals device at [facility] and needs generator change. ECHO revealed EF 60-65%, -WMA's. He initially had PPM implanted after brain surgery. Has occasional light chest pain, CXR shows moderate hiatal hernia. PT also has a significant cough.     4/30/19  He had PPM implanted in Feb 2019 by Dr. Cleo Rouse. No further CP/SOB. Does have large hiatal hernia on recent CXR otherwise doing well. BP and HR normal today. Overall doing well. Had sleep study, abnormal using CPAP compliant.     10/31/19  Overall has been doing well. NO CP but mild atypical hernia pain symptoms. Has been walking without COOPER. PPM with brief PAF but overall no sx.     11/3/20  Not seen in general CV clinic since 10/2019. Recent interrogation of device with PAF noted.  3.2% AT/AF burden since 6/6/19. He was enrolled in [employer] by Dr. Cleo Rouse but enrolled due to being too healthy. He continues to work    Patient feels no sob, no leg swelling, no PND, no palpitation, no dizziness, no syncope, no CNS symptoms.    Patient is compliant with medications.     Device interrogation 9/2020  78962 AMS (3.2% AT/AF burden since 6/6/19). Longest 7/25/20, 56 min 12 sec, peak A/V rate of 357/86 bpm. EGMs illustrate pAF, possible RA lead noise, SVT with  indeterminate duration as tachy cycle length falls below detection prior to termination.     Patient in [place] [facility].     41 HVR, longest 9/4/20, 44 min 43 sec, avg V rate of 134 bpm. EGM illustrate SVT with indeterminate duration as tachy cycle length falls below detection prior to termination. Other EGMs illustrate AF with RVR or SVT with indeterminate durat    2D ECHO 01/22/2019   CONCLUSIONS     1 - Normal left ventricular systolic function (EF 60-65%).     2 - Normal left ventricular diastolic function.     3 - Normal right ventricular systolic function .     4 - No wall motion abnormalities.     5 - Concentric hypertrophy.     6 - The estimated PA systolic pressure is greater than 27 mmHg.     7 - Trivial aortic regurgitation.     8 - Mild tricuspid regurgitation.               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    Seizures     because of brain tumor    Sleep apnea     Spinal cord disease     meningioma       Past Surgical History:   Procedure Laterality Date    BRAIN SURGERY      CARDIAC PACEMAKER PLACEMENT      COLONOSCOPY N/A 8/25/2016    Procedure: COLONOSCOPY;  Surgeon: Morris Olguin MD;  Location: [facility] ENDO;  Service: Endoscopy;  Laterality: N/A;    FRACTURE SURGERY Right     right jaw    Gamma knife      for schwannoma    REPLACEMENT OF PACEMAKER GENERATOR Left 2/13/2019    Procedure: REPLACEMENT, PULSE GENERATOR, CARDIAC PACEMAKER;  Surgeon: Gen Meza MD;  Location: [facility] CATH LAB;  Service: Cardiology;  Laterality: Left;  current device MDT/waiting for MD input       Social History     Tobacco Use    Smoking status: Never Smoker     "Smokeless tobacco: Never Used   Substance Use Topics    Alcohol use: No     Comment: socially    Drug use: No       Family History   Problem Relation Age of Onset    Colon cancer Father     Hypertension Unknown        Patient's Medications   New Prescriptions    No medications on file   Previous Medications    LACTOBACILLUS ACIDOPHILUS (PROBIOTIC ORAL)    Take by mouth once daily.    LEVETIRACETAM (KEPPRA) 500 MG TAB    Take 1 tablet (500 mg total) by mouth 2 (two) times daily.    MULTIVITAMIN (ONE DAILY MULTIVITAMIN) PER TABLET    Take 1 tablet by mouth once daily.    PRAVASTATIN (PRAVACHOL) 20 MG TABLET    Take 1 tablet (20 mg total) by mouth once daily.   Modified Medications    No medications on file   Discontinued Medications    No medications on file       Review of Systems   Constitutional: Negative.    HENT: Negative.    Eyes: Negative.    Respiratory: Positive for cough. Negative for shortness of breath.    Cardiovascular: Positive for chest pain. Negative for palpitations.   Gastrointestinal: Positive for abdominal pain.   Genitourinary: Negative.    Musculoskeletal: Negative.    Skin: Negative.    Neurological: Negative.    Endo/Heme/Allergies: Negative.    Psychiatric/Behavioral: Negative.    All 12 systems otherwise negative.      Wt Readings from Last 3 Encounters:   11/03/20 95.5 kg (210 lb 10.4 oz)   09/11/20 96 kg (211 lb 10.3 oz)   07/30/20 96 kg (211 lb 10.3 oz)     Temp Readings from Last 3 Encounters:   07/16/20 97 °F (36.1 °C) (Tympanic)   05/26/20 96.4 °F (35.8 °C) (Tympanic)   05/09/19 98.2 °F (36.8 °C) (Tympanic)     BP Readings from Last 3 Encounters:   11/03/20 126/68   09/11/20 127/78   07/30/20 121/79     Pulse Readings from Last 3 Encounters:   11/03/20 97   09/11/20 78   07/30/20 70       /68 (BP Location: Left arm, Patient Position: Sitting, BP Method: Large (Manual))   Pulse 97   Resp 16   Ht 5' 10" (1.778 m)   Wt 95.5 kg (210 lb 10.4 oz)   SpO2 97%   BMI 30.23 kg/m² "     Objective:   Physical Exam   Constitutional: He is oriented to person, place, and time. He appears well-developed and well-nourished. No distress.   HENT:   Head: Normocephalic and atraumatic.   Nose: Nose normal.   Mouth/Throat: Oropharynx is clear and moist.   Eyes: Conjunctivae and EOM are normal. No scleral icterus.   Neck: Normal range of motion. Neck supple. No JVD present. No thyromegaly present.   Cardiovascular: Normal rate, regular rhythm, S1 normal and S2 normal. Exam reveals no gallop, no S3, no S4 and no friction rub.   No murmur heard.  Pulmonary/Chest: Effort normal and breath sounds normal. No stridor. No respiratory distress. He has no wheezes. He has no rales. He exhibits no tenderness.   Chest wall s/p PPM - no erythema, nontender   Abdominal: Soft. Bowel sounds are normal. He exhibits no distension and no mass. There is no abdominal tenderness. There is no rebound.   Genitourinary:    Genitourinary Comments: Deferred     Musculoskeletal: Normal range of motion.         General: No tenderness, deformity or edema.   Lymphadenopathy:     He has no cervical adenopathy.   Neurological: He is alert and oriented to person, place, and time. He exhibits normal muscle tone. Coordination normal.   Skin: Skin is warm and dry. No rash noted. He is not diaphoretic. No erythema. No pallor.   Psychiatric: He has a normal mood and affect. His behavior is normal. Judgment and thought content normal.   Nursing note and vitals reviewed.      Lab Results   Component Value Date     05/08/2020    K 4.5 05/08/2020     05/08/2020    CO2 25 05/08/2020    BUN 17 05/08/2020    CREATININE 1.1 05/08/2020    GLU 92 05/08/2020    AST 26 05/08/2020    ALT 27 05/08/2020    ALBUMIN 3.7 05/08/2020    PROT 6.8 05/08/2020    BILITOT 0.6 05/08/2020    WBC 5.16 05/01/2019    HGB 12.7 (L) 05/01/2019    HCT 40.9 05/01/2019    MCV 82 05/01/2019     05/01/2019    INR 1.0 02/13/2019    TSH 2.850 05/08/2020    CHOL 148  05/08/2020    HDL 45 05/08/2020    LDLCALC 90.0 05/08/2020    TRIG 65 05/08/2020    BNP 79 01/22/2019     Assessment:      1. Cardiac pacemaker in situ    2. PAF (paroxysmal atrial fibrillation)    3. SSS (sick sinus syndrome)    4. Mixed hyperlipidemia    5. Obstructive sleep apnea        Plan:   1. Chest pain, atypical - resolved  - neg enzymes and ECHO  - discussed stress test after PPM if needed but defer for now  - hernia can be causing atypical referred pain - discuss with PCP    2. PAF/SSS with damaged lead  - f/u in pacemaker clinic as scheduled   - could not be enrolled in [employer] by Dr. Cleo Rouse, f/u EP    3. SUSU  - cont CPAP    4. HLD  - cont statin    Thank you for allowing me to participate in this patient's care. Please do not hesitate to contact me with any questions or concerns. Consult note has been forwarded to the referral physician.

## 2020-11-10 ENCOUNTER — TELEPHONE (OUTPATIENT)
Dept: ORTHOPEDICS | Facility: CLINIC | Age: 70
End: 2020-11-10

## 2020-11-25 ENCOUNTER — TELEPHONE (OUTPATIENT)
Dept: ORTHOPEDICS | Facility: CLINIC | Age: 70
End: 2020-11-25

## 2020-12-10 ENCOUNTER — OFFICE VISIT (OUTPATIENT)
Dept: ORTHOPEDICS | Facility: CLINIC | Age: 70
End: 2020-12-10
Payer: MEDICARE

## 2020-12-10 ENCOUNTER — PATIENT OUTREACH (OUTPATIENT)
Dept: ADMINISTRATIVE | Facility: OTHER | Age: 70
End: 2020-12-10

## 2020-12-10 VITALS
SYSTOLIC BLOOD PRESSURE: 126 MMHG | BODY MASS INDEX: 30.14 KG/M2 | HEIGHT: 70 IN | DIASTOLIC BLOOD PRESSURE: 73 MMHG | WEIGHT: 210.56 LBS | HEART RATE: 88 BPM

## 2020-12-10 DIAGNOSIS — M17.12 LOCALIZED OSTEOARTHRITIS OF LEFT KNEE: Primary | ICD-10-CM

## 2020-12-10 PROCEDURE — 99999 PR PBB SHADOW E&M-EST. PATIENT-LVL III: ICD-10-PCS | Mod: PBBFAC,,, | Performed by: FAMILY MEDICINE

## 2020-12-10 PROCEDURE — 99213 OFFICE O/P EST LOW 20 MIN: CPT | Mod: S$PBB,,, | Performed by: FAMILY MEDICINE

## 2020-12-10 PROCEDURE — 99999 PR PBB SHADOW E&M-EST. PATIENT-LVL III: CPT | Mod: PBBFAC,,, | Performed by: FAMILY MEDICINE

## 2020-12-10 PROCEDURE — 99213 PR OFFICE/OUTPT VISIT, EST, LEVL III, 20-29 MIN: ICD-10-PCS | Mod: S$PBB,,, | Performed by: FAMILY MEDICINE

## 2020-12-10 PROCEDURE — 99213 OFFICE O/P EST LOW 20 MIN: CPT | Mod: PBBFAC | Performed by: FAMILY MEDICINE

## 2020-12-10 NOTE — PROGRESS NOTES
Subjective:     Patient ID: Adal Loaiza Jr. is a 70 y.o. male.    Chief Complaint: Pain of the Left Knee      HPI:  Patient has severe bone on bone tricompartmental osteoarthritis in the left knee but he has done a great job with physical therapy and is able to do all the activities he desires including outdoor activities such as duck hunting.    He wants to continue physical therapy for while and then will do home exercise program as needed.    The pain is 4/10 today and at worse is a 6/10 and he occasionally takes Tylenol ibuprofen.  He does apply ice or Voltaren gel when needed.    The Euflexxa was very helpful to him and he would like to have another series.    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    Seizures     because of brain tumor    Sleep apnea     Spinal cord disease     meningioma     Past Surgical History:   Procedure Laterality Date    BRAIN SURGERY      CARDIAC PACEMAKER PLACEMENT      COLONOSCOPY N/A 8/25/2016    Procedure: COLONOSCOPY;  Surgeon: Morris Olguin MD;  Location: [facility] ENDO;  Service: Endoscopy;  Laterality: N/A;    FRACTURE SURGERY Right     right jaw    Gamma knife      for schwannoma    REPLACEMENT OF PACEMAKER GENERATOR Left 2/13/2019    Procedure: REPLACEMENT, PULSE GENERATOR, CARDIAC PACEMAKER;  Surgeon: Gen Meza MD;  Location: [facility] CATH LAB;  Service: Cardiology;  Laterality: Left;  current device MDT/waiting for MD input     Family History   Problem Relation Age of Onset    Colon cancer Father     Hypertension Unknown      Social History     Socioeconomic History     Marital status:      Spouse name: Not on file    Number of children: Not on file    Years of education: Not on file    Highest education level: Not on file   Occupational History    Not on file   Social Needs    Financial resource strain: Not on file    Food insecurity     Worry: Not on file     Inability: Not on file    Transportation needs     Medical: Not on file     Non-medical: Not on file   Tobacco Use    Smoking status: Never Smoker    Smokeless tobacco: Never Used   Substance and Sexual Activity    Alcohol use: No     Comment: socially    Drug use: No    Sexual activity: Not on file   Lifestyle    Physical activity     Days per week: Not on file     Minutes per session: Not on file    Stress: Not on file   Relationships    Social connections     Talks on phone: Not on file     Gets together: Not on file     Attends Muslim service: Not on file     Active member of club or organization: Not on file     Attends meetings of clubs or organizations: Not on file     Relationship status: Not on file   Other Topics Concern    Not on file   Social History Narrative    Not on file       Current Outpatient Medications:     LACTOBACILLUS ACIDOPHILUS (PROBIOTIC ORAL), Take by mouth once daily., Disp: , Rfl:     levetiracetam (KEPPRA) 500 MG Tab, Take 1 tablet (500 mg total) by mouth 2 (two) times daily., Disp: 60 tablet, Rfl: 11    multivitamin (ONE DAILY MULTIVITAMIN) per tablet, Take 1 tablet by mouth once daily., Disp: , Rfl:     pravastatin (PRAVACHOL) 20 MG tablet, Take 1 tablet (20 mg total) by mouth once daily., Disp: 90 tablet, Rfl: 3  Review of patient's allergies indicates:   Allergen Reactions    Nsaids (non-steroidal anti-inflammatory drug)      Caused GI bleeding.    Aspirin     Celecoxib      Review of Systems   Constitutional: Negative for chills, fever and weight loss.   Respiratory: Negative for shortness of breath.    Cardiovascular: Negative for chest pain and  palpitations.       Objective:   Body mass index is 30.21 kg/m².  Vitals:    12/10/20 1403   BP: 126/73   Pulse: 88           Ortho/SPM Exam-alert and oriented well-nourished well-developed ambulatory adult male pleasant in no acute distress    Respiratory effort is normal    Left knee-no acute deformity or swelling    Range of motion 0-120 degrees    Strength is good    No varus or valgus laxity negative Lachman's    Neurovascular intact    Psychiatric good affect and cognition    IMAGING: Cardiac device check - In Clinic [facility] Hospital  Verified       Radiographs / Imaging : Relevant imaging results reviewed by me and interpreted by me, discussed with the patient and / or family       Assessment:     Encounter Diagnosis   Name Primary?    Localized osteoarthritis of left knee Yes        Plan:   Localized osteoarthritis of left knee        The patient verbalized good understanding of the medical issues discussed today and expressed appreciation for the care provided.  Patient was given the opportunity to ask questions and be an active participant in their medical care. Patient had no further questions or concerns at this time.     The patient was encouraged to participate in appropriate physical activity.      Attila Mclain M.D.  [facility]        This note was dictated using voice recognition software and may have sound a like errors.

## 2020-12-14 ENCOUNTER — TELEPHONE (OUTPATIENT)
Dept: RADIOLOGY | Facility: HOSPITAL | Age: 70
End: 2020-12-14

## 2020-12-15 ENCOUNTER — HOSPITAL ENCOUNTER (OUTPATIENT)
Dept: RADIOLOGY | Facility: HOSPITAL | Age: 70
Discharge: HOME | End: 2020-12-15
Attending: UROLOGY
Payer: MEDICARE

## 2020-12-15 DIAGNOSIS — N28.1 RENAL CYST: ICD-10-CM

## 2020-12-15 DIAGNOSIS — Z12.5 PROSTATE CANCER SCREENING: ICD-10-CM

## 2020-12-15 PROCEDURE — 76770 US EXAM ABDO BACK WALL COMP: CPT | Mod: TC

## 2020-12-15 PROCEDURE — 76770 US EXAM ABDO BACK WALL COMP: CPT | Mod: 26,,, | Performed by: RADIOLOGY

## 2020-12-15 PROCEDURE — 76770 US RETROPERITONEAL COMPLETE: ICD-10-PCS | Mod: 26,,, | Performed by: RADIOLOGY

## 2020-12-16 ENCOUNTER — TELEPHONE (OUTPATIENT)
Dept: UROLOGY | Facility: CLINIC | Age: 70
End: 2020-12-16

## 2020-12-16 DIAGNOSIS — R97.20 ELEVATED PSA: Primary | ICD-10-CM

## 2021-01-04 ENCOUNTER — LAB VISIT (OUTPATIENT)
Dept: LAB | Facility: HOSPITAL | Age: 71
End: 2021-01-04
Attending: UROLOGY
Payer: MEDICARE

## 2021-01-04 ENCOUNTER — OFFICE VISIT (OUTPATIENT)
Dept: UROLOGY | Facility: CLINIC | Age: 71
End: 2021-01-04
Payer: MEDICARE

## 2021-01-04 VITALS
SYSTOLIC BLOOD PRESSURE: 136 MMHG | DIASTOLIC BLOOD PRESSURE: 88 MMHG | TEMPERATURE: 99 F | HEIGHT: 70 IN | WEIGHT: 204.13 LBS | BODY MASS INDEX: 29.22 KG/M2

## 2021-01-04 DIAGNOSIS — R97.20 ELEVATED PSA: ICD-10-CM

## 2021-01-04 DIAGNOSIS — Z12.5 PROSTATE CANCER SCREENING: ICD-10-CM

## 2021-01-04 DIAGNOSIS — N28.1 RENAL CYST: ICD-10-CM

## 2021-01-04 DIAGNOSIS — Z12.5 PROSTATE CANCER SCREENING: Primary | ICD-10-CM

## 2021-01-04 DIAGNOSIS — I10 HYPERTENSION, UNSPECIFIED TYPE: ICD-10-CM

## 2021-01-04 LAB
BILIRUB SERPL-MCNC: NORMAL MG/DL
BLOOD URINE, POC: NORMAL
CLARITY, POC UA: CLEAR
COLOR, POC UA: YELLOW
CREAT SERPL-MCNC: 1.2 MG/DL (ref 0.5–1.4)
EST. GFR  (AFRICAN AMERICAN): >60 ML/MIN/1.73 M^2
EST. GFR  (NON AFRICAN AMERICAN): >60 ML/MIN/1.73 M^2
GLUCOSE UR QL STRIP: NORMAL
KETONES UR QL STRIP: NORMAL
LEUKOCYTE ESTERASE URINE, POC: NORMAL
NITRITE, POC UA: NORMAL
PH, POC UA: 5
PROTEIN, POC: NORMAL
SPECIFIC GRAVITY, POC UA: 1.02
UROBILINOGEN, POC UA: NORMAL

## 2021-01-04 PROCEDURE — 99214 PR OFFICE/OUTPT VISIT, EST, LEVL IV, 30-39 MIN: ICD-10-PCS | Mod: S$PBB,,, | Performed by: UROLOGY

## 2021-01-04 PROCEDURE — 81002 URINALYSIS NONAUTO W/O SCOPE: CPT | Mod: PBBFAC | Performed by: UROLOGY

## 2021-01-04 PROCEDURE — 99214 OFFICE O/P EST MOD 30 MIN: CPT | Mod: S$PBB,,, | Performed by: UROLOGY

## 2021-01-04 PROCEDURE — 99999 PR PBB SHADOW E&M-EST. PATIENT-LVL III: ICD-10-PCS | Mod: PBBFAC,,, | Performed by: UROLOGY

## 2021-01-04 PROCEDURE — 82565 ASSAY OF CREATININE: CPT

## 2021-01-04 PROCEDURE — 99213 OFFICE O/P EST LOW 20 MIN: CPT | Mod: PBBFAC | Performed by: UROLOGY

## 2021-01-04 PROCEDURE — 36415 COLL VENOUS BLD VENIPUNCTURE: CPT

## 2021-01-04 PROCEDURE — 99999 PR PBB SHADOW E&M-EST. PATIENT-LVL III: CPT | Mod: PBBFAC,,, | Performed by: UROLOGY

## 2021-01-04 PROCEDURE — 84153 ASSAY OF PSA TOTAL: CPT

## 2021-01-04 RX ORDER — INFLUENZA A VIRUS A/MICHIGAN/45/2015 X-275 (H1N1) ANTIGEN (FORMALDEHYDE INACTIVATED), INFLUENZA A VIRUS A/SINGAPORE/INFIMH-16-0019/2016 IVR-186 (H3N2) ANTIGEN (FORMALDEHYDE INACTIVATED), INFLUENZA B VIRUS B/PHUKET/3073/2013 ANTIGEN (FORMALDEHYDE INACTIVATED), AND INFLUENZA B VIRUS B/MARYLAND/15/2016 BX-69A ANTIGEN (FORMALDEHYDE INACTIVATED) 60; 60; 60; 60 UG/.7ML; UG/.7ML; UG/.7ML; UG/.7ML
INJECTION, SUSPENSION INTRAMUSCULAR
COMMUNITY
Start: 2020-11-11 | End: 2022-06-24

## 2021-01-05 LAB — COMPLEXED PSA SERPL-MCNC: 4.1 NG/ML (ref 0–4)

## 2021-01-07 ENCOUNTER — IMMUNIZATION (OUTPATIENT)
Dept: INTERNAL MEDICINE | Facility: CLINIC | Age: 71
End: 2021-01-07
Payer: MEDICARE

## 2021-01-07 ENCOUNTER — HOSPITAL ENCOUNTER (OUTPATIENT)
Dept: RADIOLOGY | Facility: HOSPITAL | Age: 71
Discharge: HOME | End: 2021-01-07
Attending: UROLOGY
Payer: MEDICARE

## 2021-01-07 DIAGNOSIS — R97.20 ELEVATED PSA: ICD-10-CM

## 2021-01-07 DIAGNOSIS — Z12.5 PROSTATE CANCER SCREENING: ICD-10-CM

## 2021-01-07 DIAGNOSIS — N28.1 RENAL CYST: ICD-10-CM

## 2021-01-07 DIAGNOSIS — I10 HYPERTENSION, UNSPECIFIED TYPE: ICD-10-CM

## 2021-01-07 DIAGNOSIS — Z23 NEED FOR VACCINATION: ICD-10-CM

## 2021-01-07 PROCEDURE — 25500020 PHARM REV CODE 255: Performed by: UROLOGY

## 2021-01-07 PROCEDURE — 74178 CT ABD&PLV WO CNTR FLWD CNTR: CPT | Mod: TC

## 2021-01-07 PROCEDURE — 91300 COVID-19, MRNA, LNP-S, PF, 30 MCG/0.3 ML DOSE VACCINE: CPT | Mod: PBBFAC | Performed by: FAMILY MEDICINE

## 2021-01-07 RX ADMIN — IOHEXOL 100 ML: 350 INJECTION, SOLUTION INTRAVENOUS at 03:01

## 2021-01-13 ENCOUNTER — OFFICE VISIT (OUTPATIENT)
Dept: UROLOGY | Facility: CLINIC | Age: 71
End: 2021-01-13
Payer: MEDICARE

## 2021-01-13 VITALS
BODY MASS INDEX: 29.21 KG/M2 | WEIGHT: 203.63 LBS | DIASTOLIC BLOOD PRESSURE: 80 MMHG | TEMPERATURE: 98 F | SYSTOLIC BLOOD PRESSURE: 120 MMHG

## 2021-01-13 DIAGNOSIS — I10 HYPERTENSION, UNSPECIFIED TYPE: ICD-10-CM

## 2021-01-13 DIAGNOSIS — R97.20 ELEVATED PSA: ICD-10-CM

## 2021-01-13 DIAGNOSIS — N28.1 RENAL CYST: Primary | ICD-10-CM

## 2021-01-13 LAB
BILIRUB SERPL-MCNC: NORMAL MG/DL
BLOOD URINE, POC: NORMAL
CLARITY, POC UA: CLEAR
COLOR, POC UA: YELLOW
GLUCOSE UR QL STRIP: NORMAL
KETONES UR QL STRIP: NORMAL
LEUKOCYTE ESTERASE URINE, POC: NORMAL
NITRITE, POC UA: NORMAL
PH, POC UA: 5
PROTEIN, POC: NORMAL
SPECIFIC GRAVITY, POC UA: 1.02
UROBILINOGEN, POC UA: NORMAL

## 2021-01-13 PROCEDURE — 99214 OFFICE O/P EST MOD 30 MIN: CPT | Mod: S$PBB,,, | Performed by: UROLOGY

## 2021-01-13 PROCEDURE — 99999 PR PBB SHADOW E&M-EST. PATIENT-LVL III: CPT | Mod: PBBFAC,,, | Performed by: UROLOGY

## 2021-01-13 PROCEDURE — 81002 URINALYSIS NONAUTO W/O SCOPE: CPT | Mod: PBBFAC | Performed by: UROLOGY

## 2021-01-13 PROCEDURE — 99213 OFFICE O/P EST LOW 20 MIN: CPT | Mod: PBBFAC | Performed by: UROLOGY

## 2021-01-13 PROCEDURE — 99999 PR PBB SHADOW E&M-EST. PATIENT-LVL III: ICD-10-PCS | Mod: PBBFAC,,, | Performed by: UROLOGY

## 2021-01-13 PROCEDURE — 99214 PR OFFICE/OUTPT VISIT, EST, LEVL IV, 30-39 MIN: ICD-10-PCS | Mod: S$PBB,,, | Performed by: UROLOGY

## 2021-01-13 RX ORDER — CIPROFLOXACIN 500 MG/1
500 TABLET ORAL EVERY 12 HOURS
Qty: 3 TABLET | Refills: 0 | Status: SHIPPED | OUTPATIENT
Start: 2021-01-13 | End: 2021-07-21

## 2021-01-20 ENCOUNTER — PATIENT OUTREACH (OUTPATIENT)
Dept: ADMINISTRATIVE | Facility: OTHER | Age: 71
End: 2021-01-20

## 2021-01-20 DIAGNOSIS — M17.12 LOCALIZED PRIMARY OSTEOARTHRITIS OF LEFT LOWER LEG: Primary | ICD-10-CM

## 2021-01-21 ENCOUNTER — OFFICE VISIT (OUTPATIENT)
Dept: ORTHOPEDICS | Facility: CLINIC | Age: 71
End: 2021-01-21
Payer: MEDICARE

## 2021-01-21 VITALS
WEIGHT: 203.69 LBS | SYSTOLIC BLOOD PRESSURE: 130 MMHG | BODY MASS INDEX: 29.16 KG/M2 | HEART RATE: 87 BPM | HEIGHT: 70 IN | DIASTOLIC BLOOD PRESSURE: 77 MMHG

## 2021-01-21 DIAGNOSIS — M17.12 LOCALIZED OSTEOARTHRITIS OF LEFT KNEE: Primary | ICD-10-CM

## 2021-01-21 PROCEDURE — 20610 LARGE JOINT ASPIRATION/INJECTION: L KNEE: ICD-10-PCS | Mod: S$PBB,LT,, | Performed by: FAMILY MEDICINE

## 2021-01-21 PROCEDURE — 99213 OFFICE O/P EST LOW 20 MIN: CPT | Mod: PBBFAC | Performed by: FAMILY MEDICINE

## 2021-01-21 PROCEDURE — 99999 PR PBB SHADOW E&M-EST. PATIENT-LVL III: CPT | Mod: PBBFAC,,, | Performed by: FAMILY MEDICINE

## 2021-01-21 PROCEDURE — 20610 DRAIN/INJ JOINT/BURSA W/O US: CPT | Mod: PBBFAC | Performed by: FAMILY MEDICINE

## 2021-01-21 PROCEDURE — 99999 PR PBB SHADOW E&M-EST. PATIENT-LVL III: ICD-10-PCS | Mod: PBBFAC,,, | Performed by: FAMILY MEDICINE

## 2021-01-21 PROCEDURE — 99499 UNLISTED E&M SERVICE: CPT | Mod: S$PBB,,, | Performed by: FAMILY MEDICINE

## 2021-01-21 PROCEDURE — 99499 NO LOS: ICD-10-PCS | Mod: S$PBB,,, | Performed by: FAMILY MEDICINE

## 2021-01-21 RX ADMIN — Medication 20 MG: at 11:01

## 2021-01-24 ENCOUNTER — CLINICAL SUPPORT (OUTPATIENT)
Dept: CARDIOLOGY | Facility: HOSPITAL | Age: 71
End: 2021-01-24
Payer: MEDICARE

## 2021-01-24 DIAGNOSIS — Z95.0 PRESENCE OF CARDIAC PACEMAKER: ICD-10-CM

## 2021-01-24 PROCEDURE — 93294 REM INTERROG EVL PM/LDLS PM: CPT | Mod: ,,, | Performed by: INTERNAL MEDICINE

## 2021-01-24 PROCEDURE — 93294 CARDIAC DEVICE CHECK - REMOTE: ICD-10-PCS | Mod: ,,, | Performed by: INTERNAL MEDICINE

## 2021-01-24 PROCEDURE — 93296 REM INTERROG EVL PM/IDS: CPT | Mod: PBBFAC | Performed by: INTERNAL MEDICINE

## 2021-01-27 ENCOUNTER — OFFICE VISIT (OUTPATIENT)
Dept: SPORTS MEDICINE | Facility: CLINIC | Age: 71
End: 2021-01-27
Payer: MEDICARE

## 2021-01-27 VITALS
DIASTOLIC BLOOD PRESSURE: 80 MMHG | HEART RATE: 80 BPM | WEIGHT: 203.69 LBS | SYSTOLIC BLOOD PRESSURE: 130 MMHG | HEIGHT: 70 IN | BODY MASS INDEX: 29.16 KG/M2

## 2021-01-27 DIAGNOSIS — M17.12 LOCALIZED OSTEOARTHRITIS OF LEFT KNEE: Primary | ICD-10-CM

## 2021-01-27 PROCEDURE — 99499 NO LOS: ICD-10-PCS | Mod: S$PBB,,, | Performed by: FAMILY MEDICINE

## 2021-01-27 PROCEDURE — 99499 UNLISTED E&M SERVICE: CPT | Mod: S$PBB,,, | Performed by: FAMILY MEDICINE

## 2021-01-27 PROCEDURE — 20610 LARGE JOINT ASPIRATION/INJECTION: L KNEE: ICD-10-PCS | Mod: S$PBB,LT,, | Performed by: FAMILY MEDICINE

## 2021-01-27 PROCEDURE — 99999 PR PBB SHADOW E&M-EST. PATIENT-LVL III: CPT | Mod: PBBFAC,,, | Performed by: FAMILY MEDICINE

## 2021-01-27 PROCEDURE — 20610 DRAIN/INJ JOINT/BURSA W/O US: CPT | Mod: PBBFAC,PN | Performed by: FAMILY MEDICINE

## 2021-01-27 PROCEDURE — 99999 PR PBB SHADOW E&M-EST. PATIENT-LVL III: ICD-10-PCS | Mod: PBBFAC,,, | Performed by: FAMILY MEDICINE

## 2021-01-27 PROCEDURE — 99213 OFFICE O/P EST LOW 20 MIN: CPT | Mod: PBBFAC,PN,25 | Performed by: FAMILY MEDICINE

## 2021-01-27 RX ADMIN — Medication 20 MG: at 02:01

## 2021-01-28 ENCOUNTER — IMMUNIZATION (OUTPATIENT)
Dept: INTERNAL MEDICINE | Facility: CLINIC | Age: 71
End: 2021-01-28
Payer: MEDICARE

## 2021-01-28 DIAGNOSIS — Z23 NEED FOR VACCINATION: Primary | ICD-10-CM

## 2021-01-28 PROCEDURE — 91300 COVID-19, MRNA, LNP-S, PF, 30 MCG/0.3 ML DOSE VACCINE: CPT | Mod: PBBFAC | Performed by: FAMILY MEDICINE

## 2021-01-28 PROCEDURE — 0002A COVID-19, MRNA, LNP-S, PF, 30 MCG/0.3 ML DOSE VACCINE: CPT | Mod: PBBFAC | Performed by: FAMILY MEDICINE

## 2021-02-03 ENCOUNTER — OFFICE VISIT (OUTPATIENT)
Dept: UROLOGY | Facility: CLINIC | Age: 71
End: 2021-02-03
Payer: MEDICARE

## 2021-02-03 ENCOUNTER — OFFICE VISIT (OUTPATIENT)
Dept: SPORTS MEDICINE | Facility: CLINIC | Age: 71
End: 2021-02-03
Payer: MEDICARE

## 2021-02-03 VITALS
WEIGHT: 203.25 LBS | SYSTOLIC BLOOD PRESSURE: 120 MMHG | DIASTOLIC BLOOD PRESSURE: 80 MMHG | BODY MASS INDEX: 29.17 KG/M2

## 2021-02-03 VITALS
WEIGHT: 203.25 LBS | HEIGHT: 70 IN | SYSTOLIC BLOOD PRESSURE: 134 MMHG | BODY MASS INDEX: 29.1 KG/M2 | HEART RATE: 64 BPM | DIASTOLIC BLOOD PRESSURE: 80 MMHG

## 2021-02-03 DIAGNOSIS — R97.20 ELEVATED PSA: Primary | ICD-10-CM

## 2021-02-03 DIAGNOSIS — M17.12 LOCALIZED OSTEOARTHRITIS OF LEFT KNEE: Primary | ICD-10-CM

## 2021-02-03 PROCEDURE — 88342 IMHCHEM/IMCYTCHM 1ST ANTB: CPT | Mod: 26,,, | Performed by: PATHOLOGY

## 2021-02-03 PROCEDURE — 88341 IMHCHEM/IMCYTCHM EA ADD ANTB: CPT | Mod: 26,,, | Performed by: PATHOLOGY

## 2021-02-03 PROCEDURE — 99999 PR PBB SHADOW E&M-EST. PATIENT-LVL II: CPT | Mod: PBBFAC,,, | Performed by: UROLOGY

## 2021-02-03 PROCEDURE — 99212 OFFICE O/P EST SF 10 MIN: CPT | Mod: PBBFAC,25 | Performed by: UROLOGY

## 2021-02-03 PROCEDURE — 55700 PR BIOPSY OF PROSTATE,NEEDLE/PUNCH: ICD-10-PCS | Mod: S$PBB,,, | Performed by: UROLOGY

## 2021-02-03 PROCEDURE — 76872 US TRANSRECTAL: CPT | Mod: 26,S$PBB,, | Performed by: UROLOGY

## 2021-02-03 PROCEDURE — 88341 IMHCHEM/IMCYTCHM EA ADD ANTB: CPT | Mod: 59 | Performed by: PATHOLOGY

## 2021-02-03 PROCEDURE — 99499 UNLISTED E&M SERVICE: CPT | Mod: S$PBB,,, | Performed by: FAMILY MEDICINE

## 2021-02-03 PROCEDURE — 76872 PR US TRANSRECTAL: ICD-10-PCS | Mod: 26,S$PBB,, | Performed by: UROLOGY

## 2021-02-03 PROCEDURE — 88305 TISSUE EXAM BY PATHOLOGIST: ICD-10-PCS | Mod: 26,,, | Performed by: PATHOLOGY

## 2021-02-03 PROCEDURE — 99213 OFFICE O/P EST LOW 20 MIN: CPT | Mod: PBBFAC,27,PN,25 | Performed by: FAMILY MEDICINE

## 2021-02-03 PROCEDURE — 99999 PR PBB SHADOW E&M-EST. PATIENT-LVL II: ICD-10-PCS | Mod: PBBFAC,,, | Performed by: UROLOGY

## 2021-02-03 PROCEDURE — 76872 US TRANSRECTAL: CPT | Mod: PBBFAC | Performed by: UROLOGY

## 2021-02-03 PROCEDURE — 99499 UNLISTED E&M SERVICE: CPT | Mod: S$PBB,,, | Performed by: UROLOGY

## 2021-02-03 PROCEDURE — 88305 TISSUE EXAM BY PATHOLOGIST: CPT | Mod: 26,,, | Performed by: PATHOLOGY

## 2021-02-03 PROCEDURE — 88305 TISSUE EXAM BY PATHOLOGIST: CPT | Performed by: PATHOLOGY

## 2021-02-03 PROCEDURE — 20610 DRAIN/INJ JOINT/BURSA W/O US: CPT | Mod: PBBFAC,PN | Performed by: FAMILY MEDICINE

## 2021-02-03 PROCEDURE — 88341 PR IHC OR ICC EACH ADD'L SINGLE ANTIBODY  STAINPR: ICD-10-PCS | Mod: 26,,, | Performed by: PATHOLOGY

## 2021-02-03 PROCEDURE — 76942 ECHO GUIDE FOR BIOPSY: CPT | Mod: PBBFAC | Performed by: UROLOGY

## 2021-02-03 PROCEDURE — 88342 CHG IMMUNOCYTOCHEMISTRY: ICD-10-PCS | Mod: 26,,, | Performed by: PATHOLOGY

## 2021-02-03 PROCEDURE — 99999 PR PBB SHADOW E&M-EST. PATIENT-LVL III: ICD-10-PCS | Mod: PBBFAC,,, | Performed by: FAMILY MEDICINE

## 2021-02-03 PROCEDURE — 88342 IMHCHEM/IMCYTCHM 1ST ANTB: CPT | Performed by: PATHOLOGY

## 2021-02-03 PROCEDURE — 99499 NO LOS: ICD-10-PCS | Mod: S$PBB,,, | Performed by: FAMILY MEDICINE

## 2021-02-03 PROCEDURE — 20610 LARGE JOINT ASPIRATION/INJECTION: L KNEE: ICD-10-PCS | Mod: S$PBB,LT,, | Performed by: FAMILY MEDICINE

## 2021-02-03 PROCEDURE — 99999 PR PBB SHADOW E&M-EST. PATIENT-LVL III: CPT | Mod: PBBFAC,,, | Performed by: FAMILY MEDICINE

## 2021-02-03 PROCEDURE — 55700 PR BIOPSY OF PROSTATE,NEEDLE/PUNCH: CPT | Mod: S$PBB,,, | Performed by: UROLOGY

## 2021-02-03 PROCEDURE — 99499 NO LOS: ICD-10-PCS | Mod: S$PBB,,, | Performed by: UROLOGY

## 2021-02-03 PROCEDURE — 55700 PR BIOPSY OF PROSTATE,NEEDLE/PUNCH: CPT | Mod: PBBFAC | Performed by: UROLOGY

## 2021-02-03 RX ADMIN — Medication 20 MG: at 03:02

## 2021-02-09 LAB
FINAL PATHOLOGIC DIAGNOSIS: NORMAL
GROSS: NORMAL
Lab: NORMAL
MICROSCOPIC EXAM: NORMAL

## 2021-02-17 ENCOUNTER — OFFICE VISIT (OUTPATIENT)
Dept: UROLOGY | Facility: CLINIC | Age: 71
End: 2021-02-17
Payer: MEDICARE

## 2021-02-17 VITALS — SYSTOLIC BLOOD PRESSURE: 146 MMHG | DIASTOLIC BLOOD PRESSURE: 76 MMHG | BODY MASS INDEX: 29.8 KG/M2 | WEIGHT: 207.69 LBS

## 2021-02-17 DIAGNOSIS — Z12.5 ENCOUNTER FOR SCREENING FOR MALIGNANT NEOPLASM OF PROSTATE: ICD-10-CM

## 2021-02-17 DIAGNOSIS — R97.20 ELEVATED PSA: Primary | ICD-10-CM

## 2021-02-17 PROCEDURE — 99213 PR OFFICE/OUTPT VISIT, EST, LEVL III, 20-29 MIN: ICD-10-PCS | Mod: S$PBB,,, | Performed by: UROLOGY

## 2021-02-17 PROCEDURE — 99999 PR PBB SHADOW E&M-EST. PATIENT-LVL III: ICD-10-PCS | Mod: PBBFAC,,, | Performed by: UROLOGY

## 2021-02-17 PROCEDURE — 99213 OFFICE O/P EST LOW 20 MIN: CPT | Mod: S$PBB,,, | Performed by: UROLOGY

## 2021-02-17 PROCEDURE — 99213 OFFICE O/P EST LOW 20 MIN: CPT | Mod: PBBFAC | Performed by: UROLOGY

## 2021-02-17 PROCEDURE — 99999 PR PBB SHADOW E&M-EST. PATIENT-LVL III: CPT | Mod: PBBFAC,,, | Performed by: UROLOGY

## 2021-03-17 ENCOUNTER — OFFICE VISIT (OUTPATIENT)
Dept: SPORTS MEDICINE | Facility: CLINIC | Age: 71
End: 2021-03-17
Payer: MEDICARE

## 2021-03-17 VITALS
SYSTOLIC BLOOD PRESSURE: 128 MMHG | HEART RATE: 80 BPM | BODY MASS INDEX: 29.73 KG/M2 | WEIGHT: 207.69 LBS | DIASTOLIC BLOOD PRESSURE: 80 MMHG | HEIGHT: 70 IN

## 2021-03-17 DIAGNOSIS — M17.12 LOCALIZED OSTEOARTHRITIS OF LEFT KNEE: Primary | ICD-10-CM

## 2021-03-17 PROCEDURE — 99213 OFFICE O/P EST LOW 20 MIN: CPT | Mod: PBBFAC,PN | Performed by: FAMILY MEDICINE

## 2021-03-17 PROCEDURE — 99213 OFFICE O/P EST LOW 20 MIN: CPT | Mod: S$PBB,,, | Performed by: FAMILY MEDICINE

## 2021-03-17 PROCEDURE — 99999 PR PBB SHADOW E&M-EST. PATIENT-LVL III: CPT | Mod: PBBFAC,,, | Performed by: FAMILY MEDICINE

## 2021-03-17 PROCEDURE — 99999 PR PBB SHADOW E&M-EST. PATIENT-LVL III: ICD-10-PCS | Mod: PBBFAC,,, | Performed by: FAMILY MEDICINE

## 2021-03-17 PROCEDURE — 99213 PR OFFICE/OUTPT VISIT, EST, LEVL III, 20-29 MIN: ICD-10-PCS | Mod: S$PBB,,, | Performed by: FAMILY MEDICINE

## 2021-04-08 ENCOUNTER — TELEPHONE (OUTPATIENT)
Dept: ORTHOPEDICS | Facility: CLINIC | Age: 71
End: 2021-04-08

## 2021-04-24 ENCOUNTER — CLINICAL SUPPORT (OUTPATIENT)
Dept: CARDIOLOGY | Facility: HOSPITAL | Age: 71
End: 2021-04-24
Payer: MEDICARE

## 2021-04-24 DIAGNOSIS — Z95.0 PRESENCE OF CARDIAC PACEMAKER: ICD-10-CM

## 2021-04-24 PROCEDURE — 93294 REM INTERROG EVL PM/LDLS PM: CPT | Mod: ,,, | Performed by: INTERNAL MEDICINE

## 2021-04-24 PROCEDURE — 93294 CARDIAC DEVICE CHECK - REMOTE: ICD-10-PCS | Mod: ,,, | Performed by: INTERNAL MEDICINE

## 2021-05-05 DIAGNOSIS — E78.2 MIXED HYPERLIPIDEMIA: ICD-10-CM

## 2021-05-05 DIAGNOSIS — G47.33 OBSTRUCTIVE SLEEP APNEA: ICD-10-CM

## 2021-05-05 DIAGNOSIS — T82.110A FAILURE OF PACEMAKER LEAD, INITIAL ENCOUNTER: ICD-10-CM

## 2021-05-05 DIAGNOSIS — Z95.0 CARDIAC PACEMAKER IN SITU: Primary | ICD-10-CM

## 2021-05-05 DIAGNOSIS — I48.0 PAF (PAROXYSMAL ATRIAL FIBRILLATION): ICD-10-CM

## 2021-05-06 ENCOUNTER — OFFICE VISIT (OUTPATIENT)
Dept: CARDIOLOGY | Facility: CLINIC | Age: 71
End: 2021-05-06
Payer: MEDICARE

## 2021-05-06 ENCOUNTER — HOSPITAL ENCOUNTER (OUTPATIENT)
Dept: CARDIOLOGY | Facility: HOSPITAL | Age: 71
Discharge: HOME | End: 2021-05-06
Attending: INTERNAL MEDICINE
Payer: MEDICARE

## 2021-05-06 VITALS
RESPIRATION RATE: 16 BRPM | DIASTOLIC BLOOD PRESSURE: 80 MMHG | OXYGEN SATURATION: 98 % | BODY MASS INDEX: 29.35 KG/M2 | SYSTOLIC BLOOD PRESSURE: 138 MMHG | HEART RATE: 77 BPM | WEIGHT: 205 LBS | HEIGHT: 70 IN

## 2021-05-06 DIAGNOSIS — I48.0 PAF (PAROXYSMAL ATRIAL FIBRILLATION): ICD-10-CM

## 2021-05-06 DIAGNOSIS — E78.2 MIXED HYPERLIPIDEMIA: ICD-10-CM

## 2021-05-06 DIAGNOSIS — T82.110A FAILURE OF PACEMAKER LEAD, INITIAL ENCOUNTER: ICD-10-CM

## 2021-05-06 DIAGNOSIS — Z95.0 CARDIAC PACEMAKER IN SITU: Chronic | ICD-10-CM

## 2021-05-06 DIAGNOSIS — R56.1 SEIZURES, POST-TRAUMATIC: Chronic | ICD-10-CM

## 2021-05-06 DIAGNOSIS — G47.33 OBSTRUCTIVE SLEEP APNEA: Chronic | ICD-10-CM

## 2021-05-06 DIAGNOSIS — G47.33 OBSTRUCTIVE SLEEP APNEA: ICD-10-CM

## 2021-05-06 DIAGNOSIS — G40.209 PARTIAL SYMPTOMATIC EPILEPSY WITH COMPLEX PARTIAL SEIZURES, NOT INTRACTABLE, WITHOUT STATUS EPILEPTICUS: ICD-10-CM

## 2021-05-06 DIAGNOSIS — Z95.0 CARDIAC PACEMAKER IN SITU: ICD-10-CM

## 2021-05-06 DIAGNOSIS — I48.0 PAF (PAROXYSMAL ATRIAL FIBRILLATION): Primary | ICD-10-CM

## 2021-05-06 PROCEDURE — 93005 ELECTROCARDIOGRAM TRACING: CPT

## 2021-05-06 PROCEDURE — 99214 PR OFFICE/OUTPT VISIT, EST, LEVL IV, 30-39 MIN: ICD-10-PCS | Mod: S$PBB,,, | Performed by: INTERNAL MEDICINE

## 2021-05-06 PROCEDURE — 93010 ELECTROCARDIOGRAM REPORT: CPT | Mod: ,,, | Performed by: INTERNAL MEDICINE

## 2021-05-06 PROCEDURE — 99214 OFFICE O/P EST MOD 30 MIN: CPT | Mod: S$PBB,,, | Performed by: INTERNAL MEDICINE

## 2021-05-06 PROCEDURE — 99999 PR PBB SHADOW E&M-EST. PATIENT-LVL III: ICD-10-PCS | Mod: PBBFAC,,, | Performed by: INTERNAL MEDICINE

## 2021-05-06 PROCEDURE — 93010 EKG 12-LEAD: ICD-10-PCS | Mod: ,,, | Performed by: INTERNAL MEDICINE

## 2021-05-06 PROCEDURE — 99999 PR PBB SHADOW E&M-EST. PATIENT-LVL III: CPT | Mod: PBBFAC,,, | Performed by: INTERNAL MEDICINE

## 2021-05-06 PROCEDURE — 99213 OFFICE O/P EST LOW 20 MIN: CPT | Mod: PBBFAC | Performed by: INTERNAL MEDICINE

## 2021-05-06 RX ORDER — GABAPENTIN 300 MG/1
300 CAPSULE ORAL NIGHTLY
COMMUNITY
Start: 2021-05-06 | End: 2024-01-11

## 2021-05-17 ENCOUNTER — CLINICAL SUPPORT (OUTPATIENT)
Dept: CARDIOLOGY | Facility: HOSPITAL | Age: 71
End: 2021-05-17
Attending: INTERNAL MEDICINE
Payer: MEDICARE

## 2021-05-17 DIAGNOSIS — R00.1 SEVERE SINUS BRADYCARDIA: ICD-10-CM

## 2021-05-17 DIAGNOSIS — Z95.0 CARDIAC PACEMAKER IN SITU: ICD-10-CM

## 2021-05-17 PROCEDURE — 93280 PM DEVICE PROGR EVAL DUAL: CPT | Mod: 26,,, | Performed by: INTERNAL MEDICINE

## 2021-05-17 PROCEDURE — 99211 OFF/OP EST MAY X REQ PHY/QHP: CPT | Mod: PBBFAC,25

## 2021-05-17 PROCEDURE — 93280 PM DEVICE PROGR EVAL DUAL: CPT

## 2021-05-17 PROCEDURE — 99999 PR PBB SHADOW E&M-EST. PATIENT-LVL I: CPT | Mod: PBBFAC,,,

## 2021-05-17 PROCEDURE — 99999 PR PBB SHADOW E&M-EST. PATIENT-LVL I: ICD-10-PCS | Mod: PBBFAC,,,

## 2021-05-17 PROCEDURE — 93280 CARDIAC DEVICE CHECK - IN CLINIC & HOSPITAL: ICD-10-PCS | Mod: 26,,, | Performed by: INTERNAL MEDICINE

## 2021-05-25 ENCOUNTER — TELEPHONE (OUTPATIENT)
Dept: ADMINISTRATIVE | Facility: HOSPITAL | Age: 71
End: 2021-05-25

## 2021-05-26 ENCOUNTER — LAB VISIT (OUTPATIENT)
Dept: LAB | Facility: HOSPITAL | Age: 71
End: 2021-05-26
Attending: INTERNAL MEDICINE
Payer: MEDICARE

## 2021-05-26 DIAGNOSIS — E78.49 OTHER HYPERLIPIDEMIA: ICD-10-CM

## 2021-05-26 LAB
ALBUMIN SERPL BCP-MCNC: 3.6 G/DL (ref 3.5–5.2)
ALP SERPL-CCNC: 55 U/L (ref 55–135)
ALT SERPL W/O P-5'-P-CCNC: 24 U/L (ref 10–44)
ANION GAP SERPL CALC-SCNC: 7 MMOL/L (ref 8–16)
AST SERPL-CCNC: 28 U/L (ref 10–40)
BASOPHILS # BLD AUTO: 0.03 K/UL (ref 0–0.2)
BASOPHILS NFR BLD: 0.5 % (ref 0–1.9)
BILIRUB SERPL-MCNC: 0.5 MG/DL (ref 0.1–1)
BUN SERPL-MCNC: 14 MG/DL (ref 8–23)
CALCIUM SERPL-MCNC: 9.5 MG/DL (ref 8.7–10.5)
CHLORIDE SERPL-SCNC: 109 MMOL/L (ref 95–110)
CHOLEST SERPL-MCNC: 138 MG/DL (ref 120–199)
CHOLEST/HDLC SERPL: 3.1 {RATIO} (ref 2–5)
CO2 SERPL-SCNC: 24 MMOL/L (ref 23–29)
CREAT SERPL-MCNC: 1.2 MG/DL (ref 0.5–1.4)
DIFFERENTIAL METHOD: ABNORMAL
EOSINOPHIL # BLD AUTO: 0.2 K/UL (ref 0–0.5)
EOSINOPHIL NFR BLD: 2.7 % (ref 0–8)
ERYTHROCYTE [DISTWIDTH] IN BLOOD BY AUTOMATED COUNT: 17.8 % (ref 11.5–14.5)
EST. GFR  (AFRICAN AMERICAN): >60 ML/MIN/1.73 M^2
EST. GFR  (NON AFRICAN AMERICAN): >60 ML/MIN/1.73 M^2
GLUCOSE SERPL-MCNC: 94 MG/DL (ref 70–110)
HCT VFR BLD AUTO: 37.1 % (ref 40–54)
HDLC SERPL-MCNC: 45 MG/DL (ref 40–75)
HDLC SERPL: 32.6 % (ref 20–50)
HGB BLD-MCNC: 11.1 G/DL (ref 14–18)
IMM GRANULOCYTES # BLD AUTO: 0.02 K/UL (ref 0–0.04)
IMM GRANULOCYTES NFR BLD AUTO: 0.4 % (ref 0–0.5)
LDLC SERPL CALC-MCNC: 80 MG/DL (ref 63–159)
LYMPHOCYTES # BLD AUTO: 1.6 K/UL (ref 1–4.8)
LYMPHOCYTES NFR BLD: 28.5 % (ref 18–48)
MCH RBC QN AUTO: 21.5 PG (ref 27–31)
MCHC RBC AUTO-ENTMCNC: 29.9 G/DL (ref 32–36)
MCV RBC AUTO: 72 FL (ref 82–98)
MONOCYTES # BLD AUTO: 0.7 K/UL (ref 0.3–1)
MONOCYTES NFR BLD: 11.6 % (ref 4–15)
NEUTROPHILS # BLD AUTO: 3.2 K/UL (ref 1.8–7.7)
NEUTROPHILS NFR BLD: 56.3 % (ref 38–73)
NONHDLC SERPL-MCNC: 93 MG/DL
NRBC BLD-RTO: 0 /100 WBC
PLATELET # BLD AUTO: 253 K/UL (ref 150–450)
PMV BLD AUTO: 11.2 FL (ref 9.2–12.9)
POTASSIUM SERPL-SCNC: 4.8 MMOL/L (ref 3.5–5.1)
PROT SERPL-MCNC: 6.8 G/DL (ref 6–8.4)
RBC # BLD AUTO: 5.17 M/UL (ref 4.6–6.2)
SODIUM SERPL-SCNC: 140 MMOL/L (ref 136–145)
TRIGL SERPL-MCNC: 65 MG/DL (ref 30–150)
WBC # BLD AUTO: 5.62 K/UL (ref 3.9–12.7)

## 2021-05-26 PROCEDURE — 80061 LIPID PANEL: CPT | Performed by: INTERNAL MEDICINE

## 2021-05-26 PROCEDURE — 36415 COLL VENOUS BLD VENIPUNCTURE: CPT | Performed by: INTERNAL MEDICINE

## 2021-05-26 PROCEDURE — 80053 COMPREHEN METABOLIC PANEL: CPT | Performed by: INTERNAL MEDICINE

## 2021-05-26 PROCEDURE — 85025 COMPLETE CBC W/AUTO DIFF WBC: CPT | Performed by: INTERNAL MEDICINE

## 2021-06-23 ENCOUNTER — PATIENT MESSAGE (OUTPATIENT)
Dept: SPORTS MEDICINE | Facility: CLINIC | Age: 71
End: 2021-06-23

## 2021-06-23 ENCOUNTER — OFFICE VISIT (OUTPATIENT)
Dept: ORTHOPEDICS | Facility: CLINIC | Age: 71
End: 2021-06-23
Payer: MEDICARE

## 2021-06-23 VITALS
WEIGHT: 205 LBS | BODY MASS INDEX: 29.35 KG/M2 | DIASTOLIC BLOOD PRESSURE: 78 MMHG | HEART RATE: 80 BPM | SYSTOLIC BLOOD PRESSURE: 118 MMHG | HEIGHT: 70 IN

## 2021-06-23 DIAGNOSIS — M17.12 LOCALIZED OSTEOARTHRITIS OF LEFT KNEE: Primary | ICD-10-CM

## 2021-06-23 DIAGNOSIS — M25.562 LEFT KNEE PAIN, UNSPECIFIED CHRONICITY: ICD-10-CM

## 2021-06-23 PROCEDURE — 99999 PR PBB SHADOW E&M-EST. PATIENT-LVL III: CPT | Mod: PBBFAC,,, | Performed by: FAMILY MEDICINE

## 2021-06-23 PROCEDURE — 99999 PR PBB SHADOW E&M-EST. PATIENT-LVL III: ICD-10-PCS | Mod: PBBFAC,,, | Performed by: FAMILY MEDICINE

## 2021-06-23 PROCEDURE — 99213 OFFICE O/P EST LOW 20 MIN: CPT | Mod: S$PBB,,, | Performed by: FAMILY MEDICINE

## 2021-06-23 PROCEDURE — 99213 PR OFFICE/OUTPT VISIT, EST, LEVL III, 20-29 MIN: ICD-10-PCS | Mod: S$PBB,,, | Performed by: FAMILY MEDICINE

## 2021-06-23 PROCEDURE — 99213 OFFICE O/P EST LOW 20 MIN: CPT | Mod: PBBFAC | Performed by: FAMILY MEDICINE

## 2021-07-20 PROBLEM — K44.9 HIATAL HERNIA: Status: ACTIVE | Noted: 2021-07-20

## 2021-07-20 PROBLEM — G50.0 TRIGEMINAL NEURALGIA: Status: ACTIVE | Noted: 2021-07-20

## 2021-07-20 PROBLEM — D64.9 ANEMIA: Status: ACTIVE | Noted: 2021-07-20

## 2021-07-20 PROBLEM — R97.20 ELEVATED PSA: Status: ACTIVE | Noted: 2021-07-20

## 2021-07-21 ENCOUNTER — OFFICE VISIT (OUTPATIENT)
Dept: INTERNAL MEDICINE | Facility: CLINIC | Age: 71
End: 2021-07-21
Payer: MEDICARE

## 2021-07-21 VITALS
BODY MASS INDEX: 29.22 KG/M2 | WEIGHT: 204.13 LBS | HEIGHT: 70 IN | DIASTOLIC BLOOD PRESSURE: 80 MMHG | SYSTOLIC BLOOD PRESSURE: 128 MMHG

## 2021-07-21 DIAGNOSIS — N28.1 RENAL CYST: ICD-10-CM

## 2021-07-21 DIAGNOSIS — M19.90 ARTHRITIS: ICD-10-CM

## 2021-07-21 DIAGNOSIS — G50.0 TRIGEMINAL NEURALGIA: ICD-10-CM

## 2021-07-21 DIAGNOSIS — R56.1 SEIZURES, POST-TRAUMATIC: Chronic | ICD-10-CM

## 2021-07-21 DIAGNOSIS — D32.9 MENINGIOMA: ICD-10-CM

## 2021-07-21 DIAGNOSIS — G47.33 OBSTRUCTIVE SLEEP APNEA: Chronic | ICD-10-CM

## 2021-07-21 DIAGNOSIS — D64.9 ANEMIA, UNSPECIFIED TYPE: ICD-10-CM

## 2021-07-21 DIAGNOSIS — Z95.0 CARDIAC PACEMAKER IN SITU: Chronic | ICD-10-CM

## 2021-07-21 DIAGNOSIS — E78.2 MIXED HYPERLIPIDEMIA: ICD-10-CM

## 2021-07-21 DIAGNOSIS — Z00.00 ENCOUNTER FOR PREVENTIVE HEALTH EXAMINATION: Primary | ICD-10-CM

## 2021-07-21 DIAGNOSIS — G40.209 PARTIAL SYMPTOMATIC EPILEPSY WITH COMPLEX PARTIAL SEIZURES, NOT INTRACTABLE, WITHOUT STATUS EPILEPTICUS: ICD-10-CM

## 2021-07-21 DIAGNOSIS — G25.0 ESSENTIAL TREMOR: ICD-10-CM

## 2021-07-21 DIAGNOSIS — K44.9 HIATAL HERNIA: ICD-10-CM

## 2021-07-21 DIAGNOSIS — R97.20 ELEVATED PSA: ICD-10-CM

## 2021-07-21 DIAGNOSIS — I48.0 PAF (PAROXYSMAL ATRIAL FIBRILLATION): ICD-10-CM

## 2021-07-21 DIAGNOSIS — T82.110A FAILURE OF PACEMAKER LEAD, INITIAL ENCOUNTER: ICD-10-CM

## 2021-07-21 DIAGNOSIS — I49.5 SSS (SICK SINUS SYNDROME): ICD-10-CM

## 2021-07-21 PROCEDURE — 99213 OFFICE O/P EST LOW 20 MIN: CPT | Mod: PBBFAC | Performed by: PHYSICIAN ASSISTANT

## 2021-07-21 PROCEDURE — G0439 PR MEDICARE ANNUAL WELLNESS SUBSEQUENT VISIT: ICD-10-PCS | Mod: ,,, | Performed by: PHYSICIAN ASSISTANT

## 2021-07-21 PROCEDURE — 99999 PR PBB SHADOW E&M-EST. PATIENT-LVL III: ICD-10-PCS | Mod: PBBFAC,,, | Performed by: PHYSICIAN ASSISTANT

## 2021-07-21 PROCEDURE — 99999 PR PBB SHADOW E&M-EST. PATIENT-LVL III: CPT | Mod: PBBFAC,,, | Performed by: PHYSICIAN ASSISTANT

## 2021-07-21 PROCEDURE — G0439 PPPS, SUBSEQ VISIT: HCPCS | Mod: ,,, | Performed by: PHYSICIAN ASSISTANT

## 2021-07-23 ENCOUNTER — TELEPHONE (OUTPATIENT)
Dept: ORTHOPEDICS | Facility: CLINIC | Age: 71
End: 2021-07-23

## 2021-07-23 ENCOUNTER — PATIENT MESSAGE (OUTPATIENT)
Dept: ORTHOPEDICS | Facility: CLINIC | Age: 71
End: 2021-07-23

## 2021-07-23 ENCOUNTER — CLINICAL SUPPORT (OUTPATIENT)
Dept: CARDIOLOGY | Facility: HOSPITAL | Age: 71
End: 2021-07-23
Payer: MEDICARE

## 2021-07-23 DIAGNOSIS — Z95.0 PRESENCE OF CARDIAC PACEMAKER: ICD-10-CM

## 2021-07-23 PROCEDURE — 93294 CARDIAC DEVICE CHECK - REMOTE: ICD-10-PCS | Mod: ,,, | Performed by: INTERNAL MEDICINE

## 2021-07-23 PROCEDURE — 93294 REM INTERROG EVL PM/LDLS PM: CPT | Mod: ,,, | Performed by: INTERNAL MEDICINE

## 2021-07-27 ENCOUNTER — PATIENT OUTREACH (OUTPATIENT)
Dept: ADMINISTRATIVE | Facility: OTHER | Age: 71
End: 2021-07-27

## 2021-07-28 ENCOUNTER — OFFICE VISIT (OUTPATIENT)
Dept: SLEEP MEDICINE | Facility: CLINIC | Age: 71
End: 2021-07-28
Payer: MEDICARE

## 2021-07-28 VITALS
SYSTOLIC BLOOD PRESSURE: 126 MMHG | OXYGEN SATURATION: 98 % | BODY MASS INDEX: 29.38 KG/M2 | RESPIRATION RATE: 18 BRPM | HEIGHT: 70 IN | DIASTOLIC BLOOD PRESSURE: 72 MMHG | HEART RATE: 79 BPM | WEIGHT: 205.25 LBS

## 2021-07-28 DIAGNOSIS — G47.33 OSA (OBSTRUCTIVE SLEEP APNEA): Primary | ICD-10-CM

## 2021-07-28 DIAGNOSIS — I48.0 PAF (PAROXYSMAL ATRIAL FIBRILLATION): ICD-10-CM

## 2021-07-28 DIAGNOSIS — E66.3 OVERWEIGHT (BMI 25.0-29.9): ICD-10-CM

## 2021-07-28 PROCEDURE — 99999 PR PBB SHADOW E&M-EST. PATIENT-LVL III: ICD-10-PCS | Mod: PBBFAC,,, | Performed by: NURSE PRACTITIONER

## 2021-07-28 PROCEDURE — 99214 OFFICE O/P EST MOD 30 MIN: CPT | Mod: S$PBB,,, | Performed by: NURSE PRACTITIONER

## 2021-07-28 PROCEDURE — 99999 PR PBB SHADOW E&M-EST. PATIENT-LVL III: CPT | Mod: PBBFAC,,, | Performed by: NURSE PRACTITIONER

## 2021-07-28 PROCEDURE — 99213 OFFICE O/P EST LOW 20 MIN: CPT | Mod: PBBFAC | Performed by: NURSE PRACTITIONER

## 2021-07-28 PROCEDURE — 99214 PR OFFICE/OUTPT VISIT, EST, LEVL IV, 30-39 MIN: ICD-10-PCS | Mod: S$PBB,,, | Performed by: NURSE PRACTITIONER

## 2021-08-04 ENCOUNTER — TELEPHONE (OUTPATIENT)
Dept: UROLOGY | Facility: CLINIC | Age: 71
End: 2021-08-04

## 2021-09-09 DIAGNOSIS — E78.49 OTHER HYPERLIPIDEMIA: ICD-10-CM

## 2021-09-09 RX ORDER — PRAVASTATIN SODIUM 20 MG/1
TABLET ORAL
Qty: 90 TABLET | Refills: 0 | Status: SHIPPED | OUTPATIENT
Start: 2021-09-09 | End: 2021-09-21 | Stop reason: SDUPTHER

## 2021-09-21 ENCOUNTER — LAB VISIT (OUTPATIENT)
Dept: LAB | Facility: HOSPITAL | Age: 71
End: 2021-09-21
Attending: INTERNAL MEDICINE
Payer: MEDICARE

## 2021-09-21 ENCOUNTER — OFFICE VISIT (OUTPATIENT)
Dept: INTERNAL MEDICINE | Facility: CLINIC | Age: 71
End: 2021-09-21
Payer: MEDICARE

## 2021-09-21 ENCOUNTER — TELEPHONE (OUTPATIENT)
Dept: SPORTS MEDICINE | Facility: CLINIC | Age: 71
End: 2021-09-21

## 2021-09-21 VITALS
TEMPERATURE: 98 F | HEIGHT: 70 IN | OXYGEN SATURATION: 95 % | BODY MASS INDEX: 29.2 KG/M2 | WEIGHT: 203.94 LBS | DIASTOLIC BLOOD PRESSURE: 82 MMHG | HEART RATE: 92 BPM | SYSTOLIC BLOOD PRESSURE: 130 MMHG

## 2021-09-21 DIAGNOSIS — I48.0 PAF (PAROXYSMAL ATRIAL FIBRILLATION): ICD-10-CM

## 2021-09-21 DIAGNOSIS — Z95.0 CARDIAC PACEMAKER IN SITU: Chronic | ICD-10-CM

## 2021-09-21 DIAGNOSIS — E78.2 MIXED HYPERLIPIDEMIA: Primary | ICD-10-CM

## 2021-09-21 DIAGNOSIS — G47.33 OBSTRUCTIVE SLEEP APNEA: Chronic | ICD-10-CM

## 2021-09-21 DIAGNOSIS — G40.209 PARTIAL SYMPTOMATIC EPILEPSY WITH COMPLEX PARTIAL SEIZURES, NOT INTRACTABLE, WITHOUT STATUS EPILEPTICUS: ICD-10-CM

## 2021-09-21 DIAGNOSIS — D50.9 MICROCYTIC ANEMIA: ICD-10-CM

## 2021-09-21 DIAGNOSIS — R97.20 ELEVATED PSA: ICD-10-CM

## 2021-09-21 DIAGNOSIS — Z12.11 COLON CANCER SCREENING: ICD-10-CM

## 2021-09-21 DIAGNOSIS — E78.49 OTHER HYPERLIPIDEMIA: ICD-10-CM

## 2021-09-21 LAB
BASOPHILS # BLD AUTO: 0.03 K/UL (ref 0–0.2)
BASOPHILS NFR BLD: 0.5 % (ref 0–1.9)
DIFFERENTIAL METHOD: ABNORMAL
EOSINOPHIL # BLD AUTO: 0.2 K/UL (ref 0–0.5)
EOSINOPHIL NFR BLD: 3.3 % (ref 0–8)
ERYTHROCYTE [DISTWIDTH] IN BLOOD BY AUTOMATED COUNT: 18.2 % (ref 11.5–14.5)
FERRITIN SERPL-MCNC: 8 NG/ML (ref 20–300)
HCT VFR BLD AUTO: 39.1 % (ref 40–54)
HGB BLD-MCNC: 11.6 G/DL (ref 14–18)
IMM GRANULOCYTES # BLD AUTO: 0.02 K/UL (ref 0–0.04)
IMM GRANULOCYTES NFR BLD AUTO: 0.4 % (ref 0–0.5)
IRON SERPL-MCNC: 21 UG/DL (ref 45–160)
LYMPHOCYTES # BLD AUTO: 1.4 K/UL (ref 1–4.8)
LYMPHOCYTES NFR BLD: 25.6 % (ref 18–48)
MCH RBC QN AUTO: 21.8 PG (ref 27–31)
MCHC RBC AUTO-ENTMCNC: 29.7 G/DL (ref 32–36)
MCV RBC AUTO: 74 FL (ref 82–98)
MONOCYTES # BLD AUTO: 0.7 K/UL (ref 0.3–1)
MONOCYTES NFR BLD: 13.2 % (ref 4–15)
NEUTROPHILS # BLD AUTO: 3.1 K/UL (ref 1.8–7.7)
NEUTROPHILS NFR BLD: 57 % (ref 38–73)
NRBC BLD-RTO: 0 /100 WBC
PLATELET # BLD AUTO: 238 K/UL (ref 150–450)
PMV BLD AUTO: 11.5 FL (ref 9.2–12.9)
RBC # BLD AUTO: 5.32 M/UL (ref 4.6–6.2)
SATURATED IRON: 4 % (ref 20–50)
TOTAL IRON BINDING CAPACITY: 512 UG/DL (ref 250–450)
TRANSFERRIN SERPL-MCNC: 346 MG/DL (ref 200–375)
WBC # BLD AUTO: 5.46 K/UL (ref 3.9–12.7)

## 2021-09-21 PROCEDURE — 84466 ASSAY OF TRANSFERRIN: CPT | Performed by: INTERNAL MEDICINE

## 2021-09-21 PROCEDURE — 85025 COMPLETE CBC W/AUTO DIFF WBC: CPT | Performed by: INTERNAL MEDICINE

## 2021-09-21 PROCEDURE — 99213 OFFICE O/P EST LOW 20 MIN: CPT | Mod: PBBFAC | Performed by: INTERNAL MEDICINE

## 2021-09-21 PROCEDURE — 36415 COLL VENOUS BLD VENIPUNCTURE: CPT | Performed by: INTERNAL MEDICINE

## 2021-09-21 PROCEDURE — 82728 ASSAY OF FERRITIN: CPT | Performed by: INTERNAL MEDICINE

## 2021-09-21 PROCEDURE — 99999 PR PBB SHADOW E&M-EST. PATIENT-LVL III: CPT | Mod: PBBFAC,,, | Performed by: INTERNAL MEDICINE

## 2021-09-21 PROCEDURE — 99214 OFFICE O/P EST MOD 30 MIN: CPT | Mod: S$PBB,,, | Performed by: INTERNAL MEDICINE

## 2021-09-21 PROCEDURE — 99999 PR PBB SHADOW E&M-EST. PATIENT-LVL III: ICD-10-PCS | Mod: PBBFAC,,, | Performed by: INTERNAL MEDICINE

## 2021-09-21 PROCEDURE — 99214 PR OFFICE/OUTPT VISIT, EST, LEVL IV, 30-39 MIN: ICD-10-PCS | Mod: S$PBB,,, | Performed by: INTERNAL MEDICINE

## 2021-09-21 RX ORDER — PRAVASTATIN SODIUM 20 MG/1
20 TABLET ORAL DAILY
Qty: 90 TABLET | Refills: 1 | Status: SHIPPED | OUTPATIENT
Start: 2021-09-21 | End: 2021-12-10

## 2021-09-27 ENCOUNTER — TELEPHONE (OUTPATIENT)
Dept: INTERNAL MEDICINE | Facility: CLINIC | Age: 71
End: 2021-09-27

## 2021-09-27 ENCOUNTER — OFFICE VISIT (OUTPATIENT)
Dept: SPORTS MEDICINE | Facility: CLINIC | Age: 71
End: 2021-09-27
Payer: MEDICARE

## 2021-09-27 VITALS — BODY MASS INDEX: 29.2 KG/M2 | HEIGHT: 70 IN | WEIGHT: 203.94 LBS

## 2021-09-27 DIAGNOSIS — M17.12 LOCALIZED OSTEOARTHRITIS OF LEFT KNEE: Primary | ICD-10-CM

## 2021-09-27 DIAGNOSIS — D50.9 IRON DEFICIENCY ANEMIA, UNSPECIFIED IRON DEFICIENCY ANEMIA TYPE: Primary | ICD-10-CM

## 2021-09-27 PROCEDURE — 99499 NO LOS: ICD-10-PCS | Mod: S$PBB,,, | Performed by: FAMILY MEDICINE

## 2021-09-27 PROCEDURE — 20610 DRAIN/INJ JOINT/BURSA W/O US: CPT | Mod: PBBFAC | Performed by: FAMILY MEDICINE

## 2021-09-27 PROCEDURE — 99999 PR PBB SHADOW E&M-EST. PATIENT-LVL III: CPT | Mod: PBBFAC,,, | Performed by: FAMILY MEDICINE

## 2021-09-27 PROCEDURE — 99999 PR PBB SHADOW E&M-EST. PATIENT-LVL III: ICD-10-PCS | Mod: PBBFAC,,, | Performed by: FAMILY MEDICINE

## 2021-09-27 PROCEDURE — 20610 LARGE JOINT ASPIRATION/INJECTION: L KNEE: ICD-10-PCS | Mod: S$PBB,LT,, | Performed by: FAMILY MEDICINE

## 2021-09-27 PROCEDURE — 99499 UNLISTED E&M SERVICE: CPT | Mod: S$PBB,,, | Performed by: FAMILY MEDICINE

## 2021-09-27 PROCEDURE — 99213 OFFICE O/P EST LOW 20 MIN: CPT | Mod: PBBFAC,25 | Performed by: FAMILY MEDICINE

## 2021-09-27 RX ADMIN — Medication 20 MG: at 07:09

## 2021-09-28 ENCOUNTER — IMMUNIZATION (OUTPATIENT)
Dept: PRIMARY CARE CLINIC | Facility: CLINIC | Age: 71
End: 2021-09-28
Payer: MEDICARE

## 2021-09-28 DIAGNOSIS — Z23 NEED FOR VACCINATION: Primary | ICD-10-CM

## 2021-09-28 PROCEDURE — 0003A COVID-19, MRNA, LNP-S, PF, 30 MCG/0.3 ML DOSE VACCINE: CPT | Mod: CV19,PBBFAC | Performed by: FAMILY MEDICINE

## 2021-09-28 PROCEDURE — 91300 COVID-19, MRNA, LNP-S, PF, 30 MCG/0.3 ML DOSE VACCINE: CPT | Mod: PBBFAC | Performed by: FAMILY MEDICINE

## 2021-09-30 ENCOUNTER — LAB VISIT (OUTPATIENT)
Dept: LAB | Facility: HOSPITAL | Age: 71
End: 2021-09-30
Attending: INTERNAL MEDICINE
Payer: MEDICARE

## 2021-09-30 DIAGNOSIS — D50.9 IRON DEFICIENCY ANEMIA, UNSPECIFIED IRON DEFICIENCY ANEMIA TYPE: ICD-10-CM

## 2021-09-30 PROCEDURE — 82272 OCCULT BLD FECES 1-3 TESTS: CPT | Performed by: INTERNAL MEDICINE

## 2021-10-01 LAB — OB PNL STL: NEGATIVE

## 2021-10-05 ENCOUNTER — OFFICE VISIT (OUTPATIENT)
Dept: SPORTS MEDICINE | Facility: CLINIC | Age: 71
End: 2021-10-05
Payer: MEDICARE

## 2021-10-05 VITALS — BODY MASS INDEX: 29.06 KG/M2 | WEIGHT: 203 LBS | HEIGHT: 70 IN

## 2021-10-05 DIAGNOSIS — M17.12 LOCALIZED OSTEOARTHRITIS OF LEFT KNEE: Primary | ICD-10-CM

## 2021-10-05 PROCEDURE — 20610 DRAIN/INJ JOINT/BURSA W/O US: CPT | Mod: PBBFAC | Performed by: PHYSICAL MEDICINE & REHABILITATION

## 2021-10-05 PROCEDURE — 20610 LARGE JOINT ASPIRATION/INJECTION: L KNEE: ICD-10-PCS | Mod: S$PBB,LT,, | Performed by: PHYSICAL MEDICINE & REHABILITATION

## 2021-10-05 PROCEDURE — 99499 UNLISTED E&M SERVICE: CPT | Mod: S$PBB,,, | Performed by: PHYSICAL MEDICINE & REHABILITATION

## 2021-10-05 PROCEDURE — 99999 PR PBB SHADOW E&M-EST. PATIENT-LVL III: ICD-10-PCS | Mod: PBBFAC,,, | Performed by: PHYSICAL MEDICINE & REHABILITATION

## 2021-10-05 PROCEDURE — 99213 OFFICE O/P EST LOW 20 MIN: CPT | Mod: PBBFAC,25 | Performed by: PHYSICAL MEDICINE & REHABILITATION

## 2021-10-05 PROCEDURE — 99999 PR PBB SHADOW E&M-EST. PATIENT-LVL III: CPT | Mod: PBBFAC,,, | Performed by: PHYSICAL MEDICINE & REHABILITATION

## 2021-10-05 PROCEDURE — 99499 NO LOS: ICD-10-PCS | Mod: S$PBB,,, | Performed by: PHYSICAL MEDICINE & REHABILITATION

## 2021-10-05 RX ADMIN — Medication 20 MG: at 11:10

## 2021-10-06 ENCOUNTER — TELEPHONE (OUTPATIENT)
Dept: INTERNAL MEDICINE | Facility: CLINIC | Age: 71
End: 2021-10-06

## 2021-10-06 ENCOUNTER — HOSPITAL ENCOUNTER (EMERGENCY)
Facility: HOSPITAL | Age: 71
Discharge: HOME | End: 2021-10-06
Attending: EMERGENCY MEDICINE
Payer: MEDICARE

## 2021-10-06 VITALS
DIASTOLIC BLOOD PRESSURE: 77 MMHG | SYSTOLIC BLOOD PRESSURE: 132 MMHG | TEMPERATURE: 97 F | HEART RATE: 81 BPM | OXYGEN SATURATION: 97 % | HEIGHT: 70 IN | WEIGHT: 199.31 LBS | BODY MASS INDEX: 28.53 KG/M2 | RESPIRATION RATE: 16 BRPM

## 2021-10-06 DIAGNOSIS — K56.7 ILEUS: ICD-10-CM

## 2021-10-06 DIAGNOSIS — R10.84 GENERALIZED ABDOMINAL PAIN: Primary | ICD-10-CM

## 2021-10-06 DIAGNOSIS — R10.84 ABDOMINAL PAIN, ACUTE, GENERALIZED: ICD-10-CM

## 2021-10-06 LAB
ALBUMIN SERPL BCP-MCNC: 4.1 G/DL (ref 3.5–5.2)
ALP SERPL-CCNC: 68 U/L (ref 55–135)
ALT SERPL W/O P-5'-P-CCNC: 26 U/L (ref 10–44)
ANION GAP SERPL CALC-SCNC: 12 MMOL/L (ref 8–16)
ANISOCYTOSIS BLD QL SMEAR: SLIGHT
AST SERPL-CCNC: 25 U/L (ref 10–40)
BASOPHILS # BLD AUTO: 0.04 K/UL (ref 0–0.2)
BASOPHILS NFR BLD: 0.3 % (ref 0–1.9)
BILIRUB SERPL-MCNC: 0.9 MG/DL (ref 0.1–1)
BILIRUB UR QL STRIP: NEGATIVE
BUN SERPL-MCNC: 15 MG/DL (ref 8–23)
CALCIUM SERPL-MCNC: 10.2 MG/DL (ref 8.7–10.5)
CHLORIDE SERPL-SCNC: 104 MMOL/L (ref 95–110)
CLARITY UR: CLEAR
CO2 SERPL-SCNC: 23 MMOL/L (ref 23–29)
COLOR UR: YELLOW
CREAT SERPL-MCNC: 1 MG/DL (ref 0.5–1.4)
DACRYOCYTES BLD QL SMEAR: ABNORMAL
DIFFERENTIAL METHOD: ABNORMAL
EOSINOPHIL # BLD AUTO: 0 K/UL (ref 0–0.5)
EOSINOPHIL NFR BLD: 0 % (ref 0–8)
ERYTHROCYTE [DISTWIDTH] IN BLOOD BY AUTOMATED COUNT: 23 % (ref 11.5–14.5)
EST. GFR  (AFRICAN AMERICAN): >60 ML/MIN/1.73 M^2
EST. GFR  (NON AFRICAN AMERICAN): >60 ML/MIN/1.73 M^2
GLUCOSE SERPL-MCNC: 122 MG/DL (ref 70–110)
GLUCOSE UR QL STRIP: NEGATIVE
HCT VFR BLD AUTO: 48.2 % (ref 40–54)
HGB BLD-MCNC: 14.5 G/DL (ref 14–18)
HGB UR QL STRIP: NEGATIVE
IMM GRANULOCYTES # BLD AUTO: 0.07 K/UL (ref 0–0.04)
IMM GRANULOCYTES NFR BLD AUTO: 0.5 % (ref 0–0.5)
KETONES UR QL STRIP: ABNORMAL
LEUKOCYTE ESTERASE UR QL STRIP: NEGATIVE
LIPASE SERPL-CCNC: 34 U/L (ref 4–60)
LYMPHOCYTES # BLD AUTO: 0.8 K/UL (ref 1–4.8)
LYMPHOCYTES NFR BLD: 5.2 % (ref 18–48)
MCH RBC QN AUTO: 22.7 PG (ref 27–31)
MCHC RBC AUTO-ENTMCNC: 30.1 G/DL (ref 32–36)
MCV RBC AUTO: 75 FL (ref 82–98)
MONOCYTES # BLD AUTO: 0.6 K/UL (ref 0.3–1)
MONOCYTES NFR BLD: 4 % (ref 4–15)
NEUTROPHILS # BLD AUTO: 13.8 K/UL (ref 1.8–7.7)
NEUTROPHILS NFR BLD: 90 % (ref 38–73)
NITRITE UR QL STRIP: NEGATIVE
NRBC BLD-RTO: 0 /100 WBC
OVALOCYTES BLD QL SMEAR: ABNORMAL
PH UR STRIP: 6 [PH] (ref 5–8)
PLATELET # BLD AUTO: 251 K/UL (ref 150–450)
PLATELET BLD QL SMEAR: ABNORMAL
PMV BLD AUTO: 10 FL (ref 9.2–12.9)
POIKILOCYTOSIS BLD QL SMEAR: SLIGHT
POTASSIUM SERPL-SCNC: 4.5 MMOL/L (ref 3.5–5.1)
PROT SERPL-MCNC: 7.6 G/DL (ref 6–8.4)
PROT UR QL STRIP: NEGATIVE
RBC # BLD AUTO: 6.39 M/UL (ref 4.6–6.2)
SODIUM SERPL-SCNC: 139 MMOL/L (ref 136–145)
SP GR UR STRIP: >=1.03 (ref 1–1.03)
TARGETS BLD QL SMEAR: ABNORMAL
TROPONIN I SERPL DL<=0.01 NG/ML-MCNC: <0.006 NG/ML (ref 0–0.03)
URN SPEC COLLECT METH UR: ABNORMAL
UROBILINOGEN UR STRIP-ACNC: NEGATIVE EU/DL
WBC # BLD AUTO: 15.36 K/UL (ref 3.9–12.7)

## 2021-10-06 PROCEDURE — 93010 ELECTROCARDIOGRAM REPORT: CPT | Mod: ,,, | Performed by: INTERNAL MEDICINE

## 2021-10-06 PROCEDURE — 99285 EMERGENCY DEPT VISIT HI MDM: CPT | Mod: 25

## 2021-10-06 PROCEDURE — 80053 COMPREHEN METABOLIC PANEL: CPT | Performed by: NURSE PRACTITIONER

## 2021-10-06 PROCEDURE — 81003 URINALYSIS AUTO W/O SCOPE: CPT | Performed by: NURSE PRACTITIONER

## 2021-10-06 PROCEDURE — 84484 ASSAY OF TROPONIN QUANT: CPT | Performed by: NURSE PRACTITIONER

## 2021-10-06 PROCEDURE — 93005 ELECTROCARDIOGRAM TRACING: CPT

## 2021-10-06 PROCEDURE — 93010 EKG 12-LEAD: ICD-10-PCS | Mod: ,,, | Performed by: INTERNAL MEDICINE

## 2021-10-06 PROCEDURE — 83690 ASSAY OF LIPASE: CPT | Performed by: NURSE PRACTITIONER

## 2021-10-06 PROCEDURE — 85025 COMPLETE CBC W/AUTO DIFF WBC: CPT | Performed by: NURSE PRACTITIONER

## 2021-10-12 ENCOUNTER — OFFICE VISIT (OUTPATIENT)
Dept: SPORTS MEDICINE | Facility: CLINIC | Age: 71
End: 2021-10-12
Payer: MEDICARE

## 2021-10-12 VITALS — WEIGHT: 199.31 LBS | HEIGHT: 70 IN | BODY MASS INDEX: 28.53 KG/M2

## 2021-10-12 DIAGNOSIS — M17.12 LOCALIZED OSTEOARTHRITIS OF LEFT KNEE: Primary | ICD-10-CM

## 2021-10-12 PROCEDURE — 99999 PR PBB SHADOW E&M-EST. PATIENT-LVL III: ICD-10-PCS | Mod: PBBFAC,,, | Performed by: FAMILY MEDICINE

## 2021-10-12 PROCEDURE — 99213 OFFICE O/P EST LOW 20 MIN: CPT | Mod: PBBFAC,25 | Performed by: FAMILY MEDICINE

## 2021-10-12 PROCEDURE — 99499 UNLISTED E&M SERVICE: CPT | Mod: S$PBB,,, | Performed by: FAMILY MEDICINE

## 2021-10-12 PROCEDURE — 99999 PR PBB SHADOW E&M-EST. PATIENT-LVL III: CPT | Mod: PBBFAC,,, | Performed by: FAMILY MEDICINE

## 2021-10-12 PROCEDURE — 99499 NO LOS: ICD-10-PCS | Mod: S$PBB,,, | Performed by: FAMILY MEDICINE

## 2021-10-12 PROCEDURE — 20610 DRAIN/INJ JOINT/BURSA W/O US: CPT | Mod: PBBFAC,LT | Performed by: FAMILY MEDICINE

## 2021-10-12 PROCEDURE — 20610 LARGE JOINT ASPIRATION/INJECTION: L KNEE: ICD-10-PCS | Mod: S$PBB,LT,, | Performed by: FAMILY MEDICINE

## 2021-10-12 RX ADMIN — Medication 20 MG: at 02:10

## 2021-10-21 ENCOUNTER — CLINICAL SUPPORT (OUTPATIENT)
Dept: CARDIOLOGY | Facility: HOSPITAL | Age: 71
End: 2021-10-21
Payer: MEDICARE

## 2021-10-21 DIAGNOSIS — Z95.0 PRESENCE OF CARDIAC PACEMAKER: ICD-10-CM

## 2021-10-21 PROCEDURE — 93294 CARDIAC DEVICE CHECK - REMOTE: ICD-10-PCS | Mod: ,,, | Performed by: INTERNAL MEDICINE

## 2021-10-21 PROCEDURE — 93294 REM INTERROG EVL PM/LDLS PM: CPT | Mod: ,,, | Performed by: INTERNAL MEDICINE

## 2021-10-28 ENCOUNTER — LAB VISIT (OUTPATIENT)
Dept: LAB | Facility: HOSPITAL | Age: 71
End: 2021-10-28
Attending: INTERNAL MEDICINE
Payer: MEDICARE

## 2021-10-28 DIAGNOSIS — D50.9 IRON DEFICIENCY ANEMIA, UNSPECIFIED IRON DEFICIENCY ANEMIA TYPE: ICD-10-CM

## 2021-10-28 LAB
BASOPHILS # BLD AUTO: 0.03 K/UL (ref 0–0.2)
BASOPHILS NFR BLD: 0.6 % (ref 0–1.9)
DIFFERENTIAL METHOD: ABNORMAL
EOSINOPHIL # BLD AUTO: 0.2 K/UL (ref 0–0.5)
EOSINOPHIL NFR BLD: 2.8 % (ref 0–8)
ERYTHROCYTE [DISTWIDTH] IN BLOOD BY AUTOMATED COUNT: 23.7 % (ref 11.5–14.5)
HCT VFR BLD AUTO: 45.8 % (ref 40–54)
HGB BLD-MCNC: 14.4 G/DL (ref 14–18)
IMM GRANULOCYTES # BLD AUTO: 0.01 K/UL (ref 0–0.04)
IMM GRANULOCYTES NFR BLD AUTO: 0.2 % (ref 0–0.5)
LYMPHOCYTES # BLD AUTO: 1.2 K/UL (ref 1–4.8)
LYMPHOCYTES NFR BLD: 23.2 % (ref 18–48)
MCH RBC QN AUTO: 25 PG (ref 27–31)
MCHC RBC AUTO-ENTMCNC: 31.4 G/DL (ref 32–36)
MCV RBC AUTO: 80 FL (ref 82–98)
MONOCYTES # BLD AUTO: 0.5 K/UL (ref 0.3–1)
MONOCYTES NFR BLD: 8.8 % (ref 4–15)
NEUTROPHILS # BLD AUTO: 3.5 K/UL (ref 1.8–7.7)
NEUTROPHILS NFR BLD: 64.4 % (ref 38–73)
NRBC BLD-RTO: 0 /100 WBC
PLATELET # BLD AUTO: 206 K/UL (ref 150–450)
PMV BLD AUTO: 10.9 FL (ref 9.2–12.9)
RBC # BLD AUTO: 5.75 M/UL (ref 4.6–6.2)
WBC # BLD AUTO: 5.35 K/UL (ref 3.9–12.7)

## 2021-10-28 PROCEDURE — 36415 COLL VENOUS BLD VENIPUNCTURE: CPT | Performed by: INTERNAL MEDICINE

## 2021-10-28 PROCEDURE — 85025 COMPLETE CBC W/AUTO DIFF WBC: CPT | Performed by: INTERNAL MEDICINE

## 2021-11-09 ENCOUNTER — PATIENT OUTREACH (OUTPATIENT)
Dept: ADMINISTRATIVE | Facility: OTHER | Age: 71
End: 2021-11-09
Payer: COMMERCIAL

## 2021-11-10 ENCOUNTER — OFFICE VISIT (OUTPATIENT)
Dept: CARDIOLOGY | Facility: CLINIC | Age: 71
End: 2021-11-10
Payer: MEDICARE

## 2021-11-10 VITALS
OXYGEN SATURATION: 95 % | WEIGHT: 201.94 LBS | DIASTOLIC BLOOD PRESSURE: 68 MMHG | SYSTOLIC BLOOD PRESSURE: 116 MMHG | HEIGHT: 70 IN | RESPIRATION RATE: 16 BRPM | BODY MASS INDEX: 28.91 KG/M2 | HEART RATE: 82 BPM

## 2021-11-10 DIAGNOSIS — Z95.0 CARDIAC PACEMAKER IN SITU: ICD-10-CM

## 2021-11-10 DIAGNOSIS — I49.5 SSS (SICK SINUS SYNDROME): ICD-10-CM

## 2021-11-10 DIAGNOSIS — I48.0 PAF (PAROXYSMAL ATRIAL FIBRILLATION): ICD-10-CM

## 2021-11-10 DIAGNOSIS — Z95.0 PRESENCE OF CARDIAC PACEMAKER: Primary | ICD-10-CM

## 2021-11-10 DIAGNOSIS — G47.33 OBSTRUCTIVE SLEEP APNEA: ICD-10-CM

## 2021-11-10 DIAGNOSIS — G40.209 PARTIAL SYMPTOMATIC EPILEPSY WITH COMPLEX PARTIAL SEIZURES, NOT INTRACTABLE, WITHOUT STATUS EPILEPTICUS: ICD-10-CM

## 2021-11-10 DIAGNOSIS — T82.110A FAILURE OF PACEMAKER LEAD, INITIAL ENCOUNTER: ICD-10-CM

## 2021-11-10 DIAGNOSIS — R00.1 SEVERE SINUS BRADYCARDIA: ICD-10-CM

## 2021-11-10 DIAGNOSIS — E78.2 MIXED HYPERLIPIDEMIA: ICD-10-CM

## 2021-11-10 PROCEDURE — 99214 PR OFFICE/OUTPT VISIT, EST, LEVL IV, 30-39 MIN: ICD-10-PCS | Mod: S$PBB,,, | Performed by: INTERNAL MEDICINE

## 2021-11-10 PROCEDURE — 99214 OFFICE O/P EST MOD 30 MIN: CPT | Mod: S$PBB,,, | Performed by: INTERNAL MEDICINE

## 2021-11-10 PROCEDURE — 99214 OFFICE O/P EST MOD 30 MIN: CPT | Mod: PBBFAC | Performed by: INTERNAL MEDICINE

## 2021-11-10 PROCEDURE — 99999 PR PBB SHADOW E&M-EST. PATIENT-LVL IV: ICD-10-PCS | Mod: PBBFAC,,, | Performed by: INTERNAL MEDICINE

## 2021-11-10 PROCEDURE — 99999 PR PBB SHADOW E&M-EST. PATIENT-LVL IV: CPT | Mod: PBBFAC,,, | Performed by: INTERNAL MEDICINE

## 2021-11-11 DIAGNOSIS — M25.562 LEFT KNEE PAIN, UNSPECIFIED CHRONICITY: Primary | ICD-10-CM

## 2021-11-23 ENCOUNTER — HOSPITAL ENCOUNTER (OUTPATIENT)
Dept: RADIOLOGY | Facility: HOSPITAL | Age: 71
Discharge: HOME | End: 2021-11-23
Attending: FAMILY MEDICINE
Payer: MEDICARE

## 2021-11-23 ENCOUNTER — OFFICE VISIT (OUTPATIENT)
Dept: SPORTS MEDICINE | Facility: CLINIC | Age: 71
End: 2021-11-23
Payer: MEDICARE

## 2021-11-23 VITALS — WEIGHT: 201.94 LBS | BODY MASS INDEX: 28.91 KG/M2 | HEIGHT: 70 IN

## 2021-11-23 DIAGNOSIS — M17.12 LOCALIZED OSTEOARTHRITIS OF LEFT KNEE: Primary | ICD-10-CM

## 2021-11-23 DIAGNOSIS — M25.562 LEFT KNEE PAIN, UNSPECIFIED CHRONICITY: ICD-10-CM

## 2021-11-23 PROCEDURE — 99213 OFFICE O/P EST LOW 20 MIN: CPT | Mod: S$PBB,,, | Performed by: FAMILY MEDICINE

## 2021-11-23 PROCEDURE — 99999 PR PBB SHADOW E&M-EST. PATIENT-LVL III: CPT | Mod: PBBFAC,,, | Performed by: FAMILY MEDICINE

## 2021-11-23 PROCEDURE — 99999 PR PBB SHADOW E&M-EST. PATIENT-LVL III: ICD-10-PCS | Mod: PBBFAC,,, | Performed by: FAMILY MEDICINE

## 2021-11-23 PROCEDURE — 73564 X-RAY EXAM KNEE 4 OR MORE: CPT | Mod: 26,LT,, | Performed by: RADIOLOGY

## 2021-11-23 PROCEDURE — 99213 PR OFFICE/OUTPT VISIT, EST, LEVL III, 20-29 MIN: ICD-10-PCS | Mod: S$PBB,,, | Performed by: FAMILY MEDICINE

## 2021-11-23 PROCEDURE — 99213 OFFICE O/P EST LOW 20 MIN: CPT | Mod: PBBFAC | Performed by: FAMILY MEDICINE

## 2021-11-23 PROCEDURE — 73562 X-RAY EXAM OF KNEE 3: CPT | Mod: 26,RT,, | Performed by: RADIOLOGY

## 2021-11-23 PROCEDURE — 73564 X-RAY EXAM KNEE 4 OR MORE: CPT | Mod: TC,LT

## 2021-11-23 PROCEDURE — 73564 XR KNEE ORTHO LEFT WITH FLEXION: ICD-10-PCS | Mod: 26,LT,, | Performed by: RADIOLOGY

## 2021-11-23 PROCEDURE — 73562 XR KNEE ORTHO LEFT WITH FLEXION: ICD-10-PCS | Mod: 26,RT,, | Performed by: RADIOLOGY

## 2022-01-19 ENCOUNTER — CLINICAL SUPPORT (OUTPATIENT)
Dept: CARDIOLOGY | Facility: HOSPITAL | Age: 72
End: 2022-01-19
Payer: MEDICARE

## 2022-01-19 DIAGNOSIS — Z95.0 PRESENCE OF CARDIAC PACEMAKER: ICD-10-CM

## 2022-01-19 PROCEDURE — 93294 REM INTERROG EVL PM/LDLS PM: CPT | Mod: ,,, | Performed by: INTERNAL MEDICINE

## 2022-01-19 PROCEDURE — 93296 REM INTERROG EVL PM/IDS: CPT | Performed by: INTERNAL MEDICINE

## 2022-01-19 PROCEDURE — 93294 CARDIAC DEVICE CHECK - REMOTE: ICD-10-PCS | Mod: ,,, | Performed by: INTERNAL MEDICINE

## 2022-01-20 ENCOUNTER — PATIENT MESSAGE (OUTPATIENT)
Dept: ENDOSCOPY | Facility: HOSPITAL | Age: 72
End: 2022-01-20
Payer: COMMERCIAL

## 2022-02-17 ENCOUNTER — HOSPITAL ENCOUNTER (OUTPATIENT)
Dept: RADIOLOGY | Facility: HOSPITAL | Age: 72
Discharge: HOME | End: 2022-02-17
Attending: UROLOGY
Payer: MEDICARE

## 2022-02-17 DIAGNOSIS — R97.20 ELEVATED PSA: ICD-10-CM

## 2022-02-17 PROCEDURE — 76770 US EXAM ABDO BACK WALL COMP: CPT | Mod: 26,,, | Performed by: RADIOLOGY

## 2022-02-17 PROCEDURE — 76770 US RETROPERITONEAL COMPLETE: ICD-10-PCS | Mod: 26,,, | Performed by: RADIOLOGY

## 2022-02-17 PROCEDURE — 76770 US EXAM ABDO BACK WALL COMP: CPT | Mod: TC

## 2022-02-23 ENCOUNTER — OFFICE VISIT (OUTPATIENT)
Dept: SPORTS MEDICINE | Facility: CLINIC | Age: 72
End: 2022-02-23
Payer: MEDICARE

## 2022-02-23 ENCOUNTER — OFFICE VISIT (OUTPATIENT)
Dept: UROLOGY | Facility: CLINIC | Age: 72
End: 2022-02-23
Payer: MEDICARE

## 2022-02-23 VITALS
SYSTOLIC BLOOD PRESSURE: 135 MMHG | HEART RATE: 67 BPM | TEMPERATURE: 98 F | WEIGHT: 200.31 LBS | DIASTOLIC BLOOD PRESSURE: 85 MMHG | BODY MASS INDEX: 28.74 KG/M2

## 2022-02-23 VITALS — BODY MASS INDEX: 28.69 KG/M2 | HEIGHT: 70 IN | WEIGHT: 200.38 LBS

## 2022-02-23 DIAGNOSIS — R97.20 ELEVATED PSA: Primary | ICD-10-CM

## 2022-02-23 DIAGNOSIS — M17.12 LOCALIZED OSTEOARTHRITIS OF LEFT KNEE: Primary | ICD-10-CM

## 2022-02-23 DIAGNOSIS — N28.1 RENAL CYST: ICD-10-CM

## 2022-02-23 PROCEDURE — 99999 PR PBB SHADOW E&M-EST. PATIENT-LVL III: CPT | Mod: PBBFAC,,, | Performed by: FAMILY MEDICINE

## 2022-02-23 PROCEDURE — 99214 PR OFFICE/OUTPT VISIT, EST, LEVL IV, 30-39 MIN: ICD-10-PCS | Mod: S$PBB,,, | Performed by: FAMILY MEDICINE

## 2022-02-23 PROCEDURE — 99999 PR PBB SHADOW E&M-EST. PATIENT-LVL III: ICD-10-PCS | Mod: PBBFAC,,, | Performed by: FAMILY MEDICINE

## 2022-02-23 PROCEDURE — 99213 OFFICE O/P EST LOW 20 MIN: CPT | Mod: PBBFAC | Performed by: UROLOGY

## 2022-02-23 PROCEDURE — 99213 OFFICE O/P EST LOW 20 MIN: CPT | Mod: S$PBB,,, | Performed by: UROLOGY

## 2022-02-23 PROCEDURE — 99214 OFFICE O/P EST MOD 30 MIN: CPT | Mod: S$PBB,,, | Performed by: FAMILY MEDICINE

## 2022-02-23 PROCEDURE — 99213 OFFICE O/P EST LOW 20 MIN: CPT | Mod: PBBFAC,27 | Performed by: FAMILY MEDICINE

## 2022-02-23 PROCEDURE — 99213 PR OFFICE/OUTPT VISIT, EST, LEVL III, 20-29 MIN: ICD-10-PCS | Mod: S$PBB,,, | Performed by: UROLOGY

## 2022-02-23 PROCEDURE — 99999 PR PBB SHADOW E&M-EST. PATIENT-LVL III: CPT | Mod: PBBFAC,,, | Performed by: UROLOGY

## 2022-02-23 PROCEDURE — 99999 PR PBB SHADOW E&M-EST. PATIENT-LVL III: ICD-10-PCS | Mod: PBBFAC,,, | Performed by: UROLOGY

## 2022-02-23 NOTE — PROGRESS NOTES
Chief Complaint:   Encounter Diagnoses   Name Primary?    Elevated PSA Yes    Renal cyst          HPI:   2/23/22- patient is here today to establish care my clinic, of note no voiding complaints.  PSA is stable, renal ultrasound is also stable over the years.    Allergies:  Nsaids (non-steroidal anti-inflammatory drug), Aspirin, and Celecoxib    Medications:  has a current medication list which includes the following prescription(s): gabapentin, lactobacillus acidophilus, levetiracetam, multivitamin, multivitamin,tx-iron-minerals, fluzone highdose quad 20-21 pf, and pravastatin.    Review of Systems:  General: No fever, chills, fatigability, or weight loss.  Skin: No rashes, itching, or changes in color or texture of skin.  Chest: Denies COOPER, cyanosis, wheezing, cough, and sputum production.  Abdomen: Appetite fine. No weight loss. Denies diarrhea, abdominal pain, hematemesis, or blood in stool.  Musculoskeletal: No joint stiffness or swelling. Denies back pain.  : As above.  All other review of systems negative.    PMH:   has a past medical history of Anemia due to GI blood loss (2012), Arrhythmia requiring replacement of cardiac pacemaker, Brain tumor (benign) (1999), Encounter for blood transfusion, GI bleed due to NSAIDs (2012), Hyperlipidemia, Localized osteoarthritis of left knee (12/10/2020), PAF (paroxysmal atrial fibrillation) (6/16/2019), Renal cyst (2/19/2019), Schwannoma, Seizures, Sleep apnea, and Spinal cord disease.    PSH:   has a past surgical history that includes Cardiac pacemaker placement; Gamma knife; Brain surgery; Colonoscopy (N/A, 8/25/2016); Replacement of pacemaker generator (Left, 2/13/2019); and Fracture surgery (Right).    FamHx: family history includes Colon cancer in his father; Hypertension in his unknown relative.    SocHx:  reports that he has never smoked. He has never used smokeless tobacco. He reports that he does not drink alcohol and does not use drugs.      Physical  Exam:  Vitals:    02/23/22 0954   BP: 135/85   Pulse: 67   Temp: 98.2 °F (36.8 °C)     General: A&Ox3, no apparent distress, no deformities  Neck: No masses, normal thyroid  Lungs: normal inspiration, no use of accessory muscles  Heart: normal pulse, no arrhythmias  Abdomen: Soft, NT, ND, no midline hernia evident  Skin: The skin is warm and dry. No jaundice.  Ext: No c/c/e.    Labs/Studies:   pnbx negative 59g 2/3/21  PSA 2.8 2/22  PSA 4.1 1/21  KELL multiple small complex renal cysts 2/22    Impression/Plan:      1. Elevated PSA- PSA is stable and reduced, no voiding complaints.  See me in a year with a PSA.    2. Renal cyst- complex small stable cysts over the years, nothing further.

## 2022-02-23 NOTE — PROGRESS NOTES
Subjective:     Patient ID: Adal Loaiza Jr. is a 72 y.o. male.    Chief Complaint: Pain of the Left Knee      HPI:  Longstanding history of left knee pain and left knee osteoarthritis has several grandchildren that he is very active with the various activities.    Patient has been doing well with injections with last Visco being about 6 weeks ago time and no swelling no falls but does occasionally feel that it locks up on him for few seconds.    Says he definitely wants to continue receiving the Visco about every 6 months because it starts to wear off and it makes a huge difference in his ability to be active on his feet and keep in good overall health condition.    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    Seizures     because of brain tumor    Sleep apnea     Spinal cord disease     meningioma     Past Surgical History:   Procedure Laterality Date    BRAIN SURGERY      CARDIAC PACEMAKER PLACEMENT      COLONOSCOPY N/A 8/25/2016    Procedure: COLONOSCOPY;  Surgeon: Morris Olguin MD;  Location: [facility] ENDO;  Service: Endoscopy;  Laterality: N/A;    FRACTURE SURGERY Right     right jaw    Gamma knife      for schwannoma    REPLACEMENT OF PACEMAKER GENERATOR Left 2/13/2019    Procedure: REPLACEMENT, PULSE GENERATOR, CARDIAC PACEMAKER;  Surgeon: Gen Meza MD;  Location: [facility] CATH LAB;  Service: Cardiology;  Laterality: Left;  current device MDT/waiting for MD input     Family History   Problem Relation Age of Onset    Colon cancer Father     Hypertension Unknown      Social History     Socioeconomic History     Marital status:    Tobacco Use    Smoking status: Never Smoker    Smokeless tobacco: Never Used   Substance and Sexual Activity    Alcohol use: No     Comment: socially    Drug use: No       Current Outpatient Medications:     gabapentin (NEURONTIN) 300 MG capsule, Take 300 mg by mouth every evening. , Disp: , Rfl:     LACTOBACILLUS ACIDOPHILUS (PROBIOTIC ORAL), Take by mouth once daily., Disp: , Rfl:     levetiracetam (KEPPRA) 500 MG Tab, Take 1 tablet (500 mg total) by mouth 2 (two) times daily., Disp: 60 tablet, Rfl: 11    multivitamin (THERAGRAN) per tablet, Take 1 tablet by mouth once daily., Disp: , Rfl:     multivitamin,tx-iron-minerals Tab, Take by mouth., Disp: , Rfl:     pravastatin (PRAVACHOL) 20 MG tablet, Take 1 tablet by mouth once daily, Disp: 90 tablet, Rfl: 0    FLUZONE HIGHDOSE QUAD 20-21  mcg/0.7 mL Syrg, ADM 0.7ML IM UTD, Disp: , Rfl:   Review of patient's allergies indicates:   Allergen Reactions    Nsaids (non-steroidal anti-inflammatory drug)      Caused GI bleeding.    Aspirin     Celecoxib      Review of Systems   Constitutional: Negative for chills, fever and weight loss.   Respiratory: Negative for shortness of breath.    Cardiovascular: Negative for chest pain and palpitations.       Objective:   Body mass index is 28.75 kg/m².  There were no vitals filed for this visit.        Ortho/SPM Exam-alert and oriented well-nourished well-developed ambulatory no acute distress    Respiratory effort normal    Left knee-no acute deformity or redness    Full range of motion with good stability and strength    Nontender palpation    Neurovascular intact    Psychiatric good affect cognition    Plan-continue home exercise program and recheck in about 3 months to document possible need for next series of injections for the left knee.    Patient Instructions   Continue home exercise program and ice as needed for knees    Recheck in about 3 months to document knee status and  probably order Visco again for 6 month intervals      IMAGING: US Retroperitoneal Complete (Kidney and  Narrative: EXAMINATION:  US RETROPERITONEAL COMPLETE    CLINICAL HISTORY:  Elevated prostate specific antigen (PSA)    TECHNIQUE:  Ultrasound of the kidneys and urinary bladder was performed including color flow and Doppler evaluation of the kidneys.    COMPARISON:  12/15/2020, CT dated 01/07/2021    FINDINGS:  Right kidney: The right kidney measures 11.6 cm. No cortical thinning. No loss of corticomedullary distinction. There are several small mildly complex cyst seen throughout the kidney several of which were not demonstrated on prior ultrasound but appear grossly stable as compared to prior CT.  Largest cyst measures up to 19 mm at the lower pole and appears unchanged.  No solid renal masses visualized.  No stone visualized.  No hydronephrosis.    Left kidney: The left kidney measures 11.7 cm. No cortical thinning. No loss of corticomedullary distinction. No mass. No stone visualized.  No hydronephrosis.    The bladder is partially distended at the time of scanning and has an unremarkable appearance.    Prostate appears large measuring roughly 6.9 x 5.8 x 4.9 cm.  Impression: 1. Several small mildly complicated right renal cyst as above.  2. No hydronephrosis  3. Large prostate    Electronically signed by: Nacho Danielle MD  Date:    02/17/2022  Time:    09:06       Radiographs / Imaging : Relevant imaging results reviewed by me and interpreted by me, discussed with the patient and / or family -x-rays reviewed by me and agree with report      Assessment:     Encounter Diagnosis   Name Primary?    Localized osteoarthritis of left knee Yes        Plan:   Localized osteoarthritis of left knee        The patient verbalized good understanding of the medical issues discussed today and expressed appreciation for the care provided.  Patient was given the opportunity to ask questions and be an active participant in  their medical care. Patient had no further questions or concerns at this time.     The patient was encouraged to participate in appropriate physical activity.      Attila Mclain M.D.  [facility]        This note was dictated using voice recognition software and may have sound a like errors.

## 2022-02-23 NOTE — PATIENT INSTRUCTIONS
Continue home exercise program and ice as needed for knees    Recheck in about 3 months to document knee status and probably order Visco again for 6 month intervals

## 2022-02-24 ENCOUNTER — PATIENT MESSAGE (OUTPATIENT)
Dept: ENDOSCOPY | Facility: HOSPITAL | Age: 72
End: 2022-02-24
Payer: COMMERCIAL

## 2022-02-24 DIAGNOSIS — Z01.818 PRE-OP TESTING: Primary | ICD-10-CM

## 2022-02-24 RX ORDER — SOD SULF/POT CHLORIDE/MAG SULF 1.479 G
12 TABLET ORAL DAILY
Qty: 24 TABLET | Refills: 0 | Status: ON HOLD | OUTPATIENT
Start: 2022-02-24 | End: 2022-04-04 | Stop reason: ALTCHOICE

## 2022-03-14 ENCOUNTER — LAB VISIT (OUTPATIENT)
Dept: LAB | Facility: HOSPITAL | Age: 72
End: 2022-03-14
Attending: INTERNAL MEDICINE
Payer: MEDICARE

## 2022-03-14 DIAGNOSIS — E78.49 OTHER HYPERLIPIDEMIA: ICD-10-CM

## 2022-03-14 DIAGNOSIS — E78.2 MIXED HYPERLIPIDEMIA: ICD-10-CM

## 2022-03-14 LAB
ALBUMIN SERPL BCP-MCNC: 3.8 G/DL (ref 3.5–5.2)
ALP SERPL-CCNC: 59 U/L (ref 55–135)
ALT SERPL W/O P-5'-P-CCNC: 27 U/L (ref 10–44)
ANION GAP SERPL CALC-SCNC: 9 MMOL/L (ref 8–16)
AST SERPL-CCNC: 29 U/L (ref 10–40)
BASOPHILS # BLD AUTO: 0.04 K/UL (ref 0–0.2)
BASOPHILS NFR BLD: 0.6 % (ref 0–1.9)
BILIRUB SERPL-MCNC: 1 MG/DL (ref 0.1–1)
BUN SERPL-MCNC: 17 MG/DL (ref 8–23)
CALCIUM SERPL-MCNC: 9.7 MG/DL (ref 8.7–10.5)
CHLORIDE SERPL-SCNC: 105 MMOL/L (ref 95–110)
CHOLEST SERPL-MCNC: 173 MG/DL (ref 120–199)
CHOLEST/HDLC SERPL: 3.7 {RATIO} (ref 2–5)
CO2 SERPL-SCNC: 29 MMOL/L (ref 23–29)
CREAT SERPL-MCNC: 1.1 MG/DL (ref 0.5–1.4)
DIFFERENTIAL METHOD: ABNORMAL
EOSINOPHIL # BLD AUTO: 0.2 K/UL (ref 0–0.5)
EOSINOPHIL NFR BLD: 2.7 % (ref 0–8)
ERYTHROCYTE [DISTWIDTH] IN BLOOD BY AUTOMATED COUNT: 14 % (ref 11.5–14.5)
EST. GFR  (AFRICAN AMERICAN): >60 ML/MIN/1.73 M^2
EST. GFR  (NON AFRICAN AMERICAN): >60 ML/MIN/1.73 M^2
GLUCOSE SERPL-MCNC: 99 MG/DL (ref 70–110)
HCT VFR BLD AUTO: 52.6 % (ref 40–54)
HDLC SERPL-MCNC: 47 MG/DL (ref 40–75)
HDLC SERPL: 27.2 % (ref 20–50)
HGB BLD-MCNC: 17 G/DL (ref 14–18)
IMM GRANULOCYTES # BLD AUTO: 0.04 K/UL (ref 0–0.04)
IMM GRANULOCYTES NFR BLD AUTO: 0.6 % (ref 0–0.5)
LDLC SERPL CALC-MCNC: 110 MG/DL (ref 63–159)
LYMPHOCYTES # BLD AUTO: 1.8 K/UL (ref 1–4.8)
LYMPHOCYTES NFR BLD: 27.7 % (ref 18–48)
MCH RBC QN AUTO: 29.6 PG (ref 27–31)
MCHC RBC AUTO-ENTMCNC: 32.3 G/DL (ref 32–36)
MCV RBC AUTO: 92 FL (ref 82–98)
MONOCYTES # BLD AUTO: 0.7 K/UL (ref 0.3–1)
MONOCYTES NFR BLD: 10.9 % (ref 4–15)
NEUTROPHILS # BLD AUTO: 3.6 K/UL (ref 1.8–7.7)
NEUTROPHILS NFR BLD: 57.5 % (ref 38–73)
NONHDLC SERPL-MCNC: 126 MG/DL
NRBC BLD-RTO: 0 /100 WBC
PLATELET # BLD AUTO: 223 K/UL (ref 150–450)
PMV BLD AUTO: 11.2 FL (ref 9.2–12.9)
POTASSIUM SERPL-SCNC: 4.8 MMOL/L (ref 3.5–5.1)
PROT SERPL-MCNC: 6.9 G/DL (ref 6–8.4)
RBC # BLD AUTO: 5.75 M/UL (ref 4.6–6.2)
SODIUM SERPL-SCNC: 143 MMOL/L (ref 136–145)
TRIGL SERPL-MCNC: 80 MG/DL (ref 30–150)
TSH SERPL DL<=0.005 MIU/L-ACNC: 3.36 UIU/ML (ref 0.4–4)
WBC # BLD AUTO: 6.31 K/UL (ref 3.9–12.7)

## 2022-03-14 PROCEDURE — 80061 LIPID PANEL: CPT | Performed by: INTERNAL MEDICINE

## 2022-03-14 PROCEDURE — 84443 ASSAY THYROID STIM HORMONE: CPT | Performed by: INTERNAL MEDICINE

## 2022-03-14 PROCEDURE — 36415 COLL VENOUS BLD VENIPUNCTURE: CPT | Performed by: INTERNAL MEDICINE

## 2022-03-14 PROCEDURE — 85025 COMPLETE CBC W/AUTO DIFF WBC: CPT | Performed by: INTERNAL MEDICINE

## 2022-03-14 PROCEDURE — 80053 COMPREHEN METABOLIC PANEL: CPT | Performed by: INTERNAL MEDICINE

## 2022-03-21 ENCOUNTER — OFFICE VISIT (OUTPATIENT)
Dept: INTERNAL MEDICINE | Facility: CLINIC | Age: 72
End: 2022-03-21
Payer: MEDICARE

## 2022-03-21 VITALS
OXYGEN SATURATION: 95 % | HEIGHT: 70 IN | HEART RATE: 93 BPM | BODY MASS INDEX: 28.97 KG/M2 | WEIGHT: 202.38 LBS | RESPIRATION RATE: 18 BRPM | SYSTOLIC BLOOD PRESSURE: 132 MMHG | DIASTOLIC BLOOD PRESSURE: 84 MMHG | TEMPERATURE: 98 F

## 2022-03-21 DIAGNOSIS — I49.5 SSS (SICK SINUS SYNDROME): ICD-10-CM

## 2022-03-21 DIAGNOSIS — D50.9 MICROCYTIC ANEMIA: ICD-10-CM

## 2022-03-21 DIAGNOSIS — I48.0 PAF (PAROXYSMAL ATRIAL FIBRILLATION): ICD-10-CM

## 2022-03-21 DIAGNOSIS — E78.49 OTHER HYPERLIPIDEMIA: ICD-10-CM

## 2022-03-21 DIAGNOSIS — Z12.11 COLON CANCER SCREENING: ICD-10-CM

## 2022-03-21 DIAGNOSIS — Z12.5 ENCOUNTER FOR SCREENING FOR MALIGNANT NEOPLASM OF PROSTATE: ICD-10-CM

## 2022-03-21 DIAGNOSIS — Z95.0 CARDIAC PACEMAKER IN SITU: Chronic | ICD-10-CM

## 2022-03-21 DIAGNOSIS — E78.2 MIXED HYPERLIPIDEMIA: Primary | ICD-10-CM

## 2022-03-21 DIAGNOSIS — D32.9 MENINGIOMA: ICD-10-CM

## 2022-03-21 DIAGNOSIS — G40.209 PARTIAL SYMPTOMATIC EPILEPSY WITH COMPLEX PARTIAL SEIZURES, NOT INTRACTABLE, WITHOUT STATUS EPILEPTICUS: ICD-10-CM

## 2022-03-21 PROCEDURE — 99999 PR PBB SHADOW E&M-EST. PATIENT-LVL III: ICD-10-PCS | Mod: PBBFAC,,, | Performed by: INTERNAL MEDICINE

## 2022-03-21 PROCEDURE — 99214 PR OFFICE/OUTPT VISIT, EST, LEVL IV, 30-39 MIN: ICD-10-PCS | Mod: S$PBB,,, | Performed by: INTERNAL MEDICINE

## 2022-03-21 PROCEDURE — 99213 OFFICE O/P EST LOW 20 MIN: CPT | Mod: PBBFAC | Performed by: INTERNAL MEDICINE

## 2022-03-21 PROCEDURE — 99999 PR PBB SHADOW E&M-EST. PATIENT-LVL III: CPT | Mod: PBBFAC,,, | Performed by: INTERNAL MEDICINE

## 2022-03-21 PROCEDURE — 99214 OFFICE O/P EST MOD 30 MIN: CPT | Mod: S$PBB,,, | Performed by: INTERNAL MEDICINE

## 2022-03-21 RX ORDER — PRAVASTATIN SODIUM 40 MG/1
40 TABLET ORAL DAILY
Qty: 90 TABLET | Refills: 3 | Status: SHIPPED | OUTPATIENT
Start: 2022-03-21 | End: 2022-06-24 | Stop reason: SDUPTHER

## 2022-03-21 RX ORDER — PRAVASTATIN SODIUM 40 MG/1
20 TABLET ORAL DAILY
Qty: 90 TABLET | Refills: 1 | Status: CANCELLED | OUTPATIENT
Start: 2022-03-21

## 2022-03-21 NOTE — PROGRESS NOTES
Subjective:      Patient ID: Adal Loaiza Jr. is a 72 y.o. male.    Chief Complaint: No chief complaint on file.    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)    Cardiac pacemaker in situ    Fatty liver    Obstructive sleep apnea    Myofascial pain    Hyperlipidemia    Pacemaker lead fracture    Abnormal digital rectal exam    Family history of colon cancer    Colon polyps    Internal hemorrhoids    Sensorineural hearing loss (SNHL) of both ears    Essential tremor    Meningioma    Renal cyst    Inadequate sleep hygiene    PAF (paroxysmal atrial fibrillation)    Left knee pain    Partial symptomatic epilepsy with complex partial seizures, not intractable, without status epilepticus    SSS (sick sinus syndrome)    Localized osteoarthritis of left knee    Trigeminal neuralgia    Anemia    Hiatal hernia    Elevated PSA     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  Compliant with meds without significant side effects. Energy and appetite good.       Review of Systems   Constitutional: Negative for chills and fever.   HENT: Negative for ear pain and sore throat.    Respiratory: Negative for cough.    Cardiovascular: Negative for chest pain.   Gastrointestinal: Negative for abdominal pain and blood in stool.   Genitourinary: Negative for dysuria and hematuria.  "  Neurological: Negative for seizures and syncope.     Objective:   /84 (BP Location: Left arm, Patient Position: Sitting, BP Method: Medium (Manual))   Pulse 93   Temp 98.3 °F (36.8 °C) (Tympanic)   Resp 18   Ht 5' 10" (1.778 m)   Wt 91.8 kg (202 lb 6.1 oz)   SpO2 95%   BMI 29.04 kg/m²     Physical Exam  Constitutional:       General: He is not in acute distress.     Appearance: He is well-developed.   HENT:      Head: Normocephalic and atraumatic.   Eyes:      Pupils: Pupils are equal, round, and reactive to light.   Neck:      Thyroid: No thyromegaly.   Cardiovascular:      Rate and Rhythm: Normal rate and regular rhythm.   Pulmonary:      Breath sounds: Normal breath sounds. No wheezing or rales.   Abdominal:      General: Bowel sounds are normal.      Palpations: Abdomen is soft.      Tenderness: There is no abdominal tenderness.   Musculoskeletal:      Cervical back: Neck supple.   Lymphadenopathy:      Cervical: No cervical adenopathy.   Skin:     General: Skin is warm and dry.   Neurological:      Mental Status: He is alert and oriented to person, place, and time.   Psychiatric:         Behavior: Behavior normal.         Assessment:     1. Mixed hyperlipidemia    2. Colon cancer screening    3. Meningioma    4. PAF (paroxysmal atrial fibrillation)    5. SSS (sick sinus syndrome)    6. Partial symptomatic epilepsy with complex partial seizures, not intractable, without status epilepticus    7. Other hyperlipidemia    8. Cardiac pacemaker in situ    9. Encounter for screening for malignant neoplasm of prostate    10. Microcytic anemia      Plan:   Mixed hyperlipidemia  Not at goal.  Increase pravastatin to 40 mg  -     Lipid Panel; Future; Expected date: 06/19/2022  -     ALT (SGPT); Future; Expected date: 06/21/2022  -     Lipid Panel; Future; Expected date: 03/21/2023    Colon cancer screening  -     Cancel: Case Request Endoscopy: COLONOSCOPY    Meningioma  As per Neurology  PAF (paroxysmal " atrial fibrillation)  Up-to-date with [facility]  SSS (sick sinus syndrome)  Pacemaker in place  Partial symptomatic epilepsy with complex partial seizures, not intractable, without status epilepticus  As per neurology  Other hyperlipidemia  -     Comprehensive Metabolic Panel; Future; Expected date: 03/21/2023  -     CBC Auto Differential; Future; Expected date: 03/21/2023  -     TSH; Future; Expected date: 03/21/2023    Cardiac pacemaker in situ    Encounter for screening for malignant neoplasm of prostate  -     PSA, Screening; Future; Expected date: 03/21/2023    Microcytic anemia  Currently on iron.  Has EGD and colonoscopy scheduled for near future  -     CBC Auto Differential; Future; Expected date: 06/21/2022    Other orders  -     pravastatin (PRAVACHOL) 40 MG tablet; Take 1 tablet (40 mg total) by mouth once daily.  Dispense: 90 tablet; Refill: 3        Lab Frequency Next Occurrence   Prostate Specific Antigen, Diagnostic Once 08/23/2022   COVID-19 Routine Screening Once 02/24/2022   [facility]     Cardiac device check - [facility]         Problem List Items Addressed This Visit     Cardiac pacemaker in situ (Chronic)    Hyperlipidemia - Primary    Relevant Orders    Lipid Panel    ALT (SGPT)    Lipid Panel    Comprehensive Metabolic Panel    CBC Auto Differential    TSH    Meningioma    Overview     Sees neuro med           PAF (paroxysmal atrial fibrillation)    Overview     Sees [facility]           Partial symptomatic epilepsy with complex partial seizures, not intractable, without status epilepticus    SSS (sick sinus syndrome)      Other Visit Diagnoses     Colon cancer screening        Encounter for screening for malignant neoplasm of prostate        Relevant Orders    PSA, Screening    Microcytic anemia        Relevant Orders    CBC Auto Differential          Follow up in about 3 months (around 6/21/2022), or if symptoms worsen or fail to improve.

## 2022-04-04 ENCOUNTER — ANESTHESIA EVENT (OUTPATIENT)
Dept: ENDOSCOPY | Facility: HOSPITAL | Age: 72
End: 2022-04-04
Payer: MEDICARE

## 2022-04-04 ENCOUNTER — ANESTHESIA (OUTPATIENT)
Dept: ENDOSCOPY | Facility: HOSPITAL | Age: 72
End: 2022-04-04
Payer: MEDICARE

## 2022-04-04 ENCOUNTER — HOSPITAL ENCOUNTER (OUTPATIENT)
Facility: HOSPITAL | Age: 72
Discharge: HOME | End: 2022-04-04
Attending: INTERNAL MEDICINE | Admitting: INTERNAL MEDICINE
Payer: MEDICARE

## 2022-04-04 DIAGNOSIS — K63.5 POLYP OF COLON, UNSPECIFIED PART OF COLON, UNSPECIFIED TYPE: ICD-10-CM

## 2022-04-04 DIAGNOSIS — D12.2 ADENOMATOUS POLYP OF ASCENDING COLON: ICD-10-CM

## 2022-04-04 DIAGNOSIS — D50.0 IRON DEFICIENCY ANEMIA DUE TO CHRONIC BLOOD LOSS: Primary | ICD-10-CM

## 2022-04-04 DIAGNOSIS — K44.9 PARAESOPHAGEAL HERNIA: ICD-10-CM

## 2022-04-04 DIAGNOSIS — Z80.0 FAMILY HISTORY OF COLON CANCER: ICD-10-CM

## 2022-04-04 PROBLEM — D50.9 IRON DEFICIENCY ANEMIA: Status: ACTIVE | Noted: 2022-04-04

## 2022-04-04 PROCEDURE — 88341 IMHCHEM/IMCYTCHM EA ADD ANTB: CPT | Mod: 59 | Performed by: STUDENT IN AN ORGANIZED HEALTH CARE EDUCATION/TRAINING PROGRAM

## 2022-04-04 PROCEDURE — 88341 IMHCHEM/IMCYTCHM EA ADD ANTB: CPT | Mod: 26,,, | Performed by: STUDENT IN AN ORGANIZED HEALTH CARE EDUCATION/TRAINING PROGRAM

## 2022-04-04 PROCEDURE — 43239 PR EGD, FLEX, W/BIOPSY, SGL/MULTI: ICD-10-PCS | Mod: 51,,, | Performed by: INTERNAL MEDICINE

## 2022-04-04 PROCEDURE — 88342 IMHCHEM/IMCYTCHM 1ST ANTB: CPT | Mod: 59 | Performed by: STUDENT IN AN ORGANIZED HEALTH CARE EDUCATION/TRAINING PROGRAM

## 2022-04-04 PROCEDURE — 27201089 HC SNARE, DISP (ANY): Performed by: INTERNAL MEDICINE

## 2022-04-04 PROCEDURE — 45385 PR COLONOSCOPY,REMV LESN,SNARE: ICD-10-PCS | Mod: PT,,, | Performed by: INTERNAL MEDICINE

## 2022-04-04 PROCEDURE — 88312 PR  SPECIAL STAINS,GROUP I: ICD-10-PCS | Mod: 26,,, | Performed by: STUDENT IN AN ORGANIZED HEALTH CARE EDUCATION/TRAINING PROGRAM

## 2022-04-04 PROCEDURE — 63600175 PHARM REV CODE 636 W HCPCS: Performed by: STUDENT IN AN ORGANIZED HEALTH CARE EDUCATION/TRAINING PROGRAM

## 2022-04-04 PROCEDURE — 88342 IMHCHEM/IMCYTCHM 1ST ANTB: CPT | Mod: 26,,, | Performed by: STUDENT IN AN ORGANIZED HEALTH CARE EDUCATION/TRAINING PROGRAM

## 2022-04-04 PROCEDURE — 45385 COLONOSCOPY W/LESION REMOVAL: CPT | Performed by: INTERNAL MEDICINE

## 2022-04-04 PROCEDURE — 88341 PR IHC OR ICC EACH ADD'L SINGLE ANTIBODY  STAINPR: ICD-10-PCS | Mod: 26,,, | Performed by: STUDENT IN AN ORGANIZED HEALTH CARE EDUCATION/TRAINING PROGRAM

## 2022-04-04 PROCEDURE — 43239 EGD BIOPSY SINGLE/MULTIPLE: CPT | Performed by: INTERNAL MEDICINE

## 2022-04-04 PROCEDURE — 25000003 PHARM REV CODE 250: Performed by: STUDENT IN AN ORGANIZED HEALTH CARE EDUCATION/TRAINING PROGRAM

## 2022-04-04 PROCEDURE — 88305 TISSUE EXAM BY PATHOLOGIST: CPT | Mod: 26,,, | Performed by: STUDENT IN AN ORGANIZED HEALTH CARE EDUCATION/TRAINING PROGRAM

## 2022-04-04 PROCEDURE — 88305 TISSUE EXAM BY PATHOLOGIST: CPT | Mod: 59 | Performed by: STUDENT IN AN ORGANIZED HEALTH CARE EDUCATION/TRAINING PROGRAM

## 2022-04-04 PROCEDURE — 88305 TISSUE EXAM BY PATHOLOGIST: ICD-10-PCS | Mod: 26,,, | Performed by: STUDENT IN AN ORGANIZED HEALTH CARE EDUCATION/TRAINING PROGRAM

## 2022-04-04 PROCEDURE — 37000009 HC ANESTHESIA EA ADD 15 MINS: Performed by: INTERNAL MEDICINE

## 2022-04-04 PROCEDURE — 88312 SPECIAL STAINS GROUP 1: CPT | Mod: 26,,, | Performed by: STUDENT IN AN ORGANIZED HEALTH CARE EDUCATION/TRAINING PROGRAM

## 2022-04-04 PROCEDURE — 27201012 HC FORCEPS, HOT/COLD, DISP: Performed by: INTERNAL MEDICINE

## 2022-04-04 PROCEDURE — 88312 SPECIAL STAINS GROUP 1: CPT | Performed by: STUDENT IN AN ORGANIZED HEALTH CARE EDUCATION/TRAINING PROGRAM

## 2022-04-04 PROCEDURE — 88342 CHG IMMUNOCYTOCHEMISTRY: ICD-10-PCS | Mod: 26,,, | Performed by: STUDENT IN AN ORGANIZED HEALTH CARE EDUCATION/TRAINING PROGRAM

## 2022-04-04 PROCEDURE — 45385 COLONOSCOPY W/LESION REMOVAL: CPT | Mod: PT,,, | Performed by: INTERNAL MEDICINE

## 2022-04-04 PROCEDURE — 43239 EGD BIOPSY SINGLE/MULTIPLE: CPT | Mod: 51,,, | Performed by: INTERNAL MEDICINE

## 2022-04-04 PROCEDURE — 37000008 HC ANESTHESIA 1ST 15 MINUTES: Performed by: INTERNAL MEDICINE

## 2022-04-04 RX ORDER — SODIUM CHLORIDE, SODIUM LACTATE, POTASSIUM CHLORIDE, CALCIUM CHLORIDE 600; 310; 30; 20 MG/100ML; MG/100ML; MG/100ML; MG/100ML
INJECTION, SOLUTION INTRAVENOUS CONTINUOUS PRN
Status: DISCONTINUED | OUTPATIENT
Start: 2022-04-04 | End: 2022-04-04

## 2022-04-04 RX ORDER — PANTOPRAZOLE SODIUM 40 MG/1
40 TABLET, DELAYED RELEASE ORAL DAILY
Qty: 90 TABLET | Refills: 3 | Status: SHIPPED | OUTPATIENT
Start: 2022-04-04 | End: 2022-06-24

## 2022-04-04 RX ORDER — LIDOCAINE HYDROCHLORIDE 10 MG/ML
INJECTION, SOLUTION EPIDURAL; INFILTRATION; INTRACAUDAL; PERINEURAL
Status: DISCONTINUED | OUTPATIENT
Start: 2022-04-04 | End: 2022-04-04

## 2022-04-04 RX ORDER — PROPOFOL 10 MG/ML
VIAL (ML) INTRAVENOUS
Status: DISCONTINUED | OUTPATIENT
Start: 2022-04-04 | End: 2022-04-04

## 2022-04-04 RX ADMIN — PROPOFOL 50 MG: 10 INJECTION, EMULSION INTRAVENOUS at 10:04

## 2022-04-04 RX ADMIN — LIDOCAINE HYDROCHLORIDE 100 MG: 10 INJECTION, SOLUTION EPIDURAL; INFILTRATION; INTRACAUDAL; PERINEURAL at 09:04

## 2022-04-04 RX ADMIN — SODIUM CHLORIDE, SODIUM LACTATE, POTASSIUM CHLORIDE, AND CALCIUM CHLORIDE: 600; 310; 30; 20 INJECTION, SOLUTION INTRAVENOUS at 09:04

## 2022-04-04 RX ADMIN — PROPOFOL 50 MG: 10 INJECTION, EMULSION INTRAVENOUS at 09:04

## 2022-04-04 RX ADMIN — PROPOFOL 100 MG: 10 INJECTION, EMULSION INTRAVENOUS at 09:04

## 2022-04-04 NOTE — H&P
PRE PROCEDURE H&P    Patient Name: Adal Loaiza Jr.  MRN: [id number]  : 1950  Date of Procedure:  2022  Referring Physician: Huseyin Marrero MD  Primary Physician: Huseyin Marrero MD  Procedure Physician: Marci Silva MD       Planned Procedure: Colonoscopy and EGD  Diagnosis: iron deficiency anemia     Chief Complaint: Same as above    HPI: Patient is an 72 y.o. male is here for the above. FHx of colon cancer in his father diagnosed in his 's. Patient has also noticed some rectal bleeding with bowel prep today. He assumes it is due to aggressive wiping. Last colonoscopy in . Three TAs removed in the cecum, ascending and transverse colon.     Last colonoscopy:   Family history: father in his s.   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    Seizures     because of brain tumor    Sleep apnea     Spinal cord disease     meningioma        Past Surgical History:  Past Surgical History:   Procedure Laterality Date    BRAIN SURGERY      CARDIAC PACEMAKER PLACEMENT      COLONOSCOPY N/A 2016    Procedure: COLONOSCOPY;  Surgeon: Morris Olguin MD;  Location: [facility] ENDO;  Service: Endoscopy;  Laterality: N/A;    FRACTURE SURGERY Right     right jaw    Gamma knife      for schwannoma    REPLACEMENT OF PACEMAKER GENERATOR Left 2019    Procedure: REPLACEMENT, PULSE GENERATOR, CARDIAC PACEMAKER;  Surgeon: Gen Meza MD;  Location: [facility] CATH LAB;  Service: Cardiology;  Laterality: Left;  current device MDT/waiting for MD input        Home  "Medications:  Prior to Admission medications    Medication Sig Start Date End Date Taking? Authorizing Provider   gabapentin (NEURONTIN) 300 MG capsule Take 300 mg by mouth every evening.  5/6/21  Yes Historical Provider   LACTOBACILLUS ACIDOPHILUS (PROBIOTIC ORAL) Take by mouth once daily.   Yes Historical Provider   levetiracetam (KEPPRA) 500 MG Tab Take 1 tablet (500 mg total) by mouth 2 (two) times daily. 6/2/15  Yes SEDRICK Ramirez II, MD   multivitamin (THERAGRAN) per tablet Take 1 tablet by mouth once daily.   Yes Historical Provider   multivitamin,tx-iron-minerals Tab Take by mouth.   Yes Historical Provider   pravastatin (PRAVACHOL) 40 MG tablet Take 1 tablet (40 mg total) by mouth once daily. 3/21/22 3/21/23 Yes Huseyin Marrero MD   FLUZONE HIGHDOSE QUAD 20-21  mcg/0.7 mL Syrg ADM 0.7ML IM UTD 11/11/20   Historical Provider   sod sulf-pot chloride-mag sulf (SUTAB) 1.479-0.188- 0.225 gram tablet Take 12 tablets by mouth once daily. Take according to package instructions with indicated amount of water. 2/24/22 4/4/22  Froylan Simon PA-C        Allergies:  Review of patient's allergies indicates:   Allergen Reactions    Nsaids (non-steroidal anti-inflammatory drug)      Caused GI bleeding.    Aspirin     Celecoxib         Social History:   Social History     Socioeconomic History    Marital status:    Tobacco Use    Smoking status: Never Smoker    Smokeless tobacco: Never Used   Substance and Sexual Activity    Alcohol use: No     Comment: socially    Drug use: No       Family History:  Family History   Problem Relation Age of Onset    Colon cancer Father     Hypertension Unknown        ROS: No acute cardiac events, no acute respiratory complaints.     Physical Exam (all patients):    BP (!) 150/83 (BP Location: Left arm, Patient Position: Lying)   Pulse 71   Temp 97.9 °F (36.6 °C) (Temporal)   Resp 17   Ht 5' 10" (1.778 m)   Wt 91.6 kg (202 lb)   SpO2 98%   BMI 28.98 kg/m² "   Lungs: Clear to auscultation bilaterally, respirations unlabored  Heart: Regular rate and rhythm, S1 and S2 normal, no obvious murmurs  Abdomen:         Soft, non-tender, bowel sounds normal, no masses, no organomegaly    Lab Results   Component Value Date    WBC 6.31 03/14/2022    MCV 92 03/14/2022    RDW 14.0 03/14/2022     03/14/2022    INR 1.0 02/13/2019    GLU 99 03/14/2022    BUN 17 03/14/2022     03/14/2022    K 4.8 03/14/2022     03/14/2022        SEDATION PLAN: per anesthesia      History reviewed, vital signs satisfactory, cardiopulmonary status satisfactory, sedation options, risks and plans have been discussed with the patient  All their questions were answered and the patient agrees to the sedation procedures as planned and the patient is deemed an appropriate candidate for the sedation as planned.    The risks, benefits and alternatives of the procedure were discussed with the patient in detail. This discussion was had in the presence of endoscopy staff. The risks include, risks of adverse reaction to sedation requiring the use of reversal agents, bleeding requiring blood transfusion, perforation requiring surgical intervention and technical failure. Other risks include aspiration leading to respiratory distress and respiratory failure resulting in endotracheal intubation and mechanical ventilation including death. If anesthesia is being utilized for this procedure, it is up to the anesthesiologist to determine airway safety including elective endotracheal intubation. Questions were answered, they agree to proceed. There was no language barriers.      Procedure explained to patient, informed consent obtained and placed in chart.    Marci Silva  4/4/2022  9:52 AM

## 2022-04-04 NOTE — ANESTHESIA POSTPROCEDURE EVALUATION
Anesthesia Post Evaluation    Patient: Adal Loaiza Jr.    Procedure(s) Performed: Procedure(s) (LRB):  ESOPHAGOGASTRODUODENOSCOPY (EGD) (N/A)  COLONOSCOPY (N/A)    Final Anesthesia Type: MAC      Patient location during evaluation: GI PACU  Patient participation: Yes- Able to Participate  Level of consciousness: awake and alert and oriented  Post-procedure vital signs: reviewed and stable  Pain management: adequate  Airway patency: patent  SUSU mitigation strategies: Multimodal analgesia  PONV status at discharge: No PONV  Anesthetic complications: no      Cardiovascular status: blood pressure returned to baseline  Respiratory status: unassisted  Hydration status: euvolemic  Follow-up not needed.          No case tracking events are documented in the log.      Pain/Dona Score: No data recorded

## 2022-04-04 NOTE — ANESTHESIA PREPROCEDURE EVALUATION
04/04/2022  Adal Loaiza Jr. is a 72 y.o., male.    Anesthesia Evaluation    I have reviewed the Patient Summary Reports.    I have reviewed the Nursing Notes.   I have reviewed the Medications.     Review of Systems  Anesthesia Hx:  No problems with previous Anesthesia    Hematology/Oncology:         -- Anemia:   Cardiovascular:   Pacemaker Dysrhythmias atrial fibrillation hyperlipidemia ECG has been reviewed. CONCLUSIONS     1 - Normal left ventricular systolic function (EF 60-65%).     2 - Normal left ventricular diastolic function.     3 - Normal right ventricular systolic function .     4 - No wall motion abnormalities.     5 - Concentric hypertrophy.     6 - The estimated PA systolic pressure is greater than 27 mmHg.     7 - Trivial aortic regurgitation.     8 - Mild tricuspid regurgitation.     Normal sinus rhythm  Possible Anterior infarct (cited on or before 22-SEP-2017)  Abnormal ECG  When compared with ECG of 22-JAN-2019 01:40,  No significant change was found  Confirmed by MD WOO, MONA ([id number]) on 1/31/2019 10:07:50 PM   Pulmonary:   Sleep Apnea, CPAP    Neurological:   Seizures, well controlled        Physical Exam  General:  Well nourished    Airway/Jaw/Neck:  Airway Findings: Tongue: Normal General Airway Assessment: Adult  Mallampati: I  TM Distance: Normal, at least 6 cm      Dental:  Dental Findings: In tact   Chest/Lungs:  Chest/Lungs Findings:    Heart/Vascular:  Heart Findings:            Anesthesia Plan  Type of Anesthesia, risks & benefits discussed:  Anesthesia Type:  MAC  Patient's Preference:   Intra-op Monitoring Plan: standard ASA monitors  Intra-op Monitoring Plan Comments:   Post Op Pain Control Plan: multimodal analgesia  Post Op Pain Control Plan Comments:   Induction:   IV  Beta Blocker:  Patient is not currently on a Beta-Blocker (No further documentation required).        Informed Consent: Patient understands risks and agrees with Anesthesia plan.  Questions answered. Anesthesia consent signed with patient.  ASA Score: 3     Day of Surgery Review of History & Physical:  There are no significant changes.          Ready For Surgery From Anesthesia Perspective.                                                                                                                  04/04/2022  Adal Loaiza Jr. is a 72 y.o., male.    Anesthesia Evaluation    I have reviewed the Patient Summary Reports.    I have reviewed the Nursing Notes.   I have reviewed the Medications.     Review of Systems  Anesthesia Hx:  No problems with previous Anesthesia    Hematology/Oncology:         -- Anemia:   Cardiovascular:   Pacemaker Dysrhythmias atrial fibrillation hyperlipidemia ECG has been reviewed. CONCLUSIONS     1 - Normal left ventricular systolic function (EF 60-65%).     2 - Normal left ventricular diastolic function.     3 - Normal right ventricular systolic function .     4 - No wall motion abnormalities.     5 - Concentric hypertrophy.     6 - The estimated PA systolic pressure is greater than 27 mmHg.     7 - Trivial aortic regurgitation.     8 - Mild tricuspid regurgitation.     Normal sinus rhythm  Possible Anterior infarct (cited on or before 22-SEP-2017)  Abnormal ECG  When compared with ECG of 22-JAN-2019 01:40,  No significant change was found  Confirmed by MD WOO, MONA ([id number]) on 1/31/2019 10:07:50 PM   Pulmonary:   Sleep Apnea, CPAP    Neurological:   Seizures, well controlled        Physical Exam  General:  Well nourished    Airway/Jaw/Neck:  Airway Findings: Tongue: Normal General Airway Assessment: Adult  Mallampati: I  TM Distance: Normal, at least 6 cm      Dental:  Dental Findings: In tact   Chest/Lungs:  Chest/Lungs Findings:    Heart/Vascular:  Heart Findings:            Anesthesia Plan  Type of Anesthesia, risks & benefits discussed:  Anesthesia Type:  MAC  Patient's  Preference:   Intra-op Monitoring Plan: standard ASA monitors  Intra-op Monitoring Plan Comments:   Post Op Pain Control Plan: multimodal analgesia  Post Op Pain Control Plan Comments:   Induction:   IV  Beta Blocker:  Patient is not currently on a Beta-Blocker (No further documentation required).       Informed Consent: Patient understands risks and agrees with Anesthesia plan.  Questions answered. Anesthesia consent signed with patient.  ASA Score: 3     Day of Surgery Review of History & Physical:  There are no significant changes.          Ready For Surgery From Anesthesia Perspective.                                                                                                                  04/04/2022  Adal Loaiza Jr. is a 72 y.o., male.      Pre-op Assessment    I have reviewed the Patient Summary Reports.     I have reviewed the Nursing Notes. I have reviewed the NPO Status.   I have reviewed the Medications.     Review of Systems         Anesthesia Plan  Type of Anesthesia, risks & benefits discussed:    Anesthesia Type: MAC  Intra-op Monitoring Plan: Standard ASA Monitors  Post Op Pain Control Plan: multimodal analgesia  Induction:  IV  Informed Consent: Informed consent signed with the Patient and all parties understand the risks and agree with anesthesia plan.  All questions answered. Patient consented to blood products? Yes  ASA Score: 3  Day of Surgery Review of History & Physical: H&P Update referred to the surgeon/provider.    Ready For Surgery From Anesthesia Perspective.     .

## 2022-04-04 NOTE — PLAN OF CARE
Dr Silva came to bedside and discussed findings. NO N/V,  no abdominal pain, no GI bleeding, and vitals stable.  Pt discharged from unit.

## 2022-04-04 NOTE — TRANSFER OF CARE
"Anesthesia Transfer of Care Note    Patient: Adal Loaiza Jr.    Procedure(s) Performed: Procedure(s) (LRB):  ESOPHAGOGASTRODUODENOSCOPY (EGD) (N/A)  COLONOSCOPY (N/A)    Patient location: PACU    Anesthesia Type: MAC    Transport from OR: Transported from OR on room air with adequate spontaneous ventilation    Post pain: adequate analgesia    Post assessment: no apparent anesthetic complications    Post vital signs: stable    Level of consciousness: responds to stimulation and awake    Nausea/Vomiting: no nausea/vomiting    Complications: none    Transfer of care protocol was followed      Last vitals:   Visit Vitals  BP (!) 150/83 (BP Location: Left arm, Patient Position: Lying)   Pulse 71   Temp 36.6 °C (97.9 °F) (Temporal)   Resp 17   Ht 5' 10" (1.778 m)   Wt 91.6 kg (202 lb)   SpO2 98%   BMI 28.98 kg/m²     "

## 2022-04-04 NOTE — PROVATION PATIENT INSTRUCTIONS
Discharge Summary/Instructions after an Endoscopic Procedure  Patient Name: Adal Loaiza  Patient MRN: [id number]  Patient YOB: 1950 Monday, April 4, 2022 Marci Silva MD  Dear patient,  As a result of recent federal legislation (The Federal Cures Act), you may   receive lab or pathology results from your procedure in your [facility]   account before your physician is able to contact you. Your physician or   their representative will relay the results to you with their   recommendations at their soonest availability.  Thank you,  RESTRICTIONS:  During your procedure today, you received medications for sedation.  These   medications may affect your judgment, balance and coordination.  Therefore,   for 24 hours, you have the following restrictions:   - DO NOT drive a car, operate machinery, make legal/financial decisions,   sign important papers or drink alcohol.    ACTIVITY:  Today: no heavy lifting, straining or running due to procedural   sedation/anesthesia.  The following day: return to full activity including work.  DIET:  Eat and drink normally unless instructed otherwise.     TREATMENT FOR COMMON SIDE EFFECTS:  - Mild abdominal pain, nausea, belching, bloating or excessive gas:  rest,   eat lightly and use a heating pad.  - Sore Throat: treat with throat lozenges and/or gargle with warm salt   water.  - Because air was used during the procedure, expelling large amounts of air   from your rectum or belching is normal.  - If a bowel prep was taken, you may not have a bowel movement for 1-3 days.    This is normal.  SYMPTOMS TO WATCH FOR AND REPORT TO YOUR PHYSICIAN:  1. Abdominal pain or bloating, other than gas cramps.  2. Chest pain.  3. Back pain.  4. Signs of infection such as: chills or fever occurring within 24 hours   after the procedure.  5. Rectal bleeding, which would show as bright red, maroon, or black stools.   (A tablespoon of blood from the rectum is not serious, especially if    hemorrhoids are present.)  6. Vomiting.  7. Weakness or dizziness.  GO DIRECTLY TO THE NEAREST EMERGENCY ROOM IF YOU HAVE ANY OF THE FOLLOWING:      Difficulty breathing              Chills and/or fever over 101 F   Persistent vomiting and/or vomiting blood   Severe abdominal pain   Severe chest pain   Black, tarry stools   Bleeding- more than one tablespoon   Any other symptom or condition that you feel may need urgent attention  Your doctor recommends these additional instructions:  If any biopsies were taken, your doctors clinic will contact you in 1 to 2   weeks with any results.  - Discharge patient to home after colonoscopy.   - Resume previous diet.   - Continue present medications.   - Await pathology results.   - Refer to a surgeon. This will be scheduled for you by the General Surgery   department.   - Proceed with colonoscopy.  - Use Protonix (pantoprazole) 40 mg PO daily.   - Do an upper GI series. This will be scheduled for you according to your   availability. Please call the Radiology department at [phone].  For questions, problems or results please call your physician Marci Silva MD at Work:  [phone]  If you have any questions about the above instructions, call the GI   department at [phone] or call the endoscopy unit at [phone]   from 7am until 3 pm.  [facility] [place], EMERGENCY ROOM PHONE NUMBER:   [phone]  IF A COMPLICATION OR EMERGENCY SITUATION ARISES AND YOU ARE UNABLE TO REACH   YOUR PHYSICIAN - GO DIRECTLY TO THE EMERGENCY ROOM.  I have read or have had read to me these discharge instructions for my   procedure and have received a written copy.  I understand these   instructions and will follow-up with my physician if I have any questions.     __________________________________       _____________________________________  Nurse Signature                                          Patient/Designated   Responsible Party Signature  Marci  MD Marci Silva MD  4/4/2022 10:52:03 AM  This report has been verified and signed electronically.  Dear patient,  As a result of recent federal legislation (The Federal Cures Act), you may   receive lab or pathology results from your procedure in your [facility]   account before your physician is able to contact you. Your physician or   their representative will relay the results to you with their   recommendations at their soonest availability.  Thank you,  PROVATION

## 2022-04-04 NOTE — PROVATION PATIENT INSTRUCTIONS
Discharge Summary/Instructions after an Endoscopic Procedure  Patient Name: Adal Loaiza  Patient MRN: [id number]  Patient YOB: 1950 Monday, April 4, 2022 Marci Silva MD  Dear patient,  As a result of recent federal legislation (The Federal Cures Act), you may   receive lab or pathology results from your procedure in your [facility]   account before your physician is able to contact you. Your physician or   their representative will relay the results to you with their   recommendations at their soonest availability.  Thank you,  RESTRICTIONS:  During your procedure today, you received medications for sedation.  These   medications may affect your judgment, balance and coordination.  Therefore,   for 24 hours, you have the following restrictions:   - DO NOT drive a car, operate machinery, make legal/financial decisions,   sign important papers or drink alcohol.    ACTIVITY:  Today: no heavy lifting, straining or running due to procedural   sedation/anesthesia.  The following day: return to full activity including work.  DIET:  Eat and drink normally unless instructed otherwise.     TREATMENT FOR COMMON SIDE EFFECTS:  - Mild abdominal pain, nausea, belching, bloating or excessive gas:  rest,   eat lightly and use a heating pad.  - Sore Throat: treat with throat lozenges and/or gargle with warm salt   water.  - Because air was used during the procedure, expelling large amounts of air   from your rectum or belching is normal.  - If a bowel prep was taken, you may not have a bowel movement for 1-3 days.    This is normal.  SYMPTOMS TO WATCH FOR AND REPORT TO YOUR PHYSICIAN:  1. Abdominal pain or bloating, other than gas cramps.  2. Chest pain.  3. Back pain.  4. Signs of infection such as: chills or fever occurring within 24 hours   after the procedure.  5. Rectal bleeding, which would show as bright red, maroon, or black stools.   (A tablespoon of blood from the rectum is not serious, especially if    hemorrhoids are present.)  6. Vomiting.  7. Weakness or dizziness.  GO DIRECTLY TO THE NEAREST EMERGENCY ROOM IF YOU HAVE ANY OF THE FOLLOWING:      Difficulty breathing              Chills and/or fever over 101 F   Persistent vomiting and/or vomiting blood   Severe abdominal pain   Severe chest pain   Black, tarry stools   Bleeding- more than one tablespoon   Any other symptom or condition that you feel may need urgent attention  Your doctor recommends these additional instructions:  If any biopsies were taken, your doctors clinic will contact you in 1 to 2   weeks with any results.  - Discharge patient to home (with escort).   - Resume previous diet.   - Continue present medications.   - Await pathology results.   - Repeat colonoscopy in 5 years for surveillance based on pathology   results.  For questions, problems or results please call your physician Marci Silva MD at Work:  [phone]  If you have any questions about the above instructions, call the GI   department at [phone] or call the endoscopy unit at [phone]   from 7am until 3 pm.  [facility] [place], EMERGENCY ROOM PHONE NUMBER:   [phone]  IF A COMPLICATION OR EMERGENCY SITUATION ARISES AND YOU ARE UNABLE TO REACH   YOUR PHYSICIAN - GO DIRECTLY TO THE EMERGENCY ROOM.  I have read or have had read to me these discharge instructions for my   procedure and have received a written copy.  I understand these   instructions and will follow-up with my physician if I have any questions.     __________________________________       _____________________________________  Nurse Signature                                          Patient/Designated   Responsible Party Signature  MD Marci Sandoval MD  4/4/2022 10:55:44 AM  This report has been verified and signed electronically.  Dear patient,  As a result of recent federal legislation (The Federal Cures Act), you may   receive lab or pathology results  from your procedure in your [facility]   account before your physician is able to contact you. Your physician or   their representative will relay the results to you with their   recommendations at their soonest availability.  Thank you,  PROVATION

## 2022-04-05 VITALS
RESPIRATION RATE: 16 BRPM | DIASTOLIC BLOOD PRESSURE: 78 MMHG | HEIGHT: 70 IN | WEIGHT: 202 LBS | TEMPERATURE: 98 F | SYSTOLIC BLOOD PRESSURE: 123 MMHG | OXYGEN SATURATION: 98 % | BODY MASS INDEX: 28.92 KG/M2 | HEART RATE: 70 BPM

## 2022-04-07 ENCOUNTER — PATIENT OUTREACH (OUTPATIENT)
Dept: ADMINISTRATIVE | Facility: OTHER | Age: 72
End: 2022-04-07
Payer: COMMERCIAL

## 2022-04-08 ENCOUNTER — OFFICE VISIT (OUTPATIENT)
Dept: SURGERY | Facility: CLINIC | Age: 72
End: 2022-04-08
Payer: MEDICARE

## 2022-04-08 VITALS
BODY MASS INDEX: 29.01 KG/M2 | WEIGHT: 202.19 LBS | DIASTOLIC BLOOD PRESSURE: 78 MMHG | SYSTOLIC BLOOD PRESSURE: 131 MMHG | TEMPERATURE: 97 F

## 2022-04-08 DIAGNOSIS — K22.10 ESOPHAGITIS, EROSIVE: ICD-10-CM

## 2022-04-08 DIAGNOSIS — D50.0 IRON DEFICIENCY ANEMIA DUE TO CHRONIC BLOOD LOSS: ICD-10-CM

## 2022-04-08 DIAGNOSIS — K44.9 PARAESOPHAGEAL HERNIA: Primary | ICD-10-CM

## 2022-04-08 DIAGNOSIS — K29.60 EROSIVE GASTRITIS: ICD-10-CM

## 2022-04-08 PROCEDURE — 99999 PR PBB SHADOW E&M-EST. PATIENT-LVL III: CPT | Mod: PBBFAC,,, | Performed by: SURGERY

## 2022-04-08 PROCEDURE — 99204 PR OFFICE/OUTPT VISIT, NEW, LEVL IV, 45-59 MIN: ICD-10-PCS | Mod: S$PBB,,, | Performed by: SURGERY

## 2022-04-08 PROCEDURE — 99999 PR PBB SHADOW E&M-EST. PATIENT-LVL III: ICD-10-PCS | Mod: PBBFAC,,, | Performed by: SURGERY

## 2022-04-08 PROCEDURE — 99204 OFFICE O/P NEW MOD 45 MIN: CPT | Mod: S$PBB,,, | Performed by: SURGERY

## 2022-04-08 PROCEDURE — 99213 OFFICE O/P EST LOW 20 MIN: CPT | Mod: PBBFAC | Performed by: SURGERY

## 2022-04-12 ENCOUNTER — TELEPHONE (OUTPATIENT)
Dept: SPORTS MEDICINE | Facility: CLINIC | Age: 72
End: 2022-04-12
Payer: COMMERCIAL

## 2022-04-12 NOTE — TELEPHONE ENCOUNTER
Called pt and r/s injection appointment due to provider being out of office. Agreed to just keeping his other appointments and adding the final injection appointment the week after the existing appointments. Will message him with new time since 10:40 was not available for that day.

## 2022-04-14 LAB
FINAL PATHOLOGIC DIAGNOSIS: NORMAL
GROSS: NORMAL
Lab: NORMAL
MICROSCOPIC EXAM: NORMAL

## 2022-04-16 NOTE — PROGRESS NOTES
The biopsies of your small bowel and stomach show inflammation. Please take the acid blocker (Protonix) as prescribed. Also please schedule the UGI series with the radiology department  I requested to further evaluate your hernia and follow up with Dr. Phan of general surgery to discuss hernia repair.     The polyp removed was precancerous also known as adenoma. It was completely removed. Guidelines recommend a repeat colonoscopy in 5 years. We will send you a reminder as the time nears.

## 2022-04-19 ENCOUNTER — CLINICAL SUPPORT (OUTPATIENT)
Dept: CARDIOLOGY | Facility: HOSPITAL | Age: 72
End: 2022-04-19
Payer: MEDICARE

## 2022-04-19 DIAGNOSIS — Z95.0 PRESENCE OF CARDIAC PACEMAKER: ICD-10-CM

## 2022-04-19 PROCEDURE — 93296 REM INTERROG EVL PM/IDS: CPT | Performed by: INTERNAL MEDICINE

## 2022-04-20 ENCOUNTER — OFFICE VISIT (OUTPATIENT)
Dept: SPORTS MEDICINE | Facility: CLINIC | Age: 72
End: 2022-04-20
Payer: MEDICARE

## 2022-04-20 VITALS — HEIGHT: 70 IN | BODY MASS INDEX: 28.92 KG/M2 | WEIGHT: 202 LBS

## 2022-04-20 DIAGNOSIS — K44.9 PARAESOPHAGEAL HERNIA: ICD-10-CM

## 2022-04-20 DIAGNOSIS — M17.12 LOCALIZED OSTEOARTHRITIS OF LEFT KNEE: Primary | ICD-10-CM

## 2022-04-20 PROCEDURE — 20610 DRAIN/INJ JOINT/BURSA W/O US: CPT | Mod: PBBFAC | Performed by: FAMILY MEDICINE

## 2022-04-20 PROCEDURE — 20610 LARGE JOINT ASPIRATION/INJECTION: L KNEE: ICD-10-PCS | Mod: S$PBB,LT,, | Performed by: FAMILY MEDICINE

## 2022-04-20 PROCEDURE — 99499 NO LOS: ICD-10-PCS | Mod: S$PBB,,, | Performed by: FAMILY MEDICINE

## 2022-04-20 PROCEDURE — 99213 OFFICE O/P EST LOW 20 MIN: CPT | Mod: PBBFAC,25 | Performed by: FAMILY MEDICINE

## 2022-04-20 PROCEDURE — 99999 PR PBB SHADOW E&M-EST. PATIENT-LVL III: CPT | Mod: PBBFAC,,, | Performed by: FAMILY MEDICINE

## 2022-04-20 PROCEDURE — 99499 UNLISTED E&M SERVICE: CPT | Mod: S$PBB,,, | Performed by: FAMILY MEDICINE

## 2022-04-20 PROCEDURE — 99999 PR PBB SHADOW E&M-EST. PATIENT-LVL III: ICD-10-PCS | Mod: PBBFAC,,, | Performed by: FAMILY MEDICINE

## 2022-04-20 RX ADMIN — Medication 20 MG: at 10:04

## 2022-04-20 NOTE — PROCEDURES
Large Joint Aspiration/Injection: L knee    Date/Time: 4/20/2022 10:40 AM  Performed by: Attila Mclain MD  Authorized by: Attila Mclain MD     Consent Done?:  Yes (Verbal)  Indications:  Arthritis and pain  Site marked: the procedure site was marked    Timeout: prior to procedure the correct patient, procedure, and site was verified    Prep: patient was prepped and draped in usual sterile fashion    Approach:  Anterolateral  Location:  Knee  Site:  L knee  Medications:  20 mg sodium hyaluronate (EUFLEXXA) 10 mg/mL(mw 2.4 -3.6 million)  Patient tolerance:  Patient tolerated the procedure well with no immediate complications

## 2022-04-20 NOTE — PROGRESS NOTES
[facility] -   General Surgery History & Physical    SUBJECTIVE:     History of Present Illness:  Patient is a 72 y.o. male presents with a hiatal hernia found on endoscopy performed due to iron deficiency anemia. He reports intermittent symptoms of sensations of food getting stuck in his chest, especially with large bites of meat. Otherwise, he states no abdominal pain or issues.    Chief Complaint   Patient presents with    Hernia       Review of patient's allergies indicates:   Allergen Reactions    Nsaids (non-steroidal anti-inflammatory drug)      Caused GI bleeding.    Aspirin     Celecoxib        Current Outpatient Medications   Medication Sig Dispense Refill    FLUZONE HIGHDOSE QUAD 20-21  mcg/0.7 mL Syrg ADM 0.7ML IM UTD      gabapentin (NEURONTIN) 300 MG capsule Take 300 mg by mouth every evening.       LACTOBACILLUS ACIDOPHILUS (PROBIOTIC ORAL) Take by mouth once daily.      levetiracetam (KEPPRA) 500 MG Tab Take 1 tablet (500 mg total) by mouth 2 (two) times daily. 60 tablet 11    multivitamin (THERAGRAN) per tablet Take 1 tablet by mouth once daily.      pantoprazole (PROTONIX) 40 MG tablet Take 1 tablet (40 mg total) by mouth once daily. 90 tablet 3    pravastatin (PRAVACHOL) 40 MG tablet Take 1 tablet (40 mg total) by mouth once daily. 90 tablet 3    multivitamin,tx-iron-minerals Tab Take by mouth.       No current facility-administered medications for this visit.       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    Seizures     because of brain tumor    Sleep apnea     Spinal cord  disease     meningioma     Past Surgical History:   Procedure Laterality Date    BRAIN SURGERY      CARDIAC PACEMAKER PLACEMENT      COLONOSCOPY N/A 8/25/2016    Procedure: COLONOSCOPY;  Surgeon: Morris Olguin MD;  Location: [facility] ENDO;  Service: Endoscopy;  Laterality: N/A;    COLONOSCOPY N/A 4/4/2022    Procedure: COLONOSCOPY;  Surgeon: Marci Silva MD;  Location: [facility] ENDO;  Service: Endoscopy;  Laterality: N/A;    ESOPHAGOGASTRODUODENOSCOPY N/A 4/4/2022    Procedure: ESOPHAGOGASTRODUODENOSCOPY (EGD);  Surgeon: Marci Silva MD;  Location: [facility] ENDO;  Service: Endoscopy;  Laterality: N/A;    FRACTURE SURGERY Right     right jaw    Gamma knife      for schwannoma    REPLACEMENT OF PACEMAKER GENERATOR Left 2/13/2019    Procedure: REPLACEMENT, PULSE GENERATOR, CARDIAC PACEMAKER;  Surgeon: Gen Meza MD;  Location: [facility] CATH LAB;  Service: Cardiology;  Laterality: Left;  current device MDT/waiting for MD input     Family History   Problem Relation Age of Onset    Colon cancer Father     Hypertension Unknown      Social History     Tobacco Use    Smoking status: Never Smoker    Smokeless tobacco: Never Used   Substance Use Topics    Alcohol use: No     Comment: socially    Drug use: No        Review of Systems:  Review of Systems   Constitutional: Negative for fever and unexpected weight change.   HENT: Negative for trouble swallowing.    Respiratory: Negative for cough and shortness of breath.    Cardiovascular: Negative for chest pain.   Gastrointestinal: Negative for abdominal pain, blood in stool, nausea and vomiting.   Genitourinary: Negative for difficulty urinating, dysuria and hematuria.   Neurological: Negative for seizures.       OBJECTIVE:     Vital Signs (Most Recent)  Temp: 97 °F (36.1 °C) (04/08/22 0936)  BP: 131/78 (04/08/22 0936)     91.7 kg (202 lb 2.6 oz)     Physical Exam:  Physical Exam  Vitals and nursing note reviewed.   Constitutional:       General: He is  not in acute distress.     Appearance: He is not ill-appearing, toxic-appearing or diaphoretic.   HENT:      Head: Normocephalic and atraumatic.      Right Ear: External ear normal.      Left Ear: External ear normal.      Nose: Nose normal. No congestion or rhinorrhea.      Mouth/Throat:      Mouth: Mucous membranes are moist.      Pharynx: Oropharynx is clear.   Eyes:      General: No scleral icterus.        Right eye: No discharge.         Left eye: No discharge.      Extraocular Movements: Extraocular movements intact.      Conjunctiva/sclera: Conjunctivae normal.      Pupils: Pupils are equal, round, and reactive to light.   Cardiovascular:      Rate and Rhythm: Normal rate and regular rhythm.   Pulmonary:      Effort: Pulmonary effort is normal. No respiratory distress.      Breath sounds: No stridor.   Abdominal:      General: Bowel sounds are normal. There is no distension.      Palpations: Abdomen is soft. There is no mass.      Tenderness: There is no abdominal tenderness. There is no guarding or rebound.   Musculoskeletal:         General: No deformity. Normal range of motion.      Cervical back: Normal range of motion and neck supple. No rigidity.   Skin:     General: Skin is warm and dry.      Capillary Refill: Capillary refill takes less than 2 seconds.      Coloration: Skin is not jaundiced or pale.   Neurological:      General: No focal deficit present.      Mental Status: He is alert and oriented to person, place, and time.      Cranial Nerves: No cranial nerve deficit.   Psychiatric:         Mood and Affect: Mood normal.         Behavior: Behavior normal.         Laboratory      Diagnostic Results:  EGD: Impression:            - Normal second portion of the duodenum. Biopsied.                          - Duodenal erosions without bleeding. Biopsied.                          - Erosive gastropathy with stigmata of recent                          bleeding. Biopsied.                          - 9 cm  paraesophageal hernia.                          - Discolored, friable (with spontaneous bleeding),                          inflamed, texture changed mucosa in the esophagus.                          Biopsied.                          - Normal upper third of esophagus and middle third                          of esophagus.     Colonoscopy: Findings:        A 3 mm polyp was found in the cecum. The polyp was sessile. The        polyp was removed with a cold snare. Resection and retrieval were        complete.        A single small-mouthed diverticulum was found in the sigmoid colon.        The exam was otherwise without abnormality.        Hemorrhoids were found on perianal exam.   Impression:            - One 3 mm polyp in the cecum, removed with a cold                          snare. Resected and retrieved.                          - Diverticulosis in the sigmoid colon.                          - The examination was otherwise normal.                          - Hemorrhoids found on perianal exam.     ASSESSMENT/PLAN:     Adal was seen today for hernia.    Diagnoses and all orders for this visit:    Paraesophageal hernia  -     Ambulatory referral/consult to General Surgery  -     Manometry esophageal; Future    Iron deficiency anemia due to chronic blood loss    Esophagitis, erosive    Erosive gastritis           Patient presents with anemia and an intermittently symptomatic hiatal hernia with esophagitis and erosive gastropathy with recent bleeding as the source. I do recommend repair of the hernia which would be done minimally invasively with the DaVinci robot. I explained the steps of the procedure, risks, benefits, and alternatives. The patient seems hesitant about surgery because he feels like the symptoms (sensation of food getting stuck, nausea, regurgitation) are not frequent enough. The bleeding, anemia, and inflammation of the esophagus and stomach is quite concerning though. He is agreeable to getting an  esophageal manometry study and will think about having the surgery.  Patient expressed understanding and is in agreement.      Ryanne Phan,   General Surgery  [facility]  4/20/2022    I spent a total of 45 minutes on the day of the visit.This includes face to face time and non-face to face time preparing to see the patient (eg, review of tests), obtaining and/or reviewing separately obtained history, documenting clinical information in the electronic or other health record, independently interpreting results and communicating results to the patient/family/caregiver, or care coordinator.

## 2022-04-20 NOTE — PATIENT INSTRUCTIONS
Ice 3 times a day for 15 minutes Voltaren gel    Recheck next week for 2nd Euflexxa injection    Follow-up with other physicians for treatment of esophageal hernia    Avoid anti-inflammatory such as Aleve, Advil Naprosyn

## 2022-04-26 ENCOUNTER — HOSPITAL ENCOUNTER (OUTPATIENT)
Facility: HOSPITAL | Age: 72
Discharge: HOME | End: 2022-04-26
Attending: INTERNAL MEDICINE | Admitting: INTERNAL MEDICINE
Payer: MEDICARE

## 2022-04-26 VITALS
RESPIRATION RATE: 16 BRPM | OXYGEN SATURATION: 97 % | WEIGHT: 203 LBS | HEART RATE: 68 BPM | SYSTOLIC BLOOD PRESSURE: 122 MMHG | BODY MASS INDEX: 29.06 KG/M2 | TEMPERATURE: 98 F | DIASTOLIC BLOOD PRESSURE: 86 MMHG | HEIGHT: 70 IN

## 2022-04-26 PROCEDURE — 91010 ESOPHAGUS MOTILITY STUDY: CPT | Mod: 26,,, | Performed by: INTERNAL MEDICINE

## 2022-04-26 PROCEDURE — 91037 ESOPH IMPED FUNCTION TEST: CPT | Mod: 26,,, | Performed by: INTERNAL MEDICINE

## 2022-04-26 PROCEDURE — 91037 ESOPH IMPED FUNCTION TEST: CPT | Mod: TC | Performed by: INTERNAL MEDICINE

## 2022-04-26 PROCEDURE — 25000003 PHARM REV CODE 250: Performed by: INTERNAL MEDICINE

## 2022-04-26 PROCEDURE — 91037 PR GERD TST W/ NASAL IMPEDENCE ELECTROD: ICD-10-PCS | Mod: 26,,, | Performed by: INTERNAL MEDICINE

## 2022-04-26 PROCEDURE — 91010 PR ESOPHAGEAL MOTILITY STUDY, MA2METRY: ICD-10-PCS | Mod: 26,,, | Performed by: INTERNAL MEDICINE

## 2022-04-26 PROCEDURE — 91010 ESOPHAGUS MOTILITY STUDY: CPT | Mod: TC | Performed by: INTERNAL MEDICINE

## 2022-04-26 RX ORDER — LIDOCAINE HYDROCHLORIDE 20 MG/ML
2 SOLUTION OROPHARYNGEAL ONCE
Status: COMPLETED | OUTPATIENT
Start: 2022-04-26 | End: 2022-04-26

## 2022-04-26 RX ADMIN — LIDOCAINE HYDROCHLORIDE 2 ML: 20 SOLUTION ORAL; TOPICAL at 08:04

## 2022-04-27 ENCOUNTER — OFFICE VISIT (OUTPATIENT)
Dept: SPORTS MEDICINE | Facility: CLINIC | Age: 72
End: 2022-04-27
Payer: MEDICARE

## 2022-04-27 ENCOUNTER — TELEPHONE (OUTPATIENT)
Dept: SURGERY | Facility: CLINIC | Age: 72
End: 2022-04-27
Payer: COMMERCIAL

## 2022-04-27 VITALS — HEIGHT: 70 IN | WEIGHT: 203 LBS | BODY MASS INDEX: 29.06 KG/M2

## 2022-04-27 DIAGNOSIS — M17.12 LOCALIZED OSTEOARTHRITIS OF LEFT KNEE: Primary | ICD-10-CM

## 2022-04-27 PROCEDURE — 99499 NO LOS: ICD-10-PCS | Mod: S$PBB,,, | Performed by: FAMILY MEDICINE

## 2022-04-27 PROCEDURE — 20610 DRAIN/INJ JOINT/BURSA W/O US: CPT | Mod: PBBFAC | Performed by: FAMILY MEDICINE

## 2022-04-27 PROCEDURE — 99999 PR PBB SHADOW E&M-EST. PATIENT-LVL III: ICD-10-PCS | Mod: PBBFAC,,, | Performed by: FAMILY MEDICINE

## 2022-04-27 PROCEDURE — 99499 UNLISTED E&M SERVICE: CPT | Mod: S$PBB,,, | Performed by: FAMILY MEDICINE

## 2022-04-27 PROCEDURE — 20610 LARGE JOINT ASPIRATION/INJECTION: L KNEE: ICD-10-PCS | Mod: S$PBB,LT,, | Performed by: FAMILY MEDICINE

## 2022-04-27 PROCEDURE — 20610 DRAIN/INJ JOINT/BURSA W/O US: CPT | Mod: 50,PBBFAC | Performed by: FAMILY MEDICINE

## 2022-04-27 PROCEDURE — 99213 OFFICE O/P EST LOW 20 MIN: CPT | Mod: PBBFAC,25 | Performed by: FAMILY MEDICINE

## 2022-04-27 PROCEDURE — 99999 PR PBB SHADOW E&M-EST. PATIENT-LVL III: CPT | Mod: PBBFAC,,, | Performed by: FAMILY MEDICINE

## 2022-04-27 RX ADMIN — Medication 20 MG: at 10:04

## 2022-04-27 NOTE — PROCEDURES
Large Joint Aspiration/Injection: bilateral knee    Date/Time: 4/27/2022 10:40 AM  Performed by: Attila Mclain MD  Authorized by: Attila Mclain MD     Consent Done?:  Yes (Verbal)  Indications:  Arthritis and pain  Site marked: the procedure site was marked    Timeout: prior to procedure the correct patient, procedure, and site was verified    Prep: patient was prepped and draped in usual sterile fashion    Approach:  Anterolateral  Location:  Knee  Laterality:  Bilateral  Site:  Bilateral knee  Medications (Right):  10 mg sodium hyaluronate (EUFLEXXA) 10 mg/mL(mw 2.4 -3.6 million)  Medications (Left):  10 mg sodium hyaluronate (EUFLEXXA) 10 mg/mL(mw 2.4 -3.6 million)  Patient tolerance:  Patient tolerated the procedure well with no immediate complications

## 2022-04-27 NOTE — PATIENT INSTRUCTIONS
Apply ice to affected area three times a day for (15) fifteen minutes for the next 48 hours, and reduce activity during that time.  Voltaren gel three times a day to affected area if recommended.  Oral medication if recommended.  Physical therapy if recommended at home or at clinic.  Keep recheck visit.

## 2022-04-27 NOTE — PROVATION PATIENT INSTRUCTIONS
Discharge Summary/Instructions after an Endoscopic Procedure  Patient Name: Adal Loaiza  Patient MRN: [id number]  Patient YOB: 1950 Tuesday, April 26, 2022  Faith Good MD  Dear patient,  As a result of recent federal legislation (The Federal Cures Act), you may   receive lab or pathology results from your procedure in your [facility]   account before your physician is able to contact you. Your physician or   their representative will relay the results to you with their   recommendations at their soonest availability.  Thank you,  RESTRICTIONS:  During your procedure today, you received medications for sedation.  These   medications may affect your judgment, balance and coordination.  Therefore,   for 24 hours, you have the following restrictions:   - DO NOT drive a car, operate machinery, make legal/financial decisions,   sign important papers or drink alcohol.    ACTIVITY:  Today: no heavy lifting, straining or running due to procedural   sedation/anesthesia.  The following day: return to full activity including work.  DIET:  Eat and drink normally unless instructed otherwise.     TREATMENT FOR COMMON SIDE EFFECTS:  - Mild abdominal pain, nausea, belching, bloating or excessive gas:  rest,   eat lightly and use a heating pad.  - Sore Throat: treat with throat lozenges and/or gargle with warm salt   water.  - Because air was used during the procedure, expelling large amounts of air   from your rectum or belching is normal.  - If a bowel prep was taken, you may not have a bowel movement for 1-3 days.    This is normal.  SYMPTOMS TO WATCH FOR AND REPORT TO YOUR PHYSICIAN:  1. Abdominal pain or bloating, other than gas cramps.  2. Chest pain.  3. Back pain.  4. Signs of infection such as: chills or fever occurring within 24 hours   after the procedure.  5. Rectal bleeding, which would show as bright red, maroon, or black stools.   (A tablespoon of blood from the rectum is not serious, especially if    hemorrhoids are present.)  6. Vomiting.  7. Weakness or dizziness.  GO DIRECTLY TO THE NEAREST EMERGENCY ROOM IF YOU HAVE ANY OF THE FOLLOWING:      Difficulty breathing              Chills and/or fever over 101 F   Persistent vomiting and/or vomiting blood   Severe abdominal pain   Severe chest pain   Black, tarry stools   Bleeding- more than one tablespoon   Any other symptom or condition that you feel may need urgent attention  Your doctor recommends these additional instructions:  If any biopsies were taken, your doctors clinic will contact you in 1 to 2   weeks with any results.  Follow up with referring doctor.  For questions, problems or results please call your physician Faith Good MD at Work:  [phone]  If you have any questions about the above instructions, call the GI   department at [phone] or call the endoscopy unit at [phone]   from 7am until 3 pm.  [facility], EMERGENCY ROOM PHONE NUMBER:   [phone]  IF A COMPLICATION OR EMERGENCY SITUATION ARISES AND YOU ARE UNABLE TO REACH   YOUR PHYSICIAN - GO DIRECTLY TO THE EMERGENCY ROOM.  I have read or have had read to me these discharge instructions for my   procedure and have received a written copy.  I understand these   instructions and will follow-up with my physician if I have any questions.     __________________________________       _____________________________________  Nurse Signature                                          Patient/Designated   Responsible Party Signature  MD Faith Perea MD  4/27/2022 3:20:34 PM  This report has been verified and signed electronically.  Dear patient,  As a result of recent federal legislation (The Federal Cures Act), you may   receive lab or pathology results from your procedure in your [facility]   account before your physician is able to contact you. Your physician or   their representative will relay the results to you with their    recommendations at their soonest availability.  Thank you,  PROVATION

## 2022-05-04 ENCOUNTER — OFFICE VISIT (OUTPATIENT)
Dept: SPORTS MEDICINE | Facility: CLINIC | Age: 72
End: 2022-05-04
Payer: MEDICARE

## 2022-05-04 VITALS — WEIGHT: 203 LBS | HEIGHT: 70 IN | BODY MASS INDEX: 29.06 KG/M2

## 2022-05-04 DIAGNOSIS — M17.12 LOCALIZED OSTEOARTHRITIS OF LEFT KNEE: Primary | ICD-10-CM

## 2022-05-04 PROCEDURE — 99999 PR PBB SHADOW E&M-EST. PATIENT-LVL III: ICD-10-PCS | Mod: PBBFAC,,, | Performed by: FAMILY MEDICINE

## 2022-05-04 PROCEDURE — 20610 LARGE JOINT ASPIRATION/INJECTION: L KNEE: ICD-10-PCS | Mod: S$PBB,LT,, | Performed by: FAMILY MEDICINE

## 2022-05-04 PROCEDURE — 99999 PR PBB SHADOW E&M-EST. PATIENT-LVL III: CPT | Mod: PBBFAC,,, | Performed by: FAMILY MEDICINE

## 2022-05-04 PROCEDURE — 99213 OFFICE O/P EST LOW 20 MIN: CPT | Mod: PBBFAC,25 | Performed by: FAMILY MEDICINE

## 2022-05-04 PROCEDURE — 20610 DRAIN/INJ JOINT/BURSA W/O US: CPT | Mod: PBBFAC | Performed by: FAMILY MEDICINE

## 2022-05-04 PROCEDURE — 99499 NO LOS: ICD-10-PCS | Mod: S$PBB,,, | Performed by: FAMILY MEDICINE

## 2022-05-04 PROCEDURE — 99499 UNLISTED E&M SERVICE: CPT | Mod: S$PBB,,, | Performed by: FAMILY MEDICINE

## 2022-05-04 RX ADMIN — Medication 20 MG: at 10:05

## 2022-05-04 NOTE — PROCEDURES
Large Joint Aspiration/Injection: L knee    Date/Time: 5/4/2022 10:20 AM  Performed by: Attila Mclain MD  Authorized by: Attila Mclain MD     Consent Done?:  Yes (Verbal)  Indications:  Arthritis and pain  Site marked: the procedure site was marked    Timeout: prior to procedure the correct patient, procedure, and site was verified    Prep: patient was prepped and draped in usual sterile fashion    Approach:  Anterolateral  Location:  Knee  Site:  L knee  Medications:  30 mg sodium hyaluronate (orthovisc) 30 mg/2 mL; 10 mg sodium hyaluronate (EUFLEXXA) 10 mg/mL(mw 2.4 -3.6 million)  Patient tolerance:  Patient tolerated the procedure well with no immediate complications

## 2022-05-04 NOTE — PATIENT INSTRUCTIONS
I saw 15 minutes 3 times a day for next 2 days and elevate leg and decreased activity after the [place] objection during the day to the left knee    Recheck to 6 weeks

## 2022-05-13 ENCOUNTER — HOSPITAL ENCOUNTER (OUTPATIENT)
Dept: RADIOLOGY | Facility: HOSPITAL | Age: 72
Discharge: HOME | End: 2022-05-13
Attending: SURGERY
Payer: MEDICARE

## 2022-05-13 ENCOUNTER — OFFICE VISIT (OUTPATIENT)
Dept: SURGERY | Facility: CLINIC | Age: 72
End: 2022-05-13
Payer: MEDICARE

## 2022-05-13 ENCOUNTER — HOSPITAL ENCOUNTER (OUTPATIENT)
Dept: CARDIOLOGY | Facility: HOSPITAL | Age: 72
Discharge: HOME | End: 2022-05-13
Attending: SURGERY
Payer: MEDICARE

## 2022-05-13 VITALS
TEMPERATURE: 97 F | WEIGHT: 202.63 LBS | BODY MASS INDEX: 29.07 KG/M2 | SYSTOLIC BLOOD PRESSURE: 120 MMHG | HEART RATE: 74 BPM | DIASTOLIC BLOOD PRESSURE: 72 MMHG

## 2022-05-13 DIAGNOSIS — K44.9 PARAESOPHAGEAL HERNIA: Primary | ICD-10-CM

## 2022-05-13 DIAGNOSIS — K44.9 PARAESOPHAGEAL HERNIA: ICD-10-CM

## 2022-05-13 DIAGNOSIS — K22.10 ESOPHAGITIS, EROSIVE: ICD-10-CM

## 2022-05-13 PROCEDURE — 99999 PR PBB SHADOW E&M-EST. PATIENT-LVL V: ICD-10-PCS | Mod: PBBFAC,,, | Performed by: SURGERY

## 2022-05-13 PROCEDURE — 71045 XR CHEST 1 VIEW: ICD-10-PCS | Mod: 26,,, | Performed by: RADIOLOGY

## 2022-05-13 PROCEDURE — 93010 EKG 12-LEAD: ICD-10-PCS | Mod: ,,, | Performed by: INTERNAL MEDICINE

## 2022-05-13 PROCEDURE — 99212 OFFICE O/P EST SF 10 MIN: CPT | Mod: S$PBB,,, | Performed by: SURGERY

## 2022-05-13 PROCEDURE — 71045 X-RAY EXAM CHEST 1 VIEW: CPT | Mod: 26,,, | Performed by: RADIOLOGY

## 2022-05-13 PROCEDURE — 99215 OFFICE O/P EST HI 40 MIN: CPT | Mod: PBBFAC,25 | Performed by: SURGERY

## 2022-05-13 PROCEDURE — 71045 X-RAY EXAM CHEST 1 VIEW: CPT | Mod: TC

## 2022-05-13 PROCEDURE — 99999 PR PBB SHADOW E&M-EST. PATIENT-LVL V: CPT | Mod: PBBFAC,,, | Performed by: SURGERY

## 2022-05-13 PROCEDURE — 93010 ELECTROCARDIOGRAM REPORT: CPT | Mod: ,,, | Performed by: INTERNAL MEDICINE

## 2022-05-13 PROCEDURE — 93005 ELECTROCARDIOGRAM TRACING: CPT

## 2022-05-13 PROCEDURE — 99212 PR OFFICE/OUTPT VISIT, EST, LEVL II, 10-19 MIN: ICD-10-PCS | Mod: S$PBB,,, | Performed by: SURGERY

## 2022-05-13 NOTE — PROGRESS NOTES
[facility] -   General Surgery History & Physical    SUBJECTIVE:     History of Present Illness:  Patient is a 72 y.o. male presents to discuss hiatal hernia repair. He states he was eating a large dinner and noticed an uncomfortable pressure like sensation which has never happened before. He would like to have the surgery done as he is also concerned about the long term risks of having a large hiatal hernia (gastric volvulus, aspiration, cardiopulmonary complications, ulcers, bleeding, perforation).      Review of patient's allergies indicates:   Allergen Reactions    Nsaids (non-steroidal anti-inflammatory drug)      Caused GI bleeding.    Aspirin     Celecoxib        Current Outpatient Medications   Medication Sig Dispense Refill    FLUZONE HIGHDOSE QUAD 20-21  mcg/0.7 mL Syrg ADM 0.7ML IM UTD      gabapentin (NEURONTIN) 300 MG capsule Take 300 mg by mouth every evening.       LACTOBACILLUS ACIDOPHILUS (PROBIOTIC ORAL) Take by mouth once daily.      levetiracetam (KEPPRA) 500 MG Tab Take 1 tablet (500 mg total) by mouth 2 (two) times daily. 60 tablet 11    multivitamin (THERAGRAN) per tablet Take 1 tablet by mouth once daily.      multivitamin,tx-iron-minerals Tab Take by mouth.      pantoprazole (PROTONIX) 40 MG tablet Take 1 tablet (40 mg total) by mouth once daily. 90 tablet 3    pravastatin (PRAVACHOL) 40 MG tablet Take 1 tablet (40 mg total) by mouth once daily. 90 tablet 3     No current facility-administered medications for this visit.       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     Seizures     because of brain tumor    Sleep apnea     Spinal cord disease     meningioma     Past Surgical History:   Procedure Laterality Date    BRAIN SURGERY      CARDIAC PACEMAKER PLACEMENT      COLONOSCOPY N/A 8/25/2016    Procedure: COLONOSCOPY;  Surgeon: Morris Olguin MD;  Location: [facility] ENDO;  Service: Endoscopy;  Laterality: N/A;    COLONOSCOPY N/A 4/4/2022    Procedure: COLONOSCOPY;  Surgeon: Marci Silva MD;  Location: [facility] ENDO;  Service: Endoscopy;  Laterality: N/A;    ESOPHAGEAL MANOMETRY WITH MEASUREMENT OF IMPEDANCE N/A 4/26/2022    Procedure: MANOMETRY, ESOPHAGUS, WITH IMPEDANCE MEASUREMENT;  Surgeon: Yoni Portillo RN;  Location: [facility] ENDO;  Service: Endoscopy;  Laterality: N/A;    ESOPHAGOGASTRODUODENOSCOPY N/A 4/4/2022    Procedure: ESOPHAGOGASTRODUODENOSCOPY (EGD);  Surgeon: Marci Silva MD;  [facility];  Service: Endoscopy;  Laterality: N/A;    FRACTURE SURGERY Right     right jaw    Gamma knife      for schwannoma    REPLACEMENT OF PACEMAKER GENERATOR Left 2/13/2019    Procedure: REPLACEMENT, PULSE GENERATOR, CARDIAC PACEMAKER;  Surgeon: Gen Meza MD;  Location: [facility] CATH LAB;  Service: Cardiology;  Laterality: Left;  current device MDT/waiting for MD input     Family History   Problem Relation Age of Onset    Colon cancer Father     Hypertension Unknown      Social History     Tobacco Use    Smoking status: Never Smoker    Smokeless tobacco: Never Used   Substance Use Topics    Alcohol use: No     Comment: socially    Drug use: No        Review of Systems:  Review of Systems   Constitutional: Negative for fever and unexpected weight change.   HENT: Negative for trouble swallowing.    Respiratory: Negative for cough and shortness of breath.    Cardiovascular: Negative for chest pain.   Gastrointestinal: Negative for abdominal pain, nausea and vomiting.   Genitourinary: Negative for difficulty urinating, dysuria and hematuria.   Neurological:  Negative for seizures.       OBJECTIVE:     Vital Signs (Most Recent)  Temp: 97.4 °F (36.3 °C) (05/13/22 1019)  Pulse: 74 (05/13/22 1019)  BP: 120/72 (05/13/22 1019)     91.9 kg (202 lb 9.6 oz)     Physical Exam:  Physical Exam  Vitals and nursing note reviewed.   Constitutional:       General: He is not in acute distress.     Appearance: He is not ill-appearing, toxic-appearing or diaphoretic.   HENT:      Head: Normocephalic and atraumatic.      Right Ear: External ear normal.      Left Ear: External ear normal.      Nose: Nose normal. No congestion or rhinorrhea.      Mouth/Throat:      Mouth: Mucous membranes are moist.      Pharynx: Oropharynx is clear.   Eyes:      General: No scleral icterus.        Right eye: No discharge.         Left eye: No discharge.      Extraocular Movements: Extraocular movements intact.      Conjunctiva/sclera: Conjunctivae normal.      Pupils: Pupils are equal, round, and reactive to light.   Cardiovascular:      Rate and Rhythm: Normal rate and regular rhythm.   Pulmonary:      Effort: Pulmonary effort is normal. No respiratory distress.      Breath sounds: No stridor.   Abdominal:      General: There is no distension.      Palpations: Abdomen is soft. There is no mass.      Tenderness: There is no abdominal tenderness. There is no guarding or rebound.   Musculoskeletal:         General: No deformity. Normal range of motion.      Cervical back: Normal range of motion and neck supple. No rigidity.   Skin:     General: Skin is warm and dry.      Capillary Refill: Capillary refill takes less than 2 seconds.      Coloration: Skin is not jaundiced or pale.   Neurological:      General: No focal deficit present.      Mental Status: He is alert and oriented to person, place, and time.      Cranial Nerves: No cranial nerve deficit.   Psychiatric:         Mood and Affect: Mood normal.         Behavior: Behavior normal.         Laboratory      Diagnostic Results:      ASSESSMENT/PLAN:      Adal was seen today for pre-op exam.    Diagnoses and all orders for this visit:    Paraesophageal hernia  -     Case Request Operating Room: XI ROBOTIC REPAIR, HERNIA, HIATAL, ROBOTIC FUNDOPLICATION, NISSEN, EGD (ESOPHAGOGASTRODUODENOSCOPY)  -     CBC Auto Differential; Future  -     COMPREHENSIVE METABOLIC PANEL; Future  -     EKG 12-lead; Future  -     X-Ray Chest 1 View; Future    Esophagitis, erosive  -     Case Request Operating Room: XI ROBOTIC REPAIR, HERNIA, HIATAL, ROBOTIC FUNDOPLICATION, NISSEN, EGD (ESOPHAGOGASTRODUODENOSCOPY)  -     CBC Auto Differential; Future  -     COMPREHENSIVE METABOLIC PANEL; Future  -     EKG 12-lead; Future  -     X-Ray Chest 1 View; Future         Plan for robotic hiatal hernia repair, gastric fundoplication, intraoperative EGD. I explained that he will need to stay at least one night in the hospital and remain on full liquids for one week.    After explaining the risks, benefits, and alternatives, patient verbalized understanding and would like to proceed with surgery. All questions were answered to their satisfaction.      Patient expressed understanding and is in agreement.      Ryanne Phan, DO  General Surgery  [facility]  5/13/2022

## 2022-05-13 NOTE — H&P (VIEW-ONLY)
[facility] -   General Surgery History & Physical    SUBJECTIVE:     History of Present Illness:  Patient is a 72 y.o. male presents to discuss hiatal hernia repair. He states he was eating a large dinner and noticed an uncomfortable pressure like sensation which has never happened before. He would like to have the surgery done as he is also concerned about the long term risks of having a large hiatal hernia (gastric volvulus, aspiration, cardiopulmonary complications, ulcers, bleeding, perforation).      Review of patient's allergies indicates:   Allergen Reactions    Nsaids (non-steroidal anti-inflammatory drug)      Caused GI bleeding.    Aspirin     Celecoxib        Current Outpatient Medications   Medication Sig Dispense Refill    FLUZONE HIGHDOSE QUAD 20-21  mcg/0.7 mL Syrg ADM 0.7ML IM UTD      gabapentin (NEURONTIN) 300 MG capsule Take 300 mg by mouth every evening.       LACTOBACILLUS ACIDOPHILUS (PROBIOTIC ORAL) Take by mouth once daily.      levetiracetam (KEPPRA) 500 MG Tab Take 1 tablet (500 mg total) by mouth 2 (two) times daily. 60 tablet 11    multivitamin (THERAGRAN) per tablet Take 1 tablet by mouth once daily.      multivitamin,tx-iron-minerals Tab Take by mouth.      pantoprazole (PROTONIX) 40 MG tablet Take 1 tablet (40 mg total) by mouth once daily. 90 tablet 3    pravastatin (PRAVACHOL) 40 MG tablet Take 1 tablet (40 mg total) by mouth once daily. 90 tablet 3     No current facility-administered medications for this visit.       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     Seizures     because of brain tumor    Sleep apnea     Spinal cord disease     meningioma     Past Surgical History:   Procedure Laterality Date    BRAIN SURGERY      CARDIAC PACEMAKER PLACEMENT      COLONOSCOPY N/A 8/25/2016    Procedure: COLONOSCOPY;  Surgeon: Morris Olguin MD;  Location: [facility] ENDO;  Service: Endoscopy;  Laterality: N/A;    COLONOSCOPY N/A 4/4/2022    Procedure: COLONOSCOPY;  Surgeon: Marci Silva MD;  Location: [facility] ENDO;  Service: Endoscopy;  Laterality: N/A;    ESOPHAGEAL MANOMETRY WITH MEASUREMENT OF IMPEDANCE N/A 4/26/2022    Procedure: MANOMETRY, ESOPHAGUS, WITH IMPEDANCE MEASUREMENT;  Surgeon: Yoni Portillo RN;  Location: [facility] ENDO;  Service: Endoscopy;  Laterality: N/A;    ESOPHAGOGASTRODUODENOSCOPY N/A 4/4/2022    Procedure: ESOPHAGOGASTRODUODENOSCOPY (EGD);  Surgeon: Marci Silva MD;  Location: [facility];  Service: Endoscopy;  Laterality: N/A;    FRACTURE SURGERY Right     right jaw    Gamma knife      for schwannoma    REPLACEMENT OF PACEMAKER GENERATOR Left 2/13/2019    Procedure: REPLACEMENT, PULSE GENERATOR, CARDIAC PACEMAKER;  Surgeon: Gen Meza MD;  Location: [facility] CATH LAB;  Service: Cardiology;  Laterality: Left;  current device MDT/waiting for MD input     Family History   Problem Relation Age of Onset    Colon cancer Father     Hypertension Unknown      Social History     Tobacco Use    Smoking status: Never Smoker    Smokeless tobacco: Never Used   Substance Use Topics    Alcohol use: No     Comment: socially    Drug use: No        Review of Systems:  Review of Systems   Constitutional: Negative for fever and unexpected weight change.   HENT: Negative for trouble swallowing.    Respiratory: Negative for cough and shortness of breath.    Cardiovascular: Negative for chest pain.   Gastrointestinal: Negative for abdominal pain, nausea and vomiting.   Genitourinary: Negative for difficulty urinating, dysuria and hematuria.   Neurological:  Negative for seizures.       OBJECTIVE:     Vital Signs (Most Recent)  Temp: 97.4 °F (36.3 °C) (05/13/22 1019)  Pulse: 74 (05/13/22 1019)  BP: 120/72 (05/13/22 1019)     91.9 kg (202 lb 9.6 oz)     Physical Exam:  Physical Exam  Vitals and nursing note reviewed.   Constitutional:       General: He is not in acute distress.     Appearance: He is not ill-appearing, toxic-appearing or diaphoretic.   HENT:      Head: Normocephalic and atraumatic.      Right Ear: External ear normal.      Left Ear: External ear normal.      Nose: Nose normal. No congestion or rhinorrhea.      Mouth/Throat:      Mouth: Mucous membranes are moist.      Pharynx: Oropharynx is clear.   Eyes:      General: No scleral icterus.        Right eye: No discharge.         Left eye: No discharge.      Extraocular Movements: Extraocular movements intact.      Conjunctiva/sclera: Conjunctivae normal.      Pupils: Pupils are equal, round, and reactive to light.   Cardiovascular:      Rate and Rhythm: Normal rate and regular rhythm.   Pulmonary:      Effort: Pulmonary effort is normal. No respiratory distress.      Breath sounds: No stridor.   Abdominal:      General: There is no distension.      Palpations: Abdomen is soft. There is no mass.      Tenderness: There is no abdominal tenderness. There is no guarding or rebound.   Musculoskeletal:         General: No deformity. Normal range of motion.      Cervical back: Normal range of motion and neck supple. No rigidity.   Skin:     General: Skin is warm and dry.      Capillary Refill: Capillary refill takes less than 2 seconds.      Coloration: Skin is not jaundiced or pale.   Neurological:      General: No focal deficit present.      Mental Status: He is alert and oriented to person, place, and time.      Cranial Nerves: No cranial nerve deficit.   Psychiatric:         Mood and Affect: Mood normal.         Behavior: Behavior normal.         Laboratory      Diagnostic Results:      ASSESSMENT/PLAN:      Adal was seen today for pre-op exam.    Diagnoses and all orders for this visit:    Paraesophageal hernia  -     Case Request Operating Room: XI ROBOTIC REPAIR, HERNIA, HIATAL, ROBOTIC FUNDOPLICATION, NISSEN, EGD (ESOPHAGOGASTRODUODENOSCOPY)  -     CBC Auto Differential; Future  -     COMPREHENSIVE METABOLIC PANEL; Future  -     EKG 12-lead; Future  -     X-Ray Chest 1 View; Future    Esophagitis, erosive  -     Case Request Operating Room: XI ROBOTIC REPAIR, HERNIA, HIATAL, ROBOTIC FUNDOPLICATION, NISSEN, EGD (ESOPHAGOGASTRODUODENOSCOPY)  -     CBC Auto Differential; Future  -     COMPREHENSIVE METABOLIC PANEL; Future  -     EKG 12-lead; Future  -     X-Ray Chest 1 View; Future         Plan for robotic hiatal hernia repair, gastric fundoplication, intraoperative EGD. I explained that he will need to stay at least one night in the hospital and remain on full liquids for one week.    After explaining the risks, benefits, and alternatives, patient verbalized understanding and would like to proceed with surgery. All questions were answered to their satisfaction.      Patient expressed understanding and is in agreement.      Ryanne Phan, DO  General Surgery  [facility]  5/13/2022

## 2022-05-20 ENCOUNTER — CLINICAL SUPPORT (OUTPATIENT)
Dept: CARDIOLOGY | Facility: HOSPITAL | Age: 72
End: 2022-05-20
Attending: INTERNAL MEDICINE
Payer: MEDICARE

## 2022-05-20 DIAGNOSIS — Z95.0 CARDIAC PACEMAKER IN SITU: ICD-10-CM

## 2022-05-20 DIAGNOSIS — R00.1 SEVERE SINUS BRADYCARDIA: ICD-10-CM

## 2022-05-20 PROCEDURE — 93280 PM DEVICE PROGR EVAL DUAL: CPT

## 2022-05-23 ENCOUNTER — TELEPHONE (OUTPATIENT)
Dept: ORTHOPEDICS | Facility: CLINIC | Age: 72
End: 2022-05-23
Payer: COMMERCIAL

## 2022-06-01 NOTE — PRE ADMISSION SCREENING
Pre op instructions reviewed with Pt per phone: Spoke about pre op process and surgery instructions, verbalized understanding.    Surgery is scheduled on 6/6/22. Please arrive at 5:30am. We will call you the afternoon prior to surgery to confirm arrival time, as it is subject to change due to cancellations & emergencies.    Please report to the [facility] (1st Floor) at [facility] located off of [facility] (2nd building on the left, in front of the [facility] pole).  Address: [address] [place] [address] [place] [address]     INSTRUCTIONS IMPORTANT!!!  Do Not Eat, Drink, or Smoke after 12 midnight! NO WATER after midnight! OK to brush teeth, no gum, candy or mints!      *Take only these medicines with a small swallow of water-morning of surgery.  Keppra    ____  NO Acrylic/fake nails or nail polish worn day of surgery (specifically hand/arm & foot surgeries).  ____  NO powder, lotions, deodorants, oils or creams on body.  ____  Please Remove All jewelry & piercings prior to surgery.  ____  Please Remove Dentures, Hearing Aids & Contact Lens prior to the start of surgery.  ____  Please bring photo ID and insurance information to hospital (Leave Valuables at Home).  ____  If going home the same day, arrange for a ride home. You will not be able to drive 24 hrs if Anesthesia was used.   ____  Females (ages 11-60) may need to give a urine sample the morning of surgery; please see Pre op Nurse prior to voiding.  ____  Wear clean, loose fitting clothing. Allow for dressings, bandages.  ____  Stop all Aspirin products, Ibuprofen, Advil, Motrin & Aleve at least 5-7 days before surgery, unless otherwise instructed by your doctor, or the nurse.   ____  Blood Thinners are stopped based on your Provider's recommendation; Call Surgeon's Office to inquire when to stop/hold.  ____  Stop taking any Fish Oil supplements or Vitamins at least 5 days prior to surgery, unless instructed otherwise by your Doctor.            Diabetic  Patients: If you take diabetic medication, do NOT take morning of surgery unless instructed by             Doctor. Metformin to be stopped 24 hrs prior to surgery time. DO NOT take long-acting insulin the evening before surgery. Blood sugars will be checked in pre-op morning of.    Bathing Instructions:    -Do not shave your face or body the day before or the day of surgery.  -Do not shave pubic hair 7 days prior to surgery (gyn pt's).   -Shower & Rinse your body as usual with anti-bacterial Soap (Dial, Lever 2000, or Hibiclens)   -Do not use Hibiclens on your head, face, or genitals.   -Do not wash with anti-bacterial soap after you use the Hibiclens.   -Rinse your body thoroughly.      [facility] Visitor/Ride Policy:  Only 2 adults allowed (over the age of 18) to accompany you to the Hospital. You Must have a ride home from a responsible adult that you know and trust. Medical Transport, Uber or Lyft can only be used if patient has a responsible adult to accompany them during ride home.    Post-Op Instructions: You will receive Post-op/Discharge instructions by your Discharge Nurse prior to going home. Please call your Surgeon's office with any post-surgery questions/concerns.    *Call [facility] with surgery instruction questions @ [phone] or [phone] (Mon-Fri 7:30a to 3:45p)  *If you are running late or have questions the morning of surgery, please call the Surgery Dept @ [phone]  *Insurance/ Financial Questions, please call [phone].

## 2022-06-03 ENCOUNTER — ANESTHESIA EVENT (OUTPATIENT)
Dept: SURGERY | Facility: HOSPITAL | Age: 72
End: 2022-06-03
Payer: MEDICARE

## 2022-06-03 ENCOUNTER — TELEPHONE (OUTPATIENT)
Dept: PREADMISSION TESTING | Facility: HOSPITAL | Age: 72
End: 2022-06-03
Payer: COMMERCIAL

## 2022-06-03 NOTE — PRE ADMISSION SCREENING
Called and spoke with Dr Moyer with Anesthesia Dept regarding pt health history, specifically the patient's pacemaker. Anesthesia response: no new orders. Patient scheduled for surgery on 6/6/22.

## 2022-06-03 NOTE — TELEPHONE ENCOUNTER
Called and LVM for pt about the following:    Please arrive to [facility] main lobby at 0530am on 6/6/22 for your scheduled procedure. NOTHING to eat or drink after midnight, except medications instructed by the Pre-admit Nurse.

## 2022-06-03 NOTE — ANESTHESIA PREPROCEDURE EVALUATION
06/03/2022  Adal Loaiza Jr. is a 72 y.o., male.    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)    Cardiac pacemaker in situ    Fatty liver    Obstructive sleep apnea    Myofascial pain    Hyperlipidemia    Pacemaker lead fracture    Abnormal digital rectal exam    Family history of colon cancer    Colon polyps    Internal hemorrhoids    Sensorineural hearing loss (SNHL) of both ears    Essential tremor    Meningioma    Renal cyst    Inadequate sleep hygiene    PAF (paroxysmal atrial fibrillation)    Left knee pain    Partial symptomatic epilepsy with complex partial seizures, not intractable, without status epilepticus    SSS (sick sinus syndrome)    Localized osteoarthritis of left knee    Trigeminal neuralgia    Anemia    Paraesophageal hernia    Elevated PSA    Iron deficiency anemia       Pre-op Assessment    I have reviewed the Patient Summary Reports.    I have reviewed the NPO Status.   I have reviewed the Medications.     Review of Systems  Anesthesia Hx:  No problems with previous Anesthesia    Social:  Non-Smoker    Hematology/Oncology:  Hematology Normal        Cardiovascular:   Pacemaker Dysrhythmias hyperlipidemia ECG has been reviewed. Hx of SSS   Pulmonary:   Sleep Apnea    Hepatic/GI:   Hiatal Hernia,    Musculoskeletal:   Arthritis     Neurological:   Seizures Trigeminal neuralga   Endocrine:  Endocrine Normal        Physical Exam  General: Well nourished    Airway:  Mallampati: II   Mouth Opening: Normal  TM Distance: Normal  Neck ROM: Normal ROM    Dental:  Intact        Anesthesia Plan  Type of Anesthesia, risks & benefits discussed:    Anesthesia Type: Gen ETT  Intra-op Monitoring Plan: Standard ASA Monitors  Post Op Pain Control Plan: multimodal analgesia  Induction:  IV  Airway Plan: ,  Post-Induction  Informed Consent: Informed consent signed with the Patient and all parties understand the risks and agree with anesthesia plan.  All questions answered.   ASA Score: 2    Ready For Surgery From Anesthesia Perspective.     .    Chemistry        Component Value Date/Time     05/13/2022 1126    K 4.8 05/13/2022 1126     05/13/2022 1126    CO2 27 05/13/2022 1126    BUN 16 05/13/2022 1126    CREATININE 1.1 05/13/2022 1126    GLU 90 05/13/2022 1126        Component Value Date/Time    CALCIUM 9.7 05/13/2022 1126    ALKPHOS 54 (L) 05/13/2022 1126    AST 25 05/13/2022 1126    ALT 23 05/13/2022 1126    BILITOT 0.7 05/13/2022 1126    ESTGFRAFRICA >60.0 05/13/2022 1126    EGFRNONAA >60.0 05/13/2022 1126        Lab Results   Component Value Date    WBC 5.90 05/13/2022    HGB 16.1 05/13/2022    HCT 50.9 05/13/2022    MCV 92 05/13/2022     05/13/2022       Sinus rhythm with 1st degree A-V block   Septal infarct ,age undetermined   Abnormal ECG   When compared with ECG of 06-OCT-2021 17:36,   Premature atrial complexes are no longer Present   MT interval has increased     Echo 1/22/19:  CONCLUSIONS     1 - Normal left ventricular systolic function (EF 60-65%).     2 - Normal left ventricular diastolic function.     3 - Normal right ventricular systolic function .     4 - No wall motion abnormalities.     5 - Concentric hypertrophy.     6 - The estimated PA systolic pressure is greater than 27 mmHg.     7 - Trivial aortic regurgitation.     8 - Mild tricuspid regurgitation.

## 2022-06-06 ENCOUNTER — HOSPITAL ENCOUNTER (OUTPATIENT)
Facility: HOSPITAL | Age: 72
LOS: 1 days | Discharge: HOME | End: 2022-06-07
Attending: SURGERY | Admitting: SURGERY
Payer: MEDICARE

## 2022-06-06 ENCOUNTER — ANESTHESIA (OUTPATIENT)
Dept: SURGERY | Facility: HOSPITAL | Age: 72
End: 2022-06-06
Payer: MEDICARE

## 2022-06-06 PROCEDURE — 63600175 PHARM REV CODE 636 W HCPCS: Performed by: SURGERY

## 2022-06-06 PROCEDURE — 36000713 HC OR TIME LEV V EA ADD 15 MIN: Performed by: SURGERY

## 2022-06-06 PROCEDURE — 25000003 PHARM REV CODE 250: Performed by: NURSE ANESTHETIST, CERTIFIED REGISTERED

## 2022-06-06 PROCEDURE — 63600175 PHARM REV CODE 636 W HCPCS: Performed by: NURSE ANESTHETIST, CERTIFIED REGISTERED

## 2022-06-06 PROCEDURE — 71000033 HC RECOVERY, INTIAL HOUR: Performed by: SURGERY

## 2022-06-06 PROCEDURE — 43281 PR LAP, REPAIR PARAESOPHAGEAL HERNIA, INCL FUNDOPLASTY W/O MESH: ICD-10-PCS | Mod: ,,, | Performed by: SURGERY

## 2022-06-06 PROCEDURE — 99900035 HC TECH TIME PER 15 MIN (STAT)

## 2022-06-06 PROCEDURE — 37000008 HC ANESTHESIA 1ST 15 MINUTES: Performed by: SURGERY

## 2022-06-06 PROCEDURE — 25000003 PHARM REV CODE 250: Performed by: SURGERY

## 2022-06-06 PROCEDURE — 37000009 HC ANESTHESIA EA ADD 15 MINS: Performed by: SURGERY

## 2022-06-06 PROCEDURE — 27201423 OPTIME MED/SURG SUP & DEVICES STERILE SUPPLY: Performed by: SURGERY

## 2022-06-06 PROCEDURE — 36000712 HC OR TIME LEV V 1ST 15 MIN: Performed by: SURGERY

## 2022-06-06 PROCEDURE — 71000039 HC RECOVERY, EACH ADD'L HOUR: Performed by: SURGERY

## 2022-06-06 PROCEDURE — C1729 CATH, DRAINAGE: HCPCS | Performed by: SURGERY

## 2022-06-06 PROCEDURE — 94799 UNLISTED PULMONARY SVC/PX: CPT

## 2022-06-06 PROCEDURE — 63600175 PHARM REV CODE 636 W HCPCS: Performed by: ANESTHESIOLOGY

## 2022-06-06 PROCEDURE — 43281 LAP PARAESOPHAG HERN REPAIR: CPT | Mod: ,,, | Performed by: SURGERY

## 2022-06-06 PROCEDURE — C9290 INJ, BUPIVACAINE LIPOSOME: HCPCS | Performed by: SURGERY

## 2022-06-06 PROCEDURE — C9113 INJ PANTOPRAZOLE SODIUM, VIA: HCPCS | Performed by: SURGERY

## 2022-06-06 RX ORDER — SODIUM CHLORIDE 9 MG/ML
INJECTION, SOLUTION INTRAVENOUS CONTINUOUS
Status: DISCONTINUED | OUTPATIENT
Start: 2022-06-06 | End: 2022-06-07

## 2022-06-06 RX ORDER — BUPIVACAINE HYDROCHLORIDE 2.5 MG/ML
INJECTION, SOLUTION EPIDURAL; INFILTRATION; INTRACAUDAL
Status: DISCONTINUED | OUTPATIENT
Start: 2022-06-06 | End: 2022-06-06 | Stop reason: HOSPADM

## 2022-06-06 RX ORDER — ROCURONIUM BROMIDE 10 MG/ML
INJECTION, SOLUTION INTRAVENOUS
Status: DISCONTINUED | OUTPATIENT
Start: 2022-06-06 | End: 2022-06-06

## 2022-06-06 RX ORDER — LEVETIRACETAM 500 MG/1
500 TABLET ORAL 2 TIMES DAILY
Status: DISCONTINUED | OUTPATIENT
Start: 2022-06-06 | End: 2022-06-06 | Stop reason: SDUPTHER

## 2022-06-06 RX ORDER — PROPOFOL 10 MG/ML
VIAL (ML) INTRAVENOUS
Status: DISCONTINUED | OUTPATIENT
Start: 2022-06-06 | End: 2022-06-06

## 2022-06-06 RX ORDER — CEFAZOLIN SODIUM 1 G/3ML
INJECTION, POWDER, FOR SOLUTION INTRAMUSCULAR; INTRAVENOUS
Status: DISCONTINUED | OUTPATIENT
Start: 2022-06-06 | End: 2022-06-06

## 2022-06-06 RX ORDER — DEXAMETHASONE SODIUM PHOSPHATE 4 MG/ML
INJECTION, SOLUTION INTRA-ARTICULAR; INTRALESIONAL; INTRAMUSCULAR; INTRAVENOUS; SOFT TISSUE
Status: DISCONTINUED | OUTPATIENT
Start: 2022-06-06 | End: 2022-06-06

## 2022-06-06 RX ORDER — SUCCINYLCHOLINE CHLORIDE 20 MG/ML
INJECTION INTRAMUSCULAR; INTRAVENOUS
Status: DISCONTINUED | OUTPATIENT
Start: 2022-06-06 | End: 2022-06-06

## 2022-06-06 RX ORDER — MORPHINE SULFATE 4 MG/ML
4 INJECTION, SOLUTION INTRAMUSCULAR; INTRAVENOUS EVERY 4 HOURS PRN
Status: DISCONTINUED | OUTPATIENT
Start: 2022-06-06 | End: 2022-06-07 | Stop reason: HOSPADM

## 2022-06-06 RX ORDER — HYDROCODONE BITARTRATE AND ACETAMINOPHEN 7.5; 325 MG/15ML; MG/15ML
15 SOLUTION ORAL EVERY 4 HOURS PRN
Status: DISCONTINUED | OUTPATIENT
Start: 2022-06-06 | End: 2022-06-07 | Stop reason: HOSPADM

## 2022-06-06 RX ORDER — LIDOCAINE HYDROCHLORIDE 10 MG/ML
INJECTION, SOLUTION EPIDURAL; INFILTRATION; INTRACAUDAL; PERINEURAL
Status: DISCONTINUED | OUTPATIENT
Start: 2022-06-06 | End: 2022-06-06

## 2022-06-06 RX ORDER — SODIUM CHLORIDE, SODIUM LACTATE, POTASSIUM CHLORIDE, CALCIUM CHLORIDE 600; 310; 30; 20 MG/100ML; MG/100ML; MG/100ML; MG/100ML
INJECTION, SOLUTION INTRAVENOUS CONTINUOUS PRN
Status: DISCONTINUED | OUTPATIENT
Start: 2022-06-06 | End: 2022-06-06

## 2022-06-06 RX ORDER — LEVETIRACETAM 100 MG/ML
500 SOLUTION ORAL 2 TIMES DAILY
Status: DISCONTINUED | OUTPATIENT
Start: 2022-06-06 | End: 2022-06-07 | Stop reason: HOSPADM

## 2022-06-06 RX ORDER — PANTOPRAZOLE SODIUM 40 MG/10ML
40 INJECTION, POWDER, LYOPHILIZED, FOR SOLUTION INTRAVENOUS NIGHTLY
Status: DISCONTINUED | OUTPATIENT
Start: 2022-06-06 | End: 2022-06-07 | Stop reason: HOSPADM

## 2022-06-06 RX ORDER — GABAPENTIN 250 MG/5ML
300 SOLUTION ORAL NIGHTLY
Status: DISCONTINUED | OUTPATIENT
Start: 2022-06-06 | End: 2022-06-07 | Stop reason: HOSPADM

## 2022-06-06 RX ORDER — HYDROMORPHONE HYDROCHLORIDE 2 MG/ML
0.2 INJECTION, SOLUTION INTRAMUSCULAR; INTRAVENOUS; SUBCUTANEOUS EVERY 5 MIN PRN
Status: DISCONTINUED | OUTPATIENT
Start: 2022-06-06 | End: 2022-06-06 | Stop reason: HOSPADM

## 2022-06-06 RX ORDER — NEOSTIGMINE METHYLSULFATE 1 MG/ML
INJECTION, SOLUTION INTRAVENOUS
Status: DISCONTINUED | OUTPATIENT
Start: 2022-06-06 | End: 2022-06-06

## 2022-06-06 RX ORDER — GABAPENTIN 300 MG/1
300 CAPSULE ORAL NIGHTLY
Status: DISCONTINUED | OUTPATIENT
Start: 2022-06-06 | End: 2022-06-06 | Stop reason: SDUPTHER

## 2022-06-06 RX ORDER — OXYCODONE AND ACETAMINOPHEN 5; 325 MG/1; MG/1
1 TABLET ORAL
Status: DISCONTINUED | OUTPATIENT
Start: 2022-06-06 | End: 2022-06-06 | Stop reason: HOSPADM

## 2022-06-06 RX ORDER — ONDANSETRON 2 MG/ML
INJECTION INTRAMUSCULAR; INTRAVENOUS
Status: DISCONTINUED | OUTPATIENT
Start: 2022-06-06 | End: 2022-06-06

## 2022-06-06 RX ORDER — ONDANSETRON 2 MG/ML
4 INJECTION INTRAMUSCULAR; INTRAVENOUS EVERY 6 HOURS PRN
Status: DISCONTINUED | OUTPATIENT
Start: 2022-06-06 | End: 2022-06-07 | Stop reason: HOSPADM

## 2022-06-06 RX ORDER — PHENYLEPHRINE HYDROCHLORIDE 10 MG/ML
INJECTION INTRAVENOUS
Status: DISCONTINUED | OUTPATIENT
Start: 2022-06-06 | End: 2022-06-06

## 2022-06-06 RX ORDER — ACETAMINOPHEN 650 MG/20.3ML
650 LIQUID ORAL EVERY 6 HOURS
Status: DISCONTINUED | OUTPATIENT
Start: 2022-06-06 | End: 2022-06-07 | Stop reason: HOSPADM

## 2022-06-06 RX ORDER — DOCUSATE SODIUM 50 MG/5ML
100 LIQUID ORAL 2 TIMES DAILY
Status: DISCONTINUED | OUTPATIENT
Start: 2022-06-06 | End: 2022-06-07 | Stop reason: HOSPADM

## 2022-06-06 RX ORDER — FENTANYL CITRATE 50 UG/ML
INJECTION, SOLUTION INTRAMUSCULAR; INTRAVENOUS
Status: DISCONTINUED | OUTPATIENT
Start: 2022-06-06 | End: 2022-06-06

## 2022-06-06 RX ORDER — ONDANSETRON 2 MG/ML
4 INJECTION INTRAMUSCULAR; INTRAVENOUS DAILY PRN
Status: DISCONTINUED | OUTPATIENT
Start: 2022-06-06 | End: 2022-06-06 | Stop reason: HOSPADM

## 2022-06-06 RX ADMIN — FENTANYL CITRATE 100 MCG: 50 INJECTION, SOLUTION INTRAMUSCULAR; INTRAVENOUS at 06:06

## 2022-06-06 RX ADMIN — GLYCOPYRROLATE 0.2 MG: 0.2 INJECTION, SOLUTION INTRAMUSCULAR; INTRAVENOUS at 07:06

## 2022-06-06 RX ADMIN — LIDOCAINE HYDROCHLORIDE 50 MG: 10 INJECTION, SOLUTION EPIDURAL; INFILTRATION; INTRACAUDAL; PERINEURAL at 07:06

## 2022-06-06 RX ADMIN — PHENYLEPHRINE HYDROCHLORIDE 100 MCG: 10 INJECTION INTRAVENOUS at 10:06

## 2022-06-06 RX ADMIN — PHENYLEPHRINE HYDROCHLORIDE 200 MCG: 10 INJECTION INTRAVENOUS at 08:06

## 2022-06-06 RX ADMIN — NEOSTIGMINE METHYLSULFATE 5 MG: 1 INJECTION INTRAVENOUS at 12:06

## 2022-06-06 RX ADMIN — PHENYLEPHRINE HYDROCHLORIDE 200 MCG: 10 INJECTION INTRAVENOUS at 07:06

## 2022-06-06 RX ADMIN — ONDANSETRON 4 MG: 2 INJECTION, SOLUTION INTRAMUSCULAR; INTRAVENOUS at 12:06

## 2022-06-06 RX ADMIN — ROCURONIUM BROMIDE 25 MG: 10 INJECTION, SOLUTION INTRAVENOUS at 07:06

## 2022-06-06 RX ADMIN — ROCURONIUM BROMIDE 10 MG: 10 INJECTION, SOLUTION INTRAVENOUS at 08:06

## 2022-06-06 RX ADMIN — SODIUM CHLORIDE, SODIUM LACTATE, POTASSIUM CHLORIDE, AND CALCIUM CHLORIDE: 600; 310; 30; 20 INJECTION, SOLUTION INTRAVENOUS at 06:06

## 2022-06-06 RX ADMIN — PROPOFOL 130 MG: 10 INJECTION, EMULSION INTRAVENOUS at 07:06

## 2022-06-06 RX ADMIN — ROCURONIUM BROMIDE 10 MG: 10 INJECTION, SOLUTION INTRAVENOUS at 09:06

## 2022-06-06 RX ADMIN — ROCURONIUM BROMIDE 5 MG: 10 INJECTION, SOLUTION INTRAVENOUS at 07:06

## 2022-06-06 RX ADMIN — GABAPENTIN 300 MG: 250 SOLUTION ORAL at 09:06

## 2022-06-06 RX ADMIN — ROCURONIUM BROMIDE 10 MG: 10 INJECTION, SOLUTION INTRAVENOUS at 10:06

## 2022-06-06 RX ADMIN — GLYCOPYRROLATE 0.8 MG: 0.2 INJECTION, SOLUTION INTRAMUSCULAR; INTRAVENOUS at 12:06

## 2022-06-06 RX ADMIN — PHENYLEPHRINE HYDROCHLORIDE 100 MCG: 10 INJECTION INTRAVENOUS at 08:06

## 2022-06-06 RX ADMIN — DOCUSATE SODIUM 100 MG: 50 LIQUID ORAL at 09:06

## 2022-06-06 RX ADMIN — SODIUM CHLORIDE: 0.9 INJECTION, SOLUTION INTRAVENOUS at 06:06

## 2022-06-06 RX ADMIN — DEXAMETHASONE SODIUM PHOSPHATE 4 MG: 4 INJECTION, SOLUTION INTRAMUSCULAR; INTRAVENOUS at 12:06

## 2022-06-06 RX ADMIN — PHENYLEPHRINE HYDROCHLORIDE 100 MCG: 10 INJECTION INTRAVENOUS at 09:06

## 2022-06-06 RX ADMIN — LEVETIRACETAM 500 MG: 100 SOLUTION ORAL at 09:06

## 2022-06-06 RX ADMIN — PHENYLEPHRINE HYDROCHLORIDE 100 MCG: 10 INJECTION INTRAVENOUS at 11:06

## 2022-06-06 RX ADMIN — ACETAMINOPHEN 650 MG: 160 SOLUTION ORAL at 05:06

## 2022-06-06 RX ADMIN — HYDROMORPHONE HYDROCHLORIDE 0.2 MG: 2 INJECTION, SOLUTION INTRAMUSCULAR; INTRAVENOUS; SUBCUTANEOUS at 01:06

## 2022-06-06 RX ADMIN — ROCURONIUM BROMIDE 10 MG: 10 INJECTION, SOLUTION INTRAVENOUS at 07:06

## 2022-06-06 RX ADMIN — SUCCINYLCHOLINE CHLORIDE 140 MG: 20 INJECTION, SOLUTION INTRAMUSCULAR; INTRAVENOUS at 07:06

## 2022-06-06 RX ADMIN — PANTOPRAZOLE SODIUM 40 MG: 40 INJECTION, POWDER, FOR SOLUTION INTRAVENOUS at 09:06

## 2022-06-06 RX ADMIN — SODIUM CHLORIDE, SODIUM LACTATE, POTASSIUM CHLORIDE, AND CALCIUM CHLORIDE: 600; 310; 30; 20 INJECTION, SOLUTION INTRAVENOUS at 10:06

## 2022-06-06 RX ADMIN — ROCURONIUM BROMIDE 10 MG: 10 INJECTION, SOLUTION INTRAVENOUS at 11:06

## 2022-06-06 RX ADMIN — CEFAZOLIN 2 G: 1 INJECTION, POWDER, FOR SOLUTION INTRAMUSCULAR; INTRAVENOUS at 07:06

## 2022-06-06 RX ADMIN — SODIUM CHLORIDE, SODIUM LACTATE, POTASSIUM CHLORIDE, AND CALCIUM CHLORIDE: 600; 310; 30; 20 INJECTION, SOLUTION INTRAVENOUS at 08:06

## 2022-06-06 NOTE — BRIEF OP NOTE
[facility] - Surgery (Hospital)  Brief Operative Note    SUMMARY     Surgery Date: 6/6/2022     Surgeon(s) and Role:     * Ryanne Phan, DO - Primary    Assisting Surgeon: None    Pre-op Diagnosis:  Paraesophageal hernia [K44.9]  Esophagitis, erosive [K22.10]    Post-op Diagnosis:  Post-Op Diagnosis Codes:     * Paraesophageal hernia [K44.9]     * Esophagitis, erosive [K22.10]  Gastritis    Procedure(s) (LRB):  XI ROBOTIC REPAIR, HERNIA, HIATAL (N/A)  FUNDOPLICATION, LAPAROSCOPIC, TOUPET (N/A)  EGD (ESOPHAGOGASTRODUODENOSCOPY) (N/A)    Anesthesia: General    Operative Findings: Nearly 50% of the stomach contained within the chest in a large hernia sac. Toupet created over 50 Fr bougie. Intraoperative EGD demonstrated patent passage into the stomach with intact wrap without twisting. Gastritis. Erosive esophagitis.    Estimated Blood Loss: 50 mL    Estimated Blood Loss has been documented.         Specimens:   Specimen (24h ago, onward)            None          [id number]

## 2022-06-06 NOTE — ANESTHESIA POSTPROCEDURE EVALUATION
Anesthesia Post Evaluation    Patient: Adal Loaiza Jr.    Procedure(s) Performed: Procedure(s) (LRB):  XI ROBOTIC REPAIR, HERNIA, HIATAL (N/A)  FUNDOPLICATION, LAPAROSCOPIC, TOUPET (N/A)  EGD (ESOPHAGOGASTRODUODENOSCOPY) (N/A)    Final Anesthesia Type: general      Patient location during evaluation: PACU  Patient participation: Yes- Able to Participate  Level of consciousness: awake and alert  Post-procedure vital signs: reviewed and stable  Pain management: adequate  Airway patency: patent  SUSU mitigation strategies: Verification of full reversal of neuromuscular block  PONV status at discharge: No PONV  Anesthetic complications: no      Cardiovascular status: hemodynamically stable  Respiratory status: spontaneous ventilation  Hydration status: euvolemic  Follow-up not needed.          Vitals Value Taken Time   /86 06/06/22 1325   Temp 36.9 °C (98.4 °F) 06/06/22 1232   Pulse 73 06/06/22 1325   Resp 14 06/06/22 1325   SpO2 95 % 06/06/22 1325   Vitals shown include unvalidated device data.      No case tracking events are documented in the log.      Pain/Dona Score: Pain Rating Prior to Med Admin: 3 (6/6/2022  1:05 PM)  Dona Score: 8 (6/6/2022  1:00 PM)

## 2022-06-06 NOTE — ANESTHESIA PROCEDURE NOTES
Intubation    Date/Time: 6/6/2022 7:09 AM  Performed by: Christopher Ghotra CRNA  Authorized by: Dante Moyer II, MD     Intubation:     Induction:  Intravenous    Intubated:  Postinduction    Mask Ventilation:  Easy mask    Attempts:  1    Attempted By:  CRNA    Method of Intubation:  Direct    Blade:  Clara 3    Laryngeal View Grade: Grade IIA - cords partially seen      Difficult Airway Encountered?: No      Complications:  None    Airway Device:  Oral endotracheal tube    Airway Device Size:  7.5    Style/Cuff Inflation:  Cuffed (inflated to minimal occlusive pressure)    Inflation Amount (mL):  5    Tube secured:  22    Secured at:  The lips    Placement Verified By:  Capnometry    Complicating Factors:  None    Findings Post-Intubation:  BS equal bilateral and atraumatic/condition of teeth unchanged

## 2022-06-06 NOTE — TRANSFER OF CARE
"Anesthesia Transfer of Care Note    Patient: Adal Loaiza Jr.    Procedure(s) Performed: Procedure(s) (LRB):  XI ROBOTIC REPAIR, HERNIA, HIATAL (N/A)  FUNDOPLICATION, LAPAROSCOPIC, TOUPET (N/A)  EGD (ESOPHAGOGASTRODUODENOSCOPY) (N/A)    Patient location: PACU    Anesthesia Type: general    Transport from OR: Transported from OR on room air with adequate spontaneous ventilation    Post pain: adequate analgesia    Post assessment: no apparent anesthetic complications and tolerated procedure well    Post vital signs: stable    Level of consciousness: awake    Nausea/Vomiting: no nausea/vomiting    Complications: none    Transfer of care protocol was followed      Last vitals:   Visit Vitals  /84 (BP Location: Right arm, Patient Position: Sitting)   Pulse 80   Temp 36.8 °C (98.2 °F) (Temporal)   Resp 20   Ht 5' 10" (1.778 m)   Wt 90.1 kg (198 lb 8.4 oz)   SpO2 96%   BMI 28.49 kg/m²     "

## 2022-06-07 VITALS
HEART RATE: 62 BPM | RESPIRATION RATE: 19 BRPM | HEIGHT: 70 IN | TEMPERATURE: 98 F | BODY MASS INDEX: 29.03 KG/M2 | WEIGHT: 202.81 LBS | SYSTOLIC BLOOD PRESSURE: 122 MMHG | OXYGEN SATURATION: 96 % | DIASTOLIC BLOOD PRESSURE: 73 MMHG

## 2022-06-07 LAB
ANION GAP SERPL CALC-SCNC: 9 MMOL/L (ref 8–16)
BASOPHILS # BLD AUTO: 0.01 K/UL (ref 0–0.2)
BASOPHILS NFR BLD: 0.1 % (ref 0–1.9)
BUN SERPL-MCNC: 13 MG/DL (ref 8–23)
CALCIUM SERPL-MCNC: 8.7 MG/DL (ref 8.7–10.5)
CHLORIDE SERPL-SCNC: 107 MMOL/L (ref 95–110)
CO2 SERPL-SCNC: 25 MMOL/L (ref 23–29)
CREAT SERPL-MCNC: 1.1 MG/DL (ref 0.5–1.4)
DIFFERENTIAL METHOD: ABNORMAL
EOSINOPHIL # BLD AUTO: 0 K/UL (ref 0–0.5)
EOSINOPHIL NFR BLD: 0.1 % (ref 0–8)
ERYTHROCYTE [DISTWIDTH] IN BLOOD BY AUTOMATED COUNT: 13 % (ref 11.5–14.5)
EST. GFR  (AFRICAN AMERICAN): >60 ML/MIN/1.73 M^2
EST. GFR  (NON AFRICAN AMERICAN): >60 ML/MIN/1.73 M^2
GLUCOSE SERPL-MCNC: 110 MG/DL (ref 70–110)
HCT VFR BLD AUTO: 42.9 % (ref 40–54)
HGB BLD-MCNC: 14.3 G/DL (ref 14–18)
IMM GRANULOCYTES # BLD AUTO: 0.04 K/UL (ref 0–0.04)
IMM GRANULOCYTES NFR BLD AUTO: 0.4 % (ref 0–0.5)
LYMPHOCYTES # BLD AUTO: 1.1 K/UL (ref 1–4.8)
LYMPHOCYTES NFR BLD: 10.7 % (ref 18–48)
MAGNESIUM SERPL-MCNC: 1.8 MG/DL (ref 1.6–2.6)
MCH RBC QN AUTO: 29.5 PG (ref 27–31)
MCHC RBC AUTO-ENTMCNC: 33.3 G/DL (ref 32–36)
MCV RBC AUTO: 89 FL (ref 82–98)
MONOCYTES # BLD AUTO: 1.2 K/UL (ref 0.3–1)
MONOCYTES NFR BLD: 11.6 % (ref 4–15)
NEUTROPHILS # BLD AUTO: 8 K/UL (ref 1.8–7.7)
NEUTROPHILS NFR BLD: 77.1 % (ref 38–73)
NRBC BLD-RTO: 0 /100 WBC
PHOSPHATE SERPL-MCNC: 3.1 MG/DL (ref 2.7–4.5)
PLATELET # BLD AUTO: 173 K/UL (ref 150–450)
PMV BLD AUTO: 9.8 FL (ref 9.2–12.9)
POTASSIUM SERPL-SCNC: 4.5 MMOL/L (ref 3.5–5.1)
RBC # BLD AUTO: 4.85 M/UL (ref 4.6–6.2)
SODIUM SERPL-SCNC: 141 MMOL/L (ref 136–145)
WBC # BLD AUTO: 10.32 K/UL (ref 3.9–12.7)

## 2022-06-07 PROCEDURE — 84100 ASSAY OF PHOSPHORUS: CPT | Performed by: SURGERY

## 2022-06-07 PROCEDURE — 83735 ASSAY OF MAGNESIUM: CPT | Performed by: SURGERY

## 2022-06-07 PROCEDURE — 99900035 HC TECH TIME PER 15 MIN (STAT)

## 2022-06-07 PROCEDURE — 63600175 PHARM REV CODE 636 W HCPCS: Performed by: SURGERY

## 2022-06-07 PROCEDURE — 36415 COLL VENOUS BLD VENIPUNCTURE: CPT | Performed by: SURGERY

## 2022-06-07 PROCEDURE — 80048 BASIC METABOLIC PNL TOTAL CA: CPT | Performed by: SURGERY

## 2022-06-07 PROCEDURE — 94799 UNLISTED PULMONARY SVC/PX: CPT

## 2022-06-07 PROCEDURE — 85025 COMPLETE CBC W/AUTO DIFF WBC: CPT | Performed by: SURGERY

## 2022-06-07 PROCEDURE — 25000003 PHARM REV CODE 250: Performed by: SURGERY

## 2022-06-07 RX ORDER — DOCUSATE SODIUM 100 MG/1
100 CAPSULE, LIQUID FILLED ORAL 2 TIMES DAILY
Qty: 60 CAPSULE | Refills: 1 | Status: SHIPPED | OUTPATIENT
Start: 2022-06-07

## 2022-06-07 RX ORDER — ONDANSETRON 4 MG/1
4 TABLET, FILM COATED ORAL EVERY 6 HOURS PRN
Qty: 10 TABLET | Refills: 1 | Status: SHIPPED | OUTPATIENT
Start: 2022-06-07 | End: 2022-06-24

## 2022-06-07 RX ORDER — MAGNESIUM SULFATE HEPTAHYDRATE 40 MG/ML
2 INJECTION, SOLUTION INTRAVENOUS ONCE
Status: COMPLETED | OUTPATIENT
Start: 2022-06-07 | End: 2022-06-07

## 2022-06-07 RX ORDER — HYDROCODONE BITARTRATE AND ACETAMINOPHEN 5; 325 MG/1; MG/1
1 TABLET ORAL EVERY 4 HOURS PRN
Qty: 20 TABLET | Refills: 0 | Status: SHIPPED | OUTPATIENT
Start: 2022-06-07 | End: 2022-06-24

## 2022-06-07 RX ORDER — METHOCARBAMOL 750 MG/1
750 TABLET, FILM COATED ORAL EVERY 8 HOURS PRN
Qty: 21 TABLET | Refills: 0 | Status: SHIPPED | OUTPATIENT
Start: 2022-06-07 | End: 2022-06-24

## 2022-06-07 RX ORDER — TAMSULOSIN HYDROCHLORIDE 0.4 MG/1
0.4 CAPSULE ORAL ONCE
Status: COMPLETED | OUTPATIENT
Start: 2022-06-07 | End: 2022-06-07

## 2022-06-07 RX ADMIN — TAMSULOSIN HYDROCHLORIDE 0.4 MG: 0.4 CAPSULE ORAL at 01:06

## 2022-06-07 RX ADMIN — MAGNESIUM SULFATE 2 G: 2 INJECTION INTRAVENOUS at 09:06

## 2022-06-07 RX ADMIN — ACETAMINOPHEN 650 MG: 160 SOLUTION ORAL at 12:06

## 2022-06-07 RX ADMIN — DOCUSATE SODIUM 100 MG: 50 LIQUID ORAL at 09:06

## 2022-06-07 RX ADMIN — LEVETIRACETAM 500 MG: 100 SOLUTION ORAL at 09:06

## 2022-06-07 RX ADMIN — ACETAMINOPHEN 650 MG: 160 SOLUTION ORAL at 05:06

## 2022-06-07 NOTE — DISCHARGE SUMMARY
[facility] Surg  Discharge Note  Short Stay    Procedure(s) (LRB):  XI ROBOTIC REPAIR, HERNIA, HIATAL (N/A)  FUNDOPLICATION, LAPAROSCOPIC, TOUPET (N/A)  EGD (ESOPHAGOGASTRODUODENOSCOPY) (N/A)    OUTCOME: Patient tolerated treatment/procedure well without complication and is now ready for discharge.    DISPOSITION: Home or Self Care    FINAL DIAGNOSIS:  <principal problem not specified>    FOLLOWUP: In clinic    DISCHARGE INSTRUCTIONS:  No discharge procedures on file.      Clinical Reference Documents Added to Patient Instructions       Document    FULL LIQUID DIET (ENGLISH)          TIME SPENT ON DISCHARGE: 5 minutes

## 2022-06-07 NOTE — PLAN OF CARE
[facility] - Med Surg  Discharge Assessment    Primary Care Provider: Huseyin Marrero MD     Discharge Assessment (most recent)     BRIEF DISCHARGE ASSESSMENT - 06/07/22 1015        Discharge Planning    Assessment Type Discharge Planning Brief Assessment     Resource/Environmental Concerns none     Support Systems Spouse/significant other     Equipment Currently Used at Home CPAP     Current Living Arrangements home/apartment/condo     Patient/Family Anticipates Transition to home with family     Patient/Family Anticipated Services at Transition none     DME Needed Upon Discharge  none     Discharge Plan A Home with family     Discharge Plan B Home with family                 CM met with patient/wife at the bedside to assess for discharge needs.  Patient has no anticipated discharge needs at this time.  CM provided a transitional care folder, information on advanced directives, information on pharmacy bedside delivery, and discharge planning begins on admission with contact information for any needs/questions.

## 2022-06-07 NOTE — PLAN OF CARE
[facility] - Med Surg  Discharge Final Note    Primary Care Provider: Huseyin Marrero MD    Expected Discharge Date: 6/7/2022    Final Discharge Note (most recent)     Final Note - 06/07/22 1302        Final Note    Assessment Type Final Discharge Note     Anticipated Discharge Disposition Home or Self Care     Hospital Resources/Appts/Education Provided Appointments scheduled and added to AVS                 Important Message from Medicare             Contact Info     Ryanne Phan DO   Specialty: General Surgery    [address]  [place] [address]   Phone: [phone]       Next Steps: Schedule an appointment as soon as possible for a visit on 6/17/2022    Instructions: For wound re-check        Jun17 Post OP with Ryanne Phan DO  Friday Jun 17, 2022 10:30 AM

## 2022-06-07 NOTE — PATIENT INSTRUCTIONS
Discharge Instructions    Hygiene and incision care:   You may shower but do not soak such as in a bathtub or pool. Your incisions are closed with absorbable sutures and there is surgical glue on top. The glue will eventually flake off on its own. Do not scrub your incisions, just allow warm soapy water to run over them.   If you develop fevers, worsening redness and/or pus-like drainage, call the office immediately.    Pain control:   You may take Tylenol (650 mg every 4 hours) as well as alternate heat and cold packs for pain and swelling.  If taking narcotic pain medication, such as Norco (hydrocodone-acetaminophen) or Percocet (oxycodone-acetaminophen), do not drink alcohol or drive. Each Norco and Percocet tablet contains 325 mg of Tylenol; do not take more than 4000 mg of Tylenol per day. Narcotic pain medications can be constipating so be sure to drink plenty of water and take an over the counter stool softener such as colace (100 mg twice a day) or miralax (17 g once a day).    Activity:   No heavy lifting >10 lbs for 6 weeks while your incisions are healing as it might result in a hernia. Avoid straining, pushing, and pulling. It is okay to walk and slowly go up and down stairs.   Make sure to do leg and ankle exercises and take deep breaths frequently to avoid developing blood clots or pneumonia.    Diet:   Remain on a full liquid diet for 1 week. After 1 week, you may resume your regular diet but stick to small portions and chew your food well before swallowing. Be sure to eat good, nutritious foods such as vegetables and lean proteins to give your body the nutrients it needs to heal. I recommend also taking vitamin C as this can aid with wound healing.   Crush large pills.    For any questions or concerns, please do not hesitate to contact the office any time at [phone] or send me a [address] message.

## 2022-06-08 ENCOUNTER — OFFICE VISIT (OUTPATIENT)
Dept: SURGERY | Facility: CLINIC | Age: 72
End: 2022-06-08
Payer: MEDICARE

## 2022-06-08 ENCOUNTER — NURSE TRIAGE (OUTPATIENT)
Dept: ADMINISTRATIVE | Facility: CLINIC | Age: 72
End: 2022-06-08
Payer: COMMERCIAL

## 2022-06-08 ENCOUNTER — TELEPHONE (OUTPATIENT)
Dept: SURGERY | Facility: CLINIC | Age: 72
End: 2022-06-08

## 2022-06-08 VITALS
HEART RATE: 106 BPM | WEIGHT: 202.19 LBS | SYSTOLIC BLOOD PRESSURE: 140 MMHG | DIASTOLIC BLOOD PRESSURE: 92 MMHG | TEMPERATURE: 98 F | BODY MASS INDEX: 29.01 KG/M2

## 2022-06-08 DIAGNOSIS — K44.9 PARAESOPHAGEAL HERNIA: Primary | ICD-10-CM

## 2022-06-08 PROCEDURE — 99999 PR PBB SHADOW E&M-EST. PATIENT-LVL IV: CPT | Mod: PBBFAC,,,

## 2022-06-08 PROCEDURE — 99999 PR PBB SHADOW E&M-EST. PATIENT-LVL IV: ICD-10-PCS | Mod: PBBFAC,,,

## 2022-06-08 PROCEDURE — 99024 POSTOP FOLLOW-UP VISIT: CPT | Mod: POP,,,

## 2022-06-08 PROCEDURE — 99024 PR POST-OP FOLLOW-UP VISIT: ICD-10-PCS | Mod: POP,,,

## 2022-06-08 PROCEDURE — 99214 OFFICE O/P EST MOD 30 MIN: CPT | Mod: PBBFAC

## 2022-06-08 RX ORDER — TAMSULOSIN HYDROCHLORIDE 0.4 MG/1
0.4 CAPSULE ORAL DAILY
Qty: 30 CAPSULE | Refills: 2 | Status: SHIPPED | OUTPATIENT
Start: 2022-06-08 | End: 2022-06-22 | Stop reason: SDUPTHER

## 2022-06-08 NOTE — OP NOTE
Adal Loaiza Jr.  : 1950, MRN: [id number]  Date of procedure: 2022+      Procedure: DaVinci-assisted paraesophageal hernia repair with Toupet gastric fundoplication, EGD    Pre-procedure diagnosis: Paraesophageal hernia  Post-procedure diagnosis: Paraesophageal hernia, gastritis, erosive esophagitis  Surgeon: Ryanne Phan DO  Assistant: None  Anesthesia: General  EBL: 50 mL  Implants/Drains: None  Specimen: None  Complications: None apparent    Significant findings: Nearly 50% of the stomach contained within the chest in a large hernia sac. Toupet created over 50 Fr bougie. Intraoperative EGD demonstrated patent passage into the stomach with intact wrap without twisting. Gastritis. Erosive esophagitis.    Indications for procedure: The patient presents with a symptomatic, large hiatal hernia causing anemia and mucosal ulcerations. After explaining the risks, benefits, and alternatives, the patient verbalized understanding and informed written consent was obtained. All questions were answered to their satisfaction.    Description of procedure: The patient was brought to the OR and SCDs were applied. General anesthesia was induced by the Anesthesia Department. Patient was in the supine position. A stacy catheter was placed. The abdomen was prepped and draped in usual sterile fashion. A time out was taken to identify the correct patient, correct procedure, and correct anatomical site; all parties were in agreement.  Local anesthetic was injected into the skin. An 8 mm supraumbilical incision was made. The fascia was grasped with a kocher clamp and elevated. A Veress needle was inserted and the abdomen was insufflated to 15 mm Hg. An optivew 8 mm robotic trochar was inserted under direct visualization and the peritoneal cavity was safely entered. Under direct visualization, three additional 8 mm robotic ports were inserted--two on the left, one on the right--along the same horizon as the supraumbilical  port. The patient was placed in steep Trendelenburg and the [employer] robot was docked.  The liver was elevated by placing an 0 strattifix suture from the right stephanie to the anterior abdominal wall with the left lobe of the liver sitting on top of the suture hammock. There was a large hernia defect containing a significant portion of stomach and lesser omentum. I first approached the defect by skeletonizing the peritoneum from the right stephanie.  By remaining within the wispy avascular plane, the large hernia sac was carefully dissected and reduced from the chest cavity.  It was then skeletonized from the left stephanie.  The esophagus was carefully and meticulously mobilized in the mostly avascular plane from the mediastinal attachments, ensuring to identify and avoid the vagus nerve.  Once there was enough esophagus mobilized to bring down into the abdominal cavity with the gastroesophageal junction, a Penrose drain was wrapped around the distal esophagus to help facilitate further mobilization.  The dissection was complete when there was at least 4 cm of distal esophagus brought down into the abdominal cavity without any tension.  Using the vessel sealing device, the short gastrics were then ligated along the greater curvature up to the left stephanie.  The large hernia sac was dissected off of the stomach and would later be removed in an Endo-Catch bag through one of the 8 mm port sites.  A cruroplasty was performed by suturing the posterior aspects of the left and right stephanie together with a running nonabsorbable 2-0 V lock suture.  Healthy bites of the left and right stephanie were taken and the suture was ran anteriorly until there was just enough hiatus to allow the esophagus to comfortably pass through without pinching or kinking. The fundoplication was then performed. A 50 Fr Bougie was passed down the esophagus with ease by anesthesia. The fundus of the stomach was grasped and wrapped around behind the distal esophagus 270  "degrees in Toupet fashion. A "shoe shine" maneuver was performed to ensure no twisting or kinking of the fundal wrap. The cephalad portion of the fundal wrap on the right side was sutured to the esophagus and the right stephanie with a  2-0 silk suture. Two additional sutures were placed inferiorly to this from the esophagus to the fundus. Three corresponding sutures were placed on the left side from the fundus to the esophagus. The toupet fundoplication was completed. An EGD was then performed. The scope passed easily through the wrap. The wrap was intact without twisting. There was gastritis and erosive esophagitis. There was no active bleeding.  The abdomen was desufflated and the ports were removed. The epidermis was approximated with a running 4-0 monocryl in the subcuticular layer and Dermabond was applied on top.      All sponge and instrument counts were deemed correct. The patient appeared to tolerate the procedure well and there were no apparent complications. The patient was transported to PACU in stable condition.    Ryanne Phan,   General Surgery  [facility]    "

## 2022-06-08 NOTE — TELEPHONE ENCOUNTER
Called and spoke with patient's wife about medication. Informed patient wife medication was sent over to patient pharmacy. Patient wife verbalized understanding.    ----- Message from Tanvir Harvey sent at 6/8/2022 11:34 AM CDT -----  Contact: pt wife  .Type:  Needs Medical Advice    Who Called:  pt wife   Symptoms (please be specific):   How long has patient had these symptoms:   Pharmacy name and phone #:      [facility] [address] [place] [id number] [facility]  [address] [facility] [address]  Phone: [phone] Fax: [phone]     Would the patient rather a call back or a response via [facility]?  Call    Best Call Back Number:[phone]  Additional Information:   caller is requesting  a call back from the nurse in regards to the pt med FLOMAX

## 2022-06-08 NOTE — TELEPHONE ENCOUNTER
Caller states that he is having difficulty urinating, only able to pass small amount of urine and feels like his bladder is full s/p hernia sx x 1 day ago. Pt is requesting that a prescription for catheters be called in and states that he watched his father cath his self for years. Pt advised ED and refused. Pt requested that message be sent to provider.  Pt advised per protocol and verbalized understanding.    Reason for Disposition   [1] Unable to urinate (or only a few drops) > 4 hours AND [2] bladder feels very full (e.g., palpable bladder or strong urge to urinate)    Additional Information   Negative: Shock suspected (e.g., cold/pale/clammy skin, too weak to stand, low BP, rapid pulse)   Negative: Sounds like a life-threatening emergency to the triager    Protocols used: URINARY SYMPTOMS-A-AH

## 2022-06-08 NOTE — PROGRESS NOTES
[facility] -   General Surgery History & Physical    SUBJECTIVE:     History of Present Illness:  Patient is a 72 y.o. male presents to discuss hiatal hernia repair. He states he was eating a large dinner and noticed an uncomfortable pressure like sensation which has never happened before. He would like to have the surgery done as he is also concerned about the long term risks of having a large hiatal hernia (gastric volvulus, aspiration, cardiopulmonary complications, ulcers, bleeding, perforation).    Interval history:  Since the last clinic visit, the patient underwent robotic hiatal hernia repair on 06/06/2022. He was having some issues with urinary retention before he was discharge and a stacy catheter was placed post-operatively. It was removed and he was able to void without issue before discharge, but he has had repeat urinary retention since he has been home. He has had been able to urinate very minimally since discharge. He is in severe pain due to this. He is otherwise doing well, tolerating a diet, and having normal bowel movements. He denies fevers, chills, nausea, and vomiting.       Review of patient's allergies indicates:   Allergen Reactions    Nsaids (non-steroidal anti-inflammatory drug)      Caused GI bleeding.    Aspirin     Celecoxib        Current Outpatient Medications   Medication Sig Dispense Refill    docusate sodium (COLACE) 100 MG capsule Take 1 capsule (100 mg total) by mouth 2 (two) times daily. 60 capsule 1    FLUZONE HIGHDOSE QUAD 20-21  mcg/0.7 mL Syrg ADM 0.7ML IM UTD      gabapentin (NEURONTIN) 300 MG capsule Take 300 mg by mouth every evening.       HYDROcodone-acetaminophen (NORCO) 5-325 mg per tablet Take 1 tablet by mouth every 4 (four) hours as needed for Pain. 20 tablet 0    LACTOBACILLUS ACIDOPHILUS (PROBIOTIC ORAL) Take by mouth once daily.      levetiracetam (KEPPRA) 500 MG Tab Take 1 tablet (500 mg total) by mouth 2 (two) times daily. 60  tablet 11    methocarbamoL (ROBAXIN) 750 MG Tab Take 1 tablet (750 mg total) by mouth every 8 (eight) hours as needed (Muscle pain or spasm). 21 tablet 0    multivitamin (THERAGRAN) per tablet Take 1 tablet by mouth once daily.      multivitamin,tx-iron-minerals Tab Take by mouth.      ondansetron (ZOFRAN) 4 MG tablet Take 1 tablet (4 mg total) by mouth every 6 (six) hours as needed for Nausea. 10 tablet 1    pantoprazole (PROTONIX) 40 MG tablet Take 1 tablet (40 mg total) by mouth once daily. (Patient taking differently: Take 40 mg by mouth every evening.) 90 tablet 3    pravastatin (PRAVACHOL) 40 MG tablet Take 1 tablet (40 mg total) by mouth once daily. (Patient taking differently: Take 40 mg by mouth every evening.) 90 tablet 3    tamsulosin (FLOMAX) 0.4 mg Cap Take 1 capsule (0.4 mg total) by mouth once daily. 30 capsule 2     No current facility-administered medications for this visit.     Facility-Administered Medications Ordered in Other Visits   Medication Dose Route Frequency Provider Last Rate Last Admin    sodium hyaluronate (EUFLEXXA) 10 mg/mL(mw 2.4 -3.6 million) injection 10 mg  10 mg Intra-articular  Attila Mclain MD   10 mg at 05/04/22 1020       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    Seizures     because of brain tumor    Sleep apnea     Spinal cord disease     meningioma     Past Surgical History:   Procedure Laterality Date    BRAIN SURGERY      CARDIAC PACEMAKER PLACEMENT      COLONOSCOPY N/A 8/25/2016    Procedure: COLONOSCOPY;  Surgeon: Morris Olguin MD;  Location: [facility];   Service: Endoscopy;  Laterality: N/A;    COLONOSCOPY N/A 4/4/2022    Procedure: COLONOSCOPY;  Surgeon: Marci Silva MD;  Location[facility];  Service: Endoscopy;  Laterality: N/A;    ESOPHAGEAL MANOMETRY WITH MEASUREMENT OF IMPEDANCE N/A 4/26/2022    Procedure: MANOMETRY, ESOPHAGUS, WITH IMPEDANCE MEASUREMENT;  Surgeon: Yoni Portillo RN;  Location: [facility] ENDO;  Service: Endoscopy;  Laterality: N/A;    ESOPHAGOGASTRODUODENOSCOPY N/A 4/4/2022    Procedure: ESOPHAGOGASTRODUODENOSCOPY (EGD);  Surgeon: Marci Silva MD;  Location: [facility] ENDO;  Service: Endoscopy;  Laterality: N/A;    ESOPHAGOGASTRODUODENOSCOPY N/A 6/6/2022    Procedure: EGD (ESOPHAGOGASTRODUODENOSCOPY);  Surgeon: Ryanne Phan DO;  Location: [facility];  Service: General;  Laterality: N/A;    FRACTURE SURGERY Right     right jaw    Gamma knife      for schwannoma    LAPAROSCOPIC TOUPET FUNDOPLICATION N/A 6/6/2022    Procedure: FUNDOPLICATION, LAPAROSCOPIC, TOUPET;  Surgeon: Ryanne Phan DO;  Location: [facility] OR;  Service: General;  Laterality: N/A;    REPLACEMENT OF PACEMAKER GENERATOR Left 2/13/2019    Procedure: REPLACEMENT, PULSE GENERATOR, CARDIAC PACEMAKER;  Surgeon: Gen Meza MD;  Location: [facility] CATH LAB;  Service: Cardiology;  Laterality: Left;  current device MDT/waiting for MD input    ROBOT-ASSISTED REPAIR OF HIATAL HERNIA USING DA CARMELO XI N/A 6/6/2022    Procedure: XI ROBOTIC REPAIR, HERNIA, HIATAL;  Surgeon: Ryanne Phan DO;  Location: [facility] OR;  Service: General;  Laterality: N/A;     Family History   Problem Relation Age of Onset    Colon cancer Father     Hypertension Unknown      Social History     Tobacco Use    Smoking status: Never Smoker    Smokeless tobacco: Never Used   Substance Use Topics    Alcohol use: No     Comment: socially    Drug use: No        Review of Systems:  Review of Systems   Constitutional: Negative for fever and unexpected weight change.   HENT: Negative for  trouble swallowing.    Respiratory: Negative for cough and shortness of breath.    Cardiovascular: Negative for chest pain.   Gastrointestinal: Negative for abdominal pain, nausea and vomiting.   Genitourinary: Positive for decreased urine volume and difficulty urinating. Negative for dysuria and hematuria.        Pelvic pain   Neurological: Negative for seizures.       OBJECTIVE:     Vital Signs (Most Recent)  Temp: 98.2 °F (36.8 °C) (06/08/22 0854)  Pulse: 106 (06/08/22 0854)  BP: (!) 140/92 (06/08/22 0854)     91.7 kg (202 lb 2.6 oz)     Physical Exam:  Physical Exam  Vitals reviewed.   Constitutional:       Appearance: Normal appearance. He is well-developed. He is not ill-appearing.   HENT:      Head: Normocephalic and atraumatic.      Right Ear: External ear normal.      Left Ear: External ear normal.   Eyes:      Extraocular Movements: Extraocular movements intact.      Conjunctiva/sclera: Conjunctivae normal.   Cardiovascular:      Rate and Rhythm: Normal rate.   Pulmonary:      Effort: Pulmonary effort is normal.   Abdominal:      General: There is no distension.      Palpations: Abdomen is soft.      Tenderness: There is no abdominal tenderness.      Comments: Surgical sites clean and dry without signs of infection.   Genitourinary:     Comments: Springer catheter inserted using sterile technique. 700+ ccs of urine drained from bladder. Leg bag attached.   Musculoskeletal:      Cervical back: Normal range of motion and neck supple.   Skin:     General: Skin is warm and dry.   Neurological:      Mental Status: He is alert and oriented to person, place, and time.   Psychiatric:         Behavior: Behavior normal.             ASSESSMENT/PLAN:     73 y/o male s/p robotic hiatal hernia repair with urinary retention.    - Springer in place and to remain in place.  - RTC on Tuesday with Dr. Phan for voiding trial.    Jolynn Owen PA-C  [facility]

## 2022-06-14 ENCOUNTER — OFFICE VISIT (OUTPATIENT)
Dept: SURGERY | Facility: CLINIC | Age: 72
End: 2022-06-14
Payer: MEDICARE

## 2022-06-14 VITALS
BODY MASS INDEX: 27.87 KG/M2 | SYSTOLIC BLOOD PRESSURE: 128 MMHG | DIASTOLIC BLOOD PRESSURE: 80 MMHG | TEMPERATURE: 98 F | WEIGHT: 194.25 LBS | HEART RATE: 82 BPM

## 2022-06-14 DIAGNOSIS — K44.9 PARAESOPHAGEAL HERNIA: Primary | ICD-10-CM

## 2022-06-14 PROCEDURE — 99024 POSTOP FOLLOW-UP VISIT: CPT | Mod: POP,,, | Performed by: SURGERY

## 2022-06-14 PROCEDURE — 99213 OFFICE O/P EST LOW 20 MIN: CPT | Mod: PBBFAC | Performed by: SURGERY

## 2022-06-14 PROCEDURE — 99999 PR PBB SHADOW E&M-EST. PATIENT-LVL III: ICD-10-PCS | Mod: PBBFAC,,, | Performed by: SURGERY

## 2022-06-14 PROCEDURE — 99024 PR POST-OP FOLLOW-UP VISIT: ICD-10-PCS | Mod: POP,,, | Performed by: SURGERY

## 2022-06-14 PROCEDURE — 99999 PR PBB SHADOW E&M-EST. PATIENT-LVL III: CPT | Mod: PBBFAC,,, | Performed by: SURGERY

## 2022-06-15 ENCOUNTER — OFFICE VISIT (OUTPATIENT)
Dept: SPORTS MEDICINE | Facility: CLINIC | Age: 72
End: 2022-06-15
Payer: MEDICARE

## 2022-06-15 VITALS — BODY MASS INDEX: 27.77 KG/M2 | WEIGHT: 194 LBS | HEIGHT: 70 IN

## 2022-06-15 DIAGNOSIS — M17.12 LOCALIZED OSTEOARTHRITIS OF LEFT KNEE: Primary | ICD-10-CM

## 2022-06-15 PROCEDURE — 99999 PR PBB SHADOW E&M-EST. PATIENT-LVL III: CPT | Mod: PBBFAC,,, | Performed by: FAMILY MEDICINE

## 2022-06-15 PROCEDURE — 99999 PR PBB SHADOW E&M-EST. PATIENT-LVL III: ICD-10-PCS | Mod: PBBFAC,,, | Performed by: FAMILY MEDICINE

## 2022-06-15 PROCEDURE — 99213 OFFICE O/P EST LOW 20 MIN: CPT | Mod: S$PBB,,, | Performed by: FAMILY MEDICINE

## 2022-06-15 PROCEDURE — 99213 OFFICE O/P EST LOW 20 MIN: CPT | Mod: PBBFAC | Performed by: FAMILY MEDICINE

## 2022-06-15 PROCEDURE — 99213 PR OFFICE/OUTPT VISIT, EST, LEVL III, 20-29 MIN: ICD-10-PCS | Mod: S$PBB,,, | Performed by: FAMILY MEDICINE

## 2022-06-15 NOTE — PROGRESS NOTES
Subjective:     Patient ID: Adal Loaiza Jr. is a 72 y.o. male.    Chief Complaint: Pain of the Left Knee      HPI:  Doing well after recent hiatal hernia is surgery a few weeks ago.    States the left knee is also doing well after the Visco injection and he hopes to keep receiving these injections about every 6 months.    He continues his home exercise program are regular basis.    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    Seizures     because of brain tumor    Sleep apnea     Spinal cord disease     meningioma     Past Surgical History:   Procedure Laterality Date    BRAIN SURGERY      CARDIAC PACEMAKER PLACEMENT      COLONOSCOPY N/A 8/25/2016    Procedure: COLONOSCOPY;  Surgeon: Morris Olguin MD;  Location: [facility];  Service: Endoscopy;  Laterality: N/A;    COLONOSCOPY N/A 4/4/2022    Procedure: COLONOSCOPY;  Surgeon: Marci Silva MD;  Location: [facility];  Service: Endoscopy;  Laterality: N/A;    ESOPHAGEAL MANOMETRY WITH MEASUREMENT OF IMPEDANCE N/A 4/26/2022    Procedure: MANOMETRY, ESOPHAGUS, WITH IMPEDANCE MEASUREMENT;  Surgeon: Yoni Portillo RN;  Location: [facility];  Service: Endoscopy;  Laterality: N/A;    ESOPHAGOGASTRODUODENOSCOPY N/A 4/4/2022    Procedure: ESOPHAGOGASTRODUODENOSCOPY (EGD);  Surgeon: Marci Silva MD;  Location: [facility];  Service: Endoscopy;  Laterality: N/A;    ESOPHAGOGASTRODUODENOSCOPY N/A 6/6/2022    Procedure: EGD (ESOPHAGOGASTRODUODENOSCOPY);  Surgeon: Ryanne Phan DO;  Location: [facility] OR;  Service: General;  Laterality: N/A;    FRACTURE SURGERY Right      right jaw    Gamma knife      for schwannoma    LAPAROSCOPIC TOUPET FUNDOPLICATION N/A 6/6/2022    Procedure: FUNDOPLICATION, LAPAROSCOPIC, TOUPET;  Surgeon: Ryanne Phan DO;  Location: [facility] OR;  Service: General;  Laterality: N/A;    REPLACEMENT OF PACEMAKER GENERATOR Left 2/13/2019    Procedure: REPLACEMENT, PULSE GENERATOR, CARDIAC PACEMAKER;  Surgeon: Gen Meza MD;  Location: [facility] CATH LAB;  Service: Cardiology;  Laterality: Left;  current device MDT/waiting for MD input    ROBOT-ASSISTED REPAIR OF HIATAL HERNIA USING DA CARMELO XI N/A 6/6/2022    Procedure: XI ROBOTIC REPAIR, HERNIA, HIATAL;  Surgeon: Ryanne Phan DO;  Location: [facility] OR;  Service: General;  Laterality: N/A;     Family History   Problem Relation Age of Onset    Colon cancer Father     Hypertension Unknown      Social History     Socioeconomic History    Marital status:    Tobacco Use    Smoking status: Never Smoker    Smokeless tobacco: Never Used   Substance and Sexual Activity    Alcohol use: No     Comment: socially    Drug use: No       Current Outpatient Medications:     docusate sodium (COLACE) 100 MG capsule, Take 1 capsule (100 mg total) by mouth 2 (two) times daily., Disp: 60 capsule, Rfl: 1    FLUZONE HIGHDOSE QUAD 20-21  mcg/0.7 mL Syrg, ADM 0.7ML IM UTD, Disp: , Rfl:     gabapentin (NEURONTIN) 300 MG capsule, Take 300 mg by mouth every evening. , Disp: , Rfl:     HYDROcodone-acetaminophen (NORCO) 5-325 mg per tablet, Take 1 tablet by mouth every 4 (four) hours as needed for Pain., Disp: 20 tablet, Rfl: 0    LACTOBACILLUS ACIDOPHILUS (PROBIOTIC ORAL), Take by mouth once daily., Disp: , Rfl:     levetiracetam (KEPPRA) 500 MG Tab, Take 1 tablet (500 mg total) by mouth 2 (two) times daily., Disp: 60 tablet, Rfl: 11    methocarbamoL (ROBAXIN) 750 MG Tab, Take 1 tablet (750 mg total) by mouth every 8 (eight) hours as needed (Muscle pain or spasm)., Disp: 21 tablet, Rfl: 0     multivitamin (THERAGRAN) per tablet, Take 1 tablet by mouth once daily., Disp: , Rfl:     multivitamin,tx-iron-minerals Tab, Take by mouth., Disp: , Rfl:     ondansetron (ZOFRAN) 4 MG tablet, Take 1 tablet (4 mg total) by mouth every 6 (six) hours as needed for Nausea., Disp: 10 tablet, Rfl: 1    pantoprazole (PROTONIX) 40 MG tablet, Take 1 tablet (40 mg total) by mouth once daily. (Patient taking differently: Take 40 mg by mouth every evening.), Disp: 90 tablet, Rfl: 3    pravastatin (PRAVACHOL) 40 MG tablet, Take 1 tablet (40 mg total) by mouth once daily. (Patient taking differently: Take 40 mg by mouth every evening.), Disp: 90 tablet, Rfl: 3    tamsulosin (FLOMAX) 0.4 mg Cap, Take 1 capsule (0.4 mg total) by mouth once daily., Disp: 30 capsule, Rfl: 2  No current facility-administered medications for this visit.    Facility-Administered Medications Ordered in Other Visits:     sodium hyaluronate (EUFLEXXA) 10 mg/mL(mw 2.4 -3.6 million) injection 10 mg, 10 mg, Intra-articular, , Attila Mclain MD, 10 mg at 05/04/22 1020  Review of patient's allergies indicates:   Allergen Reactions    Nsaids (non-steroidal anti-inflammatory drug)      Caused GI bleeding.    Aspirin     Celecoxib      Review of Systems   Constitutional: Negative for chills, fever and weight loss.   Respiratory: Negative for shortness of breath.    Cardiovascular: Negative for chest pain and palpitations.       Objective:   Body mass index is 27.84 kg/m².  There were no vitals filed for this visit.        Ortho/SPM Exam-alert and oriented well-nourished well-developed ambulatory no acute distress    Respiratory effort is normal    Left knee-no deformity or acute swelling    Full range of motion     stability good with negative Lachman's    Strength is 4/5    Neurovascular intact    Psychiatric good affect and cognition    Plan continue Visco injection protocol and recheck in about 3 months.  There are no Patient Instructions on file  for this visit.    IMAGING: X-Ray Chest 1 View  Narrative: EXAMINATION:  XR CHEST 1 VIEW    CLINICAL HISTORY:  Diaphragmatic hernia without obstruction or gangrene    TECHNIQUE:  Single frontal view of the chest was performed.    COMPARISON:  03/08/2019    FINDINGS:  No infiltrates or pleural effusions.  The heart is enlarged.  A dual lead pacing device is noted.  A large hiatal hernia is noted..  Impression: No acute cardiopulmonary process.    Electronically signed by: Mahesh Chamberlain DO  Date:    05/13/2022  Time:    11:40       Radiographs / Imaging : Relevant imaging results reviewed by me and interpreted by me, discussed with the patient and / or family       Assessment:     Encounter Diagnosis   Name Primary?    Localized osteoarthritis of left knee Yes        Plan:   Localized osteoarthritis of left knee        The patient verbalized good understanding of the medical issues discussed today and expressed appreciation for the care provided.  Patient was given the opportunity to ask questions and be an active participant in their medical care. Patient had no further questions or concerns at this time.     The patient was encouraged to participate in appropriate physical activity.      Attila Mclain M.D.  [facility]        This note was dictated using voice recognition software and may have sound a like errors.

## 2022-06-16 ENCOUNTER — NURSE TRIAGE (OUTPATIENT)
Dept: ADMINISTRATIVE | Facility: CLINIC | Age: 72
End: 2022-06-16
Payer: COMMERCIAL

## 2022-06-16 ENCOUNTER — HOSPITAL ENCOUNTER (EMERGENCY)
Facility: HOSPITAL | Age: 72
Discharge: HOME | End: 2022-06-17
Attending: EMERGENCY MEDICINE
Payer: MEDICARE

## 2022-06-16 DIAGNOSIS — R33.9 URINARY RETENTION: Primary | ICD-10-CM

## 2022-06-16 LAB
ANION GAP SERPL CALC-SCNC: 14 MMOL/L (ref 8–16)
BASOPHILS # BLD AUTO: 0.03 K/UL (ref 0–0.2)
BASOPHILS NFR BLD: 0.4 % (ref 0–1.9)
BILIRUB UR QL STRIP: NEGATIVE
BUN SERPL-MCNC: 10 MG/DL (ref 8–23)
CALCIUM SERPL-MCNC: 9.9 MG/DL (ref 8.7–10.5)
CHLORIDE SERPL-SCNC: 102 MMOL/L (ref 95–110)
CLARITY UR: CLEAR
CO2 SERPL-SCNC: 23 MMOL/L (ref 23–29)
COLOR UR: YELLOW
CREAT SERPL-MCNC: 1.2 MG/DL (ref 0.5–1.4)
DIFFERENTIAL METHOD: ABNORMAL
EOSINOPHIL # BLD AUTO: 0.1 K/UL (ref 0–0.5)
EOSINOPHIL NFR BLD: 0.8 % (ref 0–8)
ERYTHROCYTE [DISTWIDTH] IN BLOOD BY AUTOMATED COUNT: 12.8 % (ref 11.5–14.5)
EST. GFR  (AFRICAN AMERICAN): >60 ML/MIN/1.73 M^2
EST. GFR  (NON AFRICAN AMERICAN): >60 ML/MIN/1.73 M^2
GLUCOSE SERPL-MCNC: 119 MG/DL (ref 70–110)
GLUCOSE UR QL STRIP: NEGATIVE
HCT VFR BLD AUTO: 46.5 % (ref 40–54)
HGB BLD-MCNC: 15.7 G/DL (ref 14–18)
HGB UR QL STRIP: NEGATIVE
IMM GRANULOCYTES # BLD AUTO: 0.03 K/UL (ref 0–0.04)
IMM GRANULOCYTES NFR BLD AUTO: 0.4 % (ref 0–0.5)
KETONES UR QL STRIP: NEGATIVE
LEUKOCYTE ESTERASE UR QL STRIP: NEGATIVE
LYMPHOCYTES # BLD AUTO: 1.1 K/UL (ref 1–4.8)
LYMPHOCYTES NFR BLD: 13.9 % (ref 18–48)
MCH RBC QN AUTO: 29.2 PG (ref 27–31)
MCHC RBC AUTO-ENTMCNC: 33.8 G/DL (ref 32–36)
MCV RBC AUTO: 86 FL (ref 82–98)
MONOCYTES # BLD AUTO: 0.8 K/UL (ref 0.3–1)
MONOCYTES NFR BLD: 10.2 % (ref 4–15)
NEUTROPHILS # BLD AUTO: 5.6 K/UL (ref 1.8–7.7)
NEUTROPHILS NFR BLD: 74.3 % (ref 38–73)
NITRITE UR QL STRIP: NEGATIVE
NRBC BLD-RTO: 0 /100 WBC
PH UR STRIP: 6 [PH] (ref 5–8)
PLATELET # BLD AUTO: 220 K/UL (ref 150–450)
PMV BLD AUTO: 9.7 FL (ref 9.2–12.9)
POTASSIUM SERPL-SCNC: 5 MMOL/L (ref 3.5–5.1)
PROT UR QL STRIP: NEGATIVE
RBC # BLD AUTO: 5.38 M/UL (ref 4.6–6.2)
SODIUM SERPL-SCNC: 139 MMOL/L (ref 136–145)
SP GR UR STRIP: <=1.005 (ref 1–1.03)
URN SPEC COLLECT METH UR: ABNORMAL
UROBILINOGEN UR STRIP-ACNC: NEGATIVE EU/DL
WBC # BLD AUTO: 7.56 K/UL (ref 3.9–12.7)

## 2022-06-16 PROCEDURE — 81003 URINALYSIS AUTO W/O SCOPE: CPT | Performed by: NURSE PRACTITIONER

## 2022-06-16 PROCEDURE — 51798 US URINE CAPACITY MEASURE: CPT

## 2022-06-16 PROCEDURE — 80048 BASIC METABOLIC PNL TOTAL CA: CPT | Performed by: EMERGENCY MEDICINE

## 2022-06-16 PROCEDURE — 99283 EMERGENCY DEPT VISIT LOW MDM: CPT | Mod: 25

## 2022-06-16 PROCEDURE — 51702 INSERT TEMP BLADDER CATH: CPT

## 2022-06-16 PROCEDURE — 85025 COMPLETE CBC W/AUTO DIFF WBC: CPT | Performed by: EMERGENCY MEDICINE

## 2022-06-16 NOTE — PROGRESS NOTES
Adal Loaiza Jr. presents for follow up after undergoing robotic hiatal hernia repair with Toupet fundoplication. Patient states that overall they are doing well. He denies any pain, nausea, or vomiting. Tolerating a liquid diet. Incisions are healing well without signs of infection. He initially had some issues with urination and a leg bag was placed in clinic. The catheter was removed and patient was able to urinate without issues.  He may resume a normal diet. I advised that he eat small portions, small bites, and chew his food well before swallowing.  Continue no heavy lifting or strenuous activity for another 5 weeks. Follow up prn. Patient expressed understanding and is in agreement.    Ryanne Phan, DO  General Surgery  [facility]

## 2022-06-17 VITALS
DIASTOLIC BLOOD PRESSURE: 72 MMHG | SYSTOLIC BLOOD PRESSURE: 142 MMHG | OXYGEN SATURATION: 100 % | HEART RATE: 82 BPM | BODY MASS INDEX: 27.6 KG/M2 | RESPIRATION RATE: 18 BRPM | TEMPERATURE: 98 F | WEIGHT: 192.38 LBS

## 2022-06-17 NOTE — FIRST PROVIDER EVALUATION
Medical screening exam completed.  I have conducted a focused provider triage encounter, findings are as follows:    Brief history of present illness:  Patient presents with urinary retention after having Springer catheter removed 2 days ago.  Patient had Springer catheter placed during surgery.    There were no vitals filed for this visit.    Pertinent physical exam:      Brief workup plan:      Preliminary workup initiated; this workup will be continued and followed by the physician or advanced practice provider that is assigned to the patient when roomed.

## 2022-06-17 NOTE — ED PROVIDER NOTES
SCRIBE #1 NOTE: I, Sherrie Dorsey, am scribing for, and in the presence of, Neal Gutierrez MD. I have scribed the entire note.      History      Chief Complaint   Patient presents with    Urinary Retention     Pt has c/o of urinary retention x1day. Pt had catheter removed 2 days ago after surgery.        Review of patient's allergies indicates:   Allergen Reactions    Nsaids (non-steroidal anti-inflammatory drug)      Caused GI bleeding.    Aspirin     Celecoxib      Increased bleeding         HPI   HPI    6/16/2022, 10:07 PM   History obtained from the patient      History of Present Illness: Adal Loaiza Jr. is a 72 y.o. male patient with PMHx of HLD, seizures, and PAF who presents to the Emergency Department for Urinary retention which onset 10 days ago. Pt report increased difficulty urinating since after a hiatal hernia repair on 6/6/2022. Pt states since procedure requiring a multiple stacy's to urinate. Pt last urinated slight last night without assitance. Symptoms are constant and moderate in severity. No mitigating or exacerbating factors reported. Associated sxs include dysuria. Patient denies any fever, chills, cough, congestion, abd pain, N/V/D, numbness, weakness, hematuria, HA, and all other sxs at this time. No further complaints or concerns at this time.        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    Seizures     because of brain tumor    Sleep apnea     Spinal cord disease     meningioma       Past Surgical  History:  Past Surgical History:   Procedure Laterality Date    BRAIN SURGERY      CARDIAC PACEMAKER PLACEMENT      COLONOSCOPY N/A 8/25/2016    Procedure: COLONOSCOPY;  Surgeon: Morris Olguin MD;  Location: [facility];  Service: Endoscopy;  Laterality: N/A;    COLONOSCOPY N/A 4/4/2022    Procedure: COLONOSCOPY;  Surgeon: Marci Silva MD;  Location: [facility] ENDO;  Service: Endoscopy;  Laterality: N/A;    ESOPHAGEAL MANOMETRY WITH MEASUREMENT OF IMPEDANCE N/A 4/26/2022    Procedure: MANOMETRY, ESOPHAGUS, WITH IMPEDANCE MEASUREMENT;  Surgeon: Yoni Portillo RN;  Location: [facility] ENDO;  Service: Endoscopy;  Laterality: N/A;    ESOPHAGOGASTRODUODENOSCOPY N/A 4/4/2022    Procedure: ESOPHAGOGASTRODUODENOSCOPY (EGD);  Surgeon: Marci Silva MD;  Location: [facility];  Service: Endoscopy;  Laterality: N/A;    ESOPHAGOGASTRODUODENOSCOPY N/A 6/6/2022    Procedure: EGD (ESOPHAGOGASTRODUODENOSCOPY);  Surgeon: Ryanne Phan DO;  Location: [facility];  Service: General;  Laterality: N/A;    FRACTURE SURGERY Right     right jaw    Gamma knife      for schwannoma    LAPAROSCOPIC TOUPET FUNDOPLICATION N/A 6/6/2022    Procedure: FUNDOPLICATION, LAPAROSCOPIC, TOUPET;  Surgeon: Ryanne Phan DO;  Location: [facility] OR;  Service: General;  Laterality: N/A;    REPLACEMENT OF PACEMAKER GENERATOR Left 2/13/2019    Procedure: REPLACEMENT, PULSE GENERATOR, CARDIAC PACEMAKER;  Surgeon: Gen Meza MD;  Location: [facility] CATH LAB;  Service: Cardiology;  Laterality: Left;  current device MDT/waiting for MD input    ROBOT-ASSISTED REPAIR OF HIATAL HERNIA USING DA CARMELO XI N/A 6/6/2022    Procedure: XI ROBOTIC REPAIR, HERNIA, HIATAL;  Surgeon: Ryanne Phan DO;  Location: [facility];  Service: General;  Laterality: N/A;         Family History:  Family History   Problem Relation Age of Onset    Colon cancer Father     Hypertension Unknown        Social History:  Social History     Tobacco Use    Smoking status:  Never Smoker    Smokeless tobacco: Never Used   Substance and Sexual Activity    Alcohol use: No     Comment: socially    Drug use: No    Sexual activity: Not on file       ROS   Review of Systems   Constitutional: Negative for chills, diaphoresis and fever.   HENT: Negative for congestion and sore throat.    Eyes: Negative for visual disturbance.   Respiratory: Negative for cough and shortness of breath.    Cardiovascular: Negative for chest pain.   Gastrointestinal: Negative for abdominal pain, diarrhea, nausea and vomiting.   Genitourinary: Positive for decreased urine volume, difficulty urinating and dysuria. Negative for hematuria.   Musculoskeletal: Negative for back pain.   Skin: Negative for rash.   Neurological: Negative for dizziness, syncope, weakness, numbness and headaches.   Hematological: Does not bruise/bleed easily.   All other systems reviewed and are negative.      Physical Exam      Initial Vitals [06/16/22 2101]   BP Pulse Resp Temp SpO2   (!) 171/97 106 18 99 °F (37.2 °C) 97 %      MAP       --          Physical Exam  Nursing Notes and Vital Signs Reviewed.  Constitutional: Patient is in no acute distress. Well-developed and well-nourished.  Head: Atraumatic. Normocephalic.  Eyes: PERRL. EOM intact. Conjunctivae are not pale. No scleral icterus.  ENT: Mucous membranes are moist. Oropharynx is clear and symmetric.    Neck: Supple. Full ROM. No lymphadenopathy.  Cardiovascular: Regular rate. Regular rhythm. No murmurs, rubs, or gallops. Distal pulses are 2+ and symmetric.  Pulmonary/Chest: No respiratory distress. Clear to auscultation bilaterally. No wheezing or rales.  Abdominal: Soft. Supapubic abdomen distention. There is no tenderness. Laparoscopic surgical scar present that are well healed and have no signs of infection.  No rebound, guarding, or rigidity. Good bowel sounds. Over a liter found with bladder scan.  Genitourinary: No CVA tenderness  Musculoskeletal: Moves all extremities.  No obvious deformities. No edema. No calf tenderness.  Skin: Warm and dry.  Neurological:  Alert, awake, and appropriate.  Normal speech.  No acute focal neurological deficits are appreciated.  Psychiatric: Normal affect. Good eye contact. Appropriate in content.    ED Course    Procedures  ED Vital Signs:  Vitals:    06/16/22 2101   BP: (!) 171/97   Pulse: 106   Resp: 18   Temp: 99 °F (37.2 °C)   TempSrc: Oral   SpO2: 97%   Weight: 87.2 kg (192 lb 5.6 oz)       Abnormal Lab Results:  Labs Reviewed   URINALYSIS, REFLEX TO URINE CULTURE - Abnormal; Notable for the following components:       Result Value    Specific Gravity, UA <=1.005 (*)     All other components within normal limits    Narrative:     Specimen Source->Urine   CBC W/ AUTO DIFFERENTIAL - Abnormal; Notable for the following components:    Gran % 74.3 (*)     Lymph % 13.9 (*)     All other components within normal limits   BASIC METABOLIC PANEL - Abnormal; Notable for the following components:    Glucose 119 (*)     All other components within normal limits        All Lab Results:  Results for orders placed or performed during the hospital encounter of 06/16/22   Urinalysis, Reflex to Urine Culture Urine, Clean Catch    Specimen: Urine   Result Value Ref Range    Specimen UA Urine, Clean Catch     Color, UA Yellow Yellow, Straw, Meena    Appearance, UA Clear Clear    pH, UA 6.0 5.0 - 8.0    Specific Gravity, UA <=1.005 (A) 1.005 - 1.030    Protein, UA Negative Negative    Glucose, UA Negative Negative    Ketones, UA Negative Negative    Bilirubin (UA) Negative Negative    Occult Blood UA Negative Negative    Nitrite, UA Negative Negative    Urobilinogen, UA Negative <2.0 EU/dL    Leukocytes, UA Negative Negative   CBC auto differential   Result Value Ref Range    WBC 7.56 3.90 - 12.70 K/uL    RBC 5.38 4.60 - 6.20 M/uL    Hemoglobin 15.7 14.0 - 18.0 g/dL    Hematocrit 46.5 40.0 - 54.0 %    MCV 86 82 - 98 fL    MCH 29.2 27.0 - 31.0 pg    MCHC 33.8 32.0 -  36.0 g/dL    RDW 12.8 11.5 - 14.5 %    Platelets 220 150 - 450 K/uL    MPV 9.7 9.2 - 12.9 fL    Immature Granulocytes 0.4 0.0 - 0.5 %    Gran # (ANC) 5.6 1.8 - 7.7 K/uL    Immature Grans (Abs) 0.03 0.00 - 0.04 K/uL    Lymph # 1.1 1.0 - 4.8 K/uL    Mono # 0.8 0.3 - 1.0 K/uL    Eos # 0.1 0.0 - 0.5 K/uL    Baso # 0.03 0.00 - 0.20 K/uL    nRBC 0 0 /100 WBC    Gran % 74.3 (H) 38.0 - 73.0 %    Lymph % 13.9 (L) 18.0 - 48.0 %    Mono % 10.2 4.0 - 15.0 %    Eosinophil % 0.8 0.0 - 8.0 %    Basophil % 0.4 0.0 - 1.9 %    Differential Method Automated    Basic metabolic panel   Result Value Ref Range    Sodium 139 136 - 145 mmol/L    Potassium 5.0 3.5 - 5.1 mmol/L    Chloride 102 95 - 110 mmol/L    CO2 23 23 - 29 mmol/L    Glucose 119 (H) 70 - 110 mg/dL    BUN 10 8 - 23 mg/dL    Creatinine 1.2 0.5 - 1.4 mg/dL    Calcium 9.9 8.7 - 10.5 mg/dL    Anion Gap 14 8 - 16 mmol/L    eGFR if African American >60 >60 mL/min/1.73 m^2    eGFR if non African American >60 >60 mL/min/1.73 m^2         Imaging Results:  Imaging Results    None                      The Emergency Provider reviewed the vital signs and test results, which are outlined above.    ED Discussion     11:46 PM: Reassessed pt at this time. Discussed with pt all pertinent ED information and results. Discussed pt dx and plan of tx. Gave pt all f/u and return to the ED instructions. All questions and concerns were addressed at this time. Pt expresses understanding of information and instructions, and is comfortable with plan to discharge. Pt is stable for discharge.    I discussed with patient and/or family/caretaker that evaluation in the ED does not suggest any emergent or life threatening medical conditions requiring immediate intervention beyond what was provided in the ED, and I believe patient is safe for discharge.  Regardless, an unremarkable evaluation in the ED does not preclude the development or presence of a serious of life threatening condition. As such, patient  was instructed to return immediately for any worsening or change in current symptoms.           ED Medication(s):  Medications - No data to display  New Prescriptions    No medications on file       Follow-up Information     Schedule an appointment as soon as possible for a visit  with Follow-up with your urologist.           [facility] - Emergency Dept..    Specialty: Emergency Medicine  Why: As needed, If symptoms worsen  Contact information:  [address] [facility] [address] [phone]                         Medical Decision Making    Medical Decision Making:   Clinical Tests:   Lab Tests: Ordered and Reviewed  72-year-old postop patient with acute urinary retention.  This is 2nd time he has required a Springer catheter.  Catheter inserted and symptoms resolved.  Patient will be sent home with catheter advised follow-up with his urologist.  Patient currently taking Flomax           Scribe Attestation:   Scribe #1: I performed the above scribed service and the documentation accurately describes the services I performed. I attest to the accuracy of the note.    Attending:   Physician Attestation Statement for Scribe #1: I, Neal Gutierrez MD, personally performed the services described in this documentation, as scribed by Sherrie Dorsey, in my presence, and it is both accurate and complete.          Clinical Impression       ICD-10-CM ICD-9-CM   1. Urinary retention  R33.9 788.20       Disposition:   Disposition: Discharged  Condition: Stable         Neal Gutierrez MD  06/16/22 1004

## 2022-06-17 NOTE — TELEPHONE ENCOUNTER
Call x 2 no answer, lvm.    Reason for Disposition   Message left on identified voice mail    Protocols used: NO CONTACT OR DUPLICATE CONTACT CALL-A-AH

## 2022-06-21 ENCOUNTER — LAB VISIT (OUTPATIENT)
Dept: LAB | Facility: HOSPITAL | Age: 72
End: 2022-06-21
Attending: INTERNAL MEDICINE
Payer: MEDICARE

## 2022-06-21 ENCOUNTER — IMMUNIZATION (OUTPATIENT)
Dept: PHARMACY | Facility: CLINIC | Age: 72
End: 2022-06-21

## 2022-06-21 DIAGNOSIS — Z23 NEED FOR VACCINATION: Primary | ICD-10-CM

## 2022-06-21 DIAGNOSIS — E78.2 MIXED HYPERLIPIDEMIA: ICD-10-CM

## 2022-06-21 DIAGNOSIS — D50.9 MICROCYTIC ANEMIA: ICD-10-CM

## 2022-06-21 LAB
ALT SERPL W/O P-5'-P-CCNC: 30 U/L (ref 10–44)
BASOPHILS # BLD AUTO: 0.04 K/UL (ref 0–0.2)
BASOPHILS NFR BLD: 0.7 % (ref 0–1.9)
CHOLEST SERPL-MCNC: 134 MG/DL (ref 120–199)
CHOLEST/HDLC SERPL: 3.2 {RATIO} (ref 2–5)
DIFFERENTIAL METHOD: NORMAL
EOSINOPHIL # BLD AUTO: 0.2 K/UL (ref 0–0.5)
EOSINOPHIL NFR BLD: 3.9 % (ref 0–8)
ERYTHROCYTE [DISTWIDTH] IN BLOOD BY AUTOMATED COUNT: 13.3 % (ref 11.5–14.5)
HCT VFR BLD AUTO: 47.5 % (ref 40–54)
HDLC SERPL-MCNC: 42 MG/DL (ref 40–75)
HDLC SERPL: 31.3 % (ref 20–50)
HGB BLD-MCNC: 15.4 G/DL (ref 14–18)
IMM GRANULOCYTES # BLD AUTO: 0.03 K/UL (ref 0–0.04)
IMM GRANULOCYTES NFR BLD AUTO: 0.5 % (ref 0–0.5)
LDLC SERPL CALC-MCNC: 76.8 MG/DL (ref 63–159)
LYMPHOCYTES # BLD AUTO: 1.3 K/UL (ref 1–4.8)
LYMPHOCYTES NFR BLD: 23.4 % (ref 18–48)
MCH RBC QN AUTO: 29.5 PG (ref 27–31)
MCHC RBC AUTO-ENTMCNC: 32.4 G/DL (ref 32–36)
MCV RBC AUTO: 91 FL (ref 82–98)
MONOCYTES # BLD AUTO: 0.5 K/UL (ref 0.3–1)
MONOCYTES NFR BLD: 8.8 % (ref 4–15)
NEUTROPHILS # BLD AUTO: 3.6 K/UL (ref 1.8–7.7)
NEUTROPHILS NFR BLD: 62.7 % (ref 38–73)
NONHDLC SERPL-MCNC: 92 MG/DL
NRBC BLD-RTO: 0 /100 WBC
PLATELET # BLD AUTO: 246 K/UL (ref 150–450)
PMV BLD AUTO: 11.1 FL (ref 9.2–12.9)
RBC # BLD AUTO: 5.22 M/UL (ref 4.6–6.2)
TRIGL SERPL-MCNC: 76 MG/DL (ref 30–150)
WBC # BLD AUTO: 5.69 K/UL (ref 3.9–12.7)

## 2022-06-21 PROCEDURE — 80061 LIPID PANEL: CPT | Performed by: INTERNAL MEDICINE

## 2022-06-21 PROCEDURE — 36415 COLL VENOUS BLD VENIPUNCTURE: CPT | Performed by: INTERNAL MEDICINE

## 2022-06-21 PROCEDURE — 85025 COMPLETE CBC W/AUTO DIFF WBC: CPT | Performed by: INTERNAL MEDICINE

## 2022-06-21 PROCEDURE — 84460 ALANINE AMINO (ALT) (SGPT): CPT | Performed by: INTERNAL MEDICINE

## 2022-06-22 ENCOUNTER — OFFICE VISIT (OUTPATIENT)
Dept: UROLOGY | Facility: CLINIC | Age: 72
End: 2022-06-22
Payer: MEDICARE

## 2022-06-22 VITALS — DIASTOLIC BLOOD PRESSURE: 80 MMHG | SYSTOLIC BLOOD PRESSURE: 127 MMHG | HEART RATE: 81 BPM

## 2022-06-22 DIAGNOSIS — R33.9 URINARY RETENTION: Primary | ICD-10-CM

## 2022-06-22 PROCEDURE — 99213 OFFICE O/P EST LOW 20 MIN: CPT | Mod: PBBFAC | Performed by: NURSE PRACTITIONER

## 2022-06-22 PROCEDURE — 99999 PR PBB SHADOW E&M-EST. PATIENT-LVL III: CPT | Mod: PBBFAC,,, | Performed by: NURSE PRACTITIONER

## 2022-06-22 PROCEDURE — 99214 PR OFFICE/OUTPT VISIT, EST, LEVL IV, 30-39 MIN: ICD-10-PCS | Mod: S$PBB,,, | Performed by: NURSE PRACTITIONER

## 2022-06-22 PROCEDURE — 99214 OFFICE O/P EST MOD 30 MIN: CPT | Mod: S$PBB,,, | Performed by: NURSE PRACTITIONER

## 2022-06-22 PROCEDURE — 99999 PR PBB SHADOW E&M-EST. PATIENT-LVL III: ICD-10-PCS | Mod: PBBFAC,,, | Performed by: NURSE PRACTITIONER

## 2022-06-22 RX ORDER — TAMSULOSIN HYDROCHLORIDE 0.4 MG/1
0.4 CAPSULE ORAL DAILY
Qty: 90 CAPSULE | Refills: 3 | Status: SHIPPED | OUTPATIENT
Start: 2022-06-22 | End: 2023-03-27 | Stop reason: SDUPTHER

## 2022-06-22 NOTE — PROGRESS NOTES
Chief Complaint:   Urinary retention status post hernia repair    HPI:   Patient is 72-year-old male that is presenting with an acute onset of urinary tension secondary to hernia repair.  Patient states that he has been seen by the ED and general surgery and has had multiple catheters placed.  Patient states the catheters were taken out and he is unable to void.  Patient was prescribed tamsulosin last week after an  ED visit.  Allergies:  Nsaids (non-steroidal anti-inflammatory drug), Aspirin, and Celecoxib    Medications:  has a current medication list which includes the following prescription(s): pfizer covid-19 dewayne vaccn(pf), docusate sodium, fluzone highdose quad 20-21 pf, gabapentin, hydrocodone-acetaminophen, lactobacillus acidophilus, levetiracetam, methocarbamol, multivitamin, multivitamin,tx-iron-minerals, ondansetron, pantoprazole, pravastatin, and tamsulosin, and the following Facility-Administered Medications: sodium hyaluronate (euflexxa).    Review of Systems:  General: No fever, chills, fatigability, or weight loss.  Skin: No rashes, itching, or changes in color or texture of skin.  Chest: Denies COOPER, cyanosis, wheezing, cough, and sputum production.  Abdomen: Appetite fine. No weight loss. Denies diarrhea, abdominal pain, hematemesis, or blood in stool.  Musculoskeletal: No joint stiffness or swelling. Denies back pain.  : As above.  All other review of systems negative.    PMH:   has a past medical history of Anemia due to GI blood loss (2012), Arrhythmia requiring replacement of cardiac pacemaker, Brain tumor (benign) (1999), Encounter for blood transfusion, GI bleed due to NSAIDs (2012), Hyperlipidemia, Localized osteoarthritis of left knee (12/10/2020), PAF (paroxysmal atrial fibrillation) (6/16/2019), Renal cyst (2/19/2019), Schwannoma, Seizures, Sleep apnea, and Spinal cord disease.    PSH:   has a past surgical history that includes Cardiac pacemaker placement; Gamma knife; Brain surgery;  Colonoscopy (N/A, 8/25/2016); Replacement of pacemaker generator (Left, 2/13/2019); Fracture surgery (Right); Esophagogastroduodenoscopy (N/A, 4/4/2022); Colonoscopy (N/A, 4/4/2022); Esophageal manometry with measurement of impedance (N/A, 4/26/2022); Robot-assisted repair of hiatal hernia using da Nathan Xi (N/A, 6/6/2022); Laparoscopic Toupet fundoplication (N/A, 6/6/2022); and Esophagogastroduodenoscopy (N/A, 6/6/2022).    FamHx: family history includes Colon cancer in his father; Hypertension in his unknown relative.    SocHx:  reports that he has never smoked. He has never used smokeless tobacco. He reports that he does not drink alcohol and does not use drugs.      Physical Exam:  Vitals:    06/22/22 1344   BP: 127/80   Pulse: 81     General: A&Ox3, no apparent distress, no deformities  Neck: No masses, normal thyroid  Lungs: normal inspiration, no use of accessory muscles  Heart: normal pulse, no arrhythmias  Abdomen: Soft, NT, ND, no masses, no hernias, no hepatosplenomegaly    Impression/Plan:   Patient to remain on tamsulosin after passing voiding trial.  Reports that primary care physician follows him for his PSA levels.

## 2022-06-23 ENCOUNTER — TELEPHONE (OUTPATIENT)
Dept: UROLOGY | Facility: CLINIC | Age: 72
End: 2022-06-23

## 2022-06-23 ENCOUNTER — OFFICE VISIT (OUTPATIENT)
Dept: UROLOGY | Facility: CLINIC | Age: 72
End: 2022-06-23
Payer: MEDICARE

## 2022-06-23 VITALS
WEIGHT: 196.75 LBS | HEART RATE: 85 BPM | SYSTOLIC BLOOD PRESSURE: 138 MMHG | BODY MASS INDEX: 28.23 KG/M2 | DIASTOLIC BLOOD PRESSURE: 89 MMHG

## 2022-06-23 DIAGNOSIS — R33.9 URINARY RETENTION: Primary | ICD-10-CM

## 2022-06-23 PROCEDURE — 87086 URINE CULTURE/COLONY COUNT: CPT | Performed by: NURSE PRACTITIONER

## 2022-06-23 PROCEDURE — 99213 OFFICE O/P EST LOW 20 MIN: CPT | Mod: PBBFAC | Performed by: NURSE PRACTITIONER

## 2022-06-23 PROCEDURE — 99214 OFFICE O/P EST MOD 30 MIN: CPT | Mod: S$PBB,,, | Performed by: NURSE PRACTITIONER

## 2022-06-23 PROCEDURE — 99214 PR OFFICE/OUTPT VISIT, EST, LEVL IV, 30-39 MIN: ICD-10-PCS | Mod: S$PBB,,, | Performed by: NURSE PRACTITIONER

## 2022-06-23 PROCEDURE — 87088 URINE BACTERIA CULTURE: CPT | Performed by: NURSE PRACTITIONER

## 2022-06-23 PROCEDURE — 99999 PR PBB SHADOW E&M-EST. PATIENT-LVL III: CPT | Mod: PBBFAC,,, | Performed by: NURSE PRACTITIONER

## 2022-06-23 PROCEDURE — 99999 PR PBB SHADOW E&M-EST. PATIENT-LVL III: ICD-10-PCS | Mod: PBBFAC,,, | Performed by: NURSE PRACTITIONER

## 2022-06-23 NOTE — PROGRESS NOTES
Chief Complaint:   Urinary retention  HPI:   Patient is a 72-year-old male that was seen yesterday for voiding trial.  Patient is status post hernia repair and has had multiple Springer's placed.  Patient passed voiding trial yesterday and is currently on tamsulosin (has been on med for 12 days).  Unfortunately, patient states he is unable to void and PVR is elevated, 248 mL.   06/22/2022  Patient is 72-year-old male that is presenting with an acute onset of urinary tension secondary to hernia repair. Patient states that he has been seen by the ED and general surgery and has had multiple catheters placed. Patient states the catheters were taken out and he is unable to void.  Patient was prescribed tamsulosin last week after an  ED visit.    Allergies:  Nsaids (non-steroidal anti-inflammatory drug), Aspirin, and Celecoxib    Medications:  has a current medication list which includes the following prescription(s): pfizer covid-19 dewayne vaccn(pf), docusate sodium, fluzone highdose quad 20-21 pf, gabapentin, hydrocodone-acetaminophen, lactobacillus acidophilus, levetiracetam, methocarbamol, multivitamin, multivitamin,tx-iron-minerals, ondansetron, pantoprazole, pravastatin, and tamsulosin, and the following Facility-Administered Medications: sodium hyaluronate (euflexxa).    Review of Systems:  General: No fever, chills, fatigability, or weight loss.  Skin: No rashes, itching, or changes in color or texture of skin.  Chest: Denies COOPER, cyanosis, wheezing, cough, and sputum production.  Abdomen: Appetite fine. No weight loss. Denies diarrhea, abdominal pain, hematemesis, or blood in stool.  Musculoskeletal: No joint stiffness or swelling. Denies back pain.  : As above.  All other review of systems negative.    PMH:   has a past medical history of Anemia due to GI blood loss (2012), Arrhythmia requiring replacement of cardiac pacemaker, Brain tumor (benign) (1999), Encounter for blood transfusion, GI bleed due to NSAIDs  (2012), Hyperlipidemia, Localized osteoarthritis of left knee (12/10/2020), PAF (paroxysmal atrial fibrillation) (6/16/2019), Renal cyst (2/19/2019), Schwannoma, Seizures, Sleep apnea, and Spinal cord disease.    PSH:   has a past surgical history that includes Cardiac pacemaker placement; Gamma knife; Brain surgery; Colonoscopy (N/A, 8/25/2016); Replacement of pacemaker generator (Left, 2/13/2019); Fracture surgery (Right); Esophagogastroduodenoscopy (N/A, 4/4/2022); Colonoscopy (N/A, 4/4/2022); Esophageal manometry with measurement of impedance (N/A, 4/26/2022); Robot-assisted repair of hiatal hernia using da Nathan Xi (N/A, 6/6/2022); Laparoscopic Toupet fundoplication (N/A, 6/6/2022); and Esophagogastroduodenoscopy (N/A, 6/6/2022).    FamHx: family history includes Colon cancer in his father; Hypertension in his unknown relative.    SocHx:  reports that he has never smoked. He has never used smokeless tobacco. He reports that he does not drink alcohol and does not use drugs.      Physical Exam:  Vitals:    06/23/22 0919   BP: 138/89   Pulse: 85     General: A&Ox3, no apparent distress, no deformities  Neck: No masses, normal thyroid  Lungs: normal inspiration, no use of accessory muscles  Heart: normal pulse, no arrhythmias  Abdomen: Soft, NT, ND, no masses, no hernias, no hepatosplenomegaly  Lymphatic: Neck and groin nodes negative    Impression/Plan:   Urinary retention status post hernia repair  Urine will be sent for culture  Catheter was placed and patient to return to clinic with MD for re-evaluation.

## 2022-06-23 NOTE — TELEPHONE ENCOUNTER
Called pt. Pt was seen this morning, and appreciated the f/u call, VICTORIANO MILLS.   ----- Message from Autumn Mckee sent at 6/23/2022  7:25 AM CDT -----  Regarding: Pain  Contact: Patient  Patient is in severe pain and blocked up again. Please call to work patient in today if possible at Ph .[phone] (home)

## 2022-06-24 ENCOUNTER — OFFICE VISIT (OUTPATIENT)
Dept: INTERNAL MEDICINE | Facility: CLINIC | Age: 72
End: 2022-06-24
Payer: MEDICARE

## 2022-06-24 VITALS
TEMPERATURE: 99 F | WEIGHT: 194 LBS | OXYGEN SATURATION: 97 % | BODY MASS INDEX: 27.84 KG/M2 | DIASTOLIC BLOOD PRESSURE: 78 MMHG | HEART RATE: 82 BPM | SYSTOLIC BLOOD PRESSURE: 112 MMHG

## 2022-06-24 DIAGNOSIS — E78.2 MIXED HYPERLIPIDEMIA: Primary | ICD-10-CM

## 2022-06-24 LAB — BACTERIA UR CULT: ABNORMAL

## 2022-06-24 PROCEDURE — 99213 OFFICE O/P EST LOW 20 MIN: CPT | Mod: PBBFAC | Performed by: INTERNAL MEDICINE

## 2022-06-24 PROCEDURE — 99213 OFFICE O/P EST LOW 20 MIN: CPT | Mod: S$PBB,,, | Performed by: INTERNAL MEDICINE

## 2022-06-24 PROCEDURE — 99999 PR PBB SHADOW E&M-EST. PATIENT-LVL III: ICD-10-PCS | Mod: PBBFAC,,, | Performed by: INTERNAL MEDICINE

## 2022-06-24 PROCEDURE — 99213 PR OFFICE/OUTPT VISIT, EST, LEVL III, 20-29 MIN: ICD-10-PCS | Mod: S$PBB,,, | Performed by: INTERNAL MEDICINE

## 2022-06-24 PROCEDURE — 99999 PR PBB SHADOW E&M-EST. PATIENT-LVL III: CPT | Mod: PBBFAC,,, | Performed by: INTERNAL MEDICINE

## 2022-06-24 NOTE — PROGRESS NOTES
Subjective:      Patient ID: Adal Loaiza Jr. is a 72 y.o. male.    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)    Cardiac pacemaker in situ    Fatty liver    Obstructive sleep apnea    Myofascial pain    Hyperlipidemia    Pacemaker lead fracture    Abnormal digital rectal exam    Family history of colon cancer    Colon polyps    Internal hemorrhoids    Sensorineural hearing loss (SNHL) of both ears    Essential tremor    Meningioma    Renal cyst    Inadequate sleep hygiene    PAF (paroxysmal atrial fibrillation)    Left knee pain    Partial symptomatic epilepsy with complex partial seizures, not intractable, without status epilepticus    SSS (sick sinus syndrome)    Localized osteoarthritis of left knee    Trigeminal neuralgia    Anemia    Paraesophageal hernia    Elevated PSA    Iron deficiency anemia     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  Compliant with meds without significant side effects.    Review of Systems   Constitutional: Negative for chills and fever.   HENT: Negative for ear pain and sore throat.    Respiratory: Negative for cough.    Cardiovascular: Negative for chest pain.   Gastrointestinal: Negative for abdominal pain and blood in stool.   Genitourinary: Negative for dysuria and hematuria.   Neurological: Negative for seizures and syncope.      Objective:   /78 (BP Location: Right arm, Patient Position: Sitting, BP Method: Large (Manual))   Pulse 82   Temp 98.5 °F (36.9 °C) (Tympanic)   Wt 88 kg (194 lb 0.1 oz)   SpO2 97%   BMI 27.84 kg/m²     Physical Exam  Lab Visit on 06/21/2022   Component Date Value Ref Range Status    Cholesterol 06/21/2022 134  120 - 199 mg/dL Final    Comment: The National Cholesterol Education Program (NCEP) has set the  following guidelines (reference ranges) for Cholesterol:  Optimal.....................<200 mg/dL  Borderline High.............200-239 mg/dL  High........................> or = 240 mg/dL      Triglycerides 06/21/2022 76  30 - 150 mg/dL Final    Comment: The National Cholesterol Education Program (NCEP) has set the  following guidelines (reference values) for triglycerides:  Normal......................<150 mg/dL  Borderline High.............150-199 mg/dL  High........................200-499 mg/dL      HDL 06/21/2022 42  40 - 75 mg/dL Final    Comment: The National Cholesterol Education Program (NCEP) has set the  following guidelines (reference values) for HDL Cholesterol:  Low...............<40 mg/dL  Optimal...........>60 mg/dL      LDL Cholesterol 06/21/2022 76.8  63.0 - 159.0 mg/dL Final    Comment: The National Cholesterol Education Program (NCEP) has set the  following guidelines (reference values) for LDL Cholesterol:  Optimal.......................<130 mg/dL  Borderline High...............130-159 mg/dL  High..........................160-189 mg/dL  Very High.....................>190 mg/dL      HDL/Cholesterol Ratio 06/21/2022 31.3  20.0 - 50.0 % Final    Total Cholesterol/HDL Ratio 06/21/2022 3.2  2.0 - 5.0 Final    Non-HDL Cholesterol 06/21/2022 92  mg/dL Final    Comment: Risk category and Non-HDL cholesterol goals:  Coronary heart disease (CHD)or equivalent (10-year risk of CHD >20%):  Non-HDL cholesterol goal     <130 mg/dL  Two or more CHD risk factors and 10-year risk of CHD <=  20%:  Non-HDL cholesterol goal     <160 mg/dL  0 to 1 CHD risk factor:  Non-HDL cholesterol goal     <190 mg/dL      ALT 06/21/2022 30  10 - 44 U/L Final    WBC 06/21/2022 5.69  3.90 - 12.70 K/uL Final    RBC 06/21/2022 5.22  4.60 - 6.20 M/uL Final    Hemoglobin 06/21/2022 15.4  14.0 - 18.0 g/dL Final    Hematocrit 06/21/2022 47.5  40.0 - 54.0 % Final    MCV 06/21/2022 91  82 - 98 fL Final    MCH 06/21/2022 29.5  27.0 - 31.0 pg Final    MCHC 06/21/2022 32.4  32.0 - 36.0 g/dL Final    RDW 06/21/2022 13.3  11.5 - 14.5 % Final    Platelets 06/21/2022 246  150 - 450 K/uL Final    MPV 06/21/2022 11.1  9.2 - 12.9 fL Final    Immature Granulocytes 06/21/2022 0.5  0.0 - 0.5 % Final    Gran # (ANC) 06/21/2022 3.6  1.8 - 7.7 K/uL Final    Immature Grans (Abs) 06/21/2022 0.03  0.00 - 0.04 K/uL Final    Comment: Mild elevation in immature granulocytes is non specific and   can be seen in a variety of conditions including stress response,   acute inflammation, trauma and pregnancy. Correlation with other   laboratory and clinical findings is essential.      Lymph # 06/21/2022 1.3  1.0 - 4.8 K/uL Final    Mono # 06/21/2022 0.5  0.3 - 1.0 K/uL Final    Eos # 06/21/2022 0.2  0.0 - 0.5 K/uL Final    Baso # 06/21/2022 0.04  0.00 - 0.20 K/uL Final    nRBC 06/21/2022 0  0 /100 WBC Final    Gran % 06/21/2022 62.7  38.0 - 73.0 % Final    Lymph % 06/21/2022 23.4  18.0 - 48.0 % Final    Mono % 06/21/2022 8.8  4.0 - 15.0 % Final    Eosinophil % 06/21/2022 3.9  0.0 - 8.0 % Final    Basophil % 06/21/2022 0.7  0.0 - 1.9 % Final    Differential Method 06/21/2022 Automated   Final   Admission on 06/16/2022, Discharged on 06/17/2022   Component Date Value Ref Range Status    Specimen UA 06/16/2022 Urine, Clean Catch   Final    Color, UA 06/16/2022 Yellow  Yellow, Straw, Meena Final    Appearance, UA 06/16/2022 Clear  Clear Final    pH, UA 06/16/2022 6.0  5.0 - 8.0 Final    Specific Gravity, UA 06/16/2022 <=1.005 (A)  1.005 - 1.030 Final    Protein, UA 06/16/2022 Negative  Negative Final    Comment: Recommend a 24 hour urine protein or a urine   protein/creatinine ratio if globulin induced proteinuria is  clinically suspected.      Glucose, UA 06/16/2022 Negative  Negative Final    Ketones, UA 06/16/2022 Negative  Negative Final    Bilirubin (UA) 06/16/2022 Negative  Negative Final    Occult Blood UA 06/16/2022 Negative  Negative Final    Nitrite, UA 06/16/2022 Negative  Negative Final    Urobilinogen, UA 06/16/2022 Negative  <2.0 EU/dL Final    Leukocytes, UA 06/16/2022 Negative  Negative Final    WBC 06/16/2022 7.56  3.90 - 12.70 K/uL Final    RBC 06/16/2022 5.38  4.60 - 6.20 M/uL Final    Hemoglobin 06/16/2022 15.7  14.0 - 18.0 g/dL Final    Hematocrit 06/16/2022 46.5  40.0 - 54.0 % Final    MCV 06/16/2022 86  82 - 98 fL Final    MCH 06/16/2022 29.2  27.0 - 31.0 pg Final    MCHC 06/16/2022 33.8  32.0 - 36.0 g/dL Final    RDW 06/16/2022 12.8  11.5 - 14.5 % Final    Platelets 06/16/2022 220  150 - 450 K/uL Final    MPV 06/16/2022 9.7  9.2 - 12.9 fL Final    Immature Granulocytes 06/16/2022 0.4  0.0 - 0.5 % Final    Gran # (ANC) 06/16/2022 5.6  1.8 - 7.7 K/uL Final    Immature Grans (Abs) 06/16/2022 0.03  0.00 - 0.04 K/uL Final    Comment: Mild elevation in immature granulocytes is non specific and   can be seen in a variety of conditions including stress response,   acute inflammation, trauma and pregnancy. Correlation with other   laboratory and clinical findings is essential.      Lymph # 06/16/2022 1.1  1.0 - 4.8 K/uL Final    Mono # 06/16/2022 0.8  0.3 - 1.0 K/uL Final    Eos # 06/16/2022 0.1  0.0 - 0.5 K/uL Final    Baso # 06/16/2022 0.03  0.00 - 0.20 K/uL Final    nRBC 06/16/2022 0  0 /100 WBC Final    Gran % 06/16/2022 74.3 (A) 38.0 - 73.0 % Final    Lymph % 06/16/2022 13.9 (A) 18.0 - 48.0 % Final    Mono % 06/16/2022 10.2  4.0 - 15.0 % Final    Eosinophil % 06/16/2022 0.8  0.0 - 8.0 % Final     Basophil % 06/16/2022 0.4  0.0 - 1.9 % Final    Differential Method 06/16/2022 Automated   Final    Sodium 06/16/2022 139  136 - 145 mmol/L Final    Potassium 06/16/2022 5.0  3.5 - 5.1 mmol/L Final    Chloride 06/16/2022 102  95 - 110 mmol/L Final    CO2 06/16/2022 23  23 - 29 mmol/L Final    Glucose 06/16/2022 119 (A) 70 - 110 mg/dL Final    BUN 06/16/2022 10  8 - 23 mg/dL Final    Creatinine 06/16/2022 1.2  0.5 - 1.4 mg/dL Final    Calcium 06/16/2022 9.9  8.7 - 10.5 mg/dL Final    Anion Gap 06/16/2022 14  8 - 16 mmol/L Final    eGFR if African American 06/16/2022 >60  >60 mL/min/1.73 m^2 Final    eGFR if non African American 06/16/2022 >60  >60 mL/min/1.73 m^2 Final    Comment: Calculation used to obtain the estimated glomerular filtration  rate (eGFR) is the CKD-EPI equation.          Assessment:     1. Mixed hyperlipidemia      Plan:   Mixed hyperlipidemia  Improved with recent increase in statin.   improved. Cont current meds.   Cont current dosage.   Lab Frequency Next Occurrence   Prostate Specific Antigen, Diagnostic Once 08/23/2022   Lipid Panel Once 03/21/2023   Comprehensive Metabolic Panel Once 03/21/2023   CBC Auto Differential Once 03/21/2023   TSH Once 03/21/2023   PSA, Screening Once 03/21/2023   Cardiac device check - [facility]     Cardiac device check - In [facility]         Problem List Items Addressed This Visit     Hyperlipidemia - Primary          Follow up in about 1 year (around 6/24/2023), or if symptoms worsen or fail to improve.

## 2022-06-25 RX ORDER — AMOXICILLIN AND CLAVULANATE POTASSIUM 875; 125 MG/1; MG/1
1 TABLET, FILM COATED ORAL 2 TIMES DAILY
Qty: 20 TABLET | Refills: 0 | Status: SHIPPED | OUTPATIENT
Start: 2022-06-25 | End: 2022-07-05

## 2022-07-05 ENCOUNTER — OFFICE VISIT (OUTPATIENT)
Dept: UROLOGY | Facility: CLINIC | Age: 72
End: 2022-07-05
Payer: MEDICARE

## 2022-07-05 VITALS
HEIGHT: 70 IN | BODY MASS INDEX: 27.15 KG/M2 | SYSTOLIC BLOOD PRESSURE: 118 MMHG | DIASTOLIC BLOOD PRESSURE: 72 MMHG | WEIGHT: 189.63 LBS

## 2022-07-05 DIAGNOSIS — N13.8 BPH WITH OBSTRUCTION/LOWER URINARY TRACT SYMPTOMS: Primary | ICD-10-CM

## 2022-07-05 DIAGNOSIS — R33.9 URINARY RETENTION: ICD-10-CM

## 2022-07-05 DIAGNOSIS — N40.1 BPH WITH OBSTRUCTION/LOWER URINARY TRACT SYMPTOMS: Primary | ICD-10-CM

## 2022-07-05 PROCEDURE — 99213 OFFICE O/P EST LOW 20 MIN: CPT | Mod: S$PBB,,, | Performed by: UROLOGY

## 2022-07-05 PROCEDURE — 99999 PR PBB SHADOW E&M-EST. PATIENT-LVL III: CPT | Mod: PBBFAC,,, | Performed by: UROLOGY

## 2022-07-05 PROCEDURE — 99213 OFFICE O/P EST LOW 20 MIN: CPT | Mod: PBBFAC,PN | Performed by: UROLOGY

## 2022-07-05 PROCEDURE — 99999 PR PBB SHADOW E&M-EST. PATIENT-LVL III: ICD-10-PCS | Mod: PBBFAC,,, | Performed by: UROLOGY

## 2022-07-05 PROCEDURE — 99213 PR OFFICE/OUTPT VISIT, EST, LEVL III, 20-29 MIN: ICD-10-PCS | Mod: S$PBB,,, | Performed by: UROLOGY

## 2022-07-05 RX ORDER — FINASTERIDE 5 MG/1
5 TABLET, FILM COATED ORAL DAILY
Qty: 30 TABLET | Refills: 11 | Status: SHIPPED | OUTPATIENT
Start: 2022-07-05 | End: 2023-03-27 | Stop reason: SDUPTHER

## 2022-07-05 NOTE — PROGRESS NOTES
Chief Complaint   Patient presents with    Other     Urinary retention- pt currently has catheter placed         History of Present Illness:   Adal Loaiza Jr. is a 72 y.o. male here for evaluation of Other (Urinary retention- pt currently has catheter placed)    7/5/22- Pt here for evaluation of recurrent urinary retention. He reports that he underwent Hiatal hernia surgery and hasn't been able to urinate since that time. He has had multiple voiding trials. Sometimes, he was distended over 1000cc.   Prior to surgery, felt like he emptied, stream was good.   Has h/o meningioma s/p surgery 22 years ago. Also had gamma knife surgery following that.          Review of Systems   Constitutional: Negative for chills and fever.   Respiratory: Negative for shortness of breath.    Cardiovascular: Negative for chest pain.   Genitourinary: Positive for difficulty urinating.   All other systems reviewed and are negative.      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    Seizures     because of brain tumor    Sleep apnea     Spinal cord disease     meningioma       Past Surgical History:   Procedure Laterality Date    BRAIN SURGERY      CARDIAC PACEMAKER PLACEMENT      COLONOSCOPY N/A 8/25/2016    Procedure: COLONOSCOPY;  Surgeon: Morris Olguin MD;  Location: [facility];  Service: Endoscopy;  Laterality: N/A;    COLONOSCOPY N/A 4/4/2022    Procedure: COLONOSCOPY;  Surgeon: Marci Silva MD;  Location: [facility];  Service: Endoscopy;  Laterality: N/A;    ESOPHAGEAL MANOMETRY WITH MEASUREMENT OF IMPEDANCE N/A 4/26/2022     Procedure: MANOMETRY, ESOPHAGUS, WITH IMPEDANCE MEASUREMENT;  Surgeon: Yoni Portillo RN;  Location: [facility] ENDO;  Service: Endoscopy;  Laterality: N/A;    ESOPHAGOGASTRODUODENOSCOPY N/A 4/4/2022    Procedure: ESOPHAGOGASTRODUODENOSCOPY (EGD);  Surgeon: Marci Silva MD;  Location: [facility] ENDO;  Service: Endoscopy;  Laterality: N/A;    ESOPHAGOGASTRODUODENOSCOPY N/A 6/6/2022    Procedure: EGD (ESOPHAGOGASTRODUODENOSCOPY);  Surgeon: Ryanne Phan DO;  Location: [facility] OR;  Service: General;  Laterality: N/A;    FRACTURE SURGERY Right     right jaw    Gamma knife      for schwannoma    LAPAROSCOPIC TOUPET FUNDOPLICATION N/A 6/6/2022    Procedure: FUNDOPLICATION, LAPAROSCOPIC, TOUPET;  Surgeon: Ryanne Phan DO;  Location: [facility] OR;  Service: General;  Laterality: N/A;    REPLACEMENT OF PACEMAKER GENERATOR Left 2/13/2019    Procedure: REPLACEMENT, PULSE GENERATOR, CARDIAC PACEMAKER;  Surgeon: Gen Meza MD;  Location: [facility] CATH LAB;  Service: Cardiology;  Laterality: Left;  current device MDT/waiting for MD input    ROBOT-ASSISTED REPAIR OF HIATAL HERNIA USING DA CARMELO XI N/A 6/6/2022    Procedure: XI ROBOTIC REPAIR, HERNIA, HIATAL;  Surgeon: Ryanne Phan DO;  Location: [facility] OR;  Service: General;  Laterality: N/A;       Family History   Problem Relation Age of Onset    Colon cancer Father     Hypertension Unknown        Social History     Tobacco Use    Smoking status: Never Smoker    Smokeless tobacco: Never Used   Substance Use Topics    Alcohol use: No     Comment: socially    Drug use: No       Current Outpatient Medications   Medication Sig Dispense Refill    docusate sodium (COLACE) 100 MG capsule Take 1 capsule (100 mg total) by mouth 2 (two) times daily. 60 capsule 1    gabapentin (NEURONTIN) 300 MG capsule Take 300 mg by mouth every evening.       LACTOBACILLUS ACIDOPHILUS (PROBIOTIC ORAL) Take by mouth once daily.      levetiracetam (KEPPRA) 500 MG Tab Take 1 tablet  (500 mg total) by mouth 2 (two) times daily. 60 tablet 11    multivitamin (THERAGRAN) per tablet Take 1 tablet by mouth once daily.      tamsulosin (FLOMAX) 0.4 mg Cap Take 1 capsule (0.4 mg total) by mouth once daily. 90 capsule 3    finasteride (PROSCAR) 5 mg tablet Take 1 tablet (5 mg total) by mouth once daily. 30 tablet 11    levoFLOXacin (LEVAQUIN) 500 MG tablet Take 1 tablet (500 mg total) by mouth once daily. 10 tablet 0     No current facility-administered medications for this visit.     Facility-Administered Medications Ordered in Other Visits   Medication Dose Route Frequency Provider Last Rate Last Admin    sodium hyaluronate (EUFLEXXA) 10 mg/mL(mw 2.4 -3.6 million) injection 10 mg  10 mg Intra-articular  Attila Mclain MD   10 mg at 05/04/22 1020       Review of patient's allergies indicates:   Allergen Reactions    Nsaids (non-steroidal anti-inflammatory drug)      Caused GI bleeding.    Aspirin     Celecoxib      Increased bleeding        Physical Exam  Vitals:    07/05/22 1147   BP: 118/72       General: Well-developed, well-nourished in no acute distress  HEENT: Normocephalic, atraumatic, Extraocular movements intact  Neck: supple, trachea midline, no cervical or supraclavicular lymphadenopathy  Respirations: even and unlabored  Back: midline spine, no CVA tenderness  Abdomen: soft, Non-tender, non-distended, no organomegaly or palpable masses, no rebound or guarding  Rectal: >40g prostate, no nodules or tenderness. No gross blood  Extremities: atraumatic, moves all equally, no clubbing, cyanosis or edema  Psych: normal affect  Skin: warm and dry, no lesions  Neuro: Alert and oriented, Cranial nerves II-XII grossly intact    Urinalysis  pH, UA   Date Value Ref Range Status   07/12/2022 6.0 5.0 - 8.0 Final     Glucose, UA   Date Value Ref Range Status   07/12/2022 Negative Negative Final     Occult Blood UA   Date Value Ref Range Status   07/12/2022 3+ (A) Negative Final     Nitrite, UA    Date Value Ref Range Status   07/12/2022 Positive (A) Negative Final     Leukocytes, UA   Date Value Ref Range Status   07/12/2022 3+ (A) Negative Final         Lab Results   Component Value Date    PSA 2.8 02/17/2022    PSA 4.2 (H) 12/15/2020    PSA 2.7 12/04/2019       Assessment:   1. BPH with obstruction/lower urinary tract symptoms     2. Urinary retention         Plan:  BPH with obstruction/lower urinary tract symptoms    Urinary retention    Other orders  -     finasteride (PROSCAR) 5 mg tablet; Take 1 tablet (5 mg total) by mouth once daily.  Dispense: 30 tablet; Refill: 11    continue flomax  Discussed learning CIC with patient so that he may continue to attempt to volitionally void but will cath if needed. Pt was in agreement with this plan. Discussed possible need for TURP. Pt prefers to start finasteride and uses CIC if needed for now.   Follow up in about 4 weeks (around 8/2/2022).

## 2022-07-06 ENCOUNTER — TELEPHONE (OUTPATIENT)
Dept: UROLOGY | Facility: CLINIC | Age: 72
End: 2022-07-06
Payer: COMMERCIAL

## 2022-07-06 NOTE — TELEPHONE ENCOUNTER
I do not know what is causing his night sweats. Finasteride could cause them, but I just prescribed it yesterday, so that can't be what has caused them for the past 3 nights. He should discuss with his PCP.

## 2022-07-06 NOTE — TELEPHONE ENCOUNTER
----- Message from Stormy Oden sent at 7/6/2022 12:17 PM CDT -----  Pt is requesting a call back in regards to a question he has about his appt yesterday. Pt can be reached at [phone] ([email or web address])

## 2022-07-06 NOTE — TELEPHONE ENCOUNTER
Called and spoke with pt, pt states he has been urinating great on his own since yesterday, he says he has a good flow, has not had to use the catheters at all. Pt also states that he would like for you to look at his recent medications and see which one you think may be causing him to have severe night sweats, he says for the last 3 nights he has had to get out of bed in the middle of the night to change his clothes. Please forward response to Hortencia since I will be out of office.

## 2022-07-11 NOTE — TELEPHONE ENCOUNTER
Pt called stating that he declined ther antibiotics from last visit due to having extra from previous rx, pt states he is still having discomfort after having cath removed on 7/5/22 and needs antibiotics.

## 2022-07-12 ENCOUNTER — TELEPHONE (OUTPATIENT)
Dept: UROLOGY | Facility: CLINIC | Age: 72
End: 2022-07-12

## 2022-07-12 ENCOUNTER — TELEPHONE (OUTPATIENT)
Dept: UROLOGY | Facility: CLINIC | Age: 72
End: 2022-07-12
Payer: COMMERCIAL

## 2022-07-12 ENCOUNTER — LAB VISIT (OUTPATIENT)
Dept: LAB | Facility: HOSPITAL | Age: 72
End: 2022-07-12
Attending: UROLOGY
Payer: MEDICARE

## 2022-07-12 DIAGNOSIS — R33.9 URINARY RETENTION: Primary | ICD-10-CM

## 2022-07-12 DIAGNOSIS — R33.9 URINARY RETENTION: ICD-10-CM

## 2022-07-12 LAB
BACTERIA #/AREA URNS HPF: ABNORMAL /HPF
BILIRUB UR QL STRIP: ABNORMAL
CLARITY UR: ABNORMAL
COLOR UR: ABNORMAL
GLUCOSE UR QL STRIP: NEGATIVE
HGB UR QL STRIP: ABNORMAL
HYALINE CASTS #/AREA URNS LPF: 0 /LPF
KETONES UR QL STRIP: ABNORMAL
LEUKOCYTE ESTERASE UR QL STRIP: ABNORMAL
MICROSCOPIC COMMENT: ABNORMAL
NITRITE UR QL STRIP: POSITIVE
PH UR STRIP: 6 [PH] (ref 5–8)
PROT UR QL STRIP: ABNORMAL
RBC #/AREA URNS HPF: 0 /HPF (ref 0–4)
SP GR UR STRIP: 1.02 (ref 1–1.03)
URN SPEC COLLECT METH UR: ABNORMAL
WBC #/AREA URNS HPF: >100 /HPF (ref 0–5)
WBC CLUMPS URNS QL MICRO: ABNORMAL

## 2022-07-12 PROCEDURE — 87077 CULTURE AEROBIC IDENTIFY: CPT | Performed by: UROLOGY

## 2022-07-12 PROCEDURE — 87186 SC STD MICRODIL/AGAR DIL: CPT | Performed by: UROLOGY

## 2022-07-12 PROCEDURE — 81000 URINALYSIS NONAUTO W/SCOPE: CPT | Performed by: UROLOGY

## 2022-07-12 PROCEDURE — 87086 URINE CULTURE/COLONY COUNT: CPT | Performed by: UROLOGY

## 2022-07-12 PROCEDURE — 87088 URINE BACTERIA CULTURE: CPT | Performed by: UROLOGY

## 2022-07-12 RX ORDER — LEVOFLOXACIN 500 MG/1
500 TABLET, FILM COATED ORAL DAILY
Qty: 10 TABLET | Refills: 0 | Status: SHIPPED | OUTPATIENT
Start: 2022-07-12 | End: 2022-08-02

## 2022-07-12 RX ORDER — AMOXICILLIN AND CLAVULANATE POTASSIUM 875; 125 MG/1; MG/1
1 TABLET, FILM COATED ORAL 2 TIMES DAILY
OUTPATIENT
Start: 2022-07-12

## 2022-07-12 NOTE — TELEPHONE ENCOUNTER
Spoke to pt regarding urine culture. Pt will come today to the [facility] to perform specimen       ----- Message from Suzanne De León sent at 7/12/2022  2:27 PM CDT -----  Contact: self [phone]  Patient is calling regarding a fever and a possible UTI and he would like for  to give him a call back.Please call back at [phone].Thanks ar

## 2022-07-13 ENCOUNTER — TELEPHONE (OUTPATIENT)
Dept: UROLOGY | Facility: CLINIC | Age: 72
End: 2022-07-13
Payer: COMMERCIAL

## 2022-07-13 NOTE — TELEPHONE ENCOUNTER
I called to followup on pts concern about possibly having an infection; pt states he dropped off urine on yesterday and was treated with antibiotics; states he is feeling better and was very appreciative of the call.

## 2022-07-13 NOTE — TELEPHONE ENCOUNTER
----- Message from Mallory Mittal sent at 7/12/2022 10:53 AM CDT -----  Regarding: infection  Contact: patiebnt  Patient thinks that his infection has come back and would like to be advised,  please call him back [phone]

## 2022-07-14 LAB — BACTERIA UR CULT: ABNORMAL

## 2022-07-18 ENCOUNTER — CLINICAL SUPPORT (OUTPATIENT)
Dept: CARDIOLOGY | Facility: HOSPITAL | Age: 72
End: 2022-07-18
Payer: MEDICARE

## 2022-07-18 DIAGNOSIS — Z95.0 PRESENCE OF CARDIAC PACEMAKER: ICD-10-CM

## 2022-07-18 PROCEDURE — 93294 CARDIAC DEVICE CHECK - REMOTE: ICD-10-PCS | Mod: ,,, | Performed by: INTERNAL MEDICINE

## 2022-07-18 PROCEDURE — 93294 REM INTERROG EVL PM/LDLS PM: CPT | Mod: ,,, | Performed by: INTERNAL MEDICINE

## 2022-08-02 ENCOUNTER — OFFICE VISIT (OUTPATIENT)
Dept: SLEEP MEDICINE | Facility: CLINIC | Age: 72
End: 2022-08-02
Payer: MEDICARE

## 2022-08-02 VITALS
OXYGEN SATURATION: 97 % | WEIGHT: 193.56 LBS | SYSTOLIC BLOOD PRESSURE: 124 MMHG | RESPIRATION RATE: 18 BRPM | DIASTOLIC BLOOD PRESSURE: 72 MMHG | BODY MASS INDEX: 27.71 KG/M2 | HEIGHT: 70 IN | HEART RATE: 83 BPM

## 2022-08-02 DIAGNOSIS — E66.3 OVERWEIGHT (BMI 25.0-29.9): ICD-10-CM

## 2022-08-02 DIAGNOSIS — G47.33 OSA (OBSTRUCTIVE SLEEP APNEA): Primary | ICD-10-CM

## 2022-08-02 DIAGNOSIS — I48.0 PAF (PAROXYSMAL ATRIAL FIBRILLATION): ICD-10-CM

## 2022-08-02 PROCEDURE — 99213 OFFICE O/P EST LOW 20 MIN: CPT | Mod: PBBFAC | Performed by: NURSE PRACTITIONER

## 2022-08-02 PROCEDURE — 99999 PR PBB SHADOW E&M-EST. PATIENT-LVL III: ICD-10-PCS | Mod: PBBFAC,,, | Performed by: NURSE PRACTITIONER

## 2022-08-02 PROCEDURE — 99213 PR OFFICE/OUTPT VISIT, EST, LEVL III, 20-29 MIN: ICD-10-PCS | Mod: S$PBB,,, | Performed by: NURSE PRACTITIONER

## 2022-08-02 PROCEDURE — 99999 PR PBB SHADOW E&M-EST. PATIENT-LVL III: CPT | Mod: PBBFAC,,, | Performed by: NURSE PRACTITIONER

## 2022-08-02 PROCEDURE — 99213 OFFICE O/P EST LOW 20 MIN: CPT | Mod: S$PBB,,, | Performed by: NURSE PRACTITIONER

## 2022-08-02 NOTE — PROGRESS NOTES
"Subjective:      Patient ID: Adal Loaiza Jr. is a 72 y.o. male.    Chief Complaint: Sleep Apnea    HPI  Presents to office for review of AutoPAP therapy. Patient states improved symptoms with use of AutoPAP. Sleeping more soundly. Waking up feeling more refreshed. Improved daytime sleepiness. Patient states he is benefiting from use of the AutoPAP.     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)    Cardiac pacemaker in situ    Fatty liver    Obstructive sleep apnea    Myofascial pain    Hyperlipidemia    Pacemaker lead fracture    Abnormal digital rectal exam    Family history of colon cancer    Colon polyps    Internal hemorrhoids    Sensorineural hearing loss (SNHL) of both ears    Essential tremor    Meningioma    Renal cyst    Inadequate sleep hygiene    PAF (paroxysmal atrial fibrillation)    Left knee pain    Partial symptomatic epilepsy with complex partial seizures, not intractable, without status epilepticus    SSS (sick sinus syndrome)    Localized osteoarthritis of left knee    Trigeminal neuralgia    Anemia    Paraesophageal hernia    Elevated PSA    Iron deficiency anemia         /72   Pulse 83   Resp 18   Ht 5' 10" (1.778 m)   Wt 87.8 kg (193 lb 9 oz)   SpO2 97%   BMI 27.77 kg/m²   Body mass index is 27.77 kg/m².    Review of Systems   Constitutional: Negative.    HENT: Negative.    Respiratory: Negative.    Cardiovascular: Negative.    Musculoskeletal: Negative.    Gastrointestinal: Negative.    Neurological: Negative.    Psychiatric/Behavioral: Negative.      Objective:      Physical Exam  Constitutional:       Appearance: Normal appearance. He is well-developed.   HENT:      Head: Normocephalic and atraumatic.      Nose: Nose normal.   Neck:      Thyroid: No thyroid mass or thyromegaly.      Trachea: Trachea normal.   Cardiovascular:      Rate and Rhythm: Normal rate and regular rhythm.      Heart sounds: " Normal heart sounds.   Pulmonary:      Effort: Pulmonary effort is normal.      Breath sounds: Normal breath sounds. No wheezing, rhonchi or rales.   Chest:      Chest wall: There is no dullness to percussion.   Abdominal:      Palpations: Abdomen is soft. There is no splenomegaly or mass.      Tenderness: There is no abdominal tenderness.   Musculoskeletal:         General: Normal range of motion.      Cervical back: Normal range of motion and neck supple.   Skin:     General: Skin is warm and dry.   Neurological:      Mental Status: He is alert and oriented to person, place, and time.   Psychiatric:         Mood and Affect: Mood normal.         Behavior: Behavior normal.       Personal Diagnostic Review    Review of PAP download. Special study media link attached to this encounter. Normal AHI and compliant.         Assessment:       1. SUSU (obstructive sleep apnea)    2. Overweight (BMI 25.0-29.9)    3. PAF (paroxysmal atrial fibrillation)        Outpatient Encounter Medications as of 8/2/2022   Medication Sig Dispense Refill    docusate sodium (COLACE) 100 MG capsule Take 1 capsule (100 mg total) by mouth 2 (two) times daily. 60 capsule 1    finasteride (PROSCAR) 5 mg tablet Take 1 tablet (5 mg total) by mouth once daily. 30 tablet 11    gabapentin (NEURONTIN) 300 MG capsule Take 300 mg by mouth every evening.       LACTOBACILLUS ACIDOPHILUS (PROBIOTIC ORAL) Take by mouth once daily.      levetiracetam (KEPPRA) 500 MG Tab Take 1 tablet (500 mg total) by mouth 2 (two) times daily. 60 tablet 11    multivitamin (THERAGRAN) per tablet Take 1 tablet by mouth once daily.      tamsulosin (FLOMAX) 0.4 mg Cap Take 1 capsule (0.4 mg total) by mouth once daily. 90 capsule 3    levoFLOXacin (LEVAQUIN) 500 MG tablet Take 1 tablet (500 mg total) by mouth once daily. (Patient not taking: Reported on 8/2/2022) 10 tablet 0     Facility-Administered Encounter Medications as of 8/2/2022   Medication Dose Route Frequency  Provider Last Rate Last Admin    sodium hyaluronate (EUFLEXXA) 10 mg/mL(mw 2.4 -3.6 million) injection 10 mg  10 mg Intra-articular  Attila Mclain MD   10 mg at 05/04/22 1020     No orders of the defined types were placed in this encounter.    Plan:        Problem List Items Addressed This Visit        Cardiac/Vascular    PAF (paroxysmal atrial fibrillation)    Overview     Sees [facility]             Other Visit Diagnoses     SUSU (obstructive sleep apnea)    -  Primary    Overweight (BMI 25.0-29.9)          Compliant with PAP and benefits from use. Follow up annually in the sleep clinic.

## 2022-08-08 ENCOUNTER — PROCEDURE VISIT (OUTPATIENT)
Dept: UROLOGY | Facility: CLINIC | Age: 72
End: 2022-08-08
Payer: MEDICARE

## 2022-08-08 VITALS
SYSTOLIC BLOOD PRESSURE: 124 MMHG | TEMPERATURE: 98 F | WEIGHT: 193 LBS | DIASTOLIC BLOOD PRESSURE: 73 MMHG | HEART RATE: 77 BPM | BODY MASS INDEX: 27.69 KG/M2

## 2022-08-08 DIAGNOSIS — R33.9 URINARY RETENTION: ICD-10-CM

## 2022-08-08 DIAGNOSIS — N13.8 BPH WITH OBSTRUCTION/LOWER URINARY TRACT SYMPTOMS: Primary | ICD-10-CM

## 2022-08-08 DIAGNOSIS — N40.1 BPH WITH OBSTRUCTION/LOWER URINARY TRACT SYMPTOMS: Primary | ICD-10-CM

## 2022-08-08 PROCEDURE — 52000 CYSTOURETHROSCOPY: CPT | Mod: S$PBB,,, | Performed by: UROLOGY

## 2022-08-08 PROCEDURE — 52000 PR CYSTOURETHROSCOPY: ICD-10-PCS | Mod: S$PBB,,, | Performed by: UROLOGY

## 2022-08-08 PROCEDURE — 52000 CYSTOURETHROSCOPY: CPT | Mod: PBBFAC | Performed by: UROLOGY

## 2022-08-08 RX ORDER — LIDOCAINE HYDROCHLORIDE 20 MG/ML
JELLY TOPICAL
Status: COMPLETED | OUTPATIENT
Start: 2022-08-08 | End: 2022-08-08

## 2022-08-08 RX ORDER — CIPROFLOXACIN 500 MG/1
500 TABLET ORAL
Status: COMPLETED | OUTPATIENT
Start: 2022-08-08 | End: 2022-08-08

## 2022-08-08 RX ADMIN — CIPROFLOXACIN 500 MG: 500 TABLET ORAL at 10:08

## 2022-08-08 RX ADMIN — LIDOCAINE HYDROCHLORIDE 10 ML: 20 JELLY TOPICAL at 10:08

## 2022-08-08 NOTE — PROCEDURES
Chief Complaint   Patient presents with    Other     Cysto         History of Present Illness:   Adal Loaiza Jr. is a 72 y.o. male here for evaluation of Other (Cysto)    8/8/22- Here for cysto. Urinating without difficulty. Good stream. Stopped flomax and finasteride a couple days ago due to joint pain.   7/5/22- Pt here for evaluation of recurrent urinary retention. He reports that he underwent Hiatal hernia surgery and hasn't been able to urinate since that time. He has had multiple voiding trials. Sometimes, he was distended over 1000cc.   Prior to surgery, felt like he emptied, stream was good.   Has h/o meningioma s/p surgery 22 years ago. Also had gamma knife surgery following that.          Review of Systems   Constitutional:  Negative for chills and fever.   Respiratory:  Negative for shortness of breath.    Cardiovascular:  Negative for chest pain.   Genitourinary:  Positive for difficulty urinating.   All other systems reviewed and are negative.    Past Medical History:   Diagnosis Date    Anemia due to GI blood loss 2012    Arrhythmia requiring replacement of cardiac pacemaker     Brain tumor (benign) 1999    Meningioma    Encounter for blood transfusion     GI bleed due to NSAIDs 2012    Hyperlipidemia     Localized osteoarthritis of left knee 12/10/2020    PAF (paroxysmal atrial fibrillation) 6/16/2019    Renal cyst 2/19/2019    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    Seizures     because of brain tumor    Sleep apnea     Spinal cord disease     meningioma       Past Surgical History:   Procedure Laterality Date    BRAIN SURGERY      CARDIAC PACEMAKER PLACEMENT      COLONOSCOPY N/A 8/25/2016    Procedure: COLONOSCOPY;  Surgeon: Morris Olguin MD;  Location: [facility];  Service: Endoscopy;  Laterality: N/A;    COLONOSCOPY N/A 4/4/2022    Procedure: COLONOSCOPY;  Surgeon: Marci Silva MD;  Location: [facility];  Service: Endoscopy;  Laterality: N/A;    ESOPHAGEAL MANOMETRY WITH MEASUREMENT OF  IMPEDANCE N/A 4/26/2022    Procedure: MANOMETRY, ESOPHAGUS, WITH IMPEDANCE MEASUREMENT;  Surgeon: Yoni Portillo RN;  Location: [facility] ENDO;  Service: Endoscopy;  Laterality: N/A;    ESOPHAGOGASTRODUODENOSCOPY N/A 4/4/2022    Procedure: ESOPHAGOGASTRODUODENOSCOPY (EGD);  Surgeon: Marci Silva MD;  Location: [facility] ENDO;  Service: Endoscopy;  Laterality: N/A;    ESOPHAGOGASTRODUODENOSCOPY N/A 6/6/2022    Procedure: EGD (ESOPHAGOGASTRODUODENOSCOPY);  Surgeon: Ryanne Phan DO;  Location: [facility] OR;  Service: General;  Laterality: N/A;    FRACTURE SURGERY Right     right jaw    Gamma knife      for schwannoma    LAPAROSCOPIC TOUPET FUNDOPLICATION N/A 6/6/2022    Procedure: FUNDOPLICATION, LAPAROSCOPIC, TOUPET;  Surgeon: Ryanne Phan DO;  Location: [facility] OR;  Service: General;  Laterality: N/A;    REPLACEMENT OF PACEMAKER GENERATOR Left 2/13/2019    Procedure: REPLACEMENT, PULSE GENERATOR, CARDIAC PACEMAKER;  Surgeon: Gen Meza MD;  Location: [facility] CATH LAB;  Service: Cardiology;  Laterality: Left;  current device MDT/waiting for MD input    ROBOT-ASSISTED REPAIR OF HIATAL HERNIA USING DA CARMELO XI N/A 6/6/2022    Procedure: XI ROBOTIC REPAIR, HERNIA, HIATAL;  Surgeon: Ryanne Phan DO;  Location: [facility] OR;  Service: General;  Laterality: N/A;       Family History   Problem Relation Age of Onset    Colon cancer Father     Hypertension Unknown        Social History     Tobacco Use    Smoking status: Never    Smokeless tobacco: Never   Substance Use Topics    Alcohol use: No     Comment: socially    Drug use: No       Current Outpatient Medications   Medication Sig Dispense Refill    docusate sodium (COLACE) 100 MG capsule Take 1 capsule (100 mg total) by mouth 2 (two) times daily. 60 capsule 1    gabapentin (NEURONTIN) 300 MG capsule Take 300 mg by mouth every evening.       LACTOBACILLUS ACIDOPHILUS (PROBIOTIC ORAL) Take by mouth once daily.      levetiracetam (KEPPRA) 500 MG Tab Take 1 tablet  (500 mg total) by mouth 2 (two) times daily. 60 tablet 11    multivitamin (THERAGRAN) per tablet Take 1 tablet by mouth once daily.      finasteride (PROSCAR) 5 mg tablet Take 1 tablet (5 mg total) by mouth once daily. (Patient not taking: Reported on 8/8/2022) 30 tablet 11    tamsulosin (FLOMAX) 0.4 mg Cap Take 1 capsule (0.4 mg total) by mouth once daily. (Patient not taking: Reported on 8/8/2022) 90 capsule 3     No current facility-administered medications for this visit.     Facility-Administered Medications Ordered in Other Visits   Medication Dose Route Frequency Provider Last Rate Last Admin    sodium hyaluronate (EUFLEXXA) 10 mg/mL(mw 2.4 -3.6 million) injection 10 mg  10 mg Intra-articular  Attila Mclain MD   10 mg at 05/04/22 1020       Review of patient's allergies indicates:   Allergen Reactions    Nsaids (non-steroidal anti-inflammatory drug)      Caused GI bleeding.    Aspirin     Celecoxib      Increased bleeding        Physical Exam  Vitals:    08/08/22 1039   BP: 124/73   Pulse: 77   Temp: 98.2 °F (36.8 °C)       General: Well-developed, well-nourished in no acute distress  HEENT: Normocephalic, atraumatic, Extraocular movements intact  Neck: supple, trachea midline, no cervical or supraclavicular lymphadenopathy  Respirations: even and unlabored  Back: midline spine, no CVA tenderness  Abdomen: soft, Non-tender, non-distended, no organomegaly or palpable masses, no rebound or guarding  Rectal: >40g prostate, no nodules or tenderness. No gross blood  Extremities: atraumatic, moves all equally, no clubbing, cyanosis or edema  Psych: normal affect  Skin: warm and dry, no lesions  Neuro: Alert and oriented, Cranial nerves II-XII grossly intact    Urinalysis  pH, UA   Date Value Ref Range Status   07/12/2022 6.0 5.0 - 8.0 Final     Glucose, UA   Date Value Ref Range Status   07/12/2022 Negative Negative Final     Occult Blood UA   Date Value Ref Range Status   07/12/2022 3+ (A) Negative Final      Nitrite, UA   Date Value Ref Range Status   07/12/2022 Positive (A) Negative Final     Leukocytes, UA   Date Value Ref Range Status   07/12/2022 3+ (A) Negative Final         Lab Results   Component Value Date    PSA 2.8 02/17/2022    PSA 4.2 (H) 12/15/2020    PSA 2.7 12/04/2019     Procedure: Cystoscopy      Procedure in detail:   Informed consent was obtained. The patient's genitalia was prepped and draped in the usual sterile fashion. Lidocaine jelly was administered per urethra. I utilized the flexible cystoscope and advanced it into the urethral meatus. No urethral strictures were noted. Bladder access was obtained. Systematic review of the bladder was performed, noting no stones, foreign bodies or masses. Bilateral ureteral orifices were visualized and noted to be effluxing clear urine. Retroflexion was utilized, noting a large median lobe. The scope was slowly backed out of the urethra, and the prostate was noted to be large and obstructing. No urethral lesions were identified. The patient tolerated the procedure well. Estimated blood loss was 0cc.       Assessment:   1. BPH with obstruction/lower urinary tract symptoms        2. Urinary retention            Plan:  BPH with obstruction/lower urinary tract symptoms    Urinary retention    Other orders  -     ciprofloxacin HCl tablet 500 mg  -     LIDOcaine HCl 2% urojet     Discussed TURP, but pt currently not wanting to have surgical management.   Restart finasteride.   Follow up in about 6 months (around 2/8/2023).

## 2022-08-26 ENCOUNTER — TELEPHONE (OUTPATIENT)
Dept: ADMINISTRATIVE | Facility: HOSPITAL | Age: 72
End: 2022-08-26
Payer: COMMERCIAL

## 2022-09-15 ENCOUNTER — OFFICE VISIT (OUTPATIENT)
Dept: SPORTS MEDICINE | Facility: CLINIC | Age: 72
End: 2022-09-15
Payer: MEDICARE

## 2022-09-15 VITALS — HEIGHT: 70 IN | WEIGHT: 192.88 LBS | BODY MASS INDEX: 27.61 KG/M2

## 2022-09-15 DIAGNOSIS — M17.12 PRIMARY OSTEOARTHRITIS OF LEFT KNEE: Primary | ICD-10-CM

## 2022-09-15 DIAGNOSIS — M17.12 OSTEOARTHRITIS OF LEFT KNEE, UNSPECIFIED OSTEOARTHRITIS TYPE: Primary | ICD-10-CM

## 2022-09-15 PROCEDURE — 99213 OFFICE O/P EST LOW 20 MIN: CPT | Mod: PBBFAC | Performed by: FAMILY MEDICINE

## 2022-09-15 PROCEDURE — 99213 PR OFFICE/OUTPT VISIT, EST, LEVL III, 20-29 MIN: ICD-10-PCS | Mod: S$PBB,,, | Performed by: FAMILY MEDICINE

## 2022-09-15 PROCEDURE — 99999 PR PBB SHADOW E&M-EST. PATIENT-LVL III: ICD-10-PCS | Mod: PBBFAC,,, | Performed by: FAMILY MEDICINE

## 2022-09-15 PROCEDURE — 99999 PR PBB SHADOW E&M-EST. PATIENT-LVL III: CPT | Mod: PBBFAC,,, | Performed by: FAMILY MEDICINE

## 2022-09-15 PROCEDURE — 99213 OFFICE O/P EST LOW 20 MIN: CPT | Mod: S$PBB,,, | Performed by: FAMILY MEDICINE

## 2022-09-15 NOTE — PROCEDURES
Large Joint Aspiration/Injection: L knee    Date/Time: 5/4/2022 10:20 AM  Performed by: Attila Mclain MD  Authorized by: Attila Mclain MD     Consent Done?:  Yes (Verbal)  Indications:  Arthritis and pain  Site marked: the procedure site was marked    Timeout: prior to procedure the correct patient, procedure, and site was verified    Prep: patient was prepped and draped in usual sterile fashion    Approach:  Anterolateral  Location:  Knee  Site:  L knee  Medications:  20 mg sodium hyaluronate (EUFLEXXA) 10 mg/mL(mw 2.4 -3.6 million)  Patient tolerance:  Patient tolerated the procedure well with no immediate complications

## 2022-09-15 NOTE — PATIENT INSTRUCTIONS
Continue home exercise program    We will order next round of viscosupplementation for patient to receive in November

## 2022-09-15 NOTE — PROCEDURES
Large Joint Aspiration/Injection: L knee    Date/Time: 4/27/2022 10:40 AM  Performed by: Attila Mclain MD  Authorized by: Attila Mclain MD     Consent Done?:  Yes (Verbal)  Indications:  Arthritis and pain  Site marked: the procedure site was marked    Timeout: prior to procedure the correct patient, procedure, and site was verified    Prep: patient was prepped and draped in usual sterile fashion    Approach:  Anterolateral  Location:  Knee  Site:  L knee  Medications:  20 mg sodium hyaluronate (EUFLEXXA) 10 mg/mL(mw 2.4 -3.6 million)  Patient tolerance:  Patient tolerated the procedure well with no immediate complications

## 2022-09-15 NOTE — PROGRESS NOTES
Subjective:     Patient ID: Adal Loaiza Jr. is a 72 y.o. male.    Chief Complaint: Pain of the Left Knee      HPI:  Here for follow-up of left knee pain.  Has been doing well since last viscosupplementation injections given in May.  Desires next round of injections-can tell that    Past Medical History:   Diagnosis Date    Anemia due to GI blood loss 2012    Arrhythmia requiring replacement of cardiac pacemaker     Brain tumor (benign) 1999    Meningioma    Encounter for blood transfusion     GI bleed due to NSAIDs 2012    Hyperlipidemia     Localized osteoarthritis of left knee 12/10/2020    PAF (paroxysmal atrial fibrillation) 6/16/2019    Renal cyst 2/19/2019    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    Seizures     because of brain tumor    Sleep apnea     Spinal cord disease     meningioma     Past Surgical History:   Procedure Laterality Date    BRAIN SURGERY      CARDIAC PACEMAKER PLACEMENT      COLONOSCOPY N/A 8/25/2016    Procedure: COLONOSCOPY;  Surgeon: Morris Olguin MD;  Location: [facility];  Service: Endoscopy;  Laterality: N/A;    COLONOSCOPY N/A 4/4/2022    Procedure: COLONOSCOPY;  Surgeon: Marci Silva MD;  Location: [facility];  Service: Endoscopy;  Laterality: N/A;    ESOPHAGEAL MANOMETRY WITH MEASUREMENT OF IMPEDANCE N/A 4/26/2022    Procedure: MANOMETRY, ESOPHAGUS, WITH IMPEDANCE MEASUREMENT;  Surgeon: Yoni Portillo RN;  Location: [facility] ENDO;  Service: Endoscopy;  Laterality: N/A;    ESOPHAGOGASTRODUODENOSCOPY N/A 4/4/2022    Procedure: ESOPHAGOGASTRODUODENOSCOPY (EGD);  Surgeon: Marci Silva MD;  Location: [facility] ENDO;  Service: Endoscopy;  Laterality: N/A;    ESOPHAGOGASTRODUODENOSCOPY N/A 6/6/2022    Procedure: EGD (ESOPHAGOGASTRODUODENOSCOPY);  Surgeon: Ryanne Phan DO;  Location: [facility] OR;  Service: General;  Laterality: N/A;    FRACTURE SURGERY Right     right jaw    Gamma knife      for schwannoma    LAPAROSCOPIC TOUPET FUNDOPLICATION N/A 6/6/2022    Procedure: FUNDOPLICATION,  LAPAROSCOPIC, TOUPET;  Surgeon: Ryanne Phan DO;  Location: [facility] OR;  Service: General;  Laterality: N/A;    REPLACEMENT OF PACEMAKER GENERATOR Left 2/13/2019    Procedure: REPLACEMENT, PULSE GENERATOR, CARDIAC PACEMAKER;  Surgeon: Gen Meza MD;  Location: [facility] CATH LAB;  Service: Cardiology;  Laterality: Left;  current device MDT/waiting for MD input    ROBOT-ASSISTED REPAIR OF HIATAL HERNIA USING DA CARMELO XI N/A 6/6/2022    Procedure: XI ROBOTIC REPAIR, HERNIA, HIATAL;  Surgeon: Ryanne Phan DO;  Location: [facility] OR;  Service: General;  Laterality: N/A;     Family History   Problem Relation Age of Onset    Colon cancer Father     Hypertension Unknown      Social History     Socioeconomic History    Marital status:    Tobacco Use    Smoking status: Never    Smokeless tobacco: Never   Substance and Sexual Activity    Alcohol use: No     Comment: socially    Drug use: No       Current Outpatient Medications:     docusate sodium (COLACE) 100 MG capsule, Take 1 capsule (100 mg total) by mouth 2 (two) times daily., Disp: 60 capsule, Rfl: 1    finasteride (PROSCAR) 5 mg tablet, Take 1 tablet (5 mg total) by mouth once daily., Disp: 30 tablet, Rfl: 11    gabapentin (NEURONTIN) 300 MG capsule, Take 300 mg by mouth every evening. , Disp: , Rfl:     LACTOBACILLUS ACIDOPHILUS (PROBIOTIC ORAL), Take by mouth once daily., Disp: , Rfl:     levetiracetam (KEPPRA) 500 MG Tab, Take 1 tablet (500 mg total) by mouth 2 (two) times daily., Disp: 60 tablet, Rfl: 11    multivitamin (THERAGRAN) per tablet, Take 1 tablet by mouth once daily., Disp: , Rfl:     tamsulosin (FLOMAX) 0.4 mg Cap, Take 1 capsule (0.4 mg total) by mouth once daily., Disp: 90 capsule, Rfl: 3  No current facility-administered medications for this visit.    Facility-Administered Medications Ordered in Other Visits:     sodium hyaluronate (EUFLEXXA) 10 mg/mL(mw 2.4 -3.6 million) injection 10 mg, 10 mg, Intra-articular, , Attila SCHROEDER  MD Rayne, 10 mg at 05/04/22 1020  Review of patient's allergies indicates:   Allergen Reactions    Nsaids (non-steroidal anti-inflammatory drug)      Caused GI bleeding.    Aspirin     Celecoxib      Increased bleeding      Review of Systems   Constitutional:  Negative for chills, fever and weight loss.   Respiratory:  Negative for shortness of breath.    Cardiovascular:  Negative for chest pain and palpitations.     Objective:   Body mass index is 27.68 kg/m².  There were no vitals filed for this visit.        Ortho/SPM Exam-alert oriented well-nourished well-developed ambulatory in no acute distress     Respiratory effort appears normal     Left knee-no acute deformity or swelling    Range of motion is good and the joints stable with negative Lachman's.      Nontender to palpation strength is 4/5     Neurovascular intact     Psychiatric good affect and cognition    Patient Instructions   Continue home exercise program    We will order next round of viscosupplementation for patient to receive in November    IMAGING: Cardiac device check - Remote  Battery and Leads (BL)  Auto-threshold measurement unavailable or programmed off on lead(s)  Abnormality identified on lead(s)  Normal battery parameters    Presenting Rhythm (OH)  Premature Ventricular Contraction(s) on presenting rhythm  Ventricular Sensing (VS)  Atrial Sensing-Ventricular Sensing (AS-VS)    Arrhythmic events (AE)  Device-identified arrhythmic events without corresponding EGMs and/or details noted  Unable to confirm duration of SVT event(s) identified  Undersensing is noted  Noise Oversensing is noted  [place] Oversensing is noted    Cardiovascular Physiologic Parameters (CPP)  No abnormalities in physiologic parameters identified    Transmission Information (TI)  Device Summary Report    Follow Up (FU)  Continue remote monitoring with quarterly reporting    Additional Comments       Radiographs / Imaging : Relevant imaging results reviewed by me and  interpreted by me, discussed with the patient and / or family       Assessment:     Encounter Diagnosis   Name Primary?    Primary osteoarthritis of left knee Yes        Plan:   Primary osteoarthritis of left knee        The patient verbalized good understanding of the medical issues discussed today and expressed appreciation for the care provided.  Patient was given the opportunity to ask questions and be an active participant in their medical care. Patient had no further questions or concerns at this time.     The patient was encouraged to participate in appropriate physical activity.      Attila Mclain M.D.  [facility]        This note was dictated using voice recognition software and may have sound a like errors.

## 2022-09-21 ENCOUNTER — IMMUNIZATION (OUTPATIENT)
Dept: PRIMARY CARE CLINIC | Facility: CLINIC | Age: 72
End: 2022-09-21
Payer: MEDICARE

## 2022-09-21 DIAGNOSIS — Z23 NEED FOR VACCINATION: Primary | ICD-10-CM

## 2022-09-21 PROCEDURE — 0124A COVID-19, MRNA, LNP-S, BIVALENT BOOSTER, PF, 30 MCG/0.3 ML DOSE: CPT | Mod: CV19,PBBFAC | Performed by: FAMILY MEDICINE

## 2022-09-21 PROCEDURE — 91312 COVID-19, MRNA, LNP-S, BIVALENT BOOSTER, PF, 30 MCG/0.3 ML DOSE: CPT | Mod: PBBFAC | Performed by: FAMILY MEDICINE

## 2022-10-11 ENCOUNTER — OFFICE VISIT (OUTPATIENT)
Dept: INTERNAL MEDICINE | Facility: CLINIC | Age: 72
End: 2022-10-11
Payer: MEDICARE

## 2022-10-11 VITALS
BODY MASS INDEX: 28.18 KG/M2 | DIASTOLIC BLOOD PRESSURE: 74 MMHG | SYSTOLIC BLOOD PRESSURE: 132 MMHG | OXYGEN SATURATION: 96 % | HEART RATE: 88 BPM | WEIGHT: 196.88 LBS | HEIGHT: 70 IN

## 2022-10-11 DIAGNOSIS — M17.12 PRIMARY OSTEOARTHRITIS OF LEFT KNEE: ICD-10-CM

## 2022-10-11 DIAGNOSIS — K44.9 PARAESOPHAGEAL HERNIA: ICD-10-CM

## 2022-10-11 DIAGNOSIS — Z00.00 ENCOUNTER FOR PREVENTIVE HEALTH EXAMINATION: Primary | ICD-10-CM

## 2022-10-11 DIAGNOSIS — G25.0 ESSENTIAL TREMOR: ICD-10-CM

## 2022-10-11 DIAGNOSIS — E78.2 MIXED HYPERLIPIDEMIA: ICD-10-CM

## 2022-10-11 DIAGNOSIS — N28.1 RENAL CYST: ICD-10-CM

## 2022-10-11 DIAGNOSIS — I49.5 SSS (SICK SINUS SYNDROME): ICD-10-CM

## 2022-10-11 DIAGNOSIS — D32.9 MENINGIOMA: ICD-10-CM

## 2022-10-11 DIAGNOSIS — K76.0 FATTY LIVER: ICD-10-CM

## 2022-10-11 DIAGNOSIS — K64.8 INTERNAL HEMORRHOIDS: ICD-10-CM

## 2022-10-11 DIAGNOSIS — D50.9 IRON DEFICIENCY ANEMIA, UNSPECIFIED IRON DEFICIENCY ANEMIA TYPE: ICD-10-CM

## 2022-10-11 DIAGNOSIS — G40.209 PARTIAL SYMPTOMATIC EPILEPSY WITH COMPLEX PARTIAL SEIZURES, NOT INTRACTABLE, WITHOUT STATUS EPILEPTICUS: ICD-10-CM

## 2022-10-11 DIAGNOSIS — I48.0 PAF (PAROXYSMAL ATRIAL FIBRILLATION): ICD-10-CM

## 2022-10-11 DIAGNOSIS — G47.33 OBSTRUCTIVE SLEEP APNEA: Chronic | ICD-10-CM

## 2022-10-11 DIAGNOSIS — Z95.0 CARDIAC PACEMAKER IN SITU: Chronic | ICD-10-CM

## 2022-10-11 DIAGNOSIS — Z80.0 FAMILY HISTORY OF COLON CANCER: ICD-10-CM

## 2022-10-11 DIAGNOSIS — G50.0 TRIGEMINAL NEURALGIA: ICD-10-CM

## 2022-10-11 PROCEDURE — 99214 OFFICE O/P EST MOD 30 MIN: CPT | Mod: PBBFAC | Performed by: NURSE PRACTITIONER

## 2022-10-11 PROCEDURE — 99999 PR PBB SHADOW E&M-EST. PATIENT-LVL IV: CPT | Mod: PBBFAC,,, | Performed by: NURSE PRACTITIONER

## 2022-10-11 PROCEDURE — 99999 PR PBB SHADOW E&M-EST. PATIENT-LVL IV: ICD-10-PCS | Mod: PBBFAC,,, | Performed by: NURSE PRACTITIONER

## 2022-10-11 PROCEDURE — G0439 PPPS, SUBSEQ VISIT: HCPCS | Mod: ,,, | Performed by: NURSE PRACTITIONER

## 2022-10-11 PROCEDURE — G0439 PR MEDICARE ANNUAL WELLNESS SUBSEQUENT VISIT: ICD-10-PCS | Mod: ,,, | Performed by: NURSE PRACTITIONER

## 2022-10-11 NOTE — PATIENT INSTRUCTIONS
Counseling and Referral of Other Preventative  (Italic type indicates deductible and co-insurance are waived)    Patient Name: Adal Loaiza  Today's Date: 10/11/2022    Health Maintenance       Date Due Completion Date    TETANUS VACCINE 12/19/2017 12/19/2007    Influenza Vaccine (1) 09/01/2022 12/6/2021    Lipid Panel 06/21/2023 6/21/2022    Colorectal Cancer Screening 04/04/2027 4/4/2022        No orders of the defined types were placed in this encounter.      The following information is provided to all patients.  This information is to help you find resources for any of the problems found today that may be affecting your health:                Living healthy guide: www.[facility].[place].gov      Understanding Diabetes: www.diabetes.org      Eating healthy: www.cdc.gov/healthyweight      CDC home safety checklist: www.cdc.gov/steadi/patient.html      Agency on Aging: www.goea.[place].[facility]      Alcoholics anonymous (AA): www.aa.org      Physical Activity: www.gosia.nih.gov/lw0wizm      Tobacco use: www.quitwithusla.org

## 2022-10-11 NOTE — PROGRESS NOTES
"I offered to discuss advanced care planning, including how to pick a person who would make decisions for you if you were unable to make them for yourself, called a health care power of , and what kind of decisions you might make such as use of life sustaining treatments such as ventilators and tube feeding when faced with a life limiting illness recorded on a living will that they will need to know. (How you want to be cared for as you near the end of your natural life)     X Patient is interested in learning more about how to make advanced directives.  I provided them paperwork and offered to discuss this with them.Adal Loaiza presented for a  Medicare AWV and comprehensive Health Risk Assessment today. The following components were reviewed and updated:    Medical history  Family History  Social history  Allergies and Current Medications  Health Risk Assessment  Health Maintenance  Care Team     ** See Completed Assessments for Annual Wellness Visit within the encounter summary.**       The following assessments were completed:  Living Situation  CAGE  Depression Screening  Timed Get Up and Go  Whisper Test  Cognitive Function Screening  Nutrition Screening  ADL Screening  PAQ Screening    Vitals:    10/11/22 0853   BP: 132/74   Pulse: 88   SpO2: 96%   Weight: 89.3 kg (196 lb 13.9 oz)   Height: 5' 10" (1.778 m)     Body mass index is 28.25 kg/m².  Physical Exam  HENT:      Head: Normocephalic and atraumatic.      Right Ear: Tympanic membrane normal.      Left Ear: Tympanic membrane normal.   Eyes:      Conjunctiva/sclera: Conjunctivae normal.   Cardiovascular:      Rate and Rhythm: Normal rate and regular rhythm.      Heart sounds: Normal heart sounds.   Pulmonary:      Effort: Pulmonary effort is normal.      Breath sounds: Normal breath sounds.   Abdominal:      General: Bowel sounds are normal.      Palpations: Abdomen is soft.   Musculoskeletal:         General: Normal range of motion.      Cervical " back: Normal range of motion and neck supple.   Skin:     General: Skin is warm and dry.   Neurological:      Mental Status: He is alert and oriented to person, place, and time.   Psychiatric:         Mood and Affect: Mood normal.         Diagnoses and health risks identified today and associated recommendations/orders:      1. Encounter for preventive health examination  Health maintenance reviewed with pt: pt due for Shingrix and tetanus vaccines.     2. 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 Partial symptomatic epilepsy with complex partial seizures, not intractable, without status epilepticus  Stable. Pt taking Keppra. Continue current treatment plan as prescribed by your PCP and neurologist and f/u with them for further management.     4. Meningioma  Stable. Head CT 5/24/21. Continue current treatment plan as prescribed by your PCP and neurologist and f/u with them for further management.     5. PAF (paroxysmal atrial fibrillation); 6. SSS (sick sinus syndrome); 7. Cardiac pacemaker in situ; 8. Failure of pacemaker lead, initial encounter  Stable. Continue current treatment plan as prescribed by your PCP and cardiologist and f/u with them for further management.     9. Mixed hyperlipidemia  Stable. Pt taking pravastatin. Continue current treatment plan as prescribed by your PCP and cardiologist and f/u with them for further management.     10. Obstructive sleep apnea  Stable. PSG 2/23/19. Pt uses CPAP. Continue current treatment plan as prescribed by your PCP and pulm and f/u with them for further management.     11. Arthritis  Stable. Continue current treatment plan as prescribed by your PCP and ortho and f/u with them for further management.     12. Essential tremor  Stable. Pt taking gabapentin. Continue current treatment plan as prescribed by your PCP and neurologist and f/u with them for further management.     13. Hiatal hernia  S/p repair. Doing well     14. Renal cyst  Stable. CT  abd/pelv 1/7/21. Continue current treatment plan as prescribed by your PCP and urologist and f/u with them for further management.     15. Trigeminal neuralgia  Stable. Pt taking gabapentin. Continue current treatment plan as prescribed by your PCP and neuro and f/u with them for further management.     16. Anemia, unspecified type  Stable. Continue current treatment plan as prescribed by your PCP and f/u with your PCP for further management.     17. Elevated PSA  Sees uro    Provided Adal with a 5-10 year written screening schedule and personal prevention plan. Recommendations were developed using the USPSTF age appropriate recommendations. Education, counseling, and referrals were provided as needed. After Visit Summary printed and given to patient which includes a list of additional screenings\tests needed.    Follow up with PCP and specialists as scheduled  HRA follow up in 1 year    Sandee Rudolph NP

## 2022-10-16 ENCOUNTER — CLINICAL SUPPORT (OUTPATIENT)
Dept: CARDIOLOGY | Facility: HOSPITAL | Age: 72
End: 2022-10-16
Payer: MEDICARE

## 2022-10-16 DIAGNOSIS — Z95.0 PRESENCE OF CARDIAC PACEMAKER: ICD-10-CM

## 2022-10-16 PROCEDURE — 93296 REM INTERROG EVL PM/IDS: CPT | Performed by: INTERNAL MEDICINE

## 2022-11-08 ENCOUNTER — OFFICE VISIT (OUTPATIENT)
Dept: SPORTS MEDICINE | Facility: CLINIC | Age: 72
End: 2022-11-08
Payer: MEDICARE

## 2022-11-08 VITALS — WEIGHT: 196.88 LBS | HEIGHT: 70 IN | BODY MASS INDEX: 28.18 KG/M2

## 2022-11-08 DIAGNOSIS — M17.12 PRIMARY OSTEOARTHRITIS OF LEFT KNEE: Primary | ICD-10-CM

## 2022-11-08 PROCEDURE — 99999 PR PBB SHADOW E&M-EST. PATIENT-LVL III: ICD-10-PCS | Mod: PBBFAC,,, | Performed by: FAMILY MEDICINE

## 2022-11-08 PROCEDURE — 99999 PR PBB SHADOW E&M-EST. PATIENT-LVL III: CPT | Mod: PBBFAC,,, | Performed by: FAMILY MEDICINE

## 2022-11-08 PROCEDURE — 20610 DRAIN/INJ JOINT/BURSA W/O US: CPT | Mod: PBBFAC,LT | Performed by: FAMILY MEDICINE

## 2022-11-08 PROCEDURE — 99499 NO LOS: ICD-10-PCS | Mod: S$PBB,,, | Performed by: FAMILY MEDICINE

## 2022-11-08 PROCEDURE — 99499 UNLISTED E&M SERVICE: CPT | Mod: S$PBB,,, | Performed by: FAMILY MEDICINE

## 2022-11-08 PROCEDURE — 99213 OFFICE O/P EST LOW 20 MIN: CPT | Mod: PBBFAC | Performed by: FAMILY MEDICINE

## 2022-11-08 PROCEDURE — 20610 LARGE JOINT ASPIRATION/INJECTION: L KNEE: ICD-10-PCS | Mod: S$PBB,LT,, | Performed by: FAMILY MEDICINE

## 2022-11-08 RX ADMIN — Medication 20 MG: at 09:11

## 2022-11-08 NOTE — PATIENT INSTRUCTIONS
Apply ice to affected area three times a day for (15) fifteen minutes for the next 48 hours, and reduce activity during that time.  Voltaren gel three times a day to affected area if recommended.  Oral medication if recommended.  Physical therapy if recommended at home or at clinic.  Keep recheck visit. Patient Education       Viscosupplementation   Why is this procedure done?   Your joints are where two bones meet. The ends of the bones are covered with a protective coating of cartilage. This helps them glide smoothly during movement. A fluid also helps the joint move more smoothly. With years of use, the cartilage may begin to wear away. This causes pain in the joints. This procedure is done to relieve the pain. A special fluid is used to help the bones glide more easily. It also acts like a shock absorber. This is most often done on the knee joints.  What will the results be?   Lubricates the joint  Less pain in the joints during movement or weight bearing  Improved joint mobility or movement  What happens before the procedure?   Your doctor may ask you to try other ways to treat osteoarthritis or OA, such as exercise, physical therapy, drugs for pain, applying hot compress, and losing weight.  Talk to your doctor about all the drugs you are taking. Be sure to include all prescription and over-the-counter (OTC) drugs, and herbal supplements. Tell the doctor about any drug allergy. Bring a list of drugs you take with you. Some drugs may interact with the injection.  What happens during the procedure?   Your doctor will clean the skin area where the injection will be given. You will be given a drug called local anesthesia. This will numb your skin and you will not feel any pain.  In some cases, your doctor might use imaging like x-ray or ultrasound to make sure they inject the right spot.  If you have excess fluid in your joint, your doctor may remove the fluid before beginning.  A needle will be used to inject the  hyaluronic acid into the joint. Hyaluronic acid is a natural fluid in your body. It lubricates the joint to ease body movements.  The doctor will cover the injection site with a small bandage to prevent infection.  The procedure may only take a couple of minutes.  What happens after the procedure?   You may feel slight pain, warmth, and swelling around the joint area.  You will be able to go home after the injection.  What care is needed at home?   Ask your doctor what you need to do when you go home. Make sure you ask questions if you do not understand what the doctor says. This way you will know what you need to do.  Place an ice pack or a bag of frozen peas wrapped in a towel over the painful part. Never put ice right on the skin. Do not leave the ice on more than 10 to 15 minutes at a time.  Rest for the first few days after the procedure. Avoid strenuous activities like heavy lifting, jogging, standing for a long time, and hard exercise.  What follow-up care is needed?   Your doctor may ask you to make visits to the office to check on your progress. Be sure to keep these visits. Your doctor may want you to have more injections.  What lifestyle changes are needed?   Ask your doctor about when you can go back to your normal activities like exercise or work.  What problems could happen?   Pain, redness, and swelling around the injection site  Infection of the joint  Fever  Allergic reaction  Itching  Rash  Bruising  Bleeding in the joint  No change in pain after injection  Where can I learn more?   [employer]  http://orthoinfo.aaos.org/topic.cfm?xffvz=[email or web address]   Last Reviewed Date   2021-03-30  Consumer Information Use and Disclaimer   This information is not specific medical advice and does not replace information you receive from your health care provider. This is only a brief summary of general information. It does NOT include all information about conditions, illnesses, injuries,  tests, procedures, treatments, therapies, discharge instructions or life-style choices that may apply to you. You must talk with your health care provider for complete information about your health and treatment options. This information should not be used to decide whether or not to accept your health care providers advice, instructions or recommendations. Only your health care provider has the knowledge and training to provide advice that is right for you.  Copyright   Copyright © 2021 [employer] and its affiliates and/or licensors. All rights reserved.

## 2022-11-08 NOTE — PROCEDURES
Large Joint Aspiration/Injection: L knee    Date/Time: 11/8/2022 9:00 AM  Performed by: Attila Mclain MD  Authorized by: Attila Mclain MD     Consent Done?:  Yes (Verbal)  Indications:  Arthritis and pain  Site marked: the procedure site was marked    Timeout: prior to procedure the correct patient, procedure, and site was verified    Prep: patient was prepped and draped in usual sterile fashion    Approach:  Anterolateral  Location:  Knee  Site:  L knee  Medications:  20 mg sodium hyaluronate (EUFLEXXA) 10 mg/mL(mw 2.4 -3.6 million)  Patient tolerance:  Patient tolerated the procedure well with no immediate complications

## 2022-11-15 ENCOUNTER — OFFICE VISIT (OUTPATIENT)
Dept: SPORTS MEDICINE | Facility: CLINIC | Age: 72
End: 2022-11-15
Payer: MEDICARE

## 2022-11-15 VITALS — BODY MASS INDEX: 28.18 KG/M2 | HEIGHT: 70 IN | WEIGHT: 196.88 LBS

## 2022-11-15 DIAGNOSIS — M17.12 PRIMARY OSTEOARTHRITIS OF LEFT KNEE: Primary | ICD-10-CM

## 2022-11-15 PROCEDURE — 99499 UNLISTED E&M SERVICE: CPT | Mod: S$PBB,,, | Performed by: FAMILY MEDICINE

## 2022-11-15 PROCEDURE — 99999 PR PBB SHADOW E&M-EST. PATIENT-LVL III: CPT | Mod: PBBFAC,,, | Performed by: FAMILY MEDICINE

## 2022-11-15 PROCEDURE — 20610 LARGE JOINT ASPIRATION/INJECTION: L KNEE: ICD-10-PCS | Mod: S$PBB,LT,, | Performed by: FAMILY MEDICINE

## 2022-11-15 PROCEDURE — 99213 OFFICE O/P EST LOW 20 MIN: CPT | Mod: PBBFAC,25 | Performed by: FAMILY MEDICINE

## 2022-11-15 PROCEDURE — 99499 NO LOS: ICD-10-PCS | Mod: S$PBB,,, | Performed by: FAMILY MEDICINE

## 2022-11-15 PROCEDURE — 20610 DRAIN/INJ JOINT/BURSA W/O US: CPT | Mod: PBBFAC,LT | Performed by: FAMILY MEDICINE

## 2022-11-15 PROCEDURE — 99999 PR PBB SHADOW E&M-EST. PATIENT-LVL III: ICD-10-PCS | Mod: PBBFAC,,, | Performed by: FAMILY MEDICINE

## 2022-11-15 RX ADMIN — Medication 20 MG: at 09:11

## 2022-11-15 NOTE — PROCEDURES
Large Joint Aspiration/Injection: L knee    Date/Time: 11/15/2022 9:00 AM  Performed by: Attila Mclain MD  Authorized by: Attila Mclain MD     Consent Done?:  Yes (Verbal)  Indications:  Arthritis and pain  Site marked: the procedure site was marked    Timeout: prior to procedure the correct patient, procedure, and site was verified    Prep: patient was prepped and draped in usual sterile fashion    Approach:  Anterolateral  Location:  Knee  Site:  L knee  Medications:  20 mg sodium hyaluronate (EUFLEXXA) 10 mg/mL(mw 2.4 -3.6 million)  Patient tolerance:  Patient tolerated the procedure well with no immediate complications

## 2022-11-22 ENCOUNTER — OFFICE VISIT (OUTPATIENT)
Dept: SPORTS MEDICINE | Facility: CLINIC | Age: 72
End: 2022-11-22
Payer: MEDICARE

## 2022-11-22 VITALS — WEIGHT: 196.88 LBS | HEIGHT: 70 IN | BODY MASS INDEX: 28.18 KG/M2

## 2022-11-22 DIAGNOSIS — M17.12 PRIMARY OSTEOARTHRITIS OF LEFT KNEE: Primary | ICD-10-CM

## 2022-11-22 PROCEDURE — 99999 PR PBB SHADOW E&M-EST. PATIENT-LVL III: ICD-10-PCS | Mod: PBBFAC,,, | Performed by: FAMILY MEDICINE

## 2022-11-22 PROCEDURE — 99999 PR PBB SHADOW E&M-EST. PATIENT-LVL III: CPT | Mod: PBBFAC,,, | Performed by: FAMILY MEDICINE

## 2022-11-22 PROCEDURE — 99499 UNLISTED E&M SERVICE: CPT | Mod: S$PBB,,, | Performed by: FAMILY MEDICINE

## 2022-11-22 PROCEDURE — 20610 LARGE JOINT ASPIRATION/INJECTION: L KNEE: ICD-10-PCS | Mod: S$PBB,LT,, | Performed by: FAMILY MEDICINE

## 2022-11-22 PROCEDURE — 99213 OFFICE O/P EST LOW 20 MIN: CPT | Mod: PBBFAC,25 | Performed by: FAMILY MEDICINE

## 2022-11-22 PROCEDURE — 99499 NO LOS: ICD-10-PCS | Mod: S$PBB,,, | Performed by: FAMILY MEDICINE

## 2022-11-22 PROCEDURE — 20610 DRAIN/INJ JOINT/BURSA W/O US: CPT | Mod: PBBFAC | Performed by: FAMILY MEDICINE

## 2022-11-22 RX ADMIN — Medication 20 MG: at 09:11

## 2022-11-22 NOTE — PATIENT INSTRUCTIONS
Apply ice to affected area three times a day for (15) fifteen minutes for the next 48 hours, and reduce activity during that time.  Voltaren gel three times a day to affected area if recommended.  Oral medication if recommended.  Physical therapy if recommended at home or at clinic.  Keep recheck visit. Patient Education       Viscosupplementation   Why is this procedure done?   Your joints are where two bones meet. The ends of the bones are covered with a protective coating of cartilage. This helps them glide smoothly during movement. A fluid also helps the joint move more smoothly. With years of use, the cartilage may begin to wear away. This causes pain in the joints. This procedure is done to relieve the pain. A special fluid is used to help the bones glide more easily. It also acts like a shock absorber. This is most often done on the knee joints.  What will the results be?   Lubricates the joint  Less pain in the joints during movement or weight bearing  Improved joint mobility or movement  What happens before the procedure?   Your doctor may ask you to try other ways to treat osteoarthritis or OA, such as exercise, physical therapy, drugs for pain, applying hot compress, and losing weight.  Talk to your doctor about all the drugs you are taking. Be sure to include all prescription and over-the-counter (OTC) drugs, and herbal supplements. Tell the doctor about any drug allergy. Bring a list of drugs you take with you. Some drugs may interact with the injection.  What happens during the procedure?   Your doctor will clean the skin area where the injection will be given. You will be given a drug called local anesthesia. This will numb your skin and you will not feel any pain.  In some cases, your doctor might use imaging like x-ray or ultrasound to make sure they inject the right spot.  If you have excess fluid in your joint, your doctor may remove the fluid before beginning.  A needle will be used to inject the  hyaluronic acid into the joint. Hyaluronic acid is a natural fluid in your body. It lubricates the joint to ease body movements.  The doctor will cover the injection site with a small bandage to prevent infection.  The procedure may only take a couple of minutes.  What happens after the procedure?   You may feel slight pain, warmth, and swelling around the joint area.  You will be able to go home after the injection.  What care is needed at home?   Ask your doctor what you need to do when you go home. Make sure you ask questions if you do not understand what the doctor says. This way you will know what you need to do.  Place an ice pack or a bag of frozen peas wrapped in a towel over the painful part. Never put ice right on the skin. Do not leave the ice on more than 10 to 15 minutes at a time.  Rest for the first few days after the procedure. Avoid strenuous activities like heavy lifting, jogging, standing for a long time, and hard exercise.  What follow-up care is needed?   Your doctor may ask you to make visits to the office to check on your progress. Be sure to keep these visits. Your doctor may want you to have more injections.  What lifestyle changes are needed?   Ask your doctor about when you can go back to your normal activities like exercise or work.  What problems could happen?   Pain, redness, and swelling around the injection site  Infection of the joint  Fever  Allergic reaction  Itching  Rash  Bruising  Bleeding in the joint  No change in pain after injection  Where can I learn more?   [employer]  http://orthoinfo.aaos.org/topic.cfm?svmnr=n95154   Last Reviewed Date   2021-03-30  Consumer Information Use and Disclaimer   This information is not specific medical advice and does not replace information you receive from your health care provider. This is only a brief summary of general information. It does NOT include all information about conditions, illnesses, injuries,  tests, procedures, treatments, therapies, discharge instructions or life-style choices that may apply to you. You must talk with your health care provider for complete information about your health and treatment options. This information should not be used to decide whether or not to accept your health care providers advice, instructions or recommendations. Only your health care provider has the knowledge and training to provide advice that is right for you.  Copyright   Copyright © 2021 [employer] and its affiliates and/or licensors. All rights reserved.

## 2022-11-22 NOTE — PROCEDURES
Large Joint Aspiration/Injection: L knee    Date/Time: 11/22/2022 9:00 AM  Performed by: Atitla Mclain MD  Authorized by: Attila Mclain MD     Consent Done?:  Yes (Verbal)  Indications:  Arthritis and pain  Site marked: the procedure site was marked    Timeout: prior to procedure the correct patient, procedure, and site was verified    Prep: patient was prepped and draped in usual sterile fashion    Approach:  Anterolateral  Location:  Knee  Site:  L knee  Medications:  20 mg sodium hyaluronate (EUFLEXXA) 10 mg/mL(mw 2.4 -3.6 million)  Patient tolerance:  Patient tolerated the procedure well with no immediate complications

## 2023-01-14 ENCOUNTER — CLINICAL SUPPORT (OUTPATIENT)
Dept: CARDIOLOGY | Facility: HOSPITAL | Age: 73
End: 2023-01-14
Payer: MEDICARE

## 2023-01-14 DIAGNOSIS — Z95.0 PRESENCE OF CARDIAC PACEMAKER: ICD-10-CM

## 2023-01-14 PROCEDURE — 93294 REM INTERROG EVL PM/LDLS PM: CPT | Mod: ,,, | Performed by: INTERNAL MEDICINE

## 2023-01-14 PROCEDURE — 93294 CARDIAC DEVICE CHECK - REMOTE: ICD-10-PCS | Mod: ,,, | Performed by: INTERNAL MEDICINE

## 2023-01-14 PROCEDURE — 93296 REM INTERROG EVL PM/IDS: CPT | Performed by: INTERNAL MEDICINE

## 2023-01-19 ENCOUNTER — OFFICE VISIT (OUTPATIENT)
Dept: SPORTS MEDICINE | Facility: CLINIC | Age: 73
End: 2023-01-19
Payer: MEDICARE

## 2023-01-19 ENCOUNTER — HOSPITAL ENCOUNTER (OUTPATIENT)
Dept: RADIOLOGY | Facility: HOSPITAL | Age: 73
Discharge: HOME | End: 2023-01-19
Attending: FAMILY MEDICINE
Payer: MEDICARE

## 2023-01-19 VITALS — HEIGHT: 70 IN | BODY MASS INDEX: 28.18 KG/M2 | WEIGHT: 196.88 LBS

## 2023-01-19 DIAGNOSIS — M17.12 PRIMARY OSTEOARTHRITIS OF LEFT KNEE: Primary | ICD-10-CM

## 2023-01-19 DIAGNOSIS — M17.12 OSTEOARTHRITIS OF LEFT KNEE, UNSPECIFIED OSTEOARTHRITIS TYPE: ICD-10-CM

## 2023-01-19 DIAGNOSIS — M25.562 LEFT KNEE PAIN, UNSPECIFIED CHRONICITY: ICD-10-CM

## 2023-01-19 PROCEDURE — 99213 OFFICE O/P EST LOW 20 MIN: CPT | Mod: PBBFAC | Performed by: FAMILY MEDICINE

## 2023-01-19 PROCEDURE — 99214 OFFICE O/P EST MOD 30 MIN: CPT | Mod: S$PBB,,, | Performed by: FAMILY MEDICINE

## 2023-01-19 PROCEDURE — 99999 PR PBB SHADOW E&M-EST. PATIENT-LVL III: ICD-10-PCS | Mod: PBBFAC,,, | Performed by: FAMILY MEDICINE

## 2023-01-19 PROCEDURE — 73564 X-RAY EXAM KNEE 4 OR MORE: CPT | Mod: 26,50,, | Performed by: RADIOLOGY

## 2023-01-19 PROCEDURE — 73564 XR KNEE ORTHO BILAT WITH FLEXION: ICD-10-PCS | Mod: 26,50,, | Performed by: RADIOLOGY

## 2023-01-19 PROCEDURE — 99214 PR OFFICE/OUTPT VISIT, EST, LEVL IV, 30-39 MIN: ICD-10-PCS | Mod: S$PBB,,, | Performed by: FAMILY MEDICINE

## 2023-01-19 PROCEDURE — 99999 PR PBB SHADOW E&M-EST. PATIENT-LVL III: CPT | Mod: PBBFAC,,, | Performed by: FAMILY MEDICINE

## 2023-01-19 PROCEDURE — 73564 X-RAY EXAM KNEE 4 OR MORE: CPT | Mod: TC,50

## 2023-01-19 NOTE — PROGRESS NOTES
Subjective:     Patient ID: Adal Loaiza Jr. is a 72 y.o. male.    Chief Complaint: Pain of the Left Knee      HPI: Patient is being seen for left knee follow up after receiving Euflexxa injections at his office visit on 11/22/22. Says his knees are doing well. Rating pain 2/10 at today's visit. Denies taking anything by mouth for pain relief. Has not participated in physical therapy, but is completing at home exercises.    Likes to stay active outdoors with the family hunting and other activities     Past Medical History:   Diagnosis Date    Anemia due to GI blood loss 2012    Arrhythmia requiring replacement of cardiac pacemaker     Brain tumor (benign) 1999    Meningioma    Encounter for blood transfusion     GI bleed due to NSAIDs 2012    Hyperlipidemia     Localized osteoarthritis of left knee 12/10/2020    PAF (paroxysmal atrial fibrillation) 6/16/2019    Renal cyst 2/19/2019    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    Seizures     because of brain tumor    Sleep apnea     Spinal cord disease     meningioma     Past Surgical History:   Procedure Laterality Date    BRAIN SURGERY      CARDIAC PACEMAKER PLACEMENT      COLONOSCOPY N/A 8/25/2016    Procedure: COLONOSCOPY;  Surgeon: Morris Olguin MD;  Location: [facility];  Service: Endoscopy;  Laterality: N/A;    COLONOSCOPY N/A 4/4/2022    Procedure: COLONOSCOPY;  Surgeon: Marci Silva MD;  Location: [facility];  Service: Endoscopy;  Laterality: N/A;    ESOPHAGEAL MANOMETRY WITH MEASUREMENT OF IMPEDANCE N/A 4/26/2022    Procedure: MANOMETRY, ESOPHAGUS, WITH IMPEDANCE MEASUREMENT;  Surgeon: Yoni Portillo RN;  Location: [facility];  Service: Endoscopy;  Laterality: N/A;    ESOPHAGOGASTRODUODENOSCOPY N/A 4/4/2022    Procedure: ESOPHAGOGASTRODUODENOSCOPY (EGD);  Surgeon: Marci Silva MD;  Location: [facility];  Service: Endoscopy;  Laterality: N/A;    ESOPHAGOGASTRODUODENOSCOPY N/A 6/6/2022    Procedure: EGD (ESOPHAGOGASTRODUODENOSCOPY);  Surgeon: Ryanne LEE  DO Sylvester;  Location: [facility] OR;  Service: General;  Laterality: N/A;    FRACTURE SURGERY Right     right jaw    Gamma knife      for schwannoma    LAPAROSCOPIC TOUPET FUNDOPLICATION N/A 6/6/2022    Procedure: FUNDOPLICATION, LAPAROSCOPIC, TOUPET;  Surgeon: Ryanne Phan DO;  Location: [facility] OR;  Service: General;  Laterality: N/A;    REPLACEMENT OF PACEMAKER GENERATOR Left 2/13/2019    Procedure: REPLACEMENT, PULSE GENERATOR, CARDIAC PACEMAKER;  Surgeon: Gen Meza MD;  Location: [facility] CATH LAB;  Service: Cardiology;  Laterality: Left;  current device MDT/waiting for MD input    ROBOT-ASSISTED REPAIR OF HIATAL HERNIA USING DA CARMELO XI N/A 6/6/2022    Procedure: XI ROBOTIC REPAIR, HERNIA, HIATAL;  Surgeon: Ryanne Phan DO;  Location: [facility] OR;  Service: General;  Laterality: N/A;     Family History   Problem Relation Age of Onset    Colon cancer Father     Hypertension Unknown      Social History     Socioeconomic History    Marital status:    Tobacco Use    Smoking status: Never    Smokeless tobacco: Never   Substance and Sexual Activity    Alcohol use: No     Comment: socially    Drug use: No       Current Outpatient Medications:     docusate sodium (COLACE) 100 MG capsule, Take 1 capsule (100 mg total) by mouth 2 (two) times daily., Disp: 60 capsule, Rfl: 1    finasteride (PROSCAR) 5 mg tablet, Take 1 tablet (5 mg total) by mouth once daily., Disp: 30 tablet, Rfl: 11    gabapentin (NEURONTIN) 300 MG capsule, Take 300 mg by mouth every evening. , Disp: , Rfl:     LACTOBACILLUS ACIDOPHILUS (PROBIOTIC ORAL), Take by mouth once daily., Disp: , Rfl:     levetiracetam (KEPPRA) 500 MG Tab, Take 1 tablet (500 mg total) by mouth 2 (two) times daily., Disp: 60 tablet, Rfl: 11    multivitamin (THERAGRAN) per tablet, Take 1 tablet by mouth once daily., Disp: , Rfl:     tamsulosin (FLOMAX) 0.4 mg Cap, Take 1 capsule (0.4 mg total) by mouth once daily., Disp: 90 capsule, Rfl: 3  No current  facility-administered medications for this visit.    Facility-Administered Medications Ordered in Other Visits:     sodium hyaluronate (EUFLEXXA) 10 mg/mL(mw 2.4 -3.6 million) injection 20 mg, 20 mg, Intra-articular, , Attila Mclain MD, 20 mg at 04/27/22 1040    sodium hyaluronate (EUFLEXXA) 10 mg/mL(mw 2.4 -3.6 million) injection 20 mg, 20 mg, Intra-articular, , Attila Mclain MD, 20 mg at 05/04/22 1020  Review of patient's allergies indicates:   Allergen Reactions    Nsaids (non-steroidal anti-inflammatory drug)      Caused GI bleeding.    Aspirin     Celecoxib      Increased bleeding      Review of Systems   Constitutional:  Negative for chills, fever and weight loss.   Respiratory:  Negative for shortness of breath.    Cardiovascular:  Negative for chest pain and palpitations.     Objective:   Body mass index is 28.25 kg/m².  There were no vitals filed for this visit.        Ortho/SPM Exam-alert and oriented well-nourished well-developed fit appearing adult male ambulatory in no acute distress     Respiratory effort normal     Left knee-good muscle tone and no deformity or swelling  Strength is 5/5   Minimal valgus and varus laxity and negative Lachman's  Range of motion 0-110 degrees   Neurovascular intact     Psychiatric good affect and cognition    Plan-good discussion continuing exercise to keep the knees in shape and to continue 6 month Visco protocol since this is helped his knees a great deal.    MEDICAL NECESSITY FOR VISCOSUPPLEMENTATION: After thorough evaluation of the patient, I have determined that visco-supplementation is medically necessary. The patient has painful DJD of the knee with failure of conservative therapy including lifestyle modifications and rehabilitation exercises. Oral analgesis/NSAIDs have not adequately controlled symptoms and there is radiographic evidence of joint space narrowing, subchondral sclerosis, and some early osteophytic changes Kellgren- Renard grade 2 or  "greater, or in lack of radiographic evidence, there is arthroscopic or other evidence of chondrosis.     Patient Instructions   Patient Education       Chronic Knee Pain   About this topic   The knee is a large, complex joint. It is made up of 4 bones: The thigh bone, two lower leg bones, and the kneecap. There is a capsule around the joint. Cartilage acts like a shock absorber in the knee and reduces friction, which helps the knee move more smoothly. The knee also has ligaments and tendons. Ligaments are bands of tissue that join one bone to another. Tendons are bands of tissue that join muscles to the bone. There are many muscles around the knee and in front and back of the thigh. If there is a problem with any of these parts of the joint, you can have pain in your knee.       What are the causes?   Arthritis ? There are a few types of arthritis which all cause swelling of a joint. Osteoarthritis is the most common and happens over time with "wear and tear" on the joint.  Overuse ? Repeat motions or too much bending at the knee can lead to pain.  Bursitis ? Bursae are small fluid-filled sacs that help tendons glide easier. These can get swollen and hurt. This often happens to people who do work on their knees, like gardeners, roofers, and carpet layers.  Ligament tear or sprain ? Ligament injuries can make the knee shaky or unstable and painful.  Meniscus tear ? Injuries to the meniscus or cartilage can make your knee lock and cause pain with some movements.  Muscle strain ? Injuries to the muscles near the knee happen often with sports. If these do not heal the right way, ongoing pain can happen.  Patellar tendinopathy ? The tendon that goes over your kneecap can get swollen and hurt due to overuse and repetitive stress on the knee.  Chondromalacia patella ? This is a health problem where there is pain under the kneecap from cartilage being worn.  Dislocating kneecap ? If your kneecap slips out of its groove, " pain can happen.  Baker's cyst ? This is a lump that is behind the knee. This is also known as a popliteal cyst.  Hip, ankle, back problems ? Problems in the back and in nearby joints, such as the hip and ankle, can cause pain in the knee.  Fluid collection ? Fluid can collect in the knee joint after a knee injury and lead to pain if not treated.  Osgood-Schlatter disease ? This is a health problem seen in children and teens. The front of the knee below the kneecap is bothered by repeat movements.  Osteochondritis dissecans ? This is a health problem seen in children and teens. There is a problem with the blood flow to the bone and cartilage of the knee.  Plica syndrome ? Plica are folds of tissue that are left over from growth before birth. These can get sore and cause pain.  Patellofemoral pain syndrome - This happens when you have pain in front of your knee or around or behind your kneecap.  Iliotibial band syndrome - This happens when the iliotibial band, tissues that runs down the outside of the thigh from the hip to the shin, is overused. It causes pain on the outside of the knee.  What are the main signs?   Pain or stiffness  Swelling  Warmth or redness  Visible deformity  Hard to walk, go up or down stairs, or put weight on your leg  Knee locking ? not able to bend or straighten your knee fully  Knee is weak, stephanie, or gives way  Crunching and popping noises in the knee  How does the doctor diagnose this health problem?   Your doctor will look at your knee. Your doctor will feel all over your knee to find where the pain is. The doctor may move your knee and push or pull on many parts to check your motion and strength. Your doctor may have you stand and walk to see if your knee is stable. The doctor may order:  Blood tests  X-ray  CT or MRI scan  Ultrasound  Surgery ? At times, arthroscopic surgery may be needed to find the cause of the pain.  How does the doctor treat this health problem?   Finding out the  true cause of your knee pain is the most important step to fix the problem and lower your pain. Some things that may lessen your knee pain are:  Rest  Ice  Compression  Elevation - keeping the knee raised  Braces or supports  Heat may be used later but not right away. Heat can make swelling worse.  Exercises to stretch and strengthen the knee  Physical therapy (PT)  Surgery  What drugs may be needed?   The doctor may order drugs to:  Help with pain and swelling  Fight an infection  The doctor may give you a shot of an anti-inflammatory drug called a corticosteroid. This will help with swelling. Talk with your doctor about the risks of this shot.  What can be done to prevent this health problem?   If your knee pain is due to overuse, do not do repetitive movements that caused the problem if possible.  Take breaks often when doing things that use repeat movements.  Do not sit or keep your knee in one position for long periods of time.  If you sleep on your side, use a pillow in between your legs. This can help take stress off of the knee.  Always warm up and stretch before a workout and cool down after.  If you are a runner, actively stretch before a run. Talk to your doctor to make sure you understand the difference between active and passive stretching. Use good ways to train, such as slowly adding to how far you run.  Run on softer surfaces such as a track. This is easier on your knee than a hard surface like cement.  Try activities like swimming or biking rather than running. Running can put a lot of stress on your knee joint.  Stay away from activities that could result in twisting, sudden stops and starts, and blows to the knee. Sports such as basketball, skiing, football, and jogging are some common sports that can lead to knee injuries.  Wear shoes with good support. Replace your shoes often.  Keep a healthy weight. Being too heavy puts more stress on the knee joint. This makes the knee more at risk for  injury.  Stay active and work out to keep your muscles strong and flexible.  Where can I learn more?   NHS Choices  http://www.nhs.uk/conditions/knee-pain/Pages/Introduction.aspx   Last Reviewed Date   2020-04-23  Consumer Information Use and Disclaimer   This information is not specific medical advice and does not replace information you receive from your health care provider. This is only a brief summary of general information. It does NOT include all information about conditions, illnesses, injuries, tests, procedures, treatments, therapies, discharge instructions or life-style choices that may apply to you. You must talk with your health care provider for complete information about your health and treatment options. This information should not be used to decide whether or not to accept your health care providers advice, instructions or recommendations. Only your health care provider has the knowledge and training to provide advice that is right for you.  Copyright   Copyright © 2021 [employer] and its affiliates and/or licensors. All rights reserved.  Patient Education       Viscosupplementation   Why is this procedure done?   Your joints are where two bones meet. The ends of the bones are covered with a protective coating of cartilage. This helps them glide smoothly during movement. A fluid also helps the joint move more smoothly. With years of use, the cartilage may begin to wear away. This causes pain in the joints. This procedure is done to relieve the pain. A special fluid is used to help the bones glide more easily. It also acts like a shock absorber. This is most often done on the knee joints.  What will the results be?   Lubricates the joint  Less pain in the joints during movement or weight bearing  Improved joint mobility or movement  What happens before the procedure?   Your doctor may ask you to try other ways to treat osteoarthritis or OA, such as exercise, physical therapy, drugs for pain,  applying hot compress, and losing weight.  Talk to your doctor about all the drugs you are taking. Be sure to include all prescription and over-the-counter (OTC) drugs, and herbal supplements. Tell the doctor about any drug allergy. Bring a list of drugs you take with you. Some drugs may interact with the injection.  What happens during the procedure?   Your doctor will clean the skin area where the injection will be given. You will be given a drug called local anesthesia. This will numb your skin and you will not feel any pain.  In some cases, your doctor might use imaging like x-ray or ultrasound to make sure they inject the right spot.  If you have excess fluid in your joint, your doctor may remove the fluid before beginning.  A needle will be used to inject the hyaluronic acid into the joint. Hyaluronic acid is a natural fluid in your body. It lubricates the joint to ease body movements.  The doctor will cover the injection site with a small bandage to prevent infection.  The procedure may only take a couple of minutes.  What happens after the procedure?   You may feel slight pain, warmth, and swelling around the joint area.  You will be able to go home after the injection.  What care is needed at home?   Ask your doctor what you need to do when you go home. Make sure you ask questions if you do not understand what the doctor says. This way you will know what you need to do.  Place an ice pack or a bag of frozen peas wrapped in a towel over the painful part. Never put ice right on the skin. Do not leave the ice on more than 10 to 15 minutes at a time.  Rest for the first few days after the procedure. Avoid strenuous activities like heavy lifting, jogging, standing for a long time, and hard exercise.  What follow-up care is needed?   Your doctor may ask you to make visits to the office to check on your progress. Be sure to keep these visits. Your doctor may want you to have more injections.  What lifestyle  changes are needed?   Ask your doctor about when you can go back to your normal activities like exercise or work.  What problems could happen?   Pain, redness, and swelling around the injection site  Infection of the joint  Fever  Allergic reaction  Itching  Rash  Bruising  Bleeding in the joint  No change in pain after injection  Where can I learn more?   [employer]  http://orthoinfo.aaos.org/topic.cfm?oeyvb=k00006   Last Reviewed Date   2021-03-30  Consumer Information Use and Disclaimer   This information is not specific medical advice and does not replace information you receive from your health care provider. This is only a brief summary of general information. It does NOT include all information about conditions, illnesses, injuries, tests, procedures, treatments, therapies, discharge instructions or life-style choices that may apply to you. You must talk with your health care provider for complete information about your health and treatment options. This information should not be used to decide whether or not to accept your health care providers advice, instructions or recommendations. Only your health care provider has the knowledge and training to provide advice that is right for you.  Copyright   Copyright © 2021 [employer], [employer] and its affiliates and/or licensors. All rights reserved.      IMAGING: X-ray Knee Ortho Bilateral with Flexion  Narrative: EXAMINATION:  XR KNEE ORTHO BILAT WITH FLEXION    CLINICAL HISTORY:  Unilateral primary osteoarthritis, left knee    TECHNIQUE:  AP standing of both knees, PA flexion standing views of both knees, and Merchant views of both knees were performed.  Lateral views of both knees were also performed.    COMPARISON:  11/23/2021    FINDINGS:  Right knee: There is no radiographic evidence of acute osseous, articular, or soft tissue abnormality. Joint spaces are well preserved    Left knee:  No acute fractures or dislocations visualized.   No definite joint effusion appreciated.  There is tricompartmental osteoarthritis with moderate joint space loss in the medial compartment.  Multiple suspected intra-articular were bodies are again demonstrated projecting near midline and over the lateral compartment.  Impression: Degenerative findings as above    Electronically signed by: Nacho Danielle MD  Date:    01/19/2023  Time:    10:16       Radiographs / Imaging : Relevant imaging results reviewed by me and interpreted by me, discussed with the patient and / or family -agree with knee x-ray report and left much worse than right      Assessment:     Encounter Diagnoses   Name Primary?    Primary osteoarthritis of left knee Yes    Left knee pain, unspecified chronicity         Plan:   Primary osteoarthritis of left knee    Left knee pain, unspecified chronicity        The patient verbalized good understanding of the medical issues discussed today and expressed appreciation for the care provided.  Patient was given the opportunity to ask questions and be an active participant in their medical care. Patient had no further questions or concerns at this time.     The patient was encouraged to participate in appropriate physical activity.      Attila Mclain M.D.  [facility]        This note was dictated using voice recognition software and may have sound a like errors.

## 2023-01-19 NOTE — PATIENT INSTRUCTIONS
"Patient Education       Chronic Knee Pain   About this topic   The knee is a large, complex joint. It is made up of 4 bones: The thigh bone, two lower leg bones, and the kneecap. There is a capsule around the joint. Cartilage acts like a shock absorber in the knee and reduces friction, which helps the knee move more smoothly. The knee also has ligaments and tendons. Ligaments are bands of tissue that join one bone to another. Tendons are bands of tissue that join muscles to the bone. There are many muscles around the knee and in front and back of the thigh. If there is a problem with any of these parts of the joint, you can have pain in your knee.       What are the causes?   Arthritis ? There are a few types of arthritis which all cause swelling of a joint. Osteoarthritis is the most common and happens over time with "wear and tear" on the joint.  Overuse ? Repeat motions or too much bending at the knee can lead to pain.  Bursitis ? Bursae are small fluid-filled sacs that help tendons glide easier. These can get swollen and hurt. This often happens to people who do work on their knees, like gardeners, roofers, and carpet layers.  Ligament tear or sprain ? Ligament injuries can make the knee shaky or unstable and painful.  Meniscus tear ? Injuries to the meniscus or cartilage can make your knee lock and cause pain with some movements.  Muscle strain ? Injuries to the muscles near the knee happen often with sports. If these do not heal the right way, ongoing pain can happen.  Patellar tendinopathy ? The tendon that goes over your kneecap can get swollen and hurt due to overuse and repetitive stress on the knee.  Chondromalacia patella ? This is a health problem where there is pain under the kneecap from cartilage being worn.  Dislocating kneecap ? If your kneecap slips out of its groove, pain can happen.  Baker's cyst ? This is a lump that is behind the knee. This is also known as a popliteal cyst.  Hip, ankle, back " problems ? Problems in the back and in nearby joints, such as the hip and ankle, can cause pain in the knee.  Fluid collection ? Fluid can collect in the knee joint after a knee injury and lead to pain if not treated.  Osgood-Schlatter disease ? This is a health problem seen in children and teens. The front of the knee below the kneecap is bothered by repeat movements.  Osteochondritis dissecans ? This is a health problem seen in children and teens. There is a problem with the blood flow to the bone and cartilage of the knee.  Plica syndrome ? Plica are folds of tissue that are left over from growth before birth. These can get sore and cause pain.  Patellofemoral pain syndrome - This happens when you have pain in front of your knee or around or behind your kneecap.  Iliotibial band syndrome - This happens when the iliotibial band, tissues that runs down the outside of the thigh from the hip to the shin, is overused. It causes pain on the outside of the knee.  What are the main signs?   Pain or stiffness  Swelling  Warmth or redness  Visible deformity  Hard to walk, go up or down stairs, or put weight on your leg  Knee locking ? not able to bend or straighten your knee fully  Knee is weak, stephanie, or gives way  Crunching and popping noises in the knee  How does the doctor diagnose this health problem?   Your doctor will look at your knee. Your doctor will feel all over your knee to find where the pain is. The doctor may move your knee and push or pull on many parts to check your motion and strength. Your doctor may have you stand and walk to see if your knee is stable. The doctor may order:  Blood tests  X-ray  CT or MRI scan  Ultrasound  Surgery ? At times, arthroscopic surgery may be needed to find the cause of the pain.  How does the doctor treat this health problem?   Finding out the true cause of your knee pain is the most important step to fix the problem and lower your pain. Some things that may lessen your  knee pain are:  Rest  Ice  Compression  Elevation - keeping the knee raised  Braces or supports  Heat may be used later but not right away. Heat can make swelling worse.  Exercises to stretch and strengthen the knee  Physical therapy (PT)  Surgery  What drugs may be needed?   The doctor may order drugs to:  Help with pain and swelling  Fight an infection  The doctor may give you a shot of an anti-inflammatory drug called a corticosteroid. This will help with swelling. Talk with your doctor about the risks of this shot.  What can be done to prevent this health problem?   If your knee pain is due to overuse, do not do repetitive movements that caused the problem if possible.  Take breaks often when doing things that use repeat movements.  Do not sit or keep your knee in one position for long periods of time.  If you sleep on your side, use a pillow in between your legs. This can help take stress off of the knee.  Always warm up and stretch before a workout and cool down after.  If you are a runner, actively stretch before a run. Talk to your doctor to make sure you understand the difference between active and passive stretching. Use good ways to train, such as slowly adding to how far you run.  Run on softer surfaces such as a track. This is easier on your knee than a hard surface like cement.  Try activities like swimming or biking rather than running. Running can put a lot of stress on your knee joint.  Stay away from activities that could result in twisting, sudden stops and starts, and blows to the knee. Sports such as basketball, skiing, football, and jogging are some common sports that can lead to knee injuries.  Wear shoes with good support. Replace your shoes often.  Keep a healthy weight. Being too heavy puts more stress on the knee joint. This makes the knee more at risk for injury.  Stay active and work out to keep your muscles strong and flexible.  Where can I learn more?   NHS  Choices  http://www.nhs.uk/conditions/knee-pain/Pages/Introduction.aspx   Last Reviewed Date   2020-04-23  Consumer Information Use and Disclaimer   This information is not specific medical advice and does not replace information you receive from your health care provider. This is only a brief summary of general information. It does NOT include all information about conditions, illnesses, injuries, tests, procedures, treatments, therapies, discharge instructions or life-style choices that may apply to you. You must talk with your health care provider for complete information about your health and treatment options. This information should not be used to decide whether or not to accept your health care providers advice, instructions or recommendations. Only your health care provider has the knowledge and training to provide advice that is right for you.  Copyright   Copyright © 2021 [employer] and its affiliates and/or licensors. All rights reserved.  Patient Education       Viscosupplementation   Why is this procedure done?   Your joints are where two bones meet. The ends of the bones are covered with a protective coating of cartilage. This helps them glide smoothly during movement. A fluid also helps the joint move more smoothly. With years of use, the cartilage may begin to wear away. This causes pain in the joints. This procedure is done to relieve the pain. A special fluid is used to help the bones glide more easily. It also acts like a shock absorber. This is most often done on the knee joints.  What will the results be?   Lubricates the joint  Less pain in the joints during movement or weight bearing  Improved joint mobility or movement  What happens before the procedure?   Your doctor may ask you to try other ways to treat osteoarthritis or OA, such as exercise, physical therapy, drugs for pain, applying hot compress, and losing weight.  Talk to your doctor about all the drugs you are taking. Be sure to  include all prescription and over-the-counter (OTC) drugs, and herbal supplements. Tell the doctor about any drug allergy. Bring a list of drugs you take with you. Some drugs may interact with the injection.  What happens during the procedure?   Your doctor will clean the skin area where the injection will be given. You will be given a drug called local anesthesia. This will numb your skin and you will not feel any pain.  In some cases, your doctor might use imaging like x-ray or ultrasound to make sure they inject the right spot.  If you have excess fluid in your joint, your doctor may remove the fluid before beginning.  A needle will be used to inject the hyaluronic acid into the joint. Hyaluronic acid is a natural fluid in your body. It lubricates the joint to ease body movements.  The doctor will cover the injection site with a small bandage to prevent infection.  The procedure may only take a couple of minutes.  What happens after the procedure?   You may feel slight pain, warmth, and swelling around the joint area.  You will be able to go home after the injection.  What care is needed at home?   Ask your doctor what you need to do when you go home. Make sure you ask questions if you do not understand what the doctor says. This way you will know what you need to do.  Place an ice pack or a bag of frozen peas wrapped in a towel over the painful part. Never put ice right on the skin. Do not leave the ice on more than 10 to 15 minutes at a time.  Rest for the first few days after the procedure. Avoid strenuous activities like heavy lifting, jogging, standing for a long time, and hard exercise.  What follow-up care is needed?   Your doctor may ask you to make visits to the office to check on your progress. Be sure to keep these visits. Your doctor may want you to have more injections.  What lifestyle changes are needed?   Ask your doctor about when you can go back to your normal activities like exercise or  work.  What problems could happen?   Pain, redness, and swelling around the injection site  Infection of the joint  Fever  Allergic reaction  Itching  Rash  Bruising  Bleeding in the joint  No change in pain after injection  Where can I learn more?   [employer]  http://orthoinfo.aaos.org/topic.cfm?mbxgc=x87793   Last Reviewed Date   2021-03-30  Consumer Information Use and Disclaimer   This information is not specific medical advice and does not replace information you receive from your health care provider. This is only a brief summary of general information. It does NOT include all information about conditions, illnesses, injuries, tests, procedures, treatments, therapies, discharge instructions or life-style choices that may apply to you. You must talk with your health care provider for complete information about your health and treatment options. This information should not be used to decide whether or not to accept your health care providers advice, instructions or recommendations. Only your health care provider has the knowledge and training to provide advice that is right for you.  Copyright   Copyright © 2021 [employer], [employer] and its affiliates and/or licensors. All rights reserved.

## 2023-02-06 ENCOUNTER — OFFICE VISIT (OUTPATIENT)
Dept: UROLOGY | Facility: CLINIC | Age: 73
End: 2023-02-06
Payer: MEDICARE

## 2023-02-06 ENCOUNTER — LAB VISIT (OUTPATIENT)
Dept: LAB | Facility: HOSPITAL | Age: 73
End: 2023-02-06
Attending: UROLOGY
Payer: MEDICARE

## 2023-02-06 VITALS
WEIGHT: 196 LBS | HEIGHT: 70 IN | DIASTOLIC BLOOD PRESSURE: 85 MMHG | TEMPERATURE: 97 F | SYSTOLIC BLOOD PRESSURE: 124 MMHG | BODY MASS INDEX: 28.06 KG/M2 | HEART RATE: 88 BPM

## 2023-02-06 DIAGNOSIS — N40.1 BPH WITH OBSTRUCTION/LOWER URINARY TRACT SYMPTOMS: ICD-10-CM

## 2023-02-06 DIAGNOSIS — R33.9 URINARY RETENTION: Primary | ICD-10-CM

## 2023-02-06 DIAGNOSIS — I86.1 BILATERAL VARICOCELES: ICD-10-CM

## 2023-02-06 DIAGNOSIS — N13.8 BPH WITH OBSTRUCTION/LOWER URINARY TRACT SYMPTOMS: ICD-10-CM

## 2023-02-06 DIAGNOSIS — R97.20 ELEVATED PSA: ICD-10-CM

## 2023-02-06 PROCEDURE — 99214 OFFICE O/P EST MOD 30 MIN: CPT | Mod: S$PBB,,, | Performed by: UROLOGY

## 2023-02-06 PROCEDURE — 36415 COLL VENOUS BLD VENIPUNCTURE: CPT | Performed by: UROLOGY

## 2023-02-06 PROCEDURE — 99214 PR OFFICE/OUTPT VISIT, EST, LEVL IV, 30-39 MIN: ICD-10-PCS | Mod: S$PBB,,, | Performed by: UROLOGY

## 2023-02-06 PROCEDURE — 99999 PR PBB SHADOW E&M-EST. PATIENT-LVL IV: CPT | Mod: PBBFAC,,, | Performed by: UROLOGY

## 2023-02-06 PROCEDURE — 99999 PR PBB SHADOW E&M-EST. PATIENT-LVL IV: ICD-10-PCS | Mod: PBBFAC,,, | Performed by: UROLOGY

## 2023-02-06 PROCEDURE — 99214 OFFICE O/P EST MOD 30 MIN: CPT | Mod: PBBFAC | Performed by: UROLOGY

## 2023-02-06 PROCEDURE — 82565 ASSAY OF CREATININE: CPT | Performed by: UROLOGY

## 2023-02-06 RX ORDER — PANTOPRAZOLE SODIUM 40 MG/1
40 TABLET, DELAYED RELEASE ORAL
COMMUNITY
Start: 2022-12-02 | End: 2023-08-07

## 2023-02-06 NOTE — PROGRESS NOTES
Chief Complaint   Patient presents with    Follow-up     Bilat swollen groin          History of Present Illness:   Adal Loaiza Jr. is a 72 y.o. male here for evaluation of Follow-up (Bilat swollen groin )    2/6/23-on finasteride and flomax. Having bilateral scrotal swelling, which comes and goes. It is not the testicle itself. Not painful. Feels like he is urinating well. Stream is fair. Urinates 8-10 times a day. Nocturia x 1. No gross hematuria.   8/8/22- Here for cysto. Urinating without difficulty. Good stream. Stopped flomax and finasteride a couple days ago due to joint pain.   7/5/22- Pt here for evaluation of recurrent urinary retention. He reports that he underwent Hiatal hernia surgery and hasn't been able to urinate since that time. He has had multiple voiding trials. Sometimes, he was distended over 1000cc.   Prior to surgery, felt like he emptied, stream was good.   Has h/o meningioma s/p surgery 22 years ago. Also had gamma knife surgery following that.          Review of Systems   Constitutional:  Negative for fever.   Respiratory:  Negative for shortness of breath.    Cardiovascular:  Negative for chest pain.   Gastrointestinal:  Negative for abdominal pain.   Genitourinary:  Positive for scrotal swelling.   Musculoskeletal:  Negative for back pain.   All other systems reviewed and are negative.    Past Medical History:   Diagnosis Date    Anemia due to GI blood loss 2012    Arrhythmia requiring replacement of cardiac pacemaker     Brain tumor (benign) 1999    Meningioma    Encounter for blood transfusion     GI bleed due to NSAIDs 2012    Hyperlipidemia     Localized osteoarthritis of left knee 12/10/2020    PAF (paroxysmal atrial fibrillation) 6/16/2019    Renal cyst 2/19/2019    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    Seizures     because of brain tumor    Sleep apnea     Spinal cord disease     meningioma       Past Surgical History:   Procedure Laterality Date    BRAIN SURGERY      CARDIAC  PACEMAKER PLACEMENT      COLONOSCOPY N/A 8/25/2016    Procedure: COLONOSCOPY;  Surgeon: Morris Olguin MD;  Location: [facility] ENDO;  Service: Endoscopy;  Laterality: N/A;    COLONOSCOPY N/A 4/4/2022    Procedure: COLONOSCOPY;  Surgeon: Marci Silva MD;  Location: [facility] ENDO;  Service: Endoscopy;  Laterality: N/A;    ESOPHAGEAL MANOMETRY WITH MEASUREMENT OF IMPEDANCE N/A 4/26/2022    Procedure: MANOMETRY, ESOPHAGUS, WITH IMPEDANCE MEASUREMENT;  Surgeon: Yoni Portillo RN;  Location: [facility];  Service: Endoscopy;  Laterality: N/A;    ESOPHAGOGASTRODUODENOSCOPY N/A 4/4/2022    Procedure: ESOPHAGOGASTRODUODENOSCOPY (EGD);  Surgeon: Marci Silva MD;  Location: [facility];  Service: Endoscopy;  Laterality: N/A;    ESOPHAGOGASTRODUODENOSCOPY N/A 6/6/2022    Procedure: EGD (ESOPHAGOGASTRODUODENOSCOPY);  Surgeon: Ryanne Phan DO;  Location: [facility];  Service: General;  Laterality: N/A;    FRACTURE SURGERY Right     right jaw    Gamma knife      for schwannoma    LAPAROSCOPIC TOUPET FUNDOPLICATION N/A 6/6/2022    Procedure: FUNDOPLICATION, LAPAROSCOPIC, TOUPET;  Surgeon: Ryanne Phan DO;  Location: [facility] OR;  Service: General;  Laterality: N/A;    REPLACEMENT OF PACEMAKER GENERATOR Left 2/13/2019    Procedure: REPLACEMENT, PULSE GENERATOR, CARDIAC PACEMAKER;  Surgeon: Gen Meza MD;  Location: [facility] CATH LAB;  Service: Cardiology;  Laterality: Left;  current device MDT/waiting for MD input    ROBOT-ASSISTED REPAIR OF HIATAL HERNIA USING DA CARMELO XI N/A 6/6/2022    Procedure: XI ROBOTIC REPAIR, HERNIA, HIATAL;  Surgeon: Ryanne Phan DO;  Location: [facility] OR;  Service: General;  Laterality: N/A;       Family History   Problem Relation Age of Onset    Colon cancer Father     Hypertension Unknown        Social History     Tobacco Use    Smoking status: Never    Smokeless tobacco: Never   Substance Use Topics    Alcohol use: No     Comment: socially    Drug use: No       Current Outpatient  Medications   Medication Sig Dispense Refill    finasteride (PROSCAR) 5 mg tablet Take 1 tablet (5 mg total) by mouth once daily. 30 tablet 11    gabapentin (NEURONTIN) 300 MG capsule Take 300 mg by mouth every evening.       LACTOBACILLUS ACIDOPHILUS (PROBIOTIC ORAL) Take by mouth once daily.      levetiracetam (KEPPRA) 500 MG Tab Take 1 tablet (500 mg total) by mouth 2 (two) times daily. 60 tablet 11    multivitamin (THERAGRAN) per tablet Take 1 tablet by mouth once daily.      docusate sodium (COLACE) 100 MG capsule Take 1 capsule (100 mg total) by mouth 2 (two) times daily. (Patient not taking: Reported on 2/6/2023) 60 capsule 1    pantoprazole (PROTONIX) 40 MG tablet Take 40 mg by mouth.      tamsulosin (FLOMAX) 0.4 mg Cap Take 1 capsule (0.4 mg total) by mouth once daily. 90 capsule 3     No current facility-administered medications for this visit.     Facility-Administered Medications Ordered in Other Visits   Medication Dose Route Frequency Provider Last Rate Last Admin    sodium hyaluronate (EUFLEXXA) 10 mg/mL(mw 2.4 -3.6 million) injection 20 mg  20 mg Intra-articular  Attila Mclain MD   20 mg at 04/27/22 1040    sodium hyaluronate (EUFLEXXA) 10 mg/mL(mw 2.4 -3.6 million) injection 20 mg  20 mg Intra-articular  Attila Mclain MD   20 mg at 05/04/22 1020       Review of patient's allergies indicates:   Allergen Reactions    Nsaids (non-steroidal anti-inflammatory drug)      Caused GI bleeding.    Aspirin     Celecoxib      Increased bleeding        Physical Exam  Vitals:    02/06/23 1151   BP: 124/85   Pulse: 88   Temp: 97 °F (36.1 °C)         General: Well-developed, well-nourished in no acute distress  HEENT: Normocephalic, atraumatic, Extraocular movements intact  Neck: supple, trachea midline, no cervical or supraclavicular lymphadenopathy  Respirations: even and unlabored  Back: midline spine, no CVA tenderness  Abdomen: soft, Non-tender, non-distended, no organomegaly or palpable masses, no  rebound or guarding  : bilateral varicoceles, L prominent epididymis, no scrotal mass  Rectal: >40g prostate, no nodules or tenderness. No gross blood  Extremities: atraumatic, moves all equally, no clubbing, cyanosis or edema  Psych: normal affect  Skin: warm and dry, no lesions  Neuro: Alert and oriented, Cranial nerves II-XII grossly intact    Urinalysis  pH, UA   Date Value Ref Range Status   07/12/2022 6.0 5.0 - 8.0 Final     Glucose, UA   Date Value Ref Range Status   07/12/2022 Negative Negative Final     Occult Blood UA   Date Value Ref Range Status   07/12/2022 3+ (A) Negative Final     Nitrite, UA   Date Value Ref Range Status   07/12/2022 Positive (A) Negative Final     Leukocytes, UA   Date Value Ref Range Status   07/12/2022 3+ (A) Negative Final         Lab Results   Component Value Date    PSA 2.8 02/17/2022    PSA 4.2 (H) 12/15/2020    PSA 2.7 12/04/2019       Assessment:   1. Urinary retention        2. BPH with obstruction/lower urinary tract symptoms        3. Bilateral varicoceles  CT Abdomen Pelvis With Contrast    US Scrotum And Testicles    Creatinine, serum      4. Elevated PSA  Prostate Specific Antigen, Diagnostic            Plan:  Urinary retention    BPH with obstruction/lower urinary tract symptoms    Bilateral varicoceles  -     CT Abdomen Pelvis With Contrast; Future; Expected date: 02/06/2023  -     US Scrotum And Testicles; Future; Expected date: 02/06/2023  -     Creatinine, serum; Future; Expected date: 02/06/2023    Elevated PSA  -     Prostate Specific Antigen, Diagnostic; Future; Expected date: 02/06/2023       Continue flomax and finasteride.     Follow up for CT results.

## 2023-02-07 LAB
CREAT SERPL-MCNC: 1.2 MG/DL (ref 0.5–1.4)
EST. GFR  (NO RACE VARIABLE): >60 ML/MIN/1.73 M^2

## 2023-02-16 ENCOUNTER — HOSPITAL ENCOUNTER (OUTPATIENT)
Dept: RADIOLOGY | Facility: HOSPITAL | Age: 73
Discharge: HOME | End: 2023-02-16
Attending: UROLOGY
Payer: MEDICARE

## 2023-02-16 DIAGNOSIS — I86.1 BILATERAL VARICOCELES: ICD-10-CM

## 2023-02-16 DIAGNOSIS — N28.89 OTHER SPECIFIED DISORDERS OF KIDNEY AND URETER: Primary | ICD-10-CM

## 2023-02-16 PROCEDURE — 76870 US EXAM SCROTUM: CPT | Mod: 26,,, | Performed by: RADIOLOGY

## 2023-02-16 PROCEDURE — 76870 US SCROTUM AND TESTICLES: ICD-10-PCS | Mod: 26,,, | Performed by: RADIOLOGY

## 2023-02-16 PROCEDURE — 76870 US EXAM SCROTUM: CPT | Mod: TC

## 2023-02-16 PROCEDURE — 74177 CT ABD & PELVIS W/CONTRAST: CPT | Mod: 26,,, | Performed by: STUDENT IN AN ORGANIZED HEALTH CARE EDUCATION/TRAINING PROGRAM

## 2023-02-16 PROCEDURE — 74177 CT ABDOMEN PELVIS WITH CONTRAST: ICD-10-PCS | Mod: 26,,, | Performed by: STUDENT IN AN ORGANIZED HEALTH CARE EDUCATION/TRAINING PROGRAM

## 2023-02-16 PROCEDURE — 74177 CT ABD & PELVIS W/CONTRAST: CPT | Mod: TC

## 2023-02-16 PROCEDURE — 25500020 PHARM REV CODE 255: Performed by: UROLOGY

## 2023-02-16 RX ADMIN — IOHEXOL 30 ML: 350 INJECTION, SOLUTION INTRAVENOUS at 09:02

## 2023-02-16 RX ADMIN — IOHEXOL 100 ML: 350 INJECTION, SOLUTION INTRAVENOUS at 10:02

## 2023-02-17 ENCOUNTER — TELEPHONE (OUTPATIENT)
Dept: UROLOGY | Facility: CLINIC | Age: 73
End: 2023-02-17

## 2023-02-17 NOTE — TELEPHONE ENCOUNTER
----- Message from Violeta Edmond MD sent at 2/16/2023  6:00 PM CST -----  Please notify patient there is a cyst on his kidney that needs further evaluation. Please schedule ct that I ordered

## 2023-02-27 ENCOUNTER — OFFICE VISIT (OUTPATIENT)
Dept: UROLOGY | Facility: CLINIC | Age: 73
End: 2023-02-27
Payer: MEDICARE

## 2023-02-27 ENCOUNTER — LAB VISIT (OUTPATIENT)
Dept: LAB | Facility: HOSPITAL | Age: 73
End: 2023-02-27
Attending: UROLOGY
Payer: MEDICARE

## 2023-02-27 VITALS
HEART RATE: 87 BPM | DIASTOLIC BLOOD PRESSURE: 84 MMHG | BODY MASS INDEX: 28.06 KG/M2 | HEIGHT: 70 IN | TEMPERATURE: 99 F | SYSTOLIC BLOOD PRESSURE: 147 MMHG | WEIGHT: 196 LBS

## 2023-02-27 DIAGNOSIS — R33.9 URINARY RETENTION: ICD-10-CM

## 2023-02-27 DIAGNOSIS — N13.8 BPH WITH OBSTRUCTION/LOWER URINARY TRACT SYMPTOMS: ICD-10-CM

## 2023-02-27 DIAGNOSIS — N28.89 OTHER SPECIFIED DISORDERS OF KIDNEY AND URETER: ICD-10-CM

## 2023-02-27 DIAGNOSIS — N28.89 RENAL MASS: Primary | ICD-10-CM

## 2023-02-27 DIAGNOSIS — I86.1 VARICOCELE: ICD-10-CM

## 2023-02-27 DIAGNOSIS — N40.1 BPH WITH OBSTRUCTION/LOWER URINARY TRACT SYMPTOMS: ICD-10-CM

## 2023-02-27 DIAGNOSIS — N28.89 RENAL MASS: ICD-10-CM

## 2023-02-27 LAB
BILIRUB SERPL-MCNC: NORMAL MG/DL
BLOOD URINE, POC: NORMAL
BUN SERPL-MCNC: 15 MG/DL (ref 8–23)
CLARITY, POC UA: CLEAR
COLOR, POC UA: YELLOW
CREAT SERPL-MCNC: 1.1 MG/DL (ref 0.5–1.4)
EST. GFR  (NO RACE VARIABLE): >60 ML/MIN/1.73 M^2
GLUCOSE UR QL STRIP: NORMAL
KETONES UR QL STRIP: NORMAL
LEUKOCYTE ESTERASE URINE, POC: NORMAL
NITRITE, POC UA: NORMAL
PH, POC UA: 5
PROTEIN, POC: NORMAL
SPECIFIC GRAVITY, POC UA: 1.02
UROBILINOGEN, POC UA: NORMAL

## 2023-02-27 PROCEDURE — 36415 COLL VENOUS BLD VENIPUNCTURE: CPT | Performed by: UROLOGY

## 2023-02-27 PROCEDURE — 99999 PR PBB SHADOW E&M-EST. PATIENT-LVL III: CPT | Mod: PBBFAC,,, | Performed by: UROLOGY

## 2023-02-27 PROCEDURE — 81002 URINALYSIS NONAUTO W/O SCOPE: CPT | Mod: PBBFAC | Performed by: UROLOGY

## 2023-02-27 PROCEDURE — 84520 ASSAY OF UREA NITROGEN: CPT | Mod: 59 | Performed by: UROLOGY

## 2023-02-27 PROCEDURE — 99213 OFFICE O/P EST LOW 20 MIN: CPT | Mod: PBBFAC | Performed by: UROLOGY

## 2023-02-27 PROCEDURE — 99214 OFFICE O/P EST MOD 30 MIN: CPT | Mod: S$PBB,,, | Performed by: UROLOGY

## 2023-02-27 PROCEDURE — 82565 ASSAY OF CREATININE: CPT | Mod: 59 | Performed by: UROLOGY

## 2023-02-27 PROCEDURE — 99999 PR PBB SHADOW E&M-EST. PATIENT-LVL III: ICD-10-PCS | Mod: PBBFAC,,, | Performed by: UROLOGY

## 2023-02-27 PROCEDURE — 99214 PR OFFICE/OUTPT VISIT, EST, LEVL IV, 30-39 MIN: ICD-10-PCS | Mod: S$PBB,,, | Performed by: UROLOGY

## 2023-02-27 PROCEDURE — 81002 URINALYSIS NONAUTO W/O SCOPE: CPT | Mod: PBBFAC,59

## 2023-02-27 NOTE — PROGRESS NOTES
No chief complaint on file.        History of Present Illness:   Adal Loaiza Jr. is a 73 y.o. male here for evaluation of No chief complaint on file.    2/27/23-CT scan personally reviewed, showing no obstructive cause for the patient's bilateral varicoceles, but R lower pole renal cyst does appear to need further evaluation. No scrotal pain. No gross hematuria. He continues to take finasteride and flomax.   2/6/23-on finasteride and flomax. Having bilateral scrotal swelling, which comes and goes. It is not the testicle itself. Not painful. Feels like he is urinating well. Stream is fair. Urinates 8-10 times a day. Nocturia x 1. No gross hematuria.   8/8/22- Here for cysto. Urinating without difficulty. Good stream. Stopped flomax and finasteride a couple days ago due to joint pain.   7/5/22- Pt here for evaluation of recurrent urinary retention. He reports that he underwent Hiatal hernia surgery and hasn't been able to urinate since that time. He has had multiple voiding trials. Sometimes, he was distended over 1000cc.   Prior to surgery, felt like he emptied, stream was good.   Has h/o meningioma s/p surgery 22 years ago. Also had gamma knife surgery following that.          Review of Systems   Constitutional:  Negative for fever.   Respiratory:  Negative for shortness of breath.    Cardiovascular:  Negative for chest pain.   Gastrointestinal:  Negative for abdominal pain.   Genitourinary:  Positive for scrotal swelling.   Musculoskeletal:  Negative for back pain.   All other systems reviewed and are negative.    Past Medical History:   Diagnosis Date    Anemia due to GI blood loss 2012    Arrhythmia requiring replacement of cardiac pacemaker     Brain tumor (benign) 1999    Meningioma    Encounter for blood transfusion     GI bleed due to NSAIDs 2012    Hyperlipidemia     Localized osteoarthritis of left knee 12/10/2020    PAF (paroxysmal atrial fibrillation) 6/16/2019    Renal cyst 2/19/2019    Schwannoma      5th cranial nerve    Seizures     because of brain tumor    Sleep apnea     Spinal cord disease     meningioma       Past Surgical History:   Procedure Laterality Date    BRAIN SURGERY      CARDIAC PACEMAKER PLACEMENT      COLONOSCOPY N/A 8/25/2016    Procedure: COLONOSCOPY;  Surgeon: Morris Olguin MD;  Location: [facility] ENDO;  Service: Endoscopy;  Laterality: N/A;    COLONOSCOPY N/A 4/4/2022    Procedure: COLONOSCOPY;  Surgeon: Marci Silva MD;  Location: [facility] ENDO;  Service: Endoscopy;  Laterality: N/A;    ESOPHAGEAL MANOMETRY WITH MEASUREMENT OF IMPEDANCE N/A 4/26/2022    Procedure: MANOMETRY, ESOPHAGUS, WITH IMPEDANCE MEASUREMENT;  Surgeon: Yoni Portillo RN;  Location: [facility] ENDO;  Service: Endoscopy;  Laterality: N/A;    ESOPHAGOGASTRODUODENOSCOPY N/A 4/4/2022    Procedure: ESOPHAGOGASTRODUODENOSCOPY (EGD);  Surgeon: Marci Silva MD;  Location: [facility] ENDO;  Service: Endoscopy;  Laterality: N/A;    ESOPHAGOGASTRODUODENOSCOPY N/A 6/6/2022    Procedure: EGD (ESOPHAGOGASTRODUODENOSCOPY);  Surgeon: Ryanne Phan DO;  Location: [facility] OR;  Service: General;  Laterality: N/A;    FRACTURE SURGERY Right     right jaw    Gamma knife      for schwannoma    LAPAROSCOPIC TOUPET FUNDOPLICATION N/A 6/6/2022    Procedure: FUNDOPLICATION, LAPAROSCOPIC, TOUPET;  Surgeon: Ryanne Phan DO;  Location: [facility] OR;  Service: General;  Laterality: N/A;    REPLACEMENT OF PACEMAKER GENERATOR Left 2/13/2019    Procedure: REPLACEMENT, PULSE GENERATOR, CARDIAC PACEMAKER;  Surgeon: Gen Meza MD;  Location: [facility] CATH LAB;  Service: Cardiology;  Laterality: Left;  current device MDT/waiting for MD input    ROBOT-ASSISTED REPAIR OF HIATAL HERNIA USING DA CARMELO XI N/A 6/6/2022    Procedure: XI ROBOTIC REPAIR, HERNIA, HIATAL;  Surgeon: Ryanne Phan DO;  Location: [facility] OR;  Service: General;  Laterality: N/A;       Family History   Problem Relation Age of Onset    Colon cancer Father     Hypertension Unknown         Social History     Tobacco Use    Smoking status: Never    Smokeless tobacco: Never   Substance Use Topics    Alcohol use: No     Comment: socially    Drug use: No       Current Outpatient Medications   Medication Sig Dispense Refill    docusate sodium (COLACE) 100 MG capsule Take 1 capsule (100 mg total) by mouth 2 (two) times daily. 60 capsule 1    finasteride (PROSCAR) 5 mg tablet Take 1 tablet (5 mg total) by mouth once daily. 30 tablet 11    gabapentin (NEURONTIN) 300 MG capsule Take 300 mg by mouth every evening.       LACTOBACILLUS ACIDOPHILUS (PROBIOTIC ORAL) Take by mouth once daily.      levetiracetam (KEPPRA) 500 MG Tab Take 1 tablet (500 mg total) by mouth 2 (two) times daily. 60 tablet 11    multivitamin (THERAGRAN) per tablet Take 1 tablet by mouth once daily.      pantoprazole (PROTONIX) 40 MG tablet Take 40 mg by mouth.      tamsulosin (FLOMAX) 0.4 mg Cap Take 1 capsule (0.4 mg total) by mouth once daily. 90 capsule 3     No current facility-administered medications for this visit.     Facility-Administered Medications Ordered in Other Visits   Medication Dose Route Frequency Provider Last Rate Last Admin    sodium hyaluronate (EUFLEXXA) 10 mg/mL(mw 2.4 -3.6 million) injection 20 mg  20 mg Intra-articular  Attila Mclain MD   20 mg at 04/27/22 1040    sodium hyaluronate (EUFLEXXA) 10 mg/mL(mw 2.4 -3.6 million) injection 20 mg  20 mg Intra-articular  Attila Mclain MD   20 mg at 05/04/22 1020       Review of patient's allergies indicates:   Allergen Reactions    Nsaids (non-steroidal anti-inflammatory drug)      Caused GI bleeding.    Aspirin     Celecoxib      Increased bleeding        Physical Exam  Vitals:    02/27/23 1040   BP: (!) 147/84   Pulse: 87   Temp: 98.8 °F (37.1 °C)         General: Well-developed, well-nourished in no acute distress  HEENT: Normocephalic, atraumatic, Extraocular movements intact  Neck: supple, trachea midline, no cervical or supraclavicular  lymphadenopathy  Respirations: even and unlabored  Back: midline spine, no CVA tenderness  Abdomen: soft, Non-tender, non-distended, no organomegaly or palpable masses, no rebound or guarding  : 2/6/23-bilateral varicoceles, L prominent epididymis, no scrotal mass  Rectal: 2/6/23->40g prostate, no nodules or tenderness. No gross blood  Extremities: atraumatic, moves all equally, no clubbing, cyanosis or edema  Psych: normal affect  Skin: warm and dry, no lesions  Neuro: Alert and oriented, Cranial nerves II-XII grossly intact    Urinalysis  Trace protein, otherwise negative      PSA  2/16/23: 1.0      Assessment:   1. Renal mass  BUN    Creatinine, Serum      2. Urinary retention  POCT URINE DIPSTICK WITHOUT MICROSCOPE      3. Other specified disorders of kidney and ureter  CT Abdomen With Without Contrast      4. Varicocele        5. BPH with obstruction/lower urinary tract symptoms              Plan:  Renal mass  -     BUN; Future; Expected date: 02/27/2023  -     Creatinine, Serum; Future; Expected date: 02/27/2023    Urinary retention  -     POCT URINE DIPSTICK WITHOUT MICROSCOPE    Other specified disorders of kidney and ureter  -     CT Abdomen With Without Contrast; Future; Expected date: 02/27/2023    Varicocele    BPH with obstruction/lower urinary tract symptoms       Continue flomax and finasteride.     Follow up for CT results.

## 2023-03-06 ENCOUNTER — TELEPHONE (OUTPATIENT)
Dept: UROLOGY | Facility: CLINIC | Age: 73
End: 2023-03-06
Payer: MEDICARE

## 2023-03-06 NOTE — TELEPHONE ENCOUNTER
Patient made aware of results and reminded of appointment for CT. Patient verbalized understanding.

## 2023-03-09 ENCOUNTER — HOSPITAL ENCOUNTER (OUTPATIENT)
Dept: RADIOLOGY | Facility: HOSPITAL | Age: 73
Discharge: HOME | End: 2023-03-09
Attending: UROLOGY
Payer: MEDICARE

## 2023-03-09 DIAGNOSIS — N28.89 OTHER SPECIFIED DISORDERS OF KIDNEY AND URETER: ICD-10-CM

## 2023-03-09 PROCEDURE — 74170 CT ABD WO CNTRST FLWD CNTRST: CPT | Mod: TC

## 2023-03-09 PROCEDURE — 74170 CT ABD WO CNTRST FLWD CNTRST: CPT | Mod: 26,,, | Performed by: RADIOLOGY

## 2023-03-09 PROCEDURE — 74170 CT ABDOMEN W WO CONTRAST: ICD-10-PCS | Mod: 26,,, | Performed by: RADIOLOGY

## 2023-03-09 PROCEDURE — 25500020 PHARM REV CODE 255: Performed by: UROLOGY

## 2023-03-09 RX ADMIN — IOHEXOL 100 ML: 350 INJECTION, SOLUTION INTRAVENOUS at 01:03

## 2023-03-10 DIAGNOSIS — R00.1 SEVERE SINUS BRADYCARDIA: ICD-10-CM

## 2023-03-10 DIAGNOSIS — I48.0 PAF (PAROXYSMAL ATRIAL FIBRILLATION): ICD-10-CM

## 2023-03-10 DIAGNOSIS — Z95.0 PRESENCE OF CARDIAC PACEMAKER: Primary | ICD-10-CM

## 2023-03-21 ENCOUNTER — LAB VISIT (OUTPATIENT)
Dept: LAB | Facility: HOSPITAL | Age: 73
End: 2023-03-21
Attending: INTERNAL MEDICINE
Payer: MEDICARE

## 2023-03-21 DIAGNOSIS — E78.49 OTHER HYPERLIPIDEMIA: ICD-10-CM

## 2023-03-21 DIAGNOSIS — E78.2 MIXED HYPERLIPIDEMIA: ICD-10-CM

## 2023-03-21 DIAGNOSIS — Z12.5 ENCOUNTER FOR SCREENING FOR MALIGNANT NEOPLASM OF PROSTATE: ICD-10-CM

## 2023-03-21 LAB
ALBUMIN SERPL BCP-MCNC: 3.8 G/DL (ref 3.5–5.2)
ALP SERPL-CCNC: 52 U/L (ref 55–135)
ALT SERPL W/O P-5'-P-CCNC: 24 U/L (ref 10–44)
ANION GAP SERPL CALC-SCNC: 7 MMOL/L (ref 8–16)
AST SERPL-CCNC: 27 U/L (ref 10–40)
BASOPHILS # BLD AUTO: 0.03 K/UL (ref 0–0.2)
BASOPHILS NFR BLD: 0.6 % (ref 0–1.9)
BILIRUB SERPL-MCNC: 0.9 MG/DL (ref 0.1–1)
BUN SERPL-MCNC: 16 MG/DL (ref 8–23)
CALCIUM SERPL-MCNC: 9.7 MG/DL (ref 8.7–10.5)
CHLORIDE SERPL-SCNC: 108 MMOL/L (ref 95–110)
CHOLEST SERPL-MCNC: 147 MG/DL (ref 120–199)
CHOLEST/HDLC SERPL: 3.2 {RATIO} (ref 2–5)
CO2 SERPL-SCNC: 27 MMOL/L (ref 23–29)
COMPLEXED PSA SERPL-MCNC: 0.9 NG/ML (ref 0–4)
CREAT SERPL-MCNC: 1 MG/DL (ref 0.5–1.4)
DIFFERENTIAL METHOD: ABNORMAL
EOSINOPHIL # BLD AUTO: 0.2 K/UL (ref 0–0.5)
EOSINOPHIL NFR BLD: 3.6 % (ref 0–8)
ERYTHROCYTE [DISTWIDTH] IN BLOOD BY AUTOMATED COUNT: 13.1 % (ref 11.5–14.5)
EST. GFR  (NO RACE VARIABLE): >60 ML/MIN/1.73 M^2
GLUCOSE SERPL-MCNC: 92 MG/DL (ref 70–110)
HCT VFR BLD AUTO: 49.7 % (ref 40–54)
HDLC SERPL-MCNC: 46 MG/DL (ref 40–75)
HDLC SERPL: 31.3 % (ref 20–50)
HGB BLD-MCNC: 15.7 G/DL (ref 14–18)
IMM GRANULOCYTES # BLD AUTO: 0.02 K/UL (ref 0–0.04)
IMM GRANULOCYTES NFR BLD AUTO: 0.4 % (ref 0–0.5)
LDLC SERPL CALC-MCNC: 87.6 MG/DL (ref 63–159)
LYMPHOCYTES # BLD AUTO: 1.2 K/UL (ref 1–4.8)
LYMPHOCYTES NFR BLD: 25.3 % (ref 18–48)
MCH RBC QN AUTO: 28.5 PG (ref 27–31)
MCHC RBC AUTO-ENTMCNC: 31.6 G/DL (ref 32–36)
MCV RBC AUTO: 90 FL (ref 82–98)
MONOCYTES # BLD AUTO: 0.5 K/UL (ref 0.3–1)
MONOCYTES NFR BLD: 11.2 % (ref 4–15)
NEUTROPHILS # BLD AUTO: 2.8 K/UL (ref 1.8–7.7)
NEUTROPHILS NFR BLD: 58.9 % (ref 38–73)
NONHDLC SERPL-MCNC: 101 MG/DL
NRBC BLD-RTO: 0 /100 WBC
PLATELET # BLD AUTO: 197 K/UL (ref 150–450)
PMV BLD AUTO: 11.1 FL (ref 9.2–12.9)
POTASSIUM SERPL-SCNC: 4.7 MMOL/L (ref 3.5–5.1)
PROT SERPL-MCNC: 6.5 G/DL (ref 6–8.4)
RBC # BLD AUTO: 5.51 M/UL (ref 4.6–6.2)
SODIUM SERPL-SCNC: 142 MMOL/L (ref 136–145)
TRIGL SERPL-MCNC: 67 MG/DL (ref 30–150)
TSH SERPL DL<=0.005 MIU/L-ACNC: 3.73 UIU/ML (ref 0.4–4)
WBC # BLD AUTO: 4.75 K/UL (ref 3.9–12.7)

## 2023-03-21 PROCEDURE — 85025 COMPLETE CBC W/AUTO DIFF WBC: CPT | Performed by: INTERNAL MEDICINE

## 2023-03-21 PROCEDURE — 36415 COLL VENOUS BLD VENIPUNCTURE: CPT | Performed by: INTERNAL MEDICINE

## 2023-03-21 PROCEDURE — 84443 ASSAY THYROID STIM HORMONE: CPT | Performed by: INTERNAL MEDICINE

## 2023-03-21 PROCEDURE — 84153 ASSAY OF PSA TOTAL: CPT | Performed by: INTERNAL MEDICINE

## 2023-03-21 PROCEDURE — 80061 LIPID PANEL: CPT | Performed by: INTERNAL MEDICINE

## 2023-03-21 PROCEDURE — 80053 COMPREHEN METABOLIC PANEL: CPT | Performed by: INTERNAL MEDICINE

## 2023-03-27 ENCOUNTER — OFFICE VISIT (OUTPATIENT)
Dept: UROLOGY | Facility: CLINIC | Age: 73
End: 2023-03-27
Payer: MEDICARE

## 2023-03-27 VITALS
SYSTOLIC BLOOD PRESSURE: 131 MMHG | DIASTOLIC BLOOD PRESSURE: 79 MMHG | WEIGHT: 211.19 LBS | BODY MASS INDEX: 30.3 KG/M2 | TEMPERATURE: 97 F | HEART RATE: 71 BPM

## 2023-03-27 DIAGNOSIS — N28.1 RENAL CYST: Primary | ICD-10-CM

## 2023-03-27 DIAGNOSIS — N40.1 BPH WITH OBSTRUCTION/LOWER URINARY TRACT SYMPTOMS: ICD-10-CM

## 2023-03-27 DIAGNOSIS — N13.8 BPH WITH OBSTRUCTION/LOWER URINARY TRACT SYMPTOMS: ICD-10-CM

## 2023-03-27 PROCEDURE — 99214 PR OFFICE/OUTPT VISIT, EST, LEVL IV, 30-39 MIN: ICD-10-PCS | Mod: S$PBB,,, | Performed by: UROLOGY

## 2023-03-27 PROCEDURE — 99213 OFFICE O/P EST LOW 20 MIN: CPT | Mod: PBBFAC | Performed by: UROLOGY

## 2023-03-27 PROCEDURE — 99214 OFFICE O/P EST MOD 30 MIN: CPT | Mod: S$PBB,,, | Performed by: UROLOGY

## 2023-03-27 PROCEDURE — 99999 PR PBB SHADOW E&M-EST. PATIENT-LVL III: ICD-10-PCS | Mod: PBBFAC,,, | Performed by: UROLOGY

## 2023-03-27 PROCEDURE — 99999 PR PBB SHADOW E&M-EST. PATIENT-LVL III: CPT | Mod: PBBFAC,,, | Performed by: UROLOGY

## 2023-03-27 RX ORDER — FINASTERIDE 5 MG/1
5 TABLET, FILM COATED ORAL DAILY
Qty: 30 TABLET | Refills: 11 | Status: SHIPPED | OUTPATIENT
Start: 2023-03-27 | End: 2023-09-25 | Stop reason: SDUPTHER

## 2023-03-27 RX ORDER — TAMSULOSIN HYDROCHLORIDE 0.4 MG/1
0.4 CAPSULE ORAL DAILY
Qty: 90 CAPSULE | Refills: 3 | Status: SHIPPED | OUTPATIENT
Start: 2023-03-27 | End: 2023-09-25 | Stop reason: SDUPTHER

## 2023-03-27 NOTE — PROGRESS NOTES
Chief Complaint   Patient presents with    Results         History of Present Illness:   Adal Loaiza Jr. is a 73 y.o. male here for evaluation of Results      3/27/23-here for f/u after CT scan. CT scan completed 3/9/23 personally reviewed, showing a right lower pole hyperdense cyst, measuring 2.5cm. Right simple renal cysts also seen. Stream is good. No gross hematuria. No Nocturia. Currently happy with his voiding dynamics on flomax and finasteride.   2/27/23-CT scan personally reviewed, showing no obstructive cause for the patient's bilateral varicoceles, but R lower pole renal cyst does appear to need further evaluation. No scrotal pain. No gross hematuria. He continues to take finasteride and flomax.   2/6/23-on finasteride and flomax. Having bilateral scrotal swelling, which comes and goes. It is not the testicle itself. Not painful. Feels like he is urinating well. Stream is fair. Urinates 8-10 times a day. Nocturia x 1. No gross hematuria.   8/8/22- Here for cysto. Urinating without difficulty. Good stream. Stopped flomax and finasteride a couple days ago due to joint pain.   7/5/22- Pt here for evaluation of recurrent urinary retention. He reports that he underwent Hiatal hernia surgery and hasn't been able to urinate since that time. He has had multiple voiding trials. Sometimes, he was distended over 1000cc.   Prior to surgery, felt like he emptied, stream was good.   Has h/o meningioma s/p surgery 22 years ago. Also had gamma knife surgery following that.          Review of Systems   Constitutional:  Negative for fever.   Respiratory:  Negative for shortness of breath.    Cardiovascular:  Negative for chest pain.   Gastrointestinal:  Negative for abdominal pain.   Genitourinary:  Positive for scrotal swelling.   Musculoskeletal:  Negative for back pain.   All other systems reviewed and are negative.    Past Medical History:   Diagnosis Date    Anemia due to GI blood loss 2012    Arrhythmia requiring  replacement of cardiac pacemaker     Brain tumor (benign) 1999    Meningioma    Encounter for blood transfusion     GI bleed due to NSAIDs 2012    Hyperlipidemia     Localized osteoarthritis of left knee 12/10/2020    PAF (paroxysmal atrial fibrillation) 6/16/2019    Renal cyst 2/19/2019    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    Seizures     because of brain tumor    Sleep apnea     Spinal cord disease     meningioma       Past Surgical History:   Procedure Laterality Date    BRAIN SURGERY      CARDIAC PACEMAKER PLACEMENT      COLONOSCOPY N/A 8/25/2016    Procedure: COLONOSCOPY;  Surgeon: Morris Olguin MD;  Location: [facility] ENDO;  Service: Endoscopy;  Laterality: N/A;    COLONOSCOPY N/A 4/4/2022    Procedure: COLONOSCOPY;  Surgeon: Marci Silva MD;  Location: [facility];  Service: Endoscopy;  Laterality: N/A;    ESOPHAGEAL MANOMETRY WITH MEASUREMENT OF IMPEDANCE N/A 4/26/2022    Procedure: MANOMETRY, ESOPHAGUS, WITH IMPEDANCE MEASUREMENT;  Surgeon: Yoni Portillo RN;  Location: [facility];  Service: Endoscopy;  Laterality: N/A;    ESOPHAGOGASTRODUODENOSCOPY N/A 4/4/2022    Procedure: ESOPHAGOGASTRODUODENOSCOPY (EGD);  Surgeon: Marci Silva MD;  Location: [facility];  Service: Endoscopy;  Laterality: N/A;    ESOPHAGOGASTRODUODENOSCOPY N/A 6/6/2022    Procedure: EGD (ESOPHAGOGASTRODUODENOSCOPY);  Surgeon: Ryanne Phan DO;  Location: [facility] OR;  Service: General;  Laterality: N/A;    FRACTURE SURGERY Right     right jaw    Gamma knife      for schwannoma    LAPAROSCOPIC TOUPET FUNDOPLICATION N/A 6/6/2022    Procedure: FUNDOPLICATION, LAPAROSCOPIC, TOUPET;  Surgeon: Ryanne Phan DO;  Location: [facility] OR;  Service: General;  Laterality: N/A;    REPLACEMENT OF PACEMAKER GENERATOR Left 2/13/2019    Procedure: REPLACEMENT, PULSE GENERATOR, CARDIAC PACEMAKER;  Surgeon: Gen Meza MD;  Location: [facility] CATH LAB;  Service: Cardiology;  Laterality: Left;  current device MDT/waiting for MD input     ROBOT-ASSISTED REPAIR OF HIATAL HERNIA USING DA CARMELO XI N/A 6/6/2022    Procedure: XI ROBOTIC REPAIR, HERNIA, HIATAL;  Surgeon: Ryanne Phan DO;  Location: [facility] OR;  Service: General;  Laterality: N/A;       Family History   Problem Relation Age of Onset    Colon cancer Father     Hypertension Unknown        Social History     Tobacco Use    Smoking status: Never    Smokeless tobacco: Never   Substance Use Topics    Alcohol use: No     Comment: socially    Drug use: No       Current Outpatient Medications   Medication Sig Dispense Refill    gabapentin (NEURONTIN) 300 MG capsule Take 300 mg by mouth every evening.       LACTOBACILLUS ACIDOPHILUS (PROBIOTIC ORAL) Take by mouth once daily.      levetiracetam (KEPPRA) 500 MG Tab Take 1 tablet (500 mg total) by mouth 2 (two) times daily. 60 tablet 11    multivitamin (THERAGRAN) per tablet Take 1 tablet by mouth once daily.      docusate sodium (COLACE) 100 MG capsule Take 1 capsule (100 mg total) by mouth 2 (two) times daily. (Patient not taking: Reported on 3/27/2023) 60 capsule 1    finasteride (PROSCAR) 5 mg tablet Take 1 tablet (5 mg total) by mouth once daily. 30 tablet 11    pantoprazole (PROTONIX) 40 MG tablet Take 40 mg by mouth.      tamsulosin (FLOMAX) 0.4 mg Cap Take 1 capsule (0.4 mg total) by mouth once daily. 90 capsule 3     No current facility-administered medications for this visit.     Facility-Administered Medications Ordered in Other Visits   Medication Dose Route Frequency Provider Last Rate Last Admin    sodium hyaluronate (EUFLEXXA) 10 mg/mL(mw 2.4 -3.6 million) injection 20 mg  20 mg Intra-articular  Attila Mclain MD   20 mg at 04/27/22 1040    sodium hyaluronate (EUFLEXXA) 10 mg/mL(mw 2.4 -3.6 million) injection 20 mg  20 mg Intra-articular  Attila Mclain MD   20 mg at 05/04/22 1020       Review of patient's allergies indicates:   Allergen Reactions    Nsaids (non-steroidal anti-inflammatory drug)      Caused GI bleeding.     Aspirin     Celecoxib      Increased bleeding        Physical Exam  Vitals:    03/27/23 0948   BP: 131/79   Pulse: 71   Temp: 97.3 °F (36.3 °C)           General: Well-developed, well-nourished in no acute distress  HEENT: Normocephalic, atraumatic, Extraocular movements intact  Neck: supple, trachea midline, no cervical or supraclavicular lymphadenopathy  Respirations: even and unlabored  Back: midline spine, no CVA tenderness  Abdomen: soft, Non-tender, non-distended, no organomegaly or palpable masses, no rebound or guarding  : 2/6/23-bilateral varicoceles, L prominent epididymis, no scrotal mass  Rectal: 2/6/23->40g prostate, no nodules or tenderness. No gross blood  Extremities: atraumatic, moves all equally, no clubbing, cyanosis or edema  Psych: normal affect  Skin: warm and dry, no lesions  Neuro: Alert and oriented, Cranial nerves II-XII grossly intact        PSA  2/16/23: 1.0      Assessment:   1. Renal cyst        2. BPH with obstruction/lower urinary tract symptoms                Plan:  Renal cyst    BPH with obstruction/lower urinary tract symptoms    Other orders  -     tamsulosin (FLOMAX) 0.4 mg Cap; Take 1 capsule (0.4 mg total) by mouth once daily.  Dispense: 90 capsule; Refill: 3  -     finasteride (PROSCAR) 5 mg tablet; Take 1 tablet (5 mg total) by mouth once daily.  Dispense: 30 tablet; Refill: 11         Continue flomax and finasteride.     Follow up in about 6 months (around 9/27/2023).

## 2023-04-14 ENCOUNTER — CLINICAL SUPPORT (OUTPATIENT)
Dept: CARDIOLOGY | Facility: HOSPITAL | Age: 73
End: 2023-04-14
Payer: MEDICARE

## 2023-04-14 DIAGNOSIS — Z95.0 PRESENCE OF CARDIAC PACEMAKER: ICD-10-CM

## 2023-04-14 PROCEDURE — 93296 REM INTERROG EVL PM/IDS: CPT | Performed by: INTERNAL MEDICINE

## 2023-04-30 NOTE — TELEPHONE ENCOUNTER
No care due was identified.  [facility] Embedded Care Due Messages. Reference number: [id number].   4/30/2023 3:13:27 PM CDT

## 2023-05-01 RX ORDER — PRAVASTATIN SODIUM 40 MG/1
TABLET ORAL
Qty: 90 TABLET | Refills: 0 | Status: SHIPPED | OUTPATIENT
Start: 2023-05-01 | End: 2023-06-26 | Stop reason: SDUPTHER

## 2023-05-01 NOTE — TELEPHONE ENCOUNTER
"Refill Routing Note   Medication(s) are not appropriate for processing by [facility] for the following reason(s):      Clarification of medication (Rx) details    [facility] action(s):  Defer       Medication Therapy Plan: Discontinued by: Huseyin Marrero MD on 6/24/2022- "reason: duplicate" but no other pravastatin orders on file so unsure if this was intentional      Appointments  past 12m or future 3m with PCP    Date Provider   Last Visit   6/24/2022 Huseyin Marrero MD   Next Visit   6/26/2023 Huseyin Marrero MD   ED visits in past 90 days: 0        Note composed:11:27 AM 05/01/2023            "

## 2023-05-05 ENCOUNTER — CLINICAL SUPPORT (OUTPATIENT)
Dept: CARDIOLOGY | Facility: HOSPITAL | Age: 73
End: 2023-05-05
Attending: INTERNAL MEDICINE
Payer: MEDICARE

## 2023-05-05 DIAGNOSIS — I48.0 PAF (PAROXYSMAL ATRIAL FIBRILLATION): ICD-10-CM

## 2023-05-05 DIAGNOSIS — Z95.0 PRESENCE OF CARDIAC PACEMAKER: ICD-10-CM

## 2023-05-05 DIAGNOSIS — R00.1 SEVERE SINUS BRADYCARDIA: ICD-10-CM

## 2023-05-05 PROCEDURE — 93280 CARDIAC DEVICE CHECK - IN CLINIC & HOSPITAL: ICD-10-PCS | Mod: 26,,, | Performed by: INTERNAL MEDICINE

## 2023-05-05 PROCEDURE — 99999 PR PBB SHADOW E&M-EST. PATIENT-LVL I: CPT | Mod: PBBFAC,,,

## 2023-05-05 PROCEDURE — 99211 OFF/OP EST MAY X REQ PHY/QHP: CPT | Mod: PBBFAC

## 2023-05-05 PROCEDURE — 99999 PR PBB SHADOW E&M-EST. PATIENT-LVL I: ICD-10-PCS | Mod: PBBFAC,,,

## 2023-05-05 PROCEDURE — 93280 PM DEVICE PROGR EVAL DUAL: CPT

## 2023-05-05 PROCEDURE — 93280 PM DEVICE PROGR EVAL DUAL: CPT | Mod: 26,,, | Performed by: INTERNAL MEDICINE

## 2023-05-18 ENCOUNTER — TELEPHONE (OUTPATIENT)
Dept: SPORTS MEDICINE | Facility: CLINIC | Age: 73
End: 2023-05-18
Payer: MEDICARE

## 2023-05-18 NOTE — TELEPHONE ENCOUNTER
----- Message from Pippa Fraire sent at 5/18/2023  8:40 AM CDT -----  Contact: Adal  Patient is requesting a call back from nurse to discuss if he's due for injection,.. Please call .[phone]

## 2023-05-18 NOTE — TELEPHONE ENCOUNTER
Returned patient's call and r/s his missed visit with Dr. Javier Mclain on 04/05 to 05/23 at 10:40am. Patient verbalized understanding and was grateful for the call back.

## 2023-05-23 ENCOUNTER — OFFICE VISIT (OUTPATIENT)
Dept: SPORTS MEDICINE | Facility: CLINIC | Age: 73
End: 2023-05-23
Payer: MEDICARE

## 2023-05-23 VITALS — BODY MASS INDEX: 30.21 KG/M2 | HEIGHT: 70 IN | WEIGHT: 211 LBS

## 2023-05-23 DIAGNOSIS — M17.12 PRIMARY OSTEOARTHRITIS OF LEFT KNEE: Primary | ICD-10-CM

## 2023-05-23 DIAGNOSIS — M17.12 PRIMARY OSTEOARTHRITIS OF LEFT KNEE: ICD-10-CM

## 2023-05-23 DIAGNOSIS — M17.12 PRIMARY LOCALIZED OSTEOARTHRITIS OF LEFT KNEE: Primary | ICD-10-CM

## 2023-05-23 PROCEDURE — 99214 OFFICE O/P EST MOD 30 MIN: CPT | Mod: S$PBB,,, | Performed by: FAMILY MEDICINE

## 2023-05-23 PROCEDURE — 99999 PR PBB SHADOW E&M-EST. PATIENT-LVL III: CPT | Mod: PBBFAC,,, | Performed by: FAMILY MEDICINE

## 2023-05-23 PROCEDURE — 99214 PR OFFICE/OUTPT VISIT, EST, LEVL IV, 30-39 MIN: ICD-10-PCS | Mod: S$PBB,,, | Performed by: FAMILY MEDICINE

## 2023-05-23 PROCEDURE — 99999 PR PBB SHADOW E&M-EST. PATIENT-LVL III: ICD-10-PCS | Mod: PBBFAC,,, | Performed by: FAMILY MEDICINE

## 2023-05-23 PROCEDURE — 99213 OFFICE O/P EST LOW 20 MIN: CPT | Mod: PBBFAC | Performed by: FAMILY MEDICINE

## 2023-05-23 RX ORDER — GABAPENTIN 300 MG/1
1 CAPSULE ORAL EVERY MORNING
COMMUNITY
Start: 2023-03-28

## 2023-05-23 RX ORDER — LEVETIRACETAM 500 MG/1
2 TABLET ORAL DAILY
COMMUNITY
Start: 2023-05-01 | End: 2024-01-11

## 2023-05-23 NOTE — PATIENT INSTRUCTIONS
MEDICAL NECESSITY FOR VISCOSUPPLEMENTATION: After thorough evaluation of the patient, I have determined that visco-supplementation is medically necessary. The patient has painful DJD of the knee with failure of conservative therapy including lifestyle modifications and rehabilitation exercises. Oral analgesis/NSAIDs have not adequately controlled symptoms and there is radiographic evidence of joint space narrowing, subchondral sclerosis, and some early osteophytic changes Kellgren- Renard grade 2 or greater, or in lack of radiographic evidence, there is arthroscopic or other evidence of chondrosis. Patient Education       Viscosupplementation   Why is this procedure done?   Your joints are where two bones meet. The ends of the bones are covered with a protective coating of cartilage. This helps them glide smoothly during movement. A fluid also helps the joint move more smoothly. With years of use, the cartilage may begin to wear away. This causes pain in the joints. This procedure is done to relieve the pain. A special fluid is used to help the bones glide more easily. It also acts like a shock absorber. This is most often done on the knee joints.  What will the results be?   Lubricates the joint  Less pain in the joints during movement or weight bearing  Improved joint mobility or movement  What happens before the procedure?   Your doctor may ask you to try other ways to treat osteoarthritis or OA, such as exercise, physical therapy, drugs for pain, applying hot compress, and losing weight.  Talk to your doctor about all the drugs you are taking. Be sure to include all prescription and over-the-counter (OTC) drugs, and herbal supplements. Tell the doctor about any drug allergy. Bring a list of drugs you take with you. Some drugs may interact with the injection.  What happens during the procedure?   Your doctor will clean the skin area where the injection will be given. You will be given a drug called local  anesthesia. This will numb your skin and you will not feel any pain.  In some cases, your doctor might use imaging like x-ray or ultrasound to make sure they inject the right spot.  If you have excess fluid in your joint, your doctor may remove the fluid before beginning.  A needle will be used to inject the hyaluronic acid into the joint. Hyaluronic acid is a natural fluid in your body. It lubricates the joint to ease body movements.  The doctor will cover the injection site with a small bandage to prevent infection.  The procedure may only take a couple of minutes.  What happens after the procedure?   You may feel slight pain, warmth, and swelling around the joint area.  You will be able to go home after the injection.  What care is needed at home?   Ask your doctor what you need to do when you go home. Make sure you ask questions if you do not understand what the doctor says. This way you will know what you need to do.  Place an ice pack or a bag of frozen peas wrapped in a towel over the painful part. Never put ice right on the skin. Do not leave the ice on more than 10 to 15 minutes at a time.  Rest for the first few days after the procedure. Avoid strenuous activities like heavy lifting, jogging, standing for a long time, and hard exercise.  What follow-up care is needed?   Your doctor may ask you to make visits to the office to check on your progress. Be sure to keep these visits. Your doctor may want you to have more injections.  What lifestyle changes are needed?   Ask your doctor about when you can go back to your normal activities like exercise or work.  What problems could happen?   Pain, redness, and swelling around the injection site  Infection of the joint  Fever  Allergic reaction  Itching  Rash  Bruising  Bleeding in the joint  No change in pain after injection  Where can I learn more?   [employer]  http://orthoinfo.aaos.org/topic.cfm?rlbbj=b26600   Last Reviewed Date    2021-03-30  Consumer Information Use and Disclaimer   This information is not specific medical advice and does not replace information you receive from your health care provider. This is only a brief summary of general information. It does NOT include all information about conditions, illnesses, injuries, tests, procedures, treatments, therapies, discharge instructions or life-style choices that may apply to you. You must talk with your health care provider for complete information about your health and treatment options. This information should not be used to decide whether or not to accept your health care providers advice, instructions or recommendations. Only your health care provider has the knowledge and training to provide advice that is right for you.  Copyright   Copyright © 2021 [employer] and its affiliates and/or licensors. All rights reserved.

## 2023-05-23 NOTE — PROGRESS NOTES
Subjective:     Patient ID: Adal Loaiza Jr. is a 73 y.o. male.    Chief Complaint: Follow-up and Pain of the Left Knee      HPI: Patient is being seen for left knee follow up after receiving Euflexxa injections at his office visit in November 2022. Says the injections has helped. Rating pain 4/10 at today's office visit. Denies taking anything by mouth to help with pain relief. Has not participated in physical therapy.     States that his pain and stiffness are still improved compared to when he received the injection in November but the affect is definitely starting to wear off over the last few weeks.    Past Medical History:   Diagnosis Date    Anemia due to GI blood loss 2012    Arrhythmia requiring replacement of cardiac pacemaker     Brain tumor (benign) 1999    Meningioma    Encounter for blood transfusion     GI bleed due to NSAIDs 2012    Hyperlipidemia     Localized osteoarthritis of left knee 12/10/2020    PAF (paroxysmal atrial fibrillation) 6/16/2019    Renal cyst 2/19/2019    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    Seizures     because of brain tumor    Sleep apnea     Spinal cord disease     meningioma     Past Surgical History:   Procedure Laterality Date    BRAIN SURGERY      CARDIAC PACEMAKER PLACEMENT      COLONOSCOPY N/A 8/25/2016    Procedure: COLONOSCOPY;  Surgeon: Morris Olguin MD;  Location: [facility];  Service: Endoscopy;  Laterality: N/A;    COLONOSCOPY N/A 4/4/2022    Procedure: COLONOSCOPY;  Surgeon: Marci Silva MD;  Location: [facility];  Service: Endoscopy;  Laterality: N/A;    ESOPHAGEAL MANOMETRY WITH MEASUREMENT OF IMPEDANCE N/A 4/26/2022    Procedure: MANOMETRY, ESOPHAGUS, WITH IMPEDANCE MEASUREMENT;  Surgeon: Yoni Portillo RN;  Location: [facility];  Service: Endoscopy;  Laterality: N/A;    ESOPHAGOGASTRODUODENOSCOPY N/A 4/4/2022    Procedure: ESOPHAGOGASTRODUODENOSCOPY (EGD);  Surgeon: Marci Silva MD;  Location: [facility];  Service: Endoscopy;  Laterality: N/A;     ESOPHAGOGASTRODUODENOSCOPY N/A 6/6/2022    Procedure: EGD (ESOPHAGOGASTRODUODENOSCOPY);  Surgeon: Ryanne Phan DO;  Location: [facility] OR;  Service: General;  Laterality: N/A;    FRACTURE SURGERY Right     right jaw    Gamma knife      for schwannoma    LAPAROSCOPIC TOUPET FUNDOPLICATION N/A 6/6/2022    Procedure: FUNDOPLICATION, LAPAROSCOPIC, TOUPET;  Surgeon: Ryanne Phan DO;  Location: [facility] OR;  Service: General;  Laterality: N/A;    REPLACEMENT OF PACEMAKER GENERATOR Left 2/13/2019    Procedure: REPLACEMENT, PULSE GENERATOR, CARDIAC PACEMAKER;  Surgeon: Gen Meza MD;  Location: [facility] CATH LAB;  Service: Cardiology;  Laterality: Left;  current device MDT/waiting for MD input    ROBOT-ASSISTED REPAIR OF HIATAL HERNIA USING DA CARMELO XI N/A 6/6/2022    Procedure: XI ROBOTIC REPAIR, HERNIA, HIATAL;  Surgeon: Ryanne Phan DO;  Location: [facility] OR;  Service: General;  Laterality: N/A;     Family History   Problem Relation Age of Onset    Colon cancer Father     Hypertension Unknown      Social History     Socioeconomic History    Marital status:    Tobacco Use    Smoking status: Never    Smokeless tobacco: Never   Substance and Sexual Activity    Alcohol use: No     Comment: socially    Drug use: No       Current Outpatient Medications:     docusate sodium (COLACE) 100 MG capsule, Take 1 capsule (100 mg total) by mouth 2 (two) times daily., Disp: 60 capsule, Rfl: 1    finasteride (PROSCAR) 5 mg tablet, Take 1 tablet (5 mg total) by mouth once daily., Disp: 30 tablet, Rfl: 11    gabapentin (NEURONTIN) 300 MG capsule, Take 300 mg by mouth every evening. , Disp: , Rfl:     gabapentin (NEURONTIN) 300 MG capsule, Take 1 capsule by mouth every morning., Disp: , Rfl:     LACTOBACILLUS ACIDOPHILUS (PROBIOTIC ORAL), Take by mouth once daily., Disp: , Rfl:     levetiracetam (KEPPRA) 500 MG Tab, Take 1 tablet (500 mg total) by mouth 2 (two) times daily., Disp: 60 tablet, Rfl: 11    levETIRAcetam  (KEPPRA) 500 MG Tab, Take 2 tablets by mouth once daily., Disp: , Rfl:     multivitamin (THERAGRAN) per tablet, Take 1 tablet by mouth once daily., Disp: , Rfl:     pantoprazole (PROTONIX) 40 MG tablet, Take 40 mg by mouth., Disp: , Rfl:     pravastatin (PRAVACHOL) 40 MG tablet, Take 1 tablet by mouth once daily, Disp: 90 tablet, Rfl: 0    tamsulosin (FLOMAX) 0.4 mg Cap, Take 1 capsule (0.4 mg total) by mouth once daily., Disp: 90 capsule, Rfl: 3  No current facility-administered medications for this visit.    Facility-Administered Medications Ordered in Other Visits:     sodium hyaluronate (EUFLEXXA) 10 mg/mL(mw 2.4 -3.6 million) injection 20 mg, 20 mg, Intra-articular, , Attila Mclain MD, 20 mg at 04/27/22 1040    sodium hyaluronate (EUFLEXXA) 10 mg/mL(mw 2.4 -3.6 million) injection 20 mg, 20 mg, Intra-articular, , Attila Mclain MD, 20 mg at 05/04/22 1020  Review of patient's allergies indicates:   Allergen Reactions    Nsaids (non-steroidal anti-inflammatory drug)      Caused GI bleeding.    Aspirin     Celecoxib      Increased bleeding      Review of Systems   Constitutional:  Negative for chills, fever and weight loss.   Respiratory:  Negative for shortness of breath.    Cardiovascular:  Negative for chest pain and palpitations.     Objective:   Body mass index is 30.28 kg/m².  There were no vitals filed for this visit.        Ortho/SPM Exam-alert and oriented well-nourished well-developed ambulatory no acute distress     Respiratory effort appears normal     Left knee-no acute deformity  Range of motion good with good joint stability and negative Lachman's  Strength is 4/5   Neurovascular intact  Nontender palpation    Psychiatric good affect and cognition     Plan-reorder Visco for left knee  Patient to continue home exercise program    Patient Instructions   MEDICAL NECESSITY FOR VISCOSUPPLEMENTATION: After thorough evaluation of the patient, I have determined that visco-supplementation is  medically necessary. The patient has painful DJD of the knee with failure of conservative therapy including lifestyle modifications and rehabilitation exercises. Oral analgesis/NSAIDs have not adequately controlled symptoms and there is radiographic evidence of joint space narrowing, subchondral sclerosis, and some early osteophytic changes Kellgren- Renard grade 2 or greater, or in lack of radiographic evidence, there is arthroscopic or other evidence of chondrosis. Patient Education       Viscosupplementation   Why is this procedure done?   Your joints are where two bones meet. The ends of the bones are covered with a protective coating of cartilage. This helps them glide smoothly during movement. A fluid also helps the joint move more smoothly. With years of use, the cartilage may begin to wear away. This causes pain in the joints. This procedure is done to relieve the pain. A special fluid is used to help the bones glide more easily. It also acts like a shock absorber. This is most often done on the knee joints.  What will the results be?   Lubricates the joint  Less pain in the joints during movement or weight bearing  Improved joint mobility or movement  What happens before the procedure?   Your doctor may ask you to try other ways to treat osteoarthritis or OA, such as exercise, physical therapy, drugs for pain, applying hot compress, and losing weight.  Talk to your doctor about all the drugs you are taking. Be sure to include all prescription and over-the-counter (OTC) drugs, and herbal supplements. Tell the doctor about any drug allergy. Bring a list of drugs you take with you. Some drugs may interact with the injection.  What happens during the procedure?   Your doctor will clean the skin area where the injection will be given. You will be given a drug called local anesthesia. This will numb your skin and you will not feel any pain.  In some cases, your doctor might use imaging like x-ray or ultrasound  to make sure they inject the right spot.  If you have excess fluid in your joint, your doctor may remove the fluid before beginning.  A needle will be used to inject the hyaluronic acid into the joint. Hyaluronic acid is a natural fluid in your body. It lubricates the joint to ease body movements.  The doctor will cover the injection site with a small bandage to prevent infection.  The procedure may only take a couple of minutes.  What happens after the procedure?   You may feel slight pain, warmth, and swelling around the joint area.  You will be able to go home after the injection.  What care is needed at home?   Ask your doctor what you need to do when you go home. Make sure you ask questions if you do not understand what the doctor says. This way you will know what you need to do.  Place an ice pack or a bag of frozen peas wrapped in a towel over the painful part. Never put ice right on the skin. Do not leave the ice on more than 10 to 15 minutes at a time.  Rest for the first few days after the procedure. Avoid strenuous activities like heavy lifting, jogging, standing for a long time, and hard exercise.  What follow-up care is needed?   Your doctor may ask you to make visits to the office to check on your progress. Be sure to keep these visits. Your doctor may want you to have more injections.  What lifestyle changes are needed?   Ask your doctor about when you can go back to your normal activities like exercise or work.  What problems could happen?   Pain, redness, and swelling around the injection site  Infection of the joint  Fever  Allergic reaction  Itching  Rash  Bruising  Bleeding in the joint  No change in pain after injection  Where can I learn more?   [employer]  http://orthoinfo.aaos.org/topic.cfm?uxmgr=v67792   Last Reviewed Date   2021-03-30  Consumer Information Use and Disclaimer   This information is not specific medical advice and does not replace information you  receive from your health care provider. This is only a brief summary of general information. It does NOT include all information about conditions, illnesses, injuries, tests, procedures, treatments, therapies, discharge instructions or life-style choices that may apply to you. You must talk with your health care provider for complete information about your health and treatment options. This information should not be used to decide whether or not to accept your health care providers advice, instructions or recommendations. Only your health care provider has the knowledge and training to provide advice that is right for you.  Copyright   Copyright © 2021 UpToDate, Inc. and its affiliates and/or licensors. All rights reserved.      IMAGING: Cardiac device check - [facility]  Additional Comments  IN-OFFICE device interrogation and testing performed    NOT MRI safe  PPM: SJM Assurity 2272, [id number], 2/13/19, [employer]  RA lead: SJM Tendril SDX 1688T, [id number], 7/8/05  RV lead: SJM: Tendril ST Optim 1888TC/58cm, [id number], 7/16/09  VDD 40/120, sensed delay 325ms    Incision: Chronic left upper chest, C/D/I, no signs of pocket erosion.    Presenting egram demonstrates: AsVs    Underlying rhythm c/w: sinus @ 77, MA 190ms    RA pacing n/a%, RV pacing 1.8%, PVCs 1.5%    Battery Status/Longevity: 4.1 - 7.1 years, 2.99V    Atrial arrhythmias: 8635 - A lead noise, known issue    Ventricular arrhythmias: 16, EGMs appear to be -130s with far field R wave oversensing.    Anticoagulant: n/a    CHF: n/a    Reprogramming at this visit: none    Nurse note: Dr. Dean reviewed interrogation, no changes needed.    F/U via remote interrogation in 3 mos, clinic check 1 year    Interrogation performed by RICO Fink     Report prepared by andie       Radiographs / Imaging : Relevant imaging results reviewed by me and interpreted by me, discussed with the patient and / or family -agree with knee x-ray report      Assessment:      Encounter Diagnosis   Name Primary?    Primary osteoarthritis of left knee Yes        Plan:   Primary osteoarthritis of left knee        The patient verbalized good understanding of the medical issues discussed today and expressed appreciation for the care provided.  Patient was given the opportunity to ask questions and be an active participant in their medical care. Patient had no further questions or concerns at this time.     The patient was encouraged to participate in appropriate physical activity.      Attila Mclain M.D.  [facility]        This note was dictated using voice recognition software and may have sound a like errors.

## 2023-06-22 ENCOUNTER — OFFICE VISIT (OUTPATIENT)
Dept: ORTHOPEDICS | Facility: CLINIC | Age: 73
End: 2023-06-22
Payer: MEDICARE

## 2023-06-22 DIAGNOSIS — M17.12 LOCALIZED OSTEOARTHRITIS OF LEFT KNEE: Primary | ICD-10-CM

## 2023-06-22 PROCEDURE — 99499 UNLISTED E&M SERVICE: CPT | Mod: S$PBB,,, | Performed by: PHYSICIAN ASSISTANT

## 2023-06-22 PROCEDURE — 99999 PR PBB SHADOW E&M-EST. PATIENT-LVL III: ICD-10-PCS | Mod: PBBFAC,,, | Performed by: PHYSICIAN ASSISTANT

## 2023-06-22 PROCEDURE — 99213 OFFICE O/P EST LOW 20 MIN: CPT | Mod: PBBFAC,25 | Performed by: PHYSICIAN ASSISTANT

## 2023-06-22 PROCEDURE — 99499 NO LOS: ICD-10-PCS | Mod: S$PBB,,, | Performed by: PHYSICIAN ASSISTANT

## 2023-06-22 PROCEDURE — 20610 LARGE JOINT ASPIRATION/INJECTION: L KNEE: ICD-10-PCS | Mod: S$PBB,LT,, | Performed by: PHYSICIAN ASSISTANT

## 2023-06-22 PROCEDURE — 99999 PR PBB SHADOW E&M-EST. PATIENT-LVL III: CPT | Mod: PBBFAC,,, | Performed by: PHYSICIAN ASSISTANT

## 2023-06-22 PROCEDURE — 20610 DRAIN/INJ JOINT/BURSA W/O US: CPT | Mod: S$PBB,LT,, | Performed by: PHYSICIAN ASSISTANT

## 2023-06-22 PROCEDURE — 20610 DRAIN/INJ JOINT/BURSA W/O US: CPT | Mod: PBBFAC | Performed by: PHYSICIAN ASSISTANT

## 2023-06-22 RX ADMIN — Medication 20 MG: at 10:06

## 2023-06-22 NOTE — PROCEDURES
Large Joint Aspiration/Injection: L knee    Date/Time: 6/22/2023 10:00 AM  Performed by: Jesusita Vogel PA-C  Authorized by: Jesusita Vogel PA-C     Consent Done?:  Yes (Verbal)  Indications:  Joint swelling and pain  Site marked: the procedure site was marked    Timeout: prior to procedure the correct patient, procedure, and site was verified    Prep: patient was prepped and draped in usual sterile fashion      Local anesthesia used?: Yes    Local anesthetic:  Topical anesthetic    Details:  Needle Size:  22 G  Ultrasonic Guidance for needle placement?: No    Approach:  Superior  Location:  Knee  Site:  L knee  Medications:  20 mg sodium hyaluronate (EUFLEXXA) 10 mg/mL(mw 2.4 -3.6 million)  Patient tolerance:  Patient tolerated the procedure well with no immediate complications     1/3

## 2023-06-26 ENCOUNTER — OFFICE VISIT (OUTPATIENT)
Dept: INTERNAL MEDICINE | Facility: CLINIC | Age: 73
End: 2023-06-26
Payer: MEDICARE

## 2023-06-26 VITALS
HEIGHT: 70 IN | HEART RATE: 87 BPM | DIASTOLIC BLOOD PRESSURE: 82 MMHG | WEIGHT: 212.5 LBS | BODY MASS INDEX: 30.42 KG/M2 | SYSTOLIC BLOOD PRESSURE: 128 MMHG | OXYGEN SATURATION: 97 % | TEMPERATURE: 98 F

## 2023-06-26 DIAGNOSIS — R97.20 ELEVATED PSA: ICD-10-CM

## 2023-06-26 DIAGNOSIS — I48.0 PAF (PAROXYSMAL ATRIAL FIBRILLATION): ICD-10-CM

## 2023-06-26 DIAGNOSIS — Z95.0 CARDIAC PACEMAKER IN SITU: Chronic | ICD-10-CM

## 2023-06-26 DIAGNOSIS — E78.2 MIXED HYPERLIPIDEMIA: Primary | ICD-10-CM

## 2023-06-26 DIAGNOSIS — D32.9 MENINGIOMA: ICD-10-CM

## 2023-06-26 DIAGNOSIS — R56.1 SEIZURES, POST-TRAUMATIC: Chronic | ICD-10-CM

## 2023-06-26 PROCEDURE — 99214 OFFICE O/P EST MOD 30 MIN: CPT | Mod: PBBFAC | Performed by: INTERNAL MEDICINE

## 2023-06-26 PROCEDURE — 99214 PR OFFICE/OUTPT VISIT, EST, LEVL IV, 30-39 MIN: ICD-10-PCS | Mod: S$PBB,,, | Performed by: INTERNAL MEDICINE

## 2023-06-26 PROCEDURE — 99999 PR PBB SHADOW E&M-EST. PATIENT-LVL IV: ICD-10-PCS | Mod: PBBFAC,,, | Performed by: INTERNAL MEDICINE

## 2023-06-26 PROCEDURE — 99999 PR PBB SHADOW E&M-EST. PATIENT-LVL IV: CPT | Mod: PBBFAC,,, | Performed by: INTERNAL MEDICINE

## 2023-06-26 PROCEDURE — 99214 OFFICE O/P EST MOD 30 MIN: CPT | Mod: S$PBB,,, | Performed by: INTERNAL MEDICINE

## 2023-06-26 RX ORDER — PRAVASTATIN SODIUM 40 MG/1
40 TABLET ORAL DAILY
Qty: 90 TABLET | Refills: 3 | Status: SHIPPED | OUTPATIENT
Start: 2023-06-26

## 2023-06-26 NOTE — PROGRESS NOTES
Subjective:      Patient ID: Adal Loaiza Jr. is a 73 y.o. male.    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)    Cardiac pacemaker in situ    Fatty liver    Obstructive sleep apnea    Myofascial pain    Hyperlipidemia    Pacemaker lead fracture    Abnormal digital rectal exam    Family history of colon cancer    Colon polyps    Internal hemorrhoids    Sensorineural hearing loss (SNHL) of both ears    Essential tremor    Meningioma    Renal cyst    Inadequate sleep hygiene    PAF (paroxysmal atrial fibrillation)    Left knee pain    Partial symptomatic epilepsy with complex partial seizures, not intractable, without status epilepticus    SSS (sick sinus syndrome)    Primary osteoarthritis of left knee    Trigeminal neuralgia    Anemia    Paraesophageal hernia    Elevated PSA    Iron deficiency anemia     Past Medical History:   Diagnosis Date    Anemia due to GI blood loss 2012    Arrhythmia requiring replacement of cardiac pacemaker     Brain tumor (benign) 1999    Meningioma    Encounter for blood transfusion     GI bleed due to NSAIDs 2012    Hyperlipidemia     Localized osteoarthritis of left knee 12/10/2020    PAF (paroxysmal atrial fibrillation) 6/16/2019    Renal cyst 2/19/2019    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    Seizures     because of brain tumor    Sleep apnea     Spinal cord disease     meningioma     Here today for management of mult med problems as outlined below.  Compliant with meds without significant side effects. Energy and appetite good.  He reports Up-to-date with his specialty clinics.      Review of Systems   Constitutional:  Negative for chills and fever.   HENT:  Negative for ear pain and sore throat.    Respiratory:  Negative for cough.    Cardiovascular:  Negative for chest pain.   Gastrointestinal:  Negative for abdominal pain and blood in stool.   Genitourinary:  Negative for  "dysuria and hematuria.   Neurological:  Negative for seizures and syncope.   Objective:   /82 (BP Location: Left arm, Patient Position: Sitting, BP Method: Large (Manual))   Pulse 87   Temp 98.2 °F (36.8 °C) (Tympanic)   Ht 5' 10" (1.778 m)   Wt 96.4 kg (212 lb 8.4 oz)   SpO2 97%   BMI 30.49 kg/m²     Physical Exam  Constitutional:       General: He is not in acute distress.     Appearance: He is well-developed.   HENT:      Head: Normocephalic and atraumatic.   Eyes:      Extraocular Movements: Extraocular movements intact.   Neck:      Thyroid: No thyromegaly.      Vascular: No carotid bruit.   Cardiovascular:      Rate and Rhythm: Normal rate and regular rhythm.   Pulmonary:      Breath sounds: Normal breath sounds. No wheezing or rales.   Abdominal:      General: Bowel sounds are normal.      Palpations: Abdomen is soft.      Tenderness: There is no abdominal tenderness.   Musculoskeletal:         General: No swelling.      Cervical back: Neck supple. No rigidity.   Lymphadenopathy:      Cervical: No cervical adenopathy.   Skin:     General: Skin is warm and dry.   Neurological:      Mental Status: He is alert and oriented to person, place, and time.   Psychiatric:         Behavior: Behavior normal.       Lab Results   Component Value Date    WBC 4.75 03/21/2023    HGB 15.7 03/21/2023    HGB 15.4 06/21/2022    HGB 15.7 06/16/2022    HCT 49.7 03/21/2023    MCV 90 03/21/2023    MCV 91 06/21/2022    MCV 86 06/16/2022     03/21/2023    CHOL 147 03/21/2023    TRIG 67 03/21/2023    HDL 46 03/21/2023    LDLCALC 87.6 03/21/2023    LDLCALC 76.8 06/21/2022    LDLCALC 110.0 03/14/2022    ALT 24 03/21/2023    AST 27 03/21/2023     03/21/2023    K 4.7 03/21/2023    CALCIUM 9.7 03/21/2023     03/21/2023    CO2 27 03/21/2023    BUN 16 03/21/2023    CREATININE 1.0 03/21/2023    CREATININE 1.1 02/27/2023    CREATININE 1.2 02/06/2023    EGFRNORACEVR >60.0 03/21/2023    EGFRNORACEVR >60 02/27/2023    " EGFRNORACEVR >60.0 02/06/2023    TSH 3.734 03/21/2023    TSH 3.363 03/14/2022    TSH 2.850 05/08/2020    PSA 0.90 03/21/2023    PSA 2.8 02/17/2022    PSA 4.2 (H) 12/15/2020    PSADIAG 1.0 02/16/2023    PSADIAG 4.1 (H) 01/04/2021    GLU 92 03/21/2023    BNP 79 01/22/2019          The 10-year ASCVD risk score (Sabine KELLY, et al., 2019) is: 20.1%    Values used to calculate the score:      Age: 73 years      Sex: Male      Is Non- : No      Diabetic: No      Tobacco smoker: No      Systolic Blood Pressure: 128 mmHg      Is BP treated: No      HDL Cholesterol: 46 mg/dL      Total Cholesterol: 147 mg/dL     Assessment:     1. Mixed hyperlipidemia    2. 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 Meningioma    4. PAF (paroxysmal atrial fibrillation)    5. Elevated PSA    6. Cardiac pacemaker in situ      Plan:   1. Mixed hyperlipidemia  Overview:  Stable.  Continue statin    Orders:  -     pravastatin (PRAVACHOL) 40 MG tablet; Take 1 tablet (40 mg total) by mouth once daily.  Dispense: 90 tablet; Refill: 3    2. 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 Seegalina gold      3. Meningioma  Overview:  Stable.  Continue recommendations and follow-up as per Neurology.      4. PAF (paroxysmal atrial fibrillation)  Overview:  Stable.  Continue recommendations and follow-up as per Cardiology.      5. Elevated PSA  Overview:  Continue recommendations and follow-up as per Urology.      6. Cardiac pacemaker in situ        There are no Patient Instructions on file for this visit.    Future Appointments   Date Time Provider Department Center   6/29/2023 10:00 AM Jesusita Vogel PA-C [facility]THO [place]   7/6/2023 10:00 AM Jesusita Vogel PA-C [facility]TH[facility] [place]   7/13/2023 10:00 AM HOME MONITOR DEVICE CHECK [facility] AR[facility] PRO [place]   8/1/2023 11:20 AM Elizabeth Lejeune, NP [facility] SLEEP [place]   9/25/2023 11:45 AM Violeta MOTT  MD [place] [facility] UROLOGY  [place]   6/26/2024  9:40 AM Huseyin Marrero MD [facility] IM [facility] [place]       Lab Frequency Next Occurrence   Cardiac device check - [facility]     Cardiac device check - [facility]         Follow up in about 1 year (around 6/26/2024), or if symptoms worsen or fail to improve.

## 2023-06-29 ENCOUNTER — OFFICE VISIT (OUTPATIENT)
Dept: ORTHOPEDICS | Facility: CLINIC | Age: 73
End: 2023-06-29
Payer: MEDICARE

## 2023-06-29 VITALS — HEIGHT: 70 IN | WEIGHT: 212 LBS | BODY MASS INDEX: 30.35 KG/M2

## 2023-06-29 DIAGNOSIS — M17.12 LOCALIZED OSTEOARTHRITIS OF LEFT KNEE: Primary | ICD-10-CM

## 2023-06-29 PROCEDURE — 99213 OFFICE O/P EST LOW 20 MIN: CPT | Mod: PBBFAC | Performed by: PHYSICIAN ASSISTANT

## 2023-06-29 PROCEDURE — 99999 PR PBB SHADOW E&M-EST. PATIENT-LVL III: ICD-10-PCS | Mod: PBBFAC,,, | Performed by: PHYSICIAN ASSISTANT

## 2023-06-29 PROCEDURE — 20610 LARGE JOINT ASPIRATION/INJECTION: L KNEE: ICD-10-PCS | Mod: S$PBB,LT,, | Performed by: PHYSICIAN ASSISTANT

## 2023-06-29 PROCEDURE — 99499 UNLISTED E&M SERVICE: CPT | Mod: S$PBB,,, | Performed by: PHYSICIAN ASSISTANT

## 2023-06-29 PROCEDURE — 99999 PR PBB SHADOW E&M-EST. PATIENT-LVL III: CPT | Mod: PBBFAC,,, | Performed by: PHYSICIAN ASSISTANT

## 2023-06-29 PROCEDURE — 20610 DRAIN/INJ JOINT/BURSA W/O US: CPT | Mod: PBBFAC | Performed by: PHYSICIAN ASSISTANT

## 2023-06-29 PROCEDURE — 99499 NO LOS: ICD-10-PCS | Mod: S$PBB,,, | Performed by: PHYSICIAN ASSISTANT

## 2023-06-29 PROCEDURE — 20610 DRAIN/INJ JOINT/BURSA W/O US: CPT | Mod: S$PBB,LT,, | Performed by: PHYSICIAN ASSISTANT

## 2023-06-29 RX ADMIN — Medication 20 MG: at 10:06

## 2023-06-29 NOTE — PROCEDURES
Large Joint Aspiration/Injection: L knee    Date/Time: 6/29/2023 10:00 AM  Performed by: Jesusita Vogel PA-C  Authorized by: Jesusita Vogel PA-C     Consent Done?:  Yes (Verbal)  Indications:  Joint swelling and pain  Site marked: the procedure site was marked    Timeout: prior to procedure the correct patient, procedure, and site was verified    Prep: patient was prepped and draped in usual sterile fashion      Local anesthesia used?: Yes    Local anesthetic:  Topical anesthetic    Details:  Needle Size:  22 G  Ultrasonic Guidance for needle placement?: No    Approach:  Superior  Location:  Knee  Site:  L knee  Medications:  20 mg sodium hyaluronate (EUFLEXXA) 10 mg/mL(mw 2.4 -3.6 million)  Patient tolerance:  Patient tolerated the procedure well with no immediate complications     2/3

## 2023-07-06 ENCOUNTER — OFFICE VISIT (OUTPATIENT)
Dept: SPORTS MEDICINE | Facility: CLINIC | Age: 73
End: 2023-07-06
Payer: MEDICARE

## 2023-07-06 VITALS — BODY MASS INDEX: 30.35 KG/M2 | WEIGHT: 212 LBS | HEIGHT: 70 IN

## 2023-07-06 DIAGNOSIS — M17.12 PRIMARY LOCALIZED OSTEOARTHRITIS OF LEFT KNEE: Primary | ICD-10-CM

## 2023-07-06 PROCEDURE — 99213 OFFICE O/P EST LOW 20 MIN: CPT | Mod: PBBFAC,25 | Performed by: PHYSICIAN ASSISTANT

## 2023-07-06 PROCEDURE — 99499 NO LOS: ICD-10-PCS | Mod: S$PBB,,, | Performed by: PHYSICIAN ASSISTANT

## 2023-07-06 PROCEDURE — 20610 DRAIN/INJ JOINT/BURSA W/O US: CPT | Mod: S$PBB,LT,, | Performed by: PHYSICIAN ASSISTANT

## 2023-07-06 PROCEDURE — 99999 PR PBB SHADOW E&M-EST. PATIENT-LVL III: ICD-10-PCS | Mod: PBBFAC,,, | Performed by: PHYSICIAN ASSISTANT

## 2023-07-06 PROCEDURE — 99999 PR PBB SHADOW E&M-EST. PATIENT-LVL III: CPT | Mod: PBBFAC,,, | Performed by: PHYSICIAN ASSISTANT

## 2023-07-06 PROCEDURE — 99499 UNLISTED E&M SERVICE: CPT | Mod: S$PBB,,, | Performed by: PHYSICIAN ASSISTANT

## 2023-07-06 PROCEDURE — 20610 DRAIN/INJ JOINT/BURSA W/O US: CPT | Mod: PBBFAC | Performed by: PHYSICIAN ASSISTANT

## 2023-07-06 PROCEDURE — 20610 LARGE JOINT ASPIRATION/INJECTION: L KNEE: ICD-10-PCS | Mod: S$PBB,LT,, | Performed by: PHYSICIAN ASSISTANT

## 2023-07-06 RX ADMIN — Medication 20 MG: at 10:07

## 2023-07-06 NOTE — PROCEDURES
Large Joint Aspiration/Injection: L knee    Date/Time: 7/6/2023 10:00 AM  Performed by: Jesusita Vogel PA-C  Authorized by: Jesusita Vogel PA-C     Consent Done?:  Yes (Verbal)  Indications:  Joint swelling and pain  Site marked: the procedure site was marked    Timeout: prior to procedure the correct patient, procedure, and site was verified    Prep: patient was prepped and draped in usual sterile fashion      Local anesthesia used?: Yes    Local anesthetic:  Topical anesthetic    Details:  Needle Size:  22 G  Ultrasonic Guidance for needle placement?: No    Approach:  Superior  Location:  Knee  Site:  L knee  Medications:  20 mg sodium hyaluronate (EUFLEXXA) 10 mg/mL(mw 2.4 -3.6 million)  Patient tolerance:  Patient tolerated the procedure well with no immediate complications     3/3

## 2023-07-13 ENCOUNTER — CLINICAL SUPPORT (OUTPATIENT)
Dept: CARDIOLOGY | Facility: HOSPITAL | Age: 73
End: 2023-07-13
Payer: COMMERCIAL

## 2023-07-13 DIAGNOSIS — Z95.0 PRESENCE OF CARDIAC PACEMAKER: ICD-10-CM

## 2023-07-13 PROCEDURE — 93294 REM INTERROG EVL PM/LDLS PM: CPT | Mod: ,,, | Performed by: INTERNAL MEDICINE

## 2023-07-13 PROCEDURE — 93294 CARDIAC DEVICE CHECK - REMOTE: ICD-10-PCS | Mod: ,,, | Performed by: INTERNAL MEDICINE

## 2023-07-28 ENCOUNTER — PATIENT MESSAGE (OUTPATIENT)
Dept: PULMONOLOGY | Facility: CLINIC | Age: 73
End: 2023-07-28
Payer: MEDICARE

## 2023-08-01 ENCOUNTER — OFFICE VISIT (OUTPATIENT)
Dept: SLEEP MEDICINE | Facility: CLINIC | Age: 73
End: 2023-08-01
Payer: MEDICARE

## 2023-08-01 VITALS
WEIGHT: 211.63 LBS | OXYGEN SATURATION: 96 % | DIASTOLIC BLOOD PRESSURE: 86 MMHG | BODY MASS INDEX: 30.3 KG/M2 | RESPIRATION RATE: 18 BRPM | HEART RATE: 80 BPM | SYSTOLIC BLOOD PRESSURE: 140 MMHG | HEIGHT: 70 IN

## 2023-08-01 DIAGNOSIS — E66.9 OBESITY, CLASS I, BMI 30-34.9: ICD-10-CM

## 2023-08-01 DIAGNOSIS — G47.33 OBSTRUCTIVE SLEEP APNEA: Primary | Chronic | ICD-10-CM

## 2023-08-01 DIAGNOSIS — I48.0 PAF (PAROXYSMAL ATRIAL FIBRILLATION): ICD-10-CM

## 2023-08-01 PROCEDURE — 99999 PR PBB SHADOW E&M-EST. PATIENT-LVL V: CPT | Mod: PBBFAC,,, | Performed by: NURSE PRACTITIONER

## 2023-08-01 PROCEDURE — 99215 OFFICE O/P EST HI 40 MIN: CPT | Mod: PBBFAC | Performed by: NURSE PRACTITIONER

## 2023-08-01 PROCEDURE — 99999 PR PBB SHADOW E&M-EST. PATIENT-LVL V: ICD-10-PCS | Mod: PBBFAC,,, | Performed by: NURSE PRACTITIONER

## 2023-08-01 PROCEDURE — 99214 OFFICE O/P EST MOD 30 MIN: CPT | Mod: S$PBB,,, | Performed by: NURSE PRACTITIONER

## 2023-08-01 PROCEDURE — 99214 PR OFFICE/OUTPT VISIT, EST, LEVL IV, 30-39 MIN: ICD-10-PCS | Mod: S$PBB,,, | Performed by: NURSE PRACTITIONER

## 2023-08-01 NOTE — PROGRESS NOTES
"Subjective:      Patient ID: Adal Loaiza Jr. is a 73 y.o. male.    Chief Complaint: No chief complaint on file.    HPI  Presents to office for review of AutoPAP therapy. Patient states improved symptoms with use of AutoPAP. Sleeping more soundly. Waking up feeling more refreshed. Improved daytime sleepiness. Patient states he is benefiting from use of the AutoPAP.     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)    Cardiac pacemaker in situ    Fatty liver    Obstructive sleep apnea    Myofascial pain    Hyperlipidemia    Pacemaker lead fracture    Abnormal digital rectal exam    Family history of colon cancer    Colon polyps    Internal hemorrhoids    Sensorineural hearing loss (SNHL) of both ears    Essential tremor    Meningioma    Renal cyst    Inadequate sleep hygiene    PAF (paroxysmal atrial fibrillation)    Left knee pain    Partial symptomatic epilepsy with complex partial seizures, not intractable, without status epilepticus    SSS (sick sinus syndrome)    Primary osteoarthritis of left knee    Trigeminal neuralgia    Anemia    Paraesophageal hernia    Elevated PSA    Iron deficiency anemia     BP (!) 140/86   Pulse 80   Resp 18   Ht 5' 10" (1.778 m)   Wt 96 kg (211 lb 10.3 oz)   SpO2 96%   BMI 30.37 kg/m²   Body mass index is 30.37 kg/m².    Review of Systems   Constitutional: Negative.    HENT: Negative.     Respiratory: Negative.     Cardiovascular: Negative.    Musculoskeletal: Negative.    Gastrointestinal: Negative.    Neurological: Negative.    Psychiatric/Behavioral: Negative.       Objective:      Physical Exam  Constitutional:       Appearance: Normal appearance. He is well-developed.   HENT:      Head: Normocephalic and atraumatic.      Nose: Nose normal.   Neck:      Thyroid: No thyroid mass or thyromegaly.      Trachea: Trachea normal.   Cardiovascular:      Rate and Rhythm: Normal rate and regular rhythm.      Heart sounds: Normal " heart sounds.   Pulmonary:      Effort: Pulmonary effort is normal.      Breath sounds: Normal breath sounds. No wheezing, rhonchi or rales.   Chest:      Chest wall: There is no dullness to percussion.   Abdominal:      Palpations: Abdomen is soft. There is no splenomegaly or mass.      Tenderness: There is no abdominal tenderness.   Musculoskeletal:         General: Normal range of motion.      Cervical back: Normal range of motion and neck supple.   Skin:     General: Skin is warm and dry.   Neurological:      Mental Status: He is alert and oriented to person, place, and time.   Psychiatric:         Mood and Affect: Mood normal.         Behavior: Behavior normal.       Personal Diagnostic Review  APAP- not available     Assessment:       1. Obstructive sleep apnea    2. PAF (paroxysmal atrial fibrillation)    3. Obesity, Class I, BMI 30-34.9        Outpatient Encounter Medications as of 8/1/2023   Medication Sig Dispense Refill    docusate sodium (COLACE) 100 MG capsule Take 1 capsule (100 mg total) by mouth 2 (two) times daily. 60 capsule 1    finasteride (PROSCAR) 5 mg tablet Take 1 tablet (5 mg total) by mouth once daily. 30 tablet 11    gabapentin (NEURONTIN) 300 MG capsule Take 300 mg by mouth every evening.       gabapentin (NEURONTIN) 300 MG capsule Take 1 capsule by mouth every morning.      LACTOBACILLUS ACIDOPHILUS (PROBIOTIC ORAL) Take by mouth once daily.      levetiracetam (KEPPRA) 500 MG Tab Take 1 tablet (500 mg total) by mouth 2 (two) times daily. 60 tablet 11    levETIRAcetam (KEPPRA) 500 MG Tab Take 2 tablets by mouth once daily.      multivitamin (THERAGRAN) per tablet Take 1 tablet by mouth once daily.      pantoprazole (PROTONIX) 40 MG tablet Take 40 mg by mouth.      pravastatin (PRAVACHOL) 40 MG tablet Take 1 tablet (40 mg total) by mouth once daily. 90 tablet 3    tamsulosin (FLOMAX) 0.4 mg Cap Take 1 capsule (0.4 mg total) by mouth once daily. 90 capsule 3     Facility-Administered  Encounter Medications as of 8/1/2023   Medication Dose Route Frequency Provider Last Rate Last Admin    sodium hyaluronate (EUFLEXXA) 10 mg/mL(mw 2.4 -3.6 million) injection 20 mg  20 mg Intra-articular  Attila Mclian MD   20 mg at 04/27/22 1040    sodium hyaluronate (EUFLEXXA) 10 mg/mL(mw 2.4 -3.6 million) injection 20 mg  20 mg Intra-articular  Attila Mclain MD   20 mg at 05/04/22 1020     Orders Placed This Encounter   Procedures    CPAP/BIPAP SUPPLIES     Order Specific Question:   Length of need (1-99 months):     Answer:   99     Order Specific Question:   Choose ONE mask type and its corresponding cushions and/or pillows:     Answer:    Nasal Mask, 1 per 90 days:  Nasal Cushions, (6 per 90 days):  Nasal Pillows, (6 per 90 days)     Order Specific Question:   Choose EITHER Heated or Non-Heated Tubjing     Answer:    Non-Heated Tubing, 1 per 90 days     Order Specific Question:   All other supplies as needed as listed below:     Answer:    Headgear, 1 per 180 days     Order Specific Question:   All other supplies as needed as listed below:     Answer:    Disposable Filter, 6 per 90 days     Order Specific Question:   All other supplies as needed as listed below:     Answer:    Non-Disposable Filter, 1 per 180 days     Order Specific Question:   All other supplies as needed as listed below:     Answer:    Humidifier Chamber, 1 per 180 days     Plan:        Problem List Items Addressed This Visit          Cardiac/Vascular    PAF (paroxysmal atrial fibrillation)    Overview     Stable.  Continue recommendations and follow-up as per Cardiology.            Other    Obstructive sleep apnea - Primary (Chronic)    Overview     Dx 26.6/hr. On Autopap. Doing well on PAP settings. Patient is compliant. Follow up annually in sleep clinic         Relevant Orders    CPAP/BIPAP SUPPLIES     Other Visit Diagnoses       Obesity, Class I, BMI 30-34.9              Weight loss and  exercise to improve overall health.

## 2023-08-07 RX ORDER — PANTOPRAZOLE SODIUM 40 MG/1
40 TABLET, DELAYED RELEASE ORAL
Qty: 90 TABLET | Refills: 0 | Status: SHIPPED | OUTPATIENT
Start: 2023-08-07

## 2023-08-13 ENCOUNTER — TELEPHONE (OUTPATIENT)
Dept: ADMINISTRATIVE | Facility: HOSPITAL | Age: 73
End: 2023-08-13
Payer: MEDICARE

## 2023-09-25 ENCOUNTER — OFFICE VISIT (OUTPATIENT)
Dept: UROLOGY | Facility: CLINIC | Age: 73
End: 2023-09-25
Payer: MEDICARE

## 2023-09-25 VITALS
SYSTOLIC BLOOD PRESSURE: 143 MMHG | DIASTOLIC BLOOD PRESSURE: 82 MMHG | HEART RATE: 84 BPM | WEIGHT: 211 LBS | BODY MASS INDEX: 30.28 KG/M2

## 2023-09-25 DIAGNOSIS — N40.1 BPH WITH OBSTRUCTION/LOWER URINARY TRACT SYMPTOMS: Primary | ICD-10-CM

## 2023-09-25 DIAGNOSIS — R33.9 URINARY RETENTION: ICD-10-CM

## 2023-09-25 DIAGNOSIS — N13.8 BPH WITH OBSTRUCTION/LOWER URINARY TRACT SYMPTOMS: Primary | ICD-10-CM

## 2023-09-25 DIAGNOSIS — Z12.5 PROSTATE CANCER SCREENING: ICD-10-CM

## 2023-09-25 LAB
BILIRUB UR QL STRIP: NEGATIVE
GLUCOSE UR QL STRIP: NEGATIVE
KETONES UR QL STRIP: NEGATIVE
LEUKOCYTE ESTERASE UR QL STRIP: NEGATIVE
PH, POC UA: 7
POC BLOOD, URINE: NEGATIVE
POC NITRATES, URINE: NEGATIVE
POC RESIDUAL URINE VOLUME: 17 ML (ref 0–100)
PROT UR QL STRIP: NEGATIVE
SP GR UR STRIP: 1.02 (ref 1–1.03)
UROBILINOGEN UR STRIP-ACNC: 1 (ref 0.3–2.2)

## 2023-09-25 PROCEDURE — 99214 OFFICE O/P EST MOD 30 MIN: CPT | Mod: S$PBB,,, | Performed by: UROLOGY

## 2023-09-25 PROCEDURE — 99213 OFFICE O/P EST LOW 20 MIN: CPT | Mod: PBBFAC | Performed by: UROLOGY

## 2023-09-25 PROCEDURE — 99214 PR OFFICE/OUTPT VISIT, EST, LEVL IV, 30-39 MIN: ICD-10-PCS | Mod: S$PBB,,, | Performed by: UROLOGY

## 2023-09-25 PROCEDURE — 99999PBSHW POCT URINALYSIS, DIPSTICK, AUTOMATED, W/O SCOPE: Mod: PBBFAC,,,

## 2023-09-25 PROCEDURE — 99999 PR PBB SHADOW E&M-EST. PATIENT-LVL III: CPT | Mod: PBBFAC,,, | Performed by: UROLOGY

## 2023-09-25 PROCEDURE — 99999PBSHW POCT BLADDER SCAN: ICD-10-PCS | Mod: PBBFAC,,,

## 2023-09-25 PROCEDURE — 99999PBSHW POCT BLADDER SCAN: Mod: PBBFAC,,,

## 2023-09-25 PROCEDURE — 99999 PR PBB SHADOW E&M-EST. PATIENT-LVL III: ICD-10-PCS | Mod: PBBFAC,,, | Performed by: UROLOGY

## 2023-09-25 PROCEDURE — 51798 US URINE CAPACITY MEASURE: CPT | Mod: PBBFAC | Performed by: UROLOGY

## 2023-09-25 PROCEDURE — 81003 URINALYSIS AUTO W/O SCOPE: CPT | Mod: PBBFAC | Performed by: UROLOGY

## 2023-09-25 RX ORDER — FINASTERIDE 5 MG/1
5 TABLET, FILM COATED ORAL DAILY
Qty: 30 TABLET | Refills: 11 | Status: SHIPPED | OUTPATIENT
Start: 2023-09-25 | End: 2024-04-01 | Stop reason: SDUPTHER

## 2023-09-25 RX ORDER — TAMSULOSIN HYDROCHLORIDE 0.4 MG/1
0.4 CAPSULE ORAL DAILY
Qty: 90 CAPSULE | Refills: 3 | Status: SHIPPED | OUTPATIENT
Start: 2023-09-25 | End: 2024-04-01 | Stop reason: SDUPTHER

## 2023-09-25 NOTE — PROGRESS NOTES
Chief Complaint   Patient presents with    Other     F/U          History of Present Illness:   Adal Loaiza Jr. is a 73 y.o. male here for evaluation of Other (F/U )    9/25/23-Pt on flomax and finasteride. No gross hematuria or dysuria. Nocturia x 1. No stranguria. No incontinence. No urgency.   3/27/23-here for f/u after CT scan. CT scan completed 3/9/23 personally reviewed, showing a right lower pole hyperdense cyst, measuring 2.5cm. Right simple renal cysts also seen. Stream is good. No gross hematuria. No Nocturia. Currently happy with his voiding dynamics on flomax and finasteride.   2/27/23-CT scan personally reviewed, showing no obstructive cause for the patient's bilateral varicoceles, but R lower pole renal cyst does appear to need further evaluation. No scrotal pain. No gross hematuria. He continues to take finasteride and flomax.   2/6/23-on finasteride and flomax. Having bilateral scrotal swelling, which comes and goes. It is not the testicle itself. Not painful. Feels like he is urinating well. Stream is fair. Urinates 8-10 times a day. Nocturia x 1. No gross hematuria.   8/8/22- Here for cysto. Urinating without difficulty. Good stream. Stopped flomax and finasteride a couple days ago due to joint pain.   7/5/22- Pt here for evaluation of recurrent urinary retention. He reports that he underwent Hiatal hernia surgery and hasn't been able to urinate since that time. He has had multiple voiding trials. Sometimes, he was distended over 1000cc.   Prior to surgery, felt like he emptied, stream was good.   Has h/o meningioma s/p surgery 22 years ago. Also had gamma knife surgery following that.          Review of Systems   Constitutional:  Negative for fever.   Respiratory:  Negative for shortness of breath.    Cardiovascular:  Negative for chest pain.   Gastrointestinal:  Negative for abdominal pain.   Musculoskeletal:  Negative for back pain.   All other systems reviewed and are negative.      Past  Medical History:   Diagnosis Date    Anemia due to GI blood loss 2012    Arrhythmia requiring replacement of cardiac pacemaker     Brain tumor (benign) 1999    Meningioma    Encounter for blood transfusion     GI bleed due to NSAIDs 2012    Hyperlipidemia     Localized osteoarthritis of left knee 12/10/2020    PAF (paroxysmal atrial fibrillation) 6/16/2019    Renal cyst 2/19/2019    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    Seizures     because of brain tumor    Sleep apnea     Spinal cord disease     meningioma       Past Surgical History:   Procedure Laterality Date    BRAIN SURGERY      CARDIAC PACEMAKER PLACEMENT      COLONOSCOPY N/A 8/25/2016    Procedure: COLONOSCOPY;  Surgeon: Morris Olguin MD;  Location: [facility] ENDO;  Service: Endoscopy;  Laterality: N/A;    COLONOSCOPY N/A 4/4/2022    Procedure: COLONOSCOPY;  Surgeon: Marci Silva MD;  Location: [facility];  Service: Endoscopy;  Laterality: N/A;    ESOPHAGEAL MANOMETRY WITH MEASUREMENT OF IMPEDANCE N/A 4/26/2022    Procedure: MANOMETRY, ESOPHAGUS, WITH IMPEDANCE MEASUREMENT;  Surgeon: Yoni Portillo RN;  Location: [facility];  Service: Endoscopy;  Laterality: N/A;    ESOPHAGOGASTRODUODENOSCOPY N/A 4/4/2022    Procedure: ESOPHAGOGASTRODUODENOSCOPY (EGD);  Surgeon: Marci Silva MD;  Location: [facility];  Service: Endoscopy;  Laterality: N/A;    ESOPHAGOGASTRODUODENOSCOPY N/A 6/6/2022    Procedure: EGD (ESOPHAGOGASTRODUODENOSCOPY);  Surgeon: Ryanne Phan DO;  Location: [facility] OR;  Service: General;  Laterality: N/A;    FRACTURE SURGERY Right     right jaw    Gamma knife      for schwannoma    LAPAROSCOPIC TOUPET FUNDOPLICATION N/A 6/6/2022    Procedure: FUNDOPLICATION, LAPAROSCOPIC, TOUPET;  Surgeon: Ryanne Phan DO;  Location: [facility] OR;  Service: General;  Laterality: N/A;    REPLACEMENT OF PACEMAKER GENERATOR Left 2/13/2019    Procedure: REPLACEMENT, PULSE GENERATOR, CARDIAC PACEMAKER;  Surgeon: Gen Meza MD;  Location: [facility] CATH  LAB;  Service: Cardiology;  Laterality: Left;  current device MDT/waiting for MD input    ROBOT-ASSISTED REPAIR OF HIATAL HERNIA USING DA CARMELO XI N/A 6/6/2022    Procedure: XI ROBOTIC REPAIR, HERNIA, HIATAL;  Surgeon: Ryanne Phan DO;  Location: [facility] OR;  Service: General;  Laterality: N/A;       Family History   Problem Relation Age of Onset    Colon cancer Father     Hypertension Unknown        Social History     Tobacco Use    Smoking status: Never    Smokeless tobacco: Never   Substance Use Topics    Alcohol use: No     Comment: socially    Drug use: No       Current Outpatient Medications   Medication Sig Dispense Refill    docusate sodium (COLACE) 100 MG capsule Take 1 capsule (100 mg total) by mouth 2 (two) times daily. 60 capsule 1    gabapentin (NEURONTIN) 300 MG capsule Take 300 mg by mouth every evening.       gabapentin (NEURONTIN) 300 MG capsule Take 1 capsule by mouth every morning.      LACTOBACILLUS ACIDOPHILUS (PROBIOTIC ORAL) Take by mouth once daily.      levetiracetam (KEPPRA) 500 MG Tab Take 1 tablet (500 mg total) by mouth 2 (two) times daily. 60 tablet 11    levETIRAcetam (KEPPRA) 500 MG Tab Take 2 tablets by mouth once daily.      multivitamin (THERAGRAN) per tablet Take 1 tablet by mouth once daily.      pantoprazole (PROTONIX) 40 MG tablet Take 1 tablet by mouth once daily 90 tablet 0    pravastatin (PRAVACHOL) 40 MG tablet Take 1 tablet (40 mg total) by mouth once daily. 90 tablet 3    sars-cov-2, covid-19 pfizer omicron, (PFIZER COVID BIVAL,12Y UP,,PF,) 30 mcg/0.3 mL injection Inject into the muscle. 0.3 mL 0    finasteride (PROSCAR) 5 mg tablet Take 1 tablet (5 mg total) by mouth once daily. 30 tablet 11    tamsulosin (FLOMAX) 0.4 mg Cap Take 1 capsule (0.4 mg total) by mouth once daily. 90 capsule 3     No current facility-administered medications for this visit.     Facility-Administered Medications Ordered in Other Visits   Medication Dose Route Frequency Provider Last Rate  Last Admin    sodium hyaluronate (EUFLEXXA) 10 mg/mL(mw 2.4 -3.6 million) injection 20 mg  20 mg Intra-articular  Attila Mclain MD   20 mg at 04/27/22 1040    sodium hyaluronate (EUFLEXXA) 10 mg/mL(mw 2.4 -3.6 million) injection 20 mg  20 mg Intra-articular  Attila Mclain MD   20 mg at 05/04/22 1020       Review of patient's allergies indicates:   Allergen Reactions    Nsaids (non-steroidal anti-inflammatory drug)      Caused GI bleeding.    Aspirin     Celecoxib      Increased bleeding        Physical Exam  Vitals:    09/25/23 1138   BP: (!) 143/82   Pulse: 84           General: Well-developed, well-nourished in no acute distress  HEENT: Normocephalic, atraumatic, Extraocular movements intact  Neck: supple, trachea midline, no cervical or supraclavicular lymphadenopathy  Respirations: even and unlabored  Back: midline spine, no CVA tenderness  Abdomen: soft, Non-tender, non-distended, no organomegaly or palpable masses, no rebound or guarding  : 2/6/23-bilateral varicoceles, L prominent epididymis, no scrotal mass  Rectal: 2/6/23->40g prostate, no nodules or tenderness. No gross blood  Extremities: atraumatic, moves all equally, no clubbing, cyanosis or edema  Psych: normal affect  Skin: warm and dry, no lesions  Neuro: Alert and oriented, Cranial nerves II-XII grossly intact    PVR: 17cc    UA: negative for blood, LE, nit    PSA  2/16/23: 1.0      Assessment:   1. BPH with obstruction/lower urinary tract symptoms  POCT Urinalysis, Dipstick, Automated, W/O Scope    POCT Bladder Scan      2. Prostate cancer screening  PSA, Screening      3. Urinary retention                  Plan:  BPH with obstruction/lower urinary tract symptoms  -     POCT Urinalysis, Dipstick, Automated, W/O Scope  -     POCT Bladder Scan    Prostate cancer screening  -     PSA, Screening; Future; Expected date: 03/25/2024    Urinary retention    Other orders  -     finasteride (PROSCAR) 5 mg tablet; Take 1 tablet (5 mg total) by  mouth once daily.  Dispense: 30 tablet; Refill: 11  -     tamsulosin (FLOMAX) 0.4 mg Cap; Take 1 capsule (0.4 mg total) by mouth once daily.  Dispense: 90 capsule; Refill: 3           Continue flomax and finasteride.     Follow up in about 6 months (around 3/25/2024).

## 2023-10-11 ENCOUNTER — CLINICAL SUPPORT (OUTPATIENT)
Dept: CARDIOLOGY | Facility: HOSPITAL | Age: 73
End: 2023-10-11
Payer: MEDICARE

## 2023-10-11 DIAGNOSIS — Z95.0 PRESENCE OF CARDIAC PACEMAKER: ICD-10-CM

## 2023-11-21 ENCOUNTER — HOSPITAL ENCOUNTER (OUTPATIENT)
Dept: RADIOLOGY | Facility: HOSPITAL | Age: 73
Discharge: HOME | End: 2023-11-21
Attending: PHYSICIAN ASSISTANT
Payer: MEDICARE

## 2023-11-21 ENCOUNTER — OFFICE VISIT (OUTPATIENT)
Dept: SPORTS MEDICINE | Facility: CLINIC | Age: 73
End: 2023-11-21
Payer: MEDICARE

## 2023-11-21 VITALS — WEIGHT: 211 LBS | BODY MASS INDEX: 30.21 KG/M2 | HEIGHT: 70 IN

## 2023-11-21 DIAGNOSIS — G40.209 PARTIAL SYMPTOMATIC EPILEPSY WITH COMPLEX PARTIAL SEIZURES, NOT INTRACTABLE, WITHOUT STATUS EPILEPTICUS: ICD-10-CM

## 2023-11-21 DIAGNOSIS — E78.2 MIXED HYPERLIPIDEMIA: ICD-10-CM

## 2023-11-21 DIAGNOSIS — I48.0 PAF (PAROXYSMAL ATRIAL FIBRILLATION): ICD-10-CM

## 2023-11-21 DIAGNOSIS — M17.12 PRIMARY LOCALIZED OSTEOARTHRITIS OF LEFT KNEE: Primary | ICD-10-CM

## 2023-11-21 DIAGNOSIS — Z95.0 CARDIAC PACEMAKER IN SITU: Chronic | ICD-10-CM

## 2023-11-21 DIAGNOSIS — I49.5 SSS (SICK SINUS SYNDROME): ICD-10-CM

## 2023-11-21 DIAGNOSIS — M17.12 PRIMARY LOCALIZED OSTEOARTHRITIS OF LEFT KNEE: ICD-10-CM

## 2023-11-21 DIAGNOSIS — M25.562 LEFT KNEE PAIN, UNSPECIFIED CHRONICITY: ICD-10-CM

## 2023-11-21 PROCEDURE — 99214 OFFICE O/P EST MOD 30 MIN: CPT | Mod: S$PBB,,, | Performed by: PHYSICIAN ASSISTANT

## 2023-11-21 PROCEDURE — 73562 XR KNEE ORTHO LEFT WITH FLEXION: ICD-10-PCS | Mod: 26,RT,, | Performed by: RADIOLOGY

## 2023-11-21 PROCEDURE — 73562 X-RAY EXAM OF KNEE 3: CPT | Mod: 26,RT,, | Performed by: RADIOLOGY

## 2023-11-21 PROCEDURE — 99213 OFFICE O/P EST LOW 20 MIN: CPT | Mod: PBBFAC | Performed by: PHYSICIAN ASSISTANT

## 2023-11-21 PROCEDURE — 99214 PR OFFICE/OUTPT VISIT, EST, LEVL IV, 30-39 MIN: ICD-10-PCS | Mod: S$PBB,,, | Performed by: PHYSICIAN ASSISTANT

## 2023-11-21 PROCEDURE — 73564 XR KNEE ORTHO LEFT WITH FLEXION: ICD-10-PCS | Mod: 26,LT,, | Performed by: RADIOLOGY

## 2023-11-21 PROCEDURE — 73562 X-RAY EXAM OF KNEE 3: CPT | Mod: TC,RT

## 2023-11-21 PROCEDURE — 99999 PR PBB SHADOW E&M-EST. PATIENT-LVL III: ICD-10-PCS | Mod: PBBFAC,,, | Performed by: PHYSICIAN ASSISTANT

## 2023-11-21 PROCEDURE — 99999 PR PBB SHADOW E&M-EST. PATIENT-LVL III: CPT | Mod: PBBFAC,,, | Performed by: PHYSICIAN ASSISTANT

## 2023-11-21 PROCEDURE — 73564 X-RAY EXAM KNEE 4 OR MORE: CPT | Mod: 26,LT,, | Performed by: RADIOLOGY

## 2023-11-21 NOTE — PATIENT INSTRUCTIONS
Assessment:  Adal Loaiza Jr. is a 73 y.o. male   Data Unavailable with a chief complaint of Pain of the Left Knee  Previous patient of Dr. Addison Mclain, presents today for a recheck on left knee pain with a hx of left knee OA.  Left knee OA    Encounter Diagnoses   Name Primary?    Primary localized osteoarthritis of left knee Yes    Left knee pain, unspecified chronicity     PAF (paroxysmal atrial fibrillation)     Cardiac pacemaker in situ     Partial symptomatic epilepsy with complex partial seizures, not intractable, without status epilepticus     Mixed hyperlipidemia     SSS (sick sinus syndrome)       Plan:  Continue conservative treatment for left knee: knee bracing, OTC meds as needed, and home exercise plan.   New left knee x-rays today on the way out   Follow up in 4 weeks to start Left knee visco    Follow-up: Left knee visco or sooner if there are any problems between now and then.    Leave Review:   Google: Leave Google Review  Healthgrades: Leave Healthgrades Review    After Hours Number: [phone]

## 2023-11-21 NOTE — PROGRESS NOTES
Patient ID: Adal Loaiza Jr.  YOB: 1950  MRN: [id number]    Chief Complaint: Pain of the Left Knee    Referred By: jose du patient     History of Present Illness: Adal Loaiza Jr. is a  73 y.o. male   Data Unavailable with a chief complaint of Pain of the Left Knee    Adal presents to the clinic for L knee pain. He rates his pain as a 3 out of 10 today. He reports that he has been having some pain occasionally and that the injections worked. He is interested in getting new ones.     HPI    Past Medical History:   Past Medical History:   Diagnosis Date    Anemia due to GI blood loss 2012    Arrhythmia requiring replacement of cardiac pacemaker     Brain tumor (benign) 1999    Meningioma    Encounter for blood transfusion     GI bleed due to NSAIDs 2012    Hyperlipidemia     Localized osteoarthritis of left knee 12/10/2020    PAF (paroxysmal atrial fibrillation) 6/16/2019    Renal cyst 2/19/2019    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    Seizures     because of brain tumor    Sleep apnea     Spinal cord disease     meningioma     Past Surgical History:   Procedure Laterality Date    BRAIN SURGERY      CARDIAC PACEMAKER PLACEMENT      COLONOSCOPY N/A 8/25/2016    Procedure: COLONOSCOPY;  Surgeon: Morris Olguin MD;  Location: [facility];  Service: Endoscopy;  Laterality: N/A;    COLONOSCOPY N/A 4/4/2022    Procedure: COLONOSCOPY;  Surgeon: Marci Silva MD;  Location: [facility];  Service: Endoscopy;  Laterality: N/A;    ESOPHAGEAL MANOMETRY WITH MEASUREMENT OF IMPEDANCE N/A 4/26/2022    Procedure: MANOMETRY, ESOPHAGUS, WITH IMPEDANCE MEASUREMENT;  Surgeon: Yoni Portillo RN;  Location: [facility];  Service: Endoscopy;  Laterality: N/A;    ESOPHAGOGASTRODUODENOSCOPY N/A 4/4/2022    Procedure: ESOPHAGOGASTRODUODENOSCOPY (EGD);  Surgeon: Marci Silva MD;  Location: [facility];  Service: Endoscopy;  Laterality: N/A;    ESOPHAGOGASTRODUODENOSCOPY N/A 6/6/2022    Procedure: EGD  (ESOPHAGOGASTRODUODENOSCOPY);  Surgeon: Ryanne Phan DO;  Location: [facility] OR;  Service: General;  Laterality: N/A;    FRACTURE SURGERY Right     right jaw    Gamma knife      for schwannoma    LAPAROSCOPIC TOUPET FUNDOPLICATION N/A 6/6/2022    Procedure: FUNDOPLICATION, LAPAROSCOPIC, TOUPET;  Surgeon: Ryanne Phan DO;  Location: [facility] OR;  Service: General;  Laterality: N/A;    REPLACEMENT OF PACEMAKER GENERATOR Left 2/13/2019    Procedure: REPLACEMENT, PULSE GENERATOR, CARDIAC PACEMAKER;  Surgeon: Gen Meza MD;  Location: [facility] CATH LAB;  Service: Cardiology;  Laterality: Left;  current device MDT/waiting for MD input    ROBOT-ASSISTED REPAIR OF HIATAL HERNIA USING DA CARMELO XI N/A 6/6/2022    Procedure: XI ROBOTIC REPAIR, HERNIA, HIATAL;  Surgeon: Ryanne Phan DO;  Location: [facility] OR;  Service: General;  Laterality: N/A;     Family History   Problem Relation Age of Onset    Colon cancer Father     Hypertension Unknown      Social History     Socioeconomic History    Marital status:    Tobacco Use    Smoking status: Never    Smokeless tobacco: Never   Substance and Sexual Activity    Alcohol use: No     Comment: socially    Drug use: No     Medication List with Changes/Refills   Current Medications    DOCUSATE SODIUM (COLACE) 100 MG CAPSULE    Take 1 capsule (100 mg total) by mouth 2 (two) times daily.    FINASTERIDE (PROSCAR) 5 MG TABLET    Take 1 tablet (5 mg total) by mouth once daily.    GABAPENTIN (NEURONTIN) 300 MG CAPSULE    Take 300 mg by mouth every evening.     GABAPENTIN (NEURONTIN) 300 MG CAPSULE    Take 1 capsule by mouth every morning.    LACTOBACILLUS ACIDOPHILUS (PROBIOTIC ORAL)    Take by mouth once daily.    LEVETIRACETAM (KEPPRA) 500 MG TAB    Take 1 tablet (500 mg total) by mouth 2 (two) times daily.    LEVETIRACETAM (KEPPRA) 500 MG TAB    Take 2 tablets by mouth once daily.    MULTIVITAMIN (THERAGRAN) PER TABLET    Take 1 tablet by mouth once daily.     PANTOPRAZOLE (PROTONIX) 40 MG TABLET    Take 1 tablet by mouth once daily    PRAVASTATIN (PRAVACHOL) 40 MG TABLET    Take 1 tablet (40 mg total) by mouth once daily.    SARS-COV-2, COVID-19 PFIZER OMICRON, (PFIZER COVID BIVAL,12Y UP,,PF,) 30 MCG/0.3 ML INJECTION    Inject into the muscle.    TAMSULOSIN (FLOMAX) 0.4 MG CAP    Take 1 capsule (0.4 mg total) by mouth once daily.     Review of patient's allergies indicates:   Allergen Reactions    Nsaids (non-steroidal anti-inflammatory drug)      Caused GI bleeding.    Aspirin     Celecoxib      Increased bleeding      Review of Systems   Constitutional: Negative for chills and fever.   HENT:  Negative for sore throat.    Eyes:  Negative for pain.   Cardiovascular:  Negative for chest pain and leg swelling.   Respiratory:  Negative for cough and shortness of breath.    Skin:  Negative for itching and rash.   Musculoskeletal:  Positive for arthritis, back pain, joint pain, joint swelling, myalgias and stiffness.   Gastrointestinal:  Negative for abdominal pain, nausea and vomiting.   Genitourinary:  Negative for dysuria.   Neurological:  Negative for dizziness, numbness and paresthesias.       Physical Exam:   Body mass index is 30.28 kg/m².  There were no vitals filed for this visit.   GENERAL: Well appearing, appropriate for stated age, no acute distress.  CARDIOVASCULAR: Pulses regular by peripheral palpation.  PULMONARY: Respirations are even and non-labored.  NEURO: Awake, alert, and oriented x 3.  PSYCH: Mood & affect are appropriate.  HEENT: Head is normocephalic and atraumatic.  General    Nursing note and vitals reviewed.  Constitutional: He is oriented to person, place, and time. He appears well-developed and well-nourished.   HENT:   Head: Normocephalic and atraumatic.   Eyes: EOM are normal. Pupils are equal, round, and reactive to light.   Cardiovascular:  Normal rate and intact distal pulses.            Pulmonary/Chest: Effort normal.   Abdominal: Soft.  There is no abdominal tenderness.   Neurological: He is alert and oriented to person, place, and time.   Psychiatric: He has a normal mood and affect.           Right Knee Exam   Right knee exam is normal.    Inspection   Effusion: absent    Tenderness   The patient is experiencing no tenderness.     Range of Motion   Extension:  0   Flexion:  120     Tests   Ligament Examination   Lachman: normal (-1 to 2mm)   PCL-Posterior Drawer: normal (0 to 2mm)     MCL - Valgus: normal (0 to 2mm)  LCL - Varus: normal    Other   Sensation: normal    Left Knee Exam   Left knee exam is normal.    Inspection   Effusion: absent    Tenderness   The patient tender to palpation of the medial joint line.    Crepitus   The patient has crepitus of the patella.    Range of Motion   Extension:  0   Flexion:  120     Tests   Stability   Lachman: normal (-1 to 2mm)   MCL - Valgus: normal (0 to 2mm)  LCL - Varus: normal (0 to 2mm)    Other   Sensation: normal    Muscle Strength   Right Lower Extremity   Hip Abduction: 5/5   Quadriceps:  5/5   Hamstrin/5   Left Lower Extremity   Hip Abduction: 5/5   Quadriceps:  4/5   Hamstrin/5     Vascular Exam     Right Pulses  Dorsalis Pedis:      2+  Posterior Tibial:      2+        Left Pulses  Dorsalis Pedis:      2+  Posterior Tibial:      2+        All compartments are soft and compressible. Calf soft non-tender. Intact EHL, FHL, gastroc soleus, and tibialis anterior. Sensation intact to light touch in superficial peroneal, deep peroneal, tibial, sural, and saphenous nerve distributions. Foot warm and well perfused with capillary refill of less than 2 seconds and palpable pedal pulses.       Imaging:    X-ray Knee Ortho Left with Flexion  Narrative: EXAMINATION:  XR KNEE ORTHO LEFT WITH FLEXION    CLINICAL HISTORY:  Unilateral primary osteoarthritis, left knee    TECHNIQUE:  AP standing views of both knees, AP flexion views of both knees, lateral view of the left knee and Merchant views of  both knees    COMPARISON:  01/19/2023    FINDINGS:  There is moderate joint space narrowing and Mild marginal osteophyte formation seen involving the medial compartment of the left knee.  Mild-to-moderate degenerative change also seen at the lateral compartment of the left knee.  Moderate degenerative change seen involving the patellofemoral compartment.  Calcifications seen projecting over the lateral and posterior aspect of the joint on the left suggesting small loose bodies unchanged in appearance.  No significant left-sided joint effusion suggested.  No acute fracture or dislocation.  Impression: 1.  As above    Electronically signed by: Mahesh Chamberlain DO  Date:    11/21/2023  Time:    12:57      Relevant imaging results reviewed and interpreted by me, discussed with the patient and / or family today.     Other Tests:       Patient Instructions   Assessment:  dAal SCHROEDER Josse Pineda is a 73 y.o. male   Data Unavailable with a chief complaint of Pain of the Left Knee  Previous patient of Dr. Addison Mclain, presents today for a recheck on left knee pain with a hx of left knee OA.  Left knee OA    Encounter Diagnosis   Name Primary?    Primary localized osteoarthritis of left knee Yes      Plan:  Continue conservative treatment for left knee: knee bracing, OTC meds as needed, and home exercise plan.   New left knee x-rays today on the way out   Follow up in 4 weeks to start Left knee visco    Follow-up: Left knee visco or sooner if there are any problems between now and then.    Leave Review:   Google: Leave Google Review  Healthgrades: Leave Healthgrades Review    After Hours Number: [phone]      Provider Note/Medical Decision Making:   MEDICAL NECESSITY FOR VISCOSUPPLEMENTATION: After thorough evaluation of the patient, I have determined that visco-supplementation is medically necessary. The patient has painful DJD of the knee with failure of conservative therapy including lifestyle modifications and  rehabilitation exercises. Oral analgesis/NSAIDs have not adequately controlled symptoms and there is radiographic evidence of joint space narrowing, subchondral sclerosis, and some early osteophytic changes Kellgren- Renard grade 2 or greater, or in lack of radiographic evidence, there is arthroscopic or other evidence of chondrosis.      I discussed worrisome and red flag signs and symptoms with the patient. The patient expressed understanding and agreed to alert me immediately or to go to the emergency room if they experience any of these.   Treatment plan was developed with input from the patient/family, and they expressed understanding and agreement with the plan. All questions were answered today.        Disclaimer: This note was prepared using a voice recognition system and is likely to have sound alike errors within the text.

## 2023-12-28 ENCOUNTER — PROCEDURE VISIT (OUTPATIENT)
Dept: SPORTS MEDICINE | Facility: CLINIC | Age: 73
End: 2023-12-28
Payer: MEDICARE

## 2023-12-28 DIAGNOSIS — M17.12 PRIMARY LOCALIZED OSTEOARTHRITIS OF LEFT KNEE: Primary | ICD-10-CM

## 2023-12-28 PROCEDURE — 20610 DRAIN/INJ JOINT/BURSA W/O US: CPT | Mod: S$PBB,LT,, | Performed by: PHYSICIAN ASSISTANT

## 2023-12-28 PROCEDURE — 99499 NO LOS: ICD-10-PCS | Mod: S$PBB,,, | Performed by: PHYSICIAN ASSISTANT

## 2023-12-28 PROCEDURE — 20610 DRAIN/INJ JOINT/BURSA W/O US: CPT | Mod: PBBFAC,LT | Performed by: PHYSICIAN ASSISTANT

## 2023-12-28 PROCEDURE — 99999PBSHW PR PBB SHADOW TECHNICAL ONLY FILED TO HB: Mod: JZ,PBBFAC,,

## 2023-12-28 PROCEDURE — 99499 UNLISTED E&M SERVICE: CPT | Mod: S$PBB,,, | Performed by: PHYSICIAN ASSISTANT

## 2023-12-28 PROCEDURE — 20610 LARGE JOINT ASPIRATION/INJECTION: L KNEE: ICD-10-PCS | Mod: S$PBB,LT,, | Performed by: PHYSICIAN ASSISTANT

## 2023-12-28 PROCEDURE — 99999PBSHW PR PBB SHADOW TECHNICAL ONLY FILED TO HB: ICD-10-PCS | Mod: JZ,PBBFAC,,

## 2023-12-28 RX ADMIN — Medication 20 MG: at 10:12

## 2023-12-28 NOTE — PROCEDURES
Large Joint Aspiration/Injection: L knee    Date/Time: 12/28/2023 10:30 AM    Performed by: Jesusita Vogel PA-C  Authorized by: Jesusita Vogel PA-C    Consent Done?:  Yes (Verbal)  Indications:  Joint swelling and pain  Site marked: the procedure site was marked    Timeout: prior to procedure the correct patient, procedure, and site was verified    Prep: patient was prepped and draped in usual sterile fashion      Local anesthesia used?: Yes    Local anesthetic:  Topical anesthetic    Details:  Needle Size:  22 G  Ultrasonic Guidance for needle placement?: No    Approach:  Superior  Location:  Knee  Site:  L knee  Medications:  20 mg sodium hyaluronate (EUFLEXXA) 10 mg/mL(mw 2.4 -3.6 million)  Patient tolerance:  Patient tolerated the procedure well with no immediate complications     1/3

## 2024-01-03 ENCOUNTER — PROCEDURE VISIT (OUTPATIENT)
Dept: SPORTS MEDICINE | Facility: CLINIC | Age: 74
End: 2024-01-03
Payer: MEDICARE

## 2024-01-03 VITALS — HEIGHT: 70 IN | BODY MASS INDEX: 30.21 KG/M2 | WEIGHT: 211 LBS

## 2024-01-03 DIAGNOSIS — M17.12 PRIMARY LOCALIZED OSTEOARTHRITIS OF LEFT KNEE: Primary | ICD-10-CM

## 2024-01-03 PROCEDURE — 99499 UNLISTED E&M SERVICE: CPT | Mod: S$PBB,,, | Performed by: PHYSICIAN ASSISTANT

## 2024-01-03 PROCEDURE — 99999PBSHW PR PBB SHADOW TECHNICAL ONLY FILED TO HB: Mod: JZ,PBBFAC,,

## 2024-01-03 PROCEDURE — 20610 DRAIN/INJ JOINT/BURSA W/O US: CPT | Mod: S$PBB,LT,, | Performed by: PHYSICIAN ASSISTANT

## 2024-01-03 PROCEDURE — 20610 DRAIN/INJ JOINT/BURSA W/O US: CPT | Mod: PBBFAC,LT | Performed by: PHYSICIAN ASSISTANT

## 2024-01-03 RX ADMIN — Medication 20 MG: at 10:01

## 2024-01-03 NOTE — PROCEDURES
Large Joint Aspiration/Injection: L knee    Date/Time: 1/3/2024 10:00 AM    Performed by: Jesusita Vogel PA-C  Authorized by: Jesusita Vogel PA-C    Consent Done?:  Yes (Verbal)  Indications:  Joint swelling and pain  Site marked: the procedure site was marked    Timeout: prior to procedure the correct patient, procedure, and site was verified    Prep: patient was prepped and draped in usual sterile fashion      Local anesthesia used?: Yes    Local anesthetic:  Topical anesthetic    Details:  Needle Size:  22 G  Ultrasonic Guidance for needle placement?: No    Approach:  Superior  Location:  Knee  Site:  L knee  Medications:  20 mg sodium hyaluronate (EUFLEXXA) 10 mg/mL(mw 2.4 -3.6 million)  Patient tolerance:  Patient tolerated the procedure well with no immediate complications     Left knee Euflexxa #2/3

## 2024-01-10 ENCOUNTER — CLINICAL SUPPORT (OUTPATIENT)
Dept: CARDIOLOGY | Facility: HOSPITAL | Age: 74
End: 2024-01-10
Payer: MEDICARE

## 2024-01-10 DIAGNOSIS — Z95.0 PRESENCE OF CARDIAC PACEMAKER: ICD-10-CM

## 2024-01-11 ENCOUNTER — PROCEDURE VISIT (OUTPATIENT)
Dept: SPORTS MEDICINE | Facility: CLINIC | Age: 74
End: 2024-01-11
Payer: MEDICARE

## 2024-01-11 VITALS — HEIGHT: 70 IN | BODY MASS INDEX: 29.35 KG/M2 | WEIGHT: 205 LBS

## 2024-01-11 DIAGNOSIS — M17.12 PRIMARY LOCALIZED OSTEOARTHRITIS OF LEFT KNEE: Primary | ICD-10-CM

## 2024-01-11 PROCEDURE — 20610 DRAIN/INJ JOINT/BURSA W/O US: CPT | Mod: S$PBB,LT,, | Performed by: PHYSICIAN ASSISTANT

## 2024-01-11 PROCEDURE — 99999PBSHW PR PBB SHADOW TECHNICAL ONLY FILED TO HB: Mod: JZ,PBBFAC,,

## 2024-01-11 PROCEDURE — 99499 UNLISTED E&M SERVICE: CPT | Mod: S$PBB,,, | Performed by: PHYSICIAN ASSISTANT

## 2024-01-11 PROCEDURE — 20610 DRAIN/INJ JOINT/BURSA W/O US: CPT | Mod: PBBFAC | Performed by: PHYSICIAN ASSISTANT

## 2024-01-11 RX ADMIN — Medication 20 MG: at 11:01

## 2024-01-11 NOTE — PROCEDURES
Large Joint Aspiration/Injection: L knee    Date/Time: 1/11/2024 11:00 AM    Performed by: Jesusita Vogel PA-C  Authorized by: Jesusita Vogel PA-C    Consent Done?:  Yes (Verbal)  Indications:  Joint swelling and pain  Site marked: the procedure site was marked    Timeout: prior to procedure the correct patient, procedure, and site was verified    Prep: patient was prepped and draped in usual sterile fashion      Local anesthesia used?: Yes    Local anesthetic:  Topical anesthetic    Details:  Needle Size:  22 G  Ultrasonic Guidance for needle placement?: No    Approach:  Superior  Location:  Knee  Site:  L knee  Medications:  20 mg sodium hyaluronate (EUFLEXXA) 10 mg/mL(mw 2.4 -3.6 million)  Patient tolerance:  Patient tolerated the procedure well with no immediate complications     3/3

## 2024-03-01 ENCOUNTER — LAB VISIT (OUTPATIENT)
Dept: LAB | Facility: HOSPITAL | Age: 74
End: 2024-03-01
Attending: UROLOGY
Payer: MEDICARE

## 2024-03-01 DIAGNOSIS — Z12.5 PROSTATE CANCER SCREENING: ICD-10-CM

## 2024-03-01 LAB — COMPLEXED PSA SERPL-MCNC: 1.1 NG/ML (ref 0–4)

## 2024-03-01 PROCEDURE — 84153 ASSAY OF PSA TOTAL: CPT | Performed by: UROLOGY

## 2024-03-01 PROCEDURE — 36415 COLL VENOUS BLD VENIPUNCTURE: CPT | Performed by: UROLOGY

## 2024-03-17 ENCOUNTER — CLINICAL SUPPORT (OUTPATIENT)
Dept: CARDIOLOGY | Facility: HOSPITAL | Age: 74
End: 2024-03-17

## 2024-03-17 DIAGNOSIS — Z95.0 PRESENCE OF CARDIAC PACEMAKER: ICD-10-CM

## 2024-04-01 ENCOUNTER — OFFICE VISIT (OUTPATIENT)
Dept: UROLOGY | Facility: CLINIC | Age: 74
End: 2024-04-01
Payer: MEDICARE

## 2024-04-01 VITALS
HEIGHT: 70 IN | DIASTOLIC BLOOD PRESSURE: 82 MMHG | WEIGHT: 221.13 LBS | HEART RATE: 80 BPM | RESPIRATION RATE: 17 BRPM | BODY MASS INDEX: 31.66 KG/M2 | SYSTOLIC BLOOD PRESSURE: 146 MMHG

## 2024-04-01 DIAGNOSIS — N13.8 BPH WITH OBSTRUCTION/LOWER URINARY TRACT SYMPTOMS: ICD-10-CM

## 2024-04-01 DIAGNOSIS — N28.1 RENAL CYST: ICD-10-CM

## 2024-04-01 DIAGNOSIS — Z12.5 PROSTATE CANCER SCREENING: ICD-10-CM

## 2024-04-01 DIAGNOSIS — R33.9 URINARY RETENTION: Primary | ICD-10-CM

## 2024-04-01 DIAGNOSIS — N40.1 BPH WITH OBSTRUCTION/LOWER URINARY TRACT SYMPTOMS: ICD-10-CM

## 2024-04-01 LAB
BILIRUB UR QL STRIP: NEGATIVE
GLUCOSE UR QL STRIP: NEGATIVE
KETONES UR QL STRIP: NEGATIVE
LEUKOCYTE ESTERASE UR QL STRIP: NEGATIVE
PH, POC UA: 5
POC BLOOD, URINE: NEGATIVE
POC NITRATES, URINE: NEGATIVE
POC RESIDUAL URINE VOLUME: 54 ML (ref 0–100)
PROT UR QL STRIP: NEGATIVE
SP GR UR STRIP: 1.02 (ref 1–1.03)
UROBILINOGEN UR STRIP-ACNC: 0.2 (ref 0.3–2.2)

## 2024-04-01 PROCEDURE — 99214 OFFICE O/P EST MOD 30 MIN: CPT | Mod: S$PBB,,, | Performed by: UROLOGY

## 2024-04-01 PROCEDURE — 99999 PR PBB SHADOW E&M-EST. PATIENT-LVL IV: CPT | Mod: PBBFAC,,, | Performed by: UROLOGY

## 2024-04-01 PROCEDURE — 81003 URINALYSIS AUTO W/O SCOPE: CPT | Mod: PBBFAC | Performed by: UROLOGY

## 2024-04-01 PROCEDURE — 99999PBSHW POCT BLADDER SCAN: Mod: PBBFAC,,,

## 2024-04-01 PROCEDURE — 99214 OFFICE O/P EST MOD 30 MIN: CPT | Mod: PBBFAC,25 | Performed by: UROLOGY

## 2024-04-01 PROCEDURE — 99999PBSHW POCT URINALYSIS, DIPSTICK, AUTOMATED, W/O SCOPE: Mod: PBBFAC,,,

## 2024-04-01 PROCEDURE — 51798 US URINE CAPACITY MEASURE: CPT | Mod: PBBFAC | Performed by: UROLOGY

## 2024-04-01 RX ORDER — TAMSULOSIN HYDROCHLORIDE 0.4 MG/1
0.4 CAPSULE ORAL DAILY
Qty: 90 CAPSULE | Refills: 3 | Status: SHIPPED | OUTPATIENT
Start: 2024-04-01 | End: 2025-04-01

## 2024-04-01 RX ORDER — FINASTERIDE 5 MG/1
5 TABLET, FILM COATED ORAL DAILY
Qty: 30 TABLET | Refills: 11 | Status: SHIPPED | OUTPATIENT
Start: 2024-04-01 | End: 2025-04-01

## 2024-04-01 NOTE — PROGRESS NOTES
Chief Complaint   Patient presents with    Follow-up     6 month         History of Present Illness:   Adal Loaiza Jr. is a 74 y.o. male here for evaluation of Follow-up (6 month)    4/1/24-more frequency lately, but he is drinking a lot. Nocturia x 0-1. No urgency. Stream is fair. Still on flomax and finasteride.   9/25/23-Pt on flomax and finasteride. No gross hematuria or dysuria. Nocturia x 1. No stranguria. No incontinence. No urgency.   3/27/23-here for f/u after CT scan. CT scan completed 3/9/23 personally reviewed, showing a right lower pole hyperdense cyst, measuring 2.5cm. Right simple renal cysts also seen. Stream is good. No gross hematuria. No Nocturia. Currently happy with his voiding dynamics on flomax and finasteride.   2/27/23-CT scan personally reviewed, showing no obstructive cause for the patient's bilateral varicoceles, but R lower pole renal cyst does appear to need further evaluation. No scrotal pain. No gross hematuria. He continues to take finasteride and flomax.   2/6/23-on finasteride and flomax. Having bilateral scrotal swelling, which comes and goes. It is not the testicle itself. Not painful. Feels like he is urinating well. Stream is fair. Urinates 8-10 times a day. Nocturia x 1. No gross hematuria.   8/8/22- Here for cysto. Urinating without difficulty. Good stream. Stopped flomax and finasteride a couple days ago due to joint pain.   7/5/22- Pt here for evaluation of recurrent urinary retention. He reports that he underwent Hiatal hernia surgery and hasn't been able to urinate since that time. He has had multiple voiding trials. Sometimes, he was distended over 1000cc.   Prior to surgery, felt like he emptied, stream was good.   Has h/o meningioma s/p surgery 22 years ago. Also had gamma knife surgery following that.          Review of Systems   Constitutional:  Negative for fever.   Respiratory:  Negative for shortness of breath.    Cardiovascular:  Negative for chest pain.    Gastrointestinal:  Negative for abdominal pain.   Musculoskeletal:  Negative for back pain.   All other systems reviewed and are negative.      Past Medical History:   Diagnosis Date    Anemia due to GI blood loss 2012    Arrhythmia requiring replacement of cardiac pacemaker     Brain tumor (benign) 1999    Meningioma    Encounter for blood transfusion     GI bleed due to NSAIDs 2012    Hyperlipidemia     Localized osteoarthritis of left knee 12/10/2020    PAF (paroxysmal atrial fibrillation) 6/16/2019    Renal cyst 2/19/2019    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    Seizures     because of brain tumor    Sleep apnea     Spinal cord disease     meningioma       Past Surgical History:   Procedure Laterality Date    BRAIN SURGERY      CARDIAC PACEMAKER PLACEMENT      COLONOSCOPY N/A 8/25/2016    Procedure: COLONOSCOPY;  Surgeon: Morris Olguin MD;  Location: [facility];  Service: Endoscopy;  Laterality: N/A;    COLONOSCOPY N/A 4/4/2022    Procedure: COLONOSCOPY;  Surgeon: Marci Silva MD;  Location: [facility];  Service: Endoscopy;  Laterality: N/A;    ESOPHAGEAL MANOMETRY WITH MEASUREMENT OF IMPEDANCE N/A 4/26/2022    Procedure: MANOMETRY, ESOPHAGUS, WITH IMPEDANCE MEASUREMENT;  Surgeon: Yoni Portillo RN;  Location: [facility];  Service: Endoscopy;  Laterality: N/A;    ESOPHAGOGASTRODUODENOSCOPY N/A 4/4/2022    Procedure: ESOPHAGOGASTRODUODENOSCOPY (EGD);  Surgeon: Marci Silva MD;  Location: [facility];  Service: Endoscopy;  Laterality: N/A;    ESOPHAGOGASTRODUODENOSCOPY N/A 6/6/2022    Procedure: EGD (ESOPHAGOGASTRODUODENOSCOPY);  Surgeon: Ryanne Phan DO;  Location: [facility];  Service: General;  Laterality: N/A;    FRACTURE SURGERY Right     right jaw    Gamma knife      for schwannoma    LAPAROSCOPIC TOUPET FUNDOPLICATION N/A 6/6/2022    Procedure: FUNDOPLICATION, LAPAROSCOPIC, TOUPET;  Surgeon: Ryanne Phan DO;  Location: [facility];  Service: General;  Laterality: N/A;    REPLACEMENT OF  PACEMAKER GENERATOR Left 2/13/2019    Procedure: REPLACEMENT, PULSE GENERATOR, CARDIAC PACEMAKER;  Surgeon: Gen Meza MD;  Location: [facility] CATH LAB;  Service: Cardiology;  Laterality: Left;  current device MDT/waiting for MD input    ROBOT-ASSISTED REPAIR OF HIATAL HERNIA USING DA CARMELO XI N/A 6/6/2022    Procedure: XI ROBOTIC REPAIR, HERNIA, HIATAL;  Surgeon: Ryanne Phan DO;  Location: [facility] OR;  Service: General;  Laterality: N/A;       Family History   Problem Relation Age of Onset    Colon cancer Father     Hypertension Unknown        Social History     Tobacco Use    Smoking status: Never    Smokeless tobacco: Never   Substance Use Topics    Alcohol use: No     Comment: socially    Drug use: No       Current Outpatient Medications   Medication Sig Dispense Refill    docusate sodium (COLACE) 100 MG capsule Take 1 capsule (100 mg total) by mouth 2 (two) times daily. 60 capsule 1    finasteride (PROSCAR) 5 mg tablet Take 1 tablet (5 mg total) by mouth once daily. 30 tablet 11    gabapentin (NEURONTIN) 300 MG capsule Take 1 capsule by mouth every morning.      LACTOBACILLUS ACIDOPHILUS (PROBIOTIC ORAL) Take by mouth once daily.      levetiracetam (KEPPRA) 500 MG Tab Take 1 tablet (500 mg total) by mouth 2 (two) times daily. 60 tablet 11    multivitamin (THERAGRAN) per tablet Take 1 tablet by mouth once daily.      pantoprazole (PROTONIX) 40 MG tablet Take 1 tablet by mouth once daily 90 tablet 0    pravastatin (PRAVACHOL) 40 MG tablet Take 1 tablet (40 mg total) by mouth once daily. 90 tablet 3    tamsulosin (FLOMAX) 0.4 mg Cap Take 1 capsule (0.4 mg total) by mouth once daily. 90 capsule 3     No current facility-administered medications for this visit.     Facility-Administered Medications Ordered in Other Visits   Medication Dose Route Frequency Provider Last Rate Last Admin    sodium hyaluronate (EUFLEXXA) 10 mg/mL(mw 2.4 -3.6 million) injection 20 mg  20 mg Intra-articular  Attila Mclain,  MD   20 mg at 04/27/22 1040    sodium hyaluronate (EUFLEXXA) 10 mg/mL(mw 2.4 -3.6 million) injection 20 mg  20 mg Intra-articular  Attila Mclain MD   20 mg at 05/04/22 1020       Review of patient's allergies indicates:   Allergen Reactions    Nsaids (non-steroidal anti-inflammatory drug)      Caused GI bleeding.    Aspirin     Celecoxib      Increased bleeding        Physical Exam  Vitals:    04/01/24 1223   BP: (!) 146/82   Pulse: 80   Resp: 17           General: Well-developed, well-nourished in no acute distress  HEENT: Normocephalic, atraumatic, Extraocular movements intact  Neck: supple, trachea midline, no cervical or supraclavicular lymphadenopathy  Respirations: even and unlabored  Back: midline spine, no CVA tenderness  Abdomen: soft, Non-tender, non-distended, no organomegaly or palpable masses, no rebound or guarding  : 2/6/23-bilateral varicoceles, L prominent epididymis, no scrotal mass  Rectal: 4/1/24->40g prostate, no nodules or tenderness. No gross blood  Extremities: atraumatic, moves all equally, no clubbing, cyanosis or edema  Psych: normal affect  Skin: warm and dry, no lesions  Neuro: Alert and oriented, Cranial nerves II-XII grossly intact    PVR: 54cc    UA: negative for blood, LE, nit    PSA  2/16/23: 1.0  3/1/24: 1.1    Assessment:   1. Urinary retention  POCT Urinalysis, Dipstick, Automated, W/O Scope    POCT Bladder Scan      2. BPH with obstruction/lower urinary tract symptoms        3. Renal cyst  US Retroperitoneal Complete      4. Prostate cancer screening  PSA, Screening                Plan:  Urinary retention  -     POCT Urinalysis, Dipstick, Automated, W/O Scope  -     POCT Bladder Scan    BPH with obstruction/lower urinary tract symptoms    Renal cyst  -     US Retroperitoneal Complete; Future; Expected date: 04/01/2024    Prostate cancer screening  -     PSA, Screening; Future; Expected date: 04/01/2025    Other orders  -     finasteride (PROSCAR) 5 mg tablet; Take 1  tablet (5 mg total) by mouth once daily.  Dispense: 30 tablet; Refill: 11  -     tamsulosin (FLOMAX) 0.4 mg Cap; Take 1 capsule (0.4 mg total) by mouth once daily.  Dispense: 90 capsule; Refill: 3       Continue flomax and finasteride.     Follow up in about 1 year (around 4/1/2025).

## 2024-04-03 ENCOUNTER — HOSPITAL ENCOUNTER (OUTPATIENT)
Dept: RADIOLOGY | Facility: HOSPITAL | Age: 74
Discharge: HOME | End: 2024-04-03
Attending: UROLOGY
Payer: MEDICARE

## 2024-04-03 DIAGNOSIS — N28.1 RENAL CYST: ICD-10-CM

## 2024-04-03 PROCEDURE — 76770 US EXAM ABDO BACK WALL COMP: CPT | Mod: 26,,, | Performed by: RADIOLOGY

## 2024-04-03 PROCEDURE — 76770 US EXAM ABDO BACK WALL COMP: CPT | Mod: TC

## 2024-04-04 DIAGNOSIS — R00.1 SEVERE SINUS BRADYCARDIA: ICD-10-CM

## 2024-04-04 DIAGNOSIS — Z95.0 CARDIAC PACEMAKER IN SITU: Primary | ICD-10-CM

## 2024-04-10 ENCOUNTER — CLINICAL SUPPORT (OUTPATIENT)
Dept: CARDIOLOGY | Facility: HOSPITAL | Age: 74
End: 2024-04-10
Payer: MEDICARE

## 2024-04-10 DIAGNOSIS — Z95.0 PRESENCE OF CARDIAC PACEMAKER: ICD-10-CM

## 2024-05-13 ENCOUNTER — PATIENT MESSAGE (OUTPATIENT)
Dept: CARDIOLOGY | Facility: CLINIC | Age: 74
End: 2024-05-13
Payer: MEDICARE

## 2024-05-16 DIAGNOSIS — I48.0 PAF (PAROXYSMAL ATRIAL FIBRILLATION): ICD-10-CM

## 2024-05-16 DIAGNOSIS — R00.1 SEVERE SINUS BRADYCARDIA: Primary | ICD-10-CM

## 2024-05-16 DIAGNOSIS — Z95.0 CARDIAC PACEMAKER IN SITU: ICD-10-CM

## 2024-05-17 ENCOUNTER — HOSPITAL ENCOUNTER (OUTPATIENT)
Dept: CARDIOLOGY | Facility: HOSPITAL | Age: 74
Discharge: HOME | End: 2024-05-17
Attending: INTERNAL MEDICINE
Payer: MEDICARE

## 2024-05-17 ENCOUNTER — CLINICAL SUPPORT (OUTPATIENT)
Dept: CARDIOLOGY | Facility: HOSPITAL | Age: 74
End: 2024-05-17
Attending: INTERNAL MEDICINE
Payer: MEDICARE

## 2024-05-17 ENCOUNTER — OFFICE VISIT (OUTPATIENT)
Dept: CARDIOLOGY | Facility: CLINIC | Age: 74
End: 2024-05-17
Payer: MEDICARE

## 2024-05-17 VITALS
WEIGHT: 214.31 LBS | BODY MASS INDEX: 30.68 KG/M2 | SYSTOLIC BLOOD PRESSURE: 134 MMHG | HEIGHT: 70 IN | DIASTOLIC BLOOD PRESSURE: 80 MMHG | HEART RATE: 69 BPM | OXYGEN SATURATION: 98 %

## 2024-05-17 DIAGNOSIS — G47.33 OBSTRUCTIVE SLEEP APNEA: Chronic | ICD-10-CM

## 2024-05-17 DIAGNOSIS — T82.110A FAILURE OF PACEMAKER LEAD, INITIAL ENCOUNTER: ICD-10-CM

## 2024-05-17 DIAGNOSIS — Z95.0 CARDIAC PACEMAKER IN SITU: Chronic | ICD-10-CM

## 2024-05-17 DIAGNOSIS — I48.0 PAF (PAROXYSMAL ATRIAL FIBRILLATION): ICD-10-CM

## 2024-05-17 DIAGNOSIS — I49.5 SSS (SICK SINUS SYNDROME): Primary | ICD-10-CM

## 2024-05-17 DIAGNOSIS — Z95.0 CARDIAC PACEMAKER IN SITU: ICD-10-CM

## 2024-05-17 DIAGNOSIS — R97.20 ELEVATED PSA: ICD-10-CM

## 2024-05-17 DIAGNOSIS — G40.209 PARTIAL SYMPTOMATIC EPILEPSY WITH COMPLEX PARTIAL SEIZURES, NOT INTRACTABLE, WITHOUT STATUS EPILEPTICUS: ICD-10-CM

## 2024-05-17 DIAGNOSIS — R00.1 SEVERE SINUS BRADYCARDIA: ICD-10-CM

## 2024-05-17 DIAGNOSIS — I70.0 AORTIC ATHEROSCLEROSIS: ICD-10-CM

## 2024-05-17 LAB
OHS QRS DURATION: 100 MS
OHS QTC CALCULATION: 385 MS

## 2024-05-17 PROCEDURE — 93280 PM DEVICE PROGR EVAL DUAL: CPT

## 2024-05-17 PROCEDURE — 93280 PM DEVICE PROGR EVAL DUAL: CPT | Mod: 26,,, | Performed by: INTERNAL MEDICINE

## 2024-05-17 PROCEDURE — 93010 ELECTROCARDIOGRAM REPORT: CPT | Mod: ,,, | Performed by: INTERNAL MEDICINE

## 2024-05-17 PROCEDURE — 99213 OFFICE O/P EST LOW 20 MIN: CPT | Mod: PBBFAC,25,27 | Performed by: INTERNAL MEDICINE

## 2024-05-17 PROCEDURE — 99999 PR PBB SHADOW E&M-EST. PATIENT-LVL III: CPT | Mod: PBBFAC,,, | Performed by: INTERNAL MEDICINE

## 2024-05-17 PROCEDURE — 93005 ELECTROCARDIOGRAM TRACING: CPT

## 2024-05-17 PROCEDURE — 99204 OFFICE O/P NEW MOD 45 MIN: CPT | Mod: S$PBB,,, | Performed by: INTERNAL MEDICINE

## 2024-05-17 PROCEDURE — 99211 OFF/OP EST MAY X REQ PHY/QHP: CPT | Mod: PBBFAC,25

## 2024-05-17 PROCEDURE — 99999 PR PBB SHADOW E&M-EST. PATIENT-LVL I: CPT | Mod: PBBFAC,,,

## 2024-05-17 NOTE — PROGRESS NOTES
Subjective:   Patient ID:  Adal Loaiza Jr. is a 74 y.o. male     Chief complaint:SSS/PPM    HPI  70 man  Background (Aug 2016):  Initial PPM implant by Dr OCONNELL for a 6 sec pause (in the midst of a seizure). 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   He had RV lead fx >. I extracted and replaced it    Later had RA lead fx >> VDD  Has SJM PPM -- had been lost to follow up for several years  Back to check in 2/2016 and he is now here for a 6 months follow up.   He has had no issues with seizures nor syncope. He had a PPM eval 6/2  There were many AMS but no EGMs noted. He has no cardiac Sx and no palps,.      Update since 9/22/2017:  Has had a good year - still works full time, no CV c/o --if anything, feels better this year than last.   PPM eval -- poor A capture thresholds and P waves @1.0 mV >. Stable >> VDD -- doing well. PPM nearing SIMON -- 6 months or so but voltage OK.   I have reviewed the actual image of the ECG tracing obtained today and it shows NSR with normal intervals        Update 1/30/19:  Last week he was seen in ER at [facility] due to awakening with CP, nausea and clamminess -- near syncopal -- w/up was neg except for PPM at EOL -- he was in NSR   He is here to follow up on that   He feels well today -- getting over a recent, likely viral, bronchitis  I have reviewed the actual image of the ECG tracing obtained today and it shows NSR with normal intervals     Update 06/06/2019:  He has had a PPM replacement per his wishes  He came back for eval today and his PPM reveals runs of PAF < a day long  These are ASxc     Update 7/7/2020:  I wanted her screened to be enrolled in [employer].  However, this was not done.  Today, his PPM eval reveals intermittent AMS but a lot of this seems to be due to noise.  However, there were a few short episodes of were associated with a  fast ventricular rate and these are almost certainly atrial fibrillation.  Exact duration is unclear.  Note that HR V cut off was at 175 beats per minute.     Clinically, he has no cardiovascular symptoms.  He has no palpitations and no faints.  He does not have dyspnea on exertion or orthopnea.  He uses his CPAP for the treatment of sleep apnea.  He has hurt his left knee in may require TKR eventually.  He would be able to proceed with it with relatively good cardiovascular risk profile.  >>  Decrease AT detection rate cut off to 125 beats per minute so that we can  more of the true AFib.  After that, we will enroll him in [facility] three-month for in-house pacemaker clinic eval again.    Update 06/01/2024 :  He has been doing his PPM  follow ups strictly remotely the past 4 years  He has been doing well without complaints of dyspnea on exertion, chest pain, syncope, palpitations, orthopnea and or leg edema.  Patient's device (DC PPM - VDD mode) was fully evaluated today under my direct supervision and real-time feedback.  Summary of findings are as listed below:  Device is in good repair.   The battery is not near SIMON.  The RV sensing and pacing thresholds are favorable with well maintained safety margins.   P wave is 1 mV - RA non capturing  Atrial arrhythmias: 4192 - A lead noise, known issue; no true AF.   Ventricular arrhythmias: 18, EGMs appear to be -140s with far field R wave oversensing. Most recent 5/16/24 @ 1:33pm lasting 14m 53s. No VT seen.     Recommendation:  Device follow up as per clinic routine with remote and in house checks      Current Outpatient Medications   Medication Sig    finasteride (PROSCAR) 5 mg tablet Take 1 tablet (5 mg total) by mouth once daily.    gabapentin (NEURONTIN) 300 MG capsule Take 1 capsule by mouth every morning.    LACTOBACILLUS ACIDOPHILUS (PROBIOTIC ORAL) Take by mouth once daily.    levetiracetam (KEPPRA) 500 MG Tab Take 1 tablet (500 mg total) by  mouth 2 (two) times daily.    multivitamin (THERAGRAN) per tablet Take 1 tablet by mouth once daily.    pravastatin (PRAVACHOL) 40 MG tablet Take 1 tablet (40 mg total) by mouth once daily.    tamsulosin (FLOMAX) 0.4 mg Cap Take 1 capsule (0.4 mg total) by mouth once daily.    docusate sodium (COLACE) 100 MG capsule Take 1 capsule (100 mg total) by mouth 2 (two) times daily. (Patient not taking: Reported on 5/17/2024)    pantoprazole (PROTONIX) 40 MG tablet Take 1 tablet by mouth once daily (Patient not taking: Reported on 5/17/2024)     No current facility-administered medications for this visit.     Facility-Administered Medications Ordered in Other Visits   Medication    sodium hyaluronate (EUFLEXXA) 10 mg/mL(mw 2.4 -3.6 million) injection 20 mg    sodium hyaluronate (EUFLEXXA) 10 mg/mL(mw 2.4 -3.6 million) injection 20 mg       Review of Systems     Constitutional: Reviewed  for decreased appetite, weight gain and weight loss.   HENT: Reviewed for nosebleeds.    Eyes:  Reviewed for blurred vision and visual disturbance.   Cardiovascular: Reviewed for chest pain, claudication, cyanosis,dyspnea on exertion, leg swelling, orthopnea,paroxysmal nocturnal dyspnearregular heartbeats, palpitations, near-syncope, and syncope.   Respiratory: Reviewed for cough, shortness of breath, wheezing, sleep disturbances due to breathing and snoring, .    Endocrine: Reviewed for heat intolerance.   Hematologic/Lymphatic: Reviewed for easy bruisability/bleeding.   Skin: Reviewed for rash.   Musculoskeletal: Reviewed for muscle weakness and myalgias.   Gastrointestinal: Reviewed for abdominal pain, anorexia, melena, nausea and vomiting.   Genitourinary: Reviewed for hesitancy, frequency, nocturia and incontinence.   Neurological: Reviewed for excessive daytime sleepiness, dizziness, vertigo, weakness, headaches, loss of balance and seizures,   Psychiatric/Behavioral:  Reviewed for insomnia, altered mental status, depression, anxiety  and nervousness.       All symptoms reviewed above were negative except for urinary frequency and nocturia.       Social History     Tobacco Use   Smoking Status Never   Smokeless Tobacco Never        Objective:     Physical Exam  Vitals and nursing note reviewed.   Constitutional:       Appearance: Normal appearance. He is well-developed.      Comments: overweight   HENT:      Head: Normocephalic and atraumatic.      Right Ear: External ear normal.      Left Ear: External ear normal.   Eyes:      Conjunctiva/sclera: Conjunctivae normal.      Left eye: Left conjunctiva is not injected. No hemorrhage.     Pupils: Pupils are equal, round, and reactive to light.   Neck:      Thyroid: No thyromegaly.      Vascular: No JVD.   Cardiovascular:      Rate and Rhythm: Normal rate and regular rhythm.      Chest Wall: PMI is not displaced.      Pulses: Intact distal pulses.           Carotid pulses are 2+ on the right side and 2+ on the left side.       Radial pulses are 2+ on the right side and 2+ on the left side.        Dorsalis pedis pulses are 2+ on the right side and 2+ on the left side.        Posterior tibial pulses are 2+ on the right side and 2+ on the left side.      Heart sounds: Normal heart sounds. No midsystolic click and no opening snap. No murmur heard.     No friction rub. No gallop.   Pulmonary:      Effort: Pulmonary effort is normal. No respiratory distress.      Breath sounds: Normal breath sounds. No wheezing or rales.   Chest:      Chest wall: No tenderness.      Comments: Device pocket is in excellent repair.  Abdominal:      Palpations: Abdomen is soft. Abdomen is not rigid. There is no hepatomegaly.      Tenderness: There is no abdominal tenderness. There is no guarding.      Comments: Obese abdomen   Musculoskeletal:         General: No tenderness. Normal range of motion.      Cervical back: Neck supple.      Right knee: No swelling.      Left knee: No swelling.      Right lower leg: No swelling.      " Left lower leg: No swelling.      Right ankle: No swelling.      Left ankle: No swelling.      Right foot: No swelling.      Left foot: No swelling.   Skin:     General: Skin is warm and dry.      Coloration: Skin is not pale.      Findings: No rash.   Neurological:      General: No focal deficit present.      Mental Status: He is alert and oriented to person, place, and time.      Cranial Nerves: No cranial nerve deficit.      Coordination: Coordination normal.      Deep Tendon Reflexes: Reflexes are normal and symmetric.   Psychiatric:         Mood and Affect: Mood normal.         Behavior: Behavior normal.       /80 (BP Location: Left arm, Patient Position: Sitting, BP Method: Large (Manual))   Pulse 69   Ht 5' 10" (1.778 m)   Wt 97.2 kg (214 lb 4.6 oz)   SpO2 98%   BMI 30.75 kg/m²       No results found for this or any previous visit.    WBC   Date Value Ref Range Status   03/21/2023 4.75 3.90 - 12.70 K/uL Final     Hematocrit   Date Value Ref Range Status   03/21/2023 49.7 40.0 - 54.0 % Final     Hemoglobin   Date Value Ref Range Status   03/21/2023 15.7 14.0 - 18.0 g/dL Final     Lab Results   Component Value Date     03/21/2023     Lab Results   Component Value Date    CREATININE 1.0 03/21/2023    EGFRNORACEVR >60.0 03/21/2023    K 4.7 03/21/2023     Lab Results   Component Value Date    BNP 79 01/22/2019            reports no history of alcohol use.  Past Medical History:   Diagnosis Date    Anemia due to GI blood loss 2012    Arrhythmia requiring replacement of cardiac pacemaker     Brain tumor (benign) 1999    Meningioma    Encounter for blood transfusion     GI bleed due to NSAIDs 2012    Hyperlipidemia     Localized osteoarthritis of left knee 12/10/2020    PAF (paroxysmal atrial fibrillation) 6/16/2019    Renal cyst 2/19/2019    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    Seizures     because of brain tumor    Sleep apnea     Spinal cord disease     meningioma     Past Surgical History: "   Procedure Laterality Date    BRAIN SURGERY      CARDIAC PACEMAKER PLACEMENT      COLONOSCOPY N/A 8/25/2016    Procedure: COLONOSCOPY;  Surgeon: Morris Olguin MD;  Location: [facility];  Service: Endoscopy;  Laterality: N/A;    COLONOSCOPY N/A 4/4/2022    Procedure: COLONOSCOPY;  Surgeon: Marci Silva MD;  Location: [facility];  Service: Endoscopy;  Laterality: N/A;    ESOPHAGEAL MANOMETRY WITH MEASUREMENT OF IMPEDANCE N/A 4/26/2022    Procedure: MANOMETRY, ESOPHAGUS, WITH IMPEDANCE MEASUREMENT;  Surgeon: Yoni Portillo RN;  Location: [facility];  Service: Endoscopy;  Laterality: N/A;    ESOPHAGOGASTRODUODENOSCOPY N/A 4/4/2022    Procedure: ESOPHAGOGASTRODUODENOSCOPY (EGD);  Surgeon: Marci Silva MD;  Location: [facility];  Service: Endoscopy;  Laterality: N/A;    ESOPHAGOGASTRODUODENOSCOPY N/A 6/6/2022    Procedure: EGD (ESOPHAGOGASTRODUODENOSCOPY);  Surgeon: Ryanne Phan DO;  Location: [facility];  Service: General;  Laterality: N/A;    FRACTURE SURGERY Right     right jaw    Gamma knife      for schwannoma    LAPAROSCOPIC TOUPET FUNDOPLICATION N/A 6/6/2022    Procedure: FUNDOPLICATION, LAPAROSCOPIC, TOUPET;  Surgeon: Ryanne Phan DO;  Location: [facility];  Service: General;  Laterality: N/A;    REPLACEMENT OF PACEMAKER GENERATOR Left 2/13/2019    Procedure: REPLACEMENT, PULSE GENERATOR, CARDIAC PACEMAKER;  Surgeon: Gen Meza MD;  Location: [facility];  Service: Cardiology;  Laterality: Left;  current device MDT/waiting for MD input    ROBOT-ASSISTED REPAIR OF HIATAL HERNIA USING DA CARMELO XI N/A 6/6/2022    Procedure: XI ROBOTIC REPAIR, HERNIA, HIATAL;  Surgeon: Ryanne Phan DO;  Location: [facility];  Service: General;  Laterality: N/A;     Family History   Problem Relation Name Age of Onset    Colon cancer Father      Hypertension Unknown         Assessment:    His PPM is in acceptable working order.  His leads both have sensing and far field issues but the are functional of  from a clinical point of view.  We will keep nursing as is and not recommend replacement.  1. SSS (sick sinus syndrome)    2. PAF (paroxysmal atrial fibrillation)    3. Cardiac pacemaker in situ    4. Aortic atherosclerosis    5. Partial symptomatic epilepsy with complex partial seizures, not intractable, without status epilepticus    6. Failure of pacemaker lead, initial encounter    7. Obstructive sleep apnea    8. Elevated PSA        Plan:    I discussed routine device follow up including quarterly to bi-annual device checks for device function as well as yearly follow up in the EP clinic. The patient  was advised to call with any concerns regarding their device. Device clinic follow up as scheduled. RTC 1y          No orders of the defined types were placed in this encounter.      Follow up in about 1 year (around 5/17/2025), or if symptoms worsen or fail to improve.    There are no discontinued medications.         Medication List with Changes/Refills   Current Medications    DOCUSATE SODIUM (COLACE) 100 MG CAPSULE    Take 1 capsule (100 mg total) by mouth 2 (two) times daily.    FINASTERIDE (PROSCAR) 5 MG TABLET    Take 1 tablet (5 mg total) by mouth once daily.    GABAPENTIN (NEURONTIN) 300 MG CAPSULE    Take 1 capsule by mouth every morning.    LACTOBACILLUS ACIDOPHILUS (PROBIOTIC ORAL)    Take by mouth once daily.    LEVETIRACETAM (KEPPRA) 500 MG TAB    Take 1 tablet (500 mg total) by mouth 2 (two) times daily.    MULTIVITAMIN (THERAGRAN) PER TABLET    Take 1 tablet by mouth once daily.    PANTOPRAZOLE (PROTONIX) 40 MG TABLET    Take 1 tablet by mouth once daily    PRAVASTATIN (PRAVACHOL) 40 MG TABLET    Take 1 tablet (40 mg total) by mouth once daily.    TAMSULOSIN (FLOMAX) 0.4 MG CAP    Take 1 capsule (0.4 mg total) by mouth once daily.       This note is at least partially dictated using the M*Modal Fluency Direct word recognition program. There are word recognition mistakes that are occasionally missed  on review.      Pre-visit chart review: 15 min    Face to face time: 20 min, > 50% of which spent in education    I have reviewed the actual images of all relevant ECG, rhythm, holter and long term monitoring tracings available in EPIC, MUSE  and in legacy as well as outside records.   I have reviewed the actual images of all relevant CXRays.   I have directly evaluated all relevant data pertaining to any CIED.     Post visit chart documentation and care coordination: 10 min    I

## 2024-05-28 LAB
OHS CV DC REMOTE DEVICE TYPE: NORMAL
OHS CV RV PACING PERCENT: 2.5 %

## 2024-06-01 PROBLEM — I70.0 AORTIC ATHEROSCLEROSIS: Status: ACTIVE | Noted: 2024-06-01

## 2024-06-26 ENCOUNTER — HOSPITAL ENCOUNTER (OUTPATIENT)
Dept: RADIOLOGY | Facility: HOSPITAL | Age: 74
Discharge: HOME | End: 2024-06-26
Attending: INTERNAL MEDICINE
Payer: MEDICARE

## 2024-06-26 ENCOUNTER — OFFICE VISIT (OUTPATIENT)
Dept: INTERNAL MEDICINE | Facility: CLINIC | Age: 74
End: 2024-06-26
Payer: MEDICARE

## 2024-06-26 VITALS
DIASTOLIC BLOOD PRESSURE: 82 MMHG | WEIGHT: 213.63 LBS | OXYGEN SATURATION: 97 % | BODY MASS INDEX: 30.58 KG/M2 | HEIGHT: 70 IN | TEMPERATURE: 98 F | HEART RATE: 87 BPM | SYSTOLIC BLOOD PRESSURE: 118 MMHG

## 2024-06-26 DIAGNOSIS — G40.209 PARTIAL SYMPTOMATIC EPILEPSY WITH COMPLEX PARTIAL SEIZURES, NOT INTRACTABLE, WITHOUT STATUS EPILEPTICUS: ICD-10-CM

## 2024-06-26 DIAGNOSIS — B35.6 TINEA CRURIS: ICD-10-CM

## 2024-06-26 DIAGNOSIS — H60.502 ACUTE OTITIS EXTERNA OF LEFT EAR, UNSPECIFIED TYPE: ICD-10-CM

## 2024-06-26 DIAGNOSIS — I70.0 AORTIC ATHEROSCLEROSIS: ICD-10-CM

## 2024-06-26 DIAGNOSIS — D32.9 MENINGIOMA: ICD-10-CM

## 2024-06-26 DIAGNOSIS — Z95.0 CARDIAC PACEMAKER IN SITU: Chronic | ICD-10-CM

## 2024-06-26 DIAGNOSIS — I48.0 PAF (PAROXYSMAL ATRIAL FIBRILLATION): ICD-10-CM

## 2024-06-26 DIAGNOSIS — G25.0 ESSENTIAL TREMOR: ICD-10-CM

## 2024-06-26 DIAGNOSIS — E78.2 MIXED HYPERLIPIDEMIA: Primary | ICD-10-CM

## 2024-06-26 DIAGNOSIS — R06.2 WHEEZING: ICD-10-CM

## 2024-06-26 DIAGNOSIS — R97.20 ELEVATED PSA: ICD-10-CM

## 2024-06-26 PROCEDURE — 71046 X-RAY EXAM CHEST 2 VIEWS: CPT | Mod: 26,,, | Performed by: RADIOLOGY

## 2024-06-26 PROCEDURE — 99214 OFFICE O/P EST MOD 30 MIN: CPT | Mod: S$PBB,,, | Performed by: INTERNAL MEDICINE

## 2024-06-26 PROCEDURE — 99214 OFFICE O/P EST MOD 30 MIN: CPT | Mod: PBBFAC,25 | Performed by: INTERNAL MEDICINE

## 2024-06-26 PROCEDURE — G2211 COMPLEX E/M VISIT ADD ON: HCPCS | Mod: S$PBB,,, | Performed by: INTERNAL MEDICINE

## 2024-06-26 PROCEDURE — 99999 PR PBB SHADOW E&M-EST. PATIENT-LVL IV: CPT | Mod: PBBFAC,,, | Performed by: INTERNAL MEDICINE

## 2024-06-26 PROCEDURE — 71046 X-RAY EXAM CHEST 2 VIEWS: CPT | Mod: TC

## 2024-06-26 RX ORDER — NEOMYCIN SULFATE, POLYMYXIN B SULFATE, HYDROCORTISONE 3.5; 10000; 1 MG/ML; [USP'U]/ML; MG/ML
3 SOLUTION/ DROPS AURICULAR (OTIC) 4 TIMES DAILY
Qty: 10 ML | Refills: 0 | Status: SHIPPED | OUTPATIENT
Start: 2024-06-26

## 2024-06-26 RX ORDER — PRAVASTATIN SODIUM 40 MG/1
40 TABLET ORAL DAILY
Qty: 90 TABLET | Refills: 3 | Status: SHIPPED | OUTPATIENT
Start: 2024-06-26

## 2024-06-26 RX ORDER — CICLOPIROX OLAMINE 7.7 MG/G
CREAM TOPICAL 2 TIMES DAILY
Qty: 90 G | Refills: 0 | Status: SHIPPED | OUTPATIENT
Start: 2024-06-26

## 2024-06-26 NOTE — PROGRESS NOTES
Subjective:      Patient ID: Adal Loaiza Jr. is a 74 y.o. male.    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)    Cardiac pacemaker in situ    Fatty liver    Obstructive sleep apnea    Myofascial pain    Hyperlipidemia    Pacemaker lead fracture    Abnormal digital rectal exam    Family history of colon cancer    Colon polyps    Internal hemorrhoids    Sensorineural hearing loss (SNHL) of both ears    Essential tremor    Meningioma    Renal cyst    Inadequate sleep hygiene    PAF (paroxysmal atrial fibrillation)    Left knee pain    Partial symptomatic epilepsy with complex partial seizures, not intractable, without status epilepticus    SSS (sick sinus syndrome)    Primary osteoarthritis of left knee    Trigeminal neuralgia    Anemia    Paraesophageal hernia    Elevated PSA    Iron deficiency anemia    Aortic atherosclerosis     Past Medical History:   Diagnosis Date    Anemia due to GI blood loss 2012    Arrhythmia requiring replacement of cardiac pacemaker     Brain tumor (benign) 1999    Meningioma    Encounter for blood transfusion     GI bleed due to NSAIDs 2012    Hyperlipidemia     Localized osteoarthritis of left knee 12/10/2020    PAF (paroxysmal atrial fibrillation) 6/16/2019    Renal cyst 2/19/2019    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    Seizures     because of brain tumor    Sleep apnea     Spinal cord disease     meningioma     Here today for management of mult med problems as outlined below.  Compliant with meds without significant side effects. Energy and appetite good.     Pt also c/o left ear pain for weeks. No d/c. No hearing changes. No trauma.     Pt also c/o intermittent wheezing. No fever. No cough.     Review of Systems   Constitutional:  Negative for chills and fever.   HENT:  Negative for ear pain and sore throat.    Respiratory:  Negative for cough.    Cardiovascular:  Negative for  "chest pain.   Gastrointestinal:  Negative for abdominal pain and blood in stool.   Genitourinary:  Negative for dysuria and hematuria.   Neurological:  Negative for seizures and syncope.     Objective:   /82 (BP Location: Left arm, Patient Position: Sitting, BP Method: Large (Manual))   Pulse 87   Temp 98.2 °F (36.8 °C) (Tympanic)   Ht 5' 10" (1.778 m)   Wt 96.9 kg (213 lb 10 oz)   SpO2 97%   BMI 30.65 kg/m²     Physical Exam  Constitutional:       General: He is not in acute distress.     Appearance: He is well-developed.   HENT:      Head: Normocephalic and atraumatic.      Left Ear: Tympanic membrane normal. No decreased hearing noted. No drainage, swelling or tenderness. There is no impacted cerumen. No mastoid tenderness.      Ears:      Comments: Erythema to inferior aspect of left EAC.   Eyes:      Extraocular Movements: Extraocular movements intact.   Neck:      Thyroid: No thyromegaly.   Cardiovascular:      Rate and Rhythm: Normal rate and regular rhythm.   Pulmonary:      Breath sounds: Normal breath sounds. No wheezing or rales.   Abdominal:      General: Bowel sounds are normal.      Palpations: Abdomen is soft.      Tenderness: There is no abdominal tenderness.   Musculoskeletal:         General: No swelling.      Cervical back: Neck supple. No rigidity.   Lymphadenopathy:      Cervical: No cervical adenopathy.   Skin:     General: Skin is warm and dry.   Neurological:      Mental Status: He is alert and oriented to person, place, and time.   Psychiatric:         Behavior: Behavior normal.         Lab Results   Component Value Date    WBC 7.14 06/26/2024    HGB 15.9 06/26/2024    HGB 15.7 03/21/2023    HGB 15.4 06/21/2022    HCT 47.4 06/26/2024    MCV 87 06/26/2024    MCV 90 03/21/2023    MCV 91 06/21/2022     06/26/2024    CHOL 161 06/26/2024    TRIG 67 06/26/2024    HDL 47 06/26/2024    LDLCALC 100.6 06/26/2024    LDLCALC 87.6 03/21/2023    LDLCALC 76.8 06/21/2022    ALT 27 " 06/26/2024    AST 30 06/26/2024     06/26/2024    K 4.7 06/26/2024    CALCIUM 9.6 06/26/2024     06/26/2024    CO2 25 06/26/2024    BUN 16 06/26/2024    CREATININE 1.3 06/26/2024    CREATININE 1.0 03/21/2023    CREATININE 1.1 02/27/2023    EGFRNORACEVR 57.6 (A) 06/26/2024    EGFRNORACEVR >60.0 03/21/2023    EGFRNORACEVR >60 02/27/2023    TSH 2.989 06/26/2024    TSH 3.734 03/21/2023    TSH 3.363 03/14/2022    PSA 1.1 03/01/2024    PSA 0.90 03/21/2023    PSA 2.8 02/17/2022    PSADIAG 1.0 02/16/2023    PSADIAG 4.1 (H) 01/04/2021    GLU 99 06/26/2024    BNP 79 01/22/2019          The 10-year ASCVD risk score (Sabine KELLY, et al., 2019) is: 19.5%    Values used to calculate the score:      Age: 74 years      Sex: Male      Is Non- : No      Diabetic: No      Tobacco smoker: No      Systolic Blood Pressure: 118 mmHg      Is BP treated: No      HDL Cholesterol: 47 mg/dL      Total Cholesterol: 161 mg/dL     Assessment:     1. Mixed hyperlipidemia    2. Aortic atherosclerosis    3. Cardiac pacemaker in situ    4. Essential tremor    5. Partial symptomatic epilepsy with complex partial seizures, not intractable, without status epilepticus    6. PAF (paroxysmal atrial fibrillation)    7. Wheezing    8. Acute otitis externa of left ear, unspecified type    9. Tinea cruris    10. Meningioma    11. Elevated PSA      Plan:   1. Mixed hyperlipidemia  Overview:  Stable.  Continue statin    Orders:  -     Comprehensive Metabolic Panel; Future; Expected date: 06/26/2024  -     CBC Auto Differential; Future; Expected date: 06/26/2024  -     TSH; Future; Expected date: 06/26/2024  -     Lipid Panel; Future; Expected date: 06/26/2024  -     pravastatin (PRAVACHOL) 40 MG tablet; Take 1 tablet (40 mg total) by mouth once daily.  Dispense: 90 tablet; Refill: 3    2. Aortic atherosclerosis  Overview:  Stable.       3. Cardiac pacemaker in situ    4. Essential tremor  Overview:  Stable.       5. Partial  symptomatic epilepsy with complex partial seizures, not intractable, without status epilepticus    6. PAF (paroxysmal atrial fibrillation)  Overview:  Stable.  Continue recommendations and follow-up as per Cardiology.      7. Wheezing  -     Ambulatory referral/consult to Pulmonology; Future; Expected date: 07/03/2024  -     X-Ray Chest PA And Lateral; Future; Expected date: 06/26/2024    8. Acute otitis externa of left ear, unspecified type  -     neomycin-polymyxin-hydrocortisone (CORTISPORIN) otic solution; Place 3 drops into the left ear 4 (four) times daily.  Dispense: 10 mL; Refill: 0    9. Tinea cruris  -     ciclopirox (LOPROX) 0.77 % Crea; Apply topically 2 (two) times daily. To affected Area of skin.  Dispense: 90 g; Refill: 0    10. Meningioma  Overview:  Stable.  Continue recommendations and follow-up as per Neurology.      11. Elevated PSA  Overview:  Continue recommendations and follow-up as per Urology.        Visit today included increased complexity  addressed and managing the longitudinal care of the patient due to the serious and/or complex managed problem(s)     There are no Patient Instructions on file for this visit.    Future Appointments   Date Time Provider Department Center   8/6/2024  1:00 PM Lejeune, Elizabeth B, NP [facility] SLEEP [facility]   8/7/2024  2:00 PM Marlon Sawyer MD [facility] PULMSVC [facility]   4/11/2025  9:30 AM LABORATORY, [facility] LAB [facility]   4/14/2025 10:45 AM Violeta Edmond MD [facility] UROLOGY [facility]   5/23/2025 10:40 AM Gen Meza MD [facility] ARRHYTH [facility]   6/25/2025  9:40 AM Huseyin Marrero MD [facility] IM [place]       Lab Frequency Next Occurrence   X-ray Knee Ortho Left with Flexion Once 11/21/2023   PSA, Screening Once 04/01/2025   [facility]     Cardiac device check - [facility]     Cardiac device check - [facility]         Follow up in about 1 year (around 6/26/2025), or if symptoms worsen  or fail to improve, for fasting labs today.

## 2024-07-10 ENCOUNTER — CLINICAL SUPPORT (OUTPATIENT)
Dept: CARDIOLOGY | Facility: HOSPITAL | Age: 74
End: 2024-07-10
Payer: MEDICARE

## 2024-07-10 DIAGNOSIS — R00.1 BRADYCARDIA, UNSPECIFIED: ICD-10-CM

## 2024-07-10 DIAGNOSIS — Z95.0 PRESENCE OF CARDIAC PACEMAKER: ICD-10-CM

## 2024-08-06 ENCOUNTER — OFFICE VISIT (OUTPATIENT)
Dept: SLEEP MEDICINE | Facility: CLINIC | Age: 74
End: 2024-08-06
Payer: MEDICARE

## 2024-08-06 VITALS
WEIGHT: 215.63 LBS | DIASTOLIC BLOOD PRESSURE: 78 MMHG | HEIGHT: 70 IN | RESPIRATION RATE: 18 BRPM | SYSTOLIC BLOOD PRESSURE: 134 MMHG | BODY MASS INDEX: 30.87 KG/M2 | HEART RATE: 84 BPM | OXYGEN SATURATION: 96 %

## 2024-08-06 DIAGNOSIS — E66.9 OBESITY, CLASS I, BMI 30-34.9: ICD-10-CM

## 2024-08-06 DIAGNOSIS — G47.33 OBSTRUCTIVE SLEEP APNEA: Primary | Chronic | ICD-10-CM

## 2024-08-06 DIAGNOSIS — R06.2 WHEEZING: ICD-10-CM

## 2024-08-06 PROCEDURE — 99999 PR PBB SHADOW E&M-EST. PATIENT-LVL IV: CPT | Mod: PBBFAC,,, | Performed by: NURSE PRACTITIONER

## 2024-08-06 PROCEDURE — 99214 OFFICE O/P EST MOD 30 MIN: CPT | Mod: PBBFAC | Performed by: NURSE PRACTITIONER

## 2024-08-06 PROCEDURE — 99214 OFFICE O/P EST MOD 30 MIN: CPT | Mod: S$PBB,,, | Performed by: NURSE PRACTITIONER

## 2024-10-09 ENCOUNTER — CLINICAL SUPPORT (OUTPATIENT)
Dept: CARDIOLOGY | Facility: HOSPITAL | Age: 74
End: 2024-10-09
Payer: MEDICARE

## 2024-10-09 DIAGNOSIS — Z95.0 PRESENCE OF CARDIAC PACEMAKER: ICD-10-CM

## 2024-10-09 DIAGNOSIS — R00.1 BRADYCARDIA, UNSPECIFIED: ICD-10-CM

## 2024-10-14 ENCOUNTER — TELEPHONE (OUTPATIENT)
Dept: SPORTS MEDICINE | Facility: CLINIC | Age: 74
End: 2024-10-14
Payer: MEDICARE

## 2024-10-14 NOTE — TELEPHONE ENCOUNTER
Explained to patient he is far enough out from CSI or gel injections he could proceed with restarted the process for either. Moved up appointment ----- Message from Gary sent at 10/14/2024  1:52 PM CDT -----  Adal Loaiza Jr. Would like a call back at [phone], in regards to verifying when the next time he can receive a knee injection.

## 2024-10-16 ENCOUNTER — HOSPITAL ENCOUNTER (OUTPATIENT)
Dept: RADIOLOGY | Facility: HOSPITAL | Age: 74
Discharge: HOME | End: 2024-10-16
Attending: PHYSICIAN ASSISTANT
Payer: MEDICARE

## 2024-10-16 ENCOUNTER — OFFICE VISIT (OUTPATIENT)
Dept: SPORTS MEDICINE | Facility: CLINIC | Age: 74
End: 2024-10-16
Payer: MEDICARE

## 2024-10-16 DIAGNOSIS — M17.12 PRIMARY LOCALIZED OSTEOARTHRITIS OF LEFT KNEE: ICD-10-CM

## 2024-10-16 DIAGNOSIS — M25.562 LEFT KNEE PAIN, UNSPECIFIED CHRONICITY: ICD-10-CM

## 2024-10-16 DIAGNOSIS — M17.12 PRIMARY LOCALIZED OSTEOARTHRITIS OF LEFT KNEE: Primary | ICD-10-CM

## 2024-10-16 DIAGNOSIS — M25.462 EFFUSION OF LEFT KNEE: ICD-10-CM

## 2024-10-16 DIAGNOSIS — M17.12 PRIMARY OSTEOARTHRITIS OF LEFT KNEE: ICD-10-CM

## 2024-10-16 PROCEDURE — 99999 PR PBB SHADOW E&M-EST. PATIENT-LVL III: CPT | Mod: PBBFAC,,, | Performed by: PHYSICIAN ASSISTANT

## 2024-10-16 PROCEDURE — 99999PBSHW PR PBB SHADOW TECHNICAL ONLY FILED TO HB: Mod: PBBFAC,,,

## 2024-10-16 PROCEDURE — 99213 OFFICE O/P EST LOW 20 MIN: CPT | Mod: PBBFAC,25,PN | Performed by: PHYSICIAN ASSISTANT

## 2024-10-16 PROCEDURE — 73564 X-RAY EXAM KNEE 4 OR MORE: CPT | Mod: 26,LT,, | Performed by: RADIOLOGY

## 2024-10-16 PROCEDURE — 73562 X-RAY EXAM OF KNEE 3: CPT | Mod: TC,PN,RT

## 2024-10-16 PROCEDURE — 73562 X-RAY EXAM OF KNEE 3: CPT | Mod: 26,59,RT, | Performed by: RADIOLOGY

## 2024-10-16 PROCEDURE — 73564 X-RAY EXAM KNEE 4 OR MORE: CPT | Mod: TC,PN,LT

## 2024-10-16 PROCEDURE — 20610 DRAIN/INJ JOINT/BURSA W/O US: CPT | Mod: PBBFAC,PN | Performed by: PHYSICIAN ASSISTANT

## 2024-10-16 RX ADMIN — METHYLPREDNISOLONE ACETATE 80 MG: 80 INJECTION, SUSPENSION INTRALESIONAL; INTRAMUSCULAR; INTRASYNOVIAL; SOFT TISSUE at 08:10

## 2024-10-16 NOTE — PROGRESS NOTES
Patient ID: Adal Loaiza Jr.  YOB: 1950  MRN: [id number]    Chief Complaint: Pain of the Left Knee    Referred By: Previous patient     History of Present Illness: Adal Loaiza Jr. is a 74 y.o. male   Data Unavailable with a chief complaint of Pain of the Left Knee    Adal Loaiza Jr. is a 74 y.o. presents today for evaluation and management of Left knee pain.   Patient presents today for recheck on left knee pain.  Has been several months since patient is has left knee injections.  Last Visco injection was given in 1/2024. He states that he has noticed pain and swelling in the left knee which has caused him issues with walking and exercising. He has been using a compressive knee brace and topical anti-inflammatories.   At this time he denies any fevers, chills, night sweats, numbness and tingling.     Recall previous HPI on 11/21/24:   Adal presents to the clinic for L knee pain. He rates his pain as a 3 out of 10 today. He reports that he has been having some pain occasionally and that the injections worked. He is interested in getting new ones.     Past Medical History:   Past Medical History:   Diagnosis Date    Anemia due to GI blood loss 2012    Arrhythmia requiring replacement of cardiac pacemaker     Brain tumor (benign) 1999    Meningioma    Encounter for blood transfusion     GI bleed due to NSAIDs 2012    Hyperlipidemia     Localized osteoarthritis of left knee 12/10/2020    PAF (paroxysmal atrial fibrillation) 6/16/2019    Renal cyst 2/19/2019    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    Seizures     because of brain tumor    Sleep apnea     Spinal cord disease     meningioma     Past Surgical History:   Procedure Laterality Date    BRAIN SURGERY      CARDIAC PACEMAKER PLACEMENT      COLONOSCOPY N/A 8/25/2016    Procedure: COLONOSCOPY;  Surgeon: Morris Olguin MD;  Location: [facility];  Service: Endoscopy;  Laterality: N/A;    COLONOSCOPY N/A 4/4/2022    Procedure: COLONOSCOPY;   Surgeon: Marci Silva MD;  Location: [facility] ENDO;  Service: Endoscopy;  Laterality: N/A;    ESOPHAGEAL MANOMETRY WITH MEASUREMENT OF IMPEDANCE N/A 4/26/2022    Procedure: MANOMETRY, ESOPHAGUS, WITH IMPEDANCE MEASUREMENT;  Surgeon: Yoni Portillo RN;  Location: [facility] ENDO;  Service: Endoscopy;  Laterality: N/A;    ESOPHAGOGASTRODUODENOSCOPY N/A 4/4/2022    Procedure: ESOPHAGOGASTRODUODENOSCOPY (EGD);  Surgeon: Marci Silva MD;  Location: [facility] ENDO;  Service: Endoscopy;  Laterality: N/A;    ESOPHAGOGASTRODUODENOSCOPY N/A 6/6/2022    Procedure: EGD (ESOPHAGOGASTRODUODENOSCOPY);  Surgeon: Ryanne Phan DO;  Location: [facility] OR;  Service: General;  Laterality: N/A;    FRACTURE SURGERY Right     right jaw    Gamma knife      for schwannoma    LAPAROSCOPIC TOUPET FUNDOPLICATION N/A 6/6/2022    Procedure: FUNDOPLICATION, LAPAROSCOPIC, TOUPET;  Surgeon: Ryanne Phan DO;  Location: [facility] OR;  Service: General;  Laterality: N/A;    REPLACEMENT OF PACEMAKER GENERATOR Left 2/13/2019    Procedure: REPLACEMENT, PULSE GENERATOR, CARDIAC PACEMAKER;  Surgeon: Gen Meaz MD;  Location: [facility] CATH LAB;  Service: Cardiology;  Laterality: Left;  current device MDT/waiting for MD input    ROBOT-ASSISTED REPAIR OF HIATAL HERNIA USING DA CARMELO XI N/A 6/6/2022    Procedure: XI ROBOTIC REPAIR, HERNIA, HIATAL;  Surgeon: Ryanne Phan DO;  Location: [facility] OR;  Service: General;  Laterality: N/A;     Family History   Problem Relation Name Age of Onset    Colon cancer Father      Hypertension Unknown       Social History     Socioeconomic History    Marital status:    Tobacco Use    Smoking status: Never    Smokeless tobacco: Never   Substance and Sexual Activity    Alcohol use: No     Comment: socially    Drug use: No     Medication List with Changes/Refills   Current Medications    CICLOPIROX (LOPROX) 0.77 % CREA    Apply topically 2 (two) times daily. To affected Area of skin.    FINASTERIDE (PROSCAR) 5 MG  TABLET    Take 1 tablet (5 mg total) by mouth once daily.    GABAPENTIN (NEURONTIN) 300 MG CAPSULE    Take 1 capsule by mouth every morning.    LACTOBACILLUS ACIDOPHILUS (PROBIOTIC ORAL)    Take by mouth once daily.    LEVETIRACETAM (KEPPRA) 500 MG TAB    Take 1 tablet (500 mg total) by mouth 2 (two) times daily.    MULTIVITAMIN (THERAGRAN) PER TABLET    Take 1 tablet by mouth once daily.    NEOMYCIN-POLYMYXIN-HYDROCORTISONE (CORTISPORIN) OTIC SOLUTION    Place 3 drops into the left ear 4 (four) times daily.    PRAVASTATIN (PRAVACHOL) 40 MG TABLET    Take 1 tablet (40 mg total) by mouth once daily.    TAMSULOSIN (FLOMAX) 0.4 MG CAP    Take 1 capsule (0.4 mg total) by mouth once daily.     Review of patient's allergies indicates:   Allergen Reactions    Nsaids (non-steroidal anti-inflammatory drug)      Caused GI bleeding.    Aspirin     Celecoxib      Increased bleeding      Review of Systems   Constitutional: Negative for chills and fever.   HENT:  Negative for sore throat.    Eyes:  Negative for pain.   Cardiovascular:  Negative for chest pain and leg swelling.   Respiratory:  Negative for cough and shortness of breath.    Skin:  Negative for itching and rash.   Musculoskeletal:  Positive for joint pain and joint swelling.   Gastrointestinal:  Negative for abdominal pain, nausea and vomiting.   Genitourinary:  Negative for dysuria.   Neurological:  Negative for dizziness, numbness and paresthesias.       Physical Exam:   There is no height or weight on file to calculate BMI.  There were no vitals filed for this visit.   GENERAL: Well appearing, appropriate for stated age, no acute distress.  CARDIOVASCULAR: Pulses regular by peripheral palpation.  PULMONARY: Respirations are even and non-labored.  NEURO: Awake, alert, and oriented x 3.  PSYCH: Mood & affect are appropriate.  HEENT: Head is normocephalic and atraumatic.  General    Nursing note and vitals reviewed.          Right Knee Exam   Right knee exam is  normal.    Inspection   Effusion: absent    Tenderness   The patient is experiencing no tenderness.     Range of Motion   Extension:  0   Flexion:  120     Tests   Ligament Examination   Lachman: normal (-1 to 2mm)   PCL-Posterior Drawer: normal (0 to 2mm)     MCL - Valgus: normal (0 to 2mm)  LCL - Varus: normal    Other   Sensation: normal    Left Knee Exam   Left knee exam is normal.    Inspection   Effusion: present (large)    Tenderness   The patient tender to palpation of the medial joint line.    Range of Motion   Extension:  0   Flexion:  120     Tests   Stability   Lachman: normal (-1 to 2mm)   MCL - Valgus: normal (0 to 2mm)  LCL - Varus: normal (0 to 2mm)    Other   Sensation: normal    Muscle Strength   Right Lower Extremity   Hip Abduction: 5/5   Quadriceps:  5/5   Hamstrin/5   Left Lower Extremity   Hip Abduction: 5/5   Quadriceps:  5/5   Hamstrin/5     Vascular Exam     Right Pulses  Dorsalis Pedis:      2+  Posterior Tibial:      2+        Left Pulses  Dorsalis Pedis:      2+  Posterior Tibial:      2+        All compartments are soft and compressible. Calf soft non-tender. Intact EHL, FHL, gastroc soleus, and tibialis anterior. Sensation intact to light touch in superficial peroneal, deep peroneal, tibial, sural, and saphenous nerve distributions. Foot warm and well perfused with capillary refill of less than 2 seconds and palpable pedal pulses.       Imaging:    X-ray Knee Ortho Left with Flexion  Narrative: EXAM: XR KNEE ORTHO LEFT WITH FLEXION    CLINICAL HISTORY: Arthritis    FINDINGS:  Compared to 2023.    No fracture or dislocation is visible.  There is a moderate left knee joint effusion which has appeared since 2023.  Stable advanced degenerative joint space narrowing in the medial tibiofemoral and patellofemoral compartments is again noted.  3 or 4 small calcified loose joint bodies are also visible in the joint space in the lateral tibiofemoral compartment.  Less  severe joint space narrowing is also visible in the medial tibiofemoral compartment of the right knee and moderate joint space narrowing is visible in the right patellofemoral compartment.  Impression: 1.  No evidence of osseous injury.  2.  New moderate left knee joint effusion compared to 11/21/2023.  3.  Chronic osteoarthritic changes, most severe in the medial tibiofemoral compartment and stable appearance of small calcified loose joint bodies in the left knee.    Finalized on: 10/16/2024 9:26 AM By:  Alok Mora  [facility]# [id number]      2024-10-16 09:28:[id number]    [facility]      Relevant imaging results reviewed and interpreted by me, discussed with the patient and / or family today.    Other Tests:         Patient Instructions   Assessment:  Adal SCHROEDER Josse DupreeJulissa is a  74 y.o. male   Data Unavailable with a chief complaint of Pain of the Left Knee  Presents today for left knee pain and a hx of left knee OA.   Left knee pain     Encounter Diagnoses   Name Primary?    Primary localized osteoarthritis of left knee Yes    Left knee pain, unspecified chronicity     Primary osteoarthritis of left knee       Plan:  Aspiration  CSI today  Follow up for visco gel in 1 week in the left knee at [facility].     Discussed medial  brace will measure at the next appointment.                                                                                                                                                  Follow-up: 1 week or sooner if there are any problems between now and then.    Leave Review:   Google: Leave Google Review  Healthgrades: Leave Healthgrades Review    After Hours Number: [phone]      Provider Note/Medical Decision Making:   MEDICAL NECESSITY FOR VISCOSUPPLEMENTATION: After thorough evaluation of the patient, I have determined that visco-supplementation is medically necessary. The patient has painful DJD of the knee with failure of conservative therapy including lifestyle modifications  and rehabilitation exercises. Oral analgesis/NSAIDs have not adequately controlled symptoms and there is radiographic evidence of joint space narrowing, subchondral sclerosis, and some early osteophytic changes Kellgren- Renard grade 2 or greater, or in lack of radiographic evidence, there is arthroscopic or other evidence of chondrosis.      I discussed worrisome and red flag signs and symptoms with the patient. The patient expressed understanding and agreed to alert me immediately or to go to the emergency room if they experience any of these.   Treatment plan was developed with input from the patient/family, and they expressed understanding and agreement with the plan. All questions were answered today.      Jesusita Aguilera PA-C  Sports Medicine Physician Assistant       Disclaimer: This note was prepared using a voice recognition system and is likely to have sound alike errors within the text.

## 2024-10-16 NOTE — PATIENT INSTRUCTIONS
Assessment:  Adal Loaiza Jr. is a  74 y.o. male   Data Unavailable with a chief complaint of Pain of the Left Knee  Presents today for left knee pain and a hx of left knee OA.   Left knee pain     Encounter Diagnoses   Name Primary?    Primary localized osteoarthritis of left knee Yes    Left knee pain, unspecified chronicity     Primary osteoarthritis of left knee       Plan:  Aspiration  CSI today  Follow up for visco gel in 1 week in the left knee at [facility].     Discussed medial  brace will measure at the next appointment.                                                                                                                                                  Follow-up: 1 week or sooner if there are any problems between now and then.    Leave Review:   Google: Leave Google Review  Healthgrades: Leave Healthgrades Review    After Hours Number: [phone]       Antonio Gonsalez(Attending)

## 2024-10-18 RX ORDER — METHYLPREDNISOLONE ACETATE 80 MG/ML
80 INJECTION, SUSPENSION INTRA-ARTICULAR; INTRALESIONAL; INTRAMUSCULAR; SOFT TISSUE
Status: DISCONTINUED | OUTPATIENT
Start: 2024-10-16 | End: 2024-10-18 | Stop reason: HOSPADM

## 2024-10-18 NOTE — PROCEDURES
Large Joint Aspiration/Injection    Date/Time: 10/16/2024 8:00 AM    Performed by: Jesusita Vogel PA-C  Authorized by: Jesusita Vogel PA-C    Consent Done?:  Yes (Verbal)  Indications:  Joint swelling and pain  Site marked: the procedure site was marked    Timeout: prior to procedure the correct patient, procedure, and site was verified    Prep: patient was prepped and draped in usual sterile fashion      Local anesthesia used?: Yes    Anesthesia:  Local infiltration  Local anesthetic:  Lidocaine 1% without epinephrine    Details:  Needle Size:  25 G  Ultrasonic Guidance for needle placement?: No    Approach:  Anterolateral  Location:  Knee  Aspirate amount (mL):  30  Aspirate:  Serous     1% Lidocaine was used for local anesthetic  Aspiration was performed with an 18g needle.     Large Joint Aspiration/Injection: L knee    Date/Time: 10/16/2024 8:00 AM    Performed by: Jesusita Vogel PA-C  Authorized by: Jesusita Vogel PA-C    Consent Done?:  Yes (Verbal)  Indications:  Joint swelling and pain  Site marked: the procedure site was marked    Timeout: prior to procedure the correct patient, procedure, and site was verified    Prep: patient was prepped and draped in usual sterile fashion      Local anesthesia used?: Yes    Anesthesia:  Local infiltration  Local anesthetic:  Bupivacaine 0.5% without epinephrine, lidocaine 1% without epinephrine and topical anesthetic    Details:  Needle Size:  22 G  Ultrasonic Guidance for needle placement?: No    Approach:  Superior  Location:  Knee  Site:  L knee  Medications:  80 mg methylPREDNISolone acetate 80 mg/mL  Patient tolerance:  Patient tolerated the procedure well with no immediate complications     2cc 1% lidocaine plain, 2cc 0.5% marcaine plain, 0.5cc 80mg methylprednisolone    Procedure Note:  We discussed the risk and benefits of injections, including pain, infection, bleeding, damage to adjacent structures, risk of reaction to  injection. We discussed the steroid/cortisone injections will not heal the problem but mat help decrease inflammation and help with symptoms. We discussed the risk of repeated injections. The patient expressed understanding and wanted to proceed with the injection. We performed a timeout to verify the proper patient, proper procedure, and the proper site. The injection site was prepared in a sterile fashion. The patient tolerated it well and there were no complication. We did discuss with the patient that steroid injections can cause some increase in blood sugar and blood pressure for up to a week after the injection.

## 2024-10-25 ENCOUNTER — PROCEDURE VISIT (OUTPATIENT)
Dept: SPORTS MEDICINE | Facility: CLINIC | Age: 74
End: 2024-10-25
Payer: MEDICARE

## 2024-10-25 DIAGNOSIS — M17.12 PRIMARY LOCALIZED OSTEOARTHRITIS OF LEFT KNEE: Primary | ICD-10-CM

## 2024-10-25 NOTE — PROCEDURES
Large Joint Aspiration/Injection: L knee    Date/Time: 10/25/2024 12:00 PM    Performed by: Jesusita Vogel PA-C  Authorized by: Jesusita Vogel PA-C    Consent Done?:  Yes (Verbal)  Indications:  Joint swelling and pain  Site marked: the procedure site was marked    Timeout: prior to procedure the correct patient, procedure, and site was verified    Prep: patient was prepped and draped in usual sterile fashion      Local anesthesia used?: Yes    Local anesthetic:  Topical anesthetic    Details:  Needle Size:  22 G  Ultrasonic Guidance for needle placement?: No    Approach:  Superior  Location:  Knee  Site:  L knee  Medications:  20 mg sodium hyaluronate (EUFLEXXA) 10 mg/mL(mw 2.4 -3.6 million)  Patient tolerance:  Patient tolerated the procedure well with no immediate complications     1/3

## 2024-11-01 ENCOUNTER — PROCEDURE VISIT (OUTPATIENT)
Dept: SPORTS MEDICINE | Facility: CLINIC | Age: 74
End: 2024-11-01
Payer: MEDICARE

## 2024-11-01 DIAGNOSIS — M17.12 PRIMARY OSTEOARTHRITIS OF LEFT KNEE: Primary | ICD-10-CM

## 2024-11-08 ENCOUNTER — PROCEDURE VISIT (OUTPATIENT)
Dept: SPORTS MEDICINE | Facility: CLINIC | Age: 74
End: 2024-11-08
Payer: MEDICARE

## 2024-11-08 VITALS — HEIGHT: 70 IN | BODY MASS INDEX: 30.87 KG/M2 | WEIGHT: 215.63 LBS

## 2024-11-08 DIAGNOSIS — M17.12 PRIMARY OSTEOARTHRITIS OF LEFT KNEE: Primary | ICD-10-CM

## 2024-11-08 NOTE — PROCEDURES
Large Joint Aspiration/Injection: L knee    Date/Time: 11/8/2024 11:30 AM    Performed by: Jesusita Vogel PA-C  Authorized by: Jesusita Vogel PA-C    Consent Done?:  Yes (Verbal)  Indications:  Joint swelling and pain  Site marked: the procedure site was marked    Timeout: prior to procedure the correct patient, procedure, and site was verified    Prep: patient was prepped and draped in usual sterile fashion      Local anesthesia used?: Yes    Local anesthetic:  Topical anesthetic    Details:  Needle Size:  22 G  Ultrasonic Guidance for needle placement?: No    Approach:  Superior  Location:  Knee  Site:  L knee  Medications:  20 mg sodium hyaluronate (EUFLEXXA) 10 mg/mL(mw 2.4 -3.6 million)  Patient tolerance:  Patient tolerated the procedure well with no immediate complications     3/3

## 2024-11-12 ENCOUNTER — PATIENT MESSAGE (OUTPATIENT)
Dept: SPORTS MEDICINE | Facility: CLINIC | Age: 74
End: 2024-11-12
Payer: MEDICARE

## 2025-01-08 ENCOUNTER — CLINICAL SUPPORT (OUTPATIENT)
Dept: CARDIOLOGY | Facility: HOSPITAL | Age: 75
End: 2025-01-08
Payer: MEDICARE

## 2025-01-08 DIAGNOSIS — Z95.0 PRESENCE OF CARDIAC PACEMAKER: ICD-10-CM

## 2025-01-08 DIAGNOSIS — R00.1 BRADYCARDIA, UNSPECIFIED: ICD-10-CM

## 2025-01-13 DIAGNOSIS — Z00.00 ENCOUNTER FOR MEDICARE ANNUAL WELLNESS EXAM: ICD-10-CM

## 2025-02-24 ENCOUNTER — TELEPHONE (OUTPATIENT)
Dept: DERMATOLOGY | Facility: CLINIC | Age: 75
End: 2025-02-24
Payer: MEDICARE

## 2025-02-24 NOTE — TELEPHONE ENCOUNTER
Return call made to patient regarding sooner appt. No answer left vm to return my call.    ----- Message from Siobhan sent at 2/24/2025 12:52 PM CST -----  Type:  Sooner Apoointment RequestCaller is requesting a sooner appointment.  Caller declined first available appointment listed below.  Caller will not accept being placed on the waitlist and is requesting a message be sent to doctor.Name of Caller:MAC BARAJAS JR. [[id number]]When is the first available appointment?5/13Symptoms:Rashes in the groin area on the thigh Would the patient rather a call back or a response via [facility]? Call back or portal Best Call Back Number:[phone] Additional Information: Patient states the rashes are growing larger as time passes. Patient states he would like to be seen as soon as possible. Patient would like a call back or message with further assistance.  ----- Message -----  From: Siobhan Pelaez  Sent: 2/24/2025  12:55 PM CST  To: [facility] Clinical Staff    Type:  Sooner Apoointment RequestCaller is requesting a sooner appointment.  Caller declined first available appointment listed below.  Caller will not accept being placed on the waitlist and is requesting a message be sent to doctor.Name of Caller:MAC BARAJAS JR. [[id number]]When is the first available appointment?5/13Symptoms:Rashes in the groin area on the thigh Would the patient rather a call back or a response via [facility]? Call back Best Call Back Number:[phone] Additional Information: Patient states the rashes are growing larger as time passes. Patient states he would like to be seen as soon as possible. Patient would like a call back with further assistance.

## 2025-03-04 ENCOUNTER — OFFICE VISIT (OUTPATIENT)
Dept: DERMATOLOGY | Facility: CLINIC | Age: 75
End: 2025-03-04
Payer: MEDICARE

## 2025-03-04 DIAGNOSIS — B35.6 TINEA CRURIS: ICD-10-CM

## 2025-03-04 DIAGNOSIS — L30.4 INTERTRIGO: Primary | ICD-10-CM

## 2025-03-04 PROCEDURE — 99051 MED SERV EVE/WKEND/HOLIDAY: CPT | Mod: ,,, | Performed by: STUDENT IN AN ORGANIZED HEALTH CARE EDUCATION/TRAINING PROGRAM

## 2025-03-04 PROCEDURE — G2211 COMPLEX E/M VISIT ADD ON: HCPCS | Mod: S$PBB,,, | Performed by: STUDENT IN AN ORGANIZED HEALTH CARE EDUCATION/TRAINING PROGRAM

## 2025-03-04 PROCEDURE — 99999 PR PBB SHADOW E&M-EST. PATIENT-LVL III: CPT | Mod: PBBFAC,,, | Performed by: STUDENT IN AN ORGANIZED HEALTH CARE EDUCATION/TRAINING PROGRAM

## 2025-03-04 PROCEDURE — 99213 OFFICE O/P EST LOW 20 MIN: CPT | Mod: PBBFAC | Performed by: STUDENT IN AN ORGANIZED HEALTH CARE EDUCATION/TRAINING PROGRAM

## 2025-03-04 PROCEDURE — 99204 OFFICE O/P NEW MOD 45 MIN: CPT | Mod: S$PBB,,, | Performed by: STUDENT IN AN ORGANIZED HEALTH CARE EDUCATION/TRAINING PROGRAM

## 2025-03-04 RX ORDER — PRENATAL VIT 91/IRON/FOLIC/DHA 28-975-200
COMBINATION PACKAGE (EA) ORAL 2 TIMES DAILY
Qty: 28 G | Refills: 2 | Status: SHIPPED | OUTPATIENT
Start: 2025-03-04

## 2025-03-04 RX ORDER — DESONIDE 0.5 MG/G
CREAM TOPICAL 2 TIMES DAILY
Qty: 60 G | Refills: 2 | Status: SHIPPED | OUTPATIENT
Start: 2025-03-04 | End: 2025-03-05

## 2025-03-04 NOTE — PROGRESS NOTES
Subjective:       Patient ID:  Adal Loaiza Jr. is a 75 y.o. male who presents for   Chief Complaint   Patient presents with    Rash     In groin area      History of Present Illness: The patient presents with chief complaint of persistent rash in the groin.  Location: groin area, mostly on the inner thighs and inguinal folds, some on the genitals  Duration: ongoing for months  Signs/Symptoms: red, itchy and irritated plaques that are spreading  Prior treatments: has tried ciclopirox cream, which initially helped, but symptoms returned.       Rash        Review of Systems   Constitutional:  Negative for fever and chills.   Skin:  Positive for rash.        Objective:    Physical Exam   Constitutional: He appears well-developed and well-nourished. No distress.   Neurological: He is alert and oriented to person, place, and time. He is not disoriented.   Psychiatric: He has a normal mood and affect.   Skin:   Areas Examined (abnormalities noted in diagram):   Head / Face Inspection Performed  Neck Inspection Performed  Genitals / Buttocks / Groin Inspection Performed              Diagram Legend     Erythematous scaling macule/papule c/w actinic keratosis       Vascular papule c/w angioma      Pigmented verrucoid papule/plaque c/w seborrheic keratosis      Yellow umbilicated papule c/w sebaceous hyperplasia      Irregularly shaped tan macule c/w lentigo     1-2 mm smooth white papules consistent with Milia      Movable subcutaneous cyst with punctum c/w epidermal inclusion cyst      Subcutaneous movable cyst c/w pilar cyst      Firm pink to brown papule c/w dermatofibroma      Pedunculated fleshy papule(s) c/w skin tag(s)      Evenly pigmented macule c/w junctional nevus     Mildly variegated pigmented, slightly irregular-bordered macule c/w mildly atypical nevus      Flesh colored to evenly pigmented papule c/w intradermal nevus       Pink pearly papule/plaque c/w basal cell carcinoma      Erythematous  hyperkeratotic cursted plaque c/w SCC      Surgical scar with no sign of skin cancer recurrence      Open and closed comedones      Inflammatory papules and pustules      Verrucoid papule consistent consistent with wart     Erythematous eczematous patches and plaques     Dystrophic onycholytic nail with subungual debris c/w onychomycosis     Umbilicated papule    Erythematous-base heme-crusted tan verrucoid plaque consistent with inflamed seborrheic keratosis     Erythematous Silvery Scaling Plaque c/w Psoriasis     See annotation      Assessment / Plan:        Intertrigo  Tinea cruris  -     terbinafine HCL (LAMISIL) 1 % cream; Apply topically 2 (two) times daily. Anti-fungal cream  Dispense: 28 g; Refill: 2  -     desonide (DESOWEN) 0.05 % cream; Apply topically 2 (two) times daily. Steroid cream  Dispense: 60 g; Refill: 2             No follow-ups on file.

## 2025-03-05 RX ORDER — HYDROCORTISONE 25 MG/G
CREAM TOPICAL 2 TIMES DAILY
Qty: 28 G | Refills: 1 | Status: SHIPPED | OUTPATIENT
Start: 2025-03-05

## 2025-03-10 PROBLEM — H25.13 NUCLEAR SCLEROSIS OF BOTH EYES: Status: ACTIVE | Noted: 2025-03-10

## 2025-03-10 PROBLEM — H52.13 MYOPIA OF BOTH EYES WITH REGULAR ASTIGMATISM AND PRESBYOPIA: Status: ACTIVE | Noted: 2025-03-10

## 2025-03-10 PROBLEM — H52.4 MYOPIA OF BOTH EYES WITH REGULAR ASTIGMATISM AND PRESBYOPIA: Status: ACTIVE | Noted: 2025-03-10

## 2025-03-10 PROBLEM — H52.223 MYOPIA OF BOTH EYES WITH REGULAR ASTIGMATISM AND PRESBYOPIA: Status: ACTIVE | Noted: 2025-03-10

## 2025-03-14 ENCOUNTER — OFFICE VISIT (OUTPATIENT)
Dept: SPORTS MEDICINE | Facility: CLINIC | Age: 75
End: 2025-03-14
Payer: MEDICARE

## 2025-03-14 DIAGNOSIS — I48.0 PAF (PAROXYSMAL ATRIAL FIBRILLATION): ICD-10-CM

## 2025-03-14 DIAGNOSIS — M25.462 EFFUSION OF LEFT KNEE: ICD-10-CM

## 2025-03-14 DIAGNOSIS — M17.12 PRIMARY LOCALIZED OSTEOARTHRITIS OF LEFT KNEE: ICD-10-CM

## 2025-03-14 DIAGNOSIS — Z95.0 CARDIAC PACEMAKER IN SITU: ICD-10-CM

## 2025-03-14 DIAGNOSIS — M17.12 PRIMARY OSTEOARTHRITIS OF LEFT KNEE: Primary | ICD-10-CM

## 2025-03-14 DIAGNOSIS — M25.562 LEFT KNEE PAIN, UNSPECIFIED CHRONICITY: ICD-10-CM

## 2025-03-14 PROCEDURE — 99213 OFFICE O/P EST LOW 20 MIN: CPT | Mod: S$PBB,,, | Performed by: PHYSICIAN ASSISTANT

## 2025-03-14 PROCEDURE — 99999 PR PBB SHADOW E&M-EST. PATIENT-LVL III: CPT | Mod: PBBFAC,,, | Performed by: PHYSICIAN ASSISTANT

## 2025-03-14 PROCEDURE — G2211 COMPLEX E/M VISIT ADD ON: HCPCS | Mod: S$PBB,,, | Performed by: PHYSICIAN ASSISTANT

## 2025-03-14 PROCEDURE — 99213 OFFICE O/P EST LOW 20 MIN: CPT | Mod: PBBFAC | Performed by: PHYSICIAN ASSISTANT

## 2025-03-19 NOTE — PATIENT INSTRUCTIONS
Assessment:  Adal Loaiza Jr. is a  75 y.o. male   Data Unavailable with a chief complaint of Pain of the Left Knee  Patient presents today for a recheck on left knee pain with a hx of left knee OA.  Previous completed left knee visco injections on 11/8/24    Encounter Diagnoses   Name Primary?    Primary osteoarthritis of left knee Yes    Effusion of left knee     PAF (paroxysmal atrial fibrillation)     Left knee pain, unspecified chronicity     Cardiac pacemaker in situ     Primary localized osteoarthritis of left knee         Plan:  PT/OT:   Continue home exercise plan  Medications:    Continue oral ant-inflammatories as needed  Continue topical anti-inflammatories as needed   DME and weight bearing status:    Compressive knee brace  Injections:   Reorder visco injections left knee   Education:    I had a long discussion with the patient about the causes, treatments, and prognosis for knee arthritis. We discussed that although there is not a cure for arthritis, there are effective ways to improve symptoms. I recommended low impact activities such as elliptical and bicycle. I talked about the fact that aquatic and pool therapy is often helpful. I advised the patient to consider good quality stability and cushioned shoes. We talked about the importance of knee motion in the health of the knee. The patient can also use a compression knee sleeve to help limit swelling and provide proprioceptive feedback. We recommend formal physical therapy or at minimum a home exercise program, which we discussed with the patient. I advised that over the counter solutions such as glucosamine and chondroitin may help with symptoms.    Return to clinic:    4 weeks to start visco in the left knee       Follow-up: 4 weeks to start visco in the left knee. Please reach out to us sooner if there are any problems between now and then.    About Dr. Rayne Mclain's Research & Publications    Give us Feedback:   Google: Leave Google  Review  Healthgrades: Leave Healthgrades Review

## 2025-03-19 NOTE — PROGRESS NOTES
"      Patient ID: Adal Loaiza Jr.  YOB: 1950  MRN: [id number]    Chief Complaint: Pain of the Left Knee    History of Present Illness: Adal Loaiza Jr. is a  75 y.o. male   Data Unavailable with a chief complaint of Pain of the Left Knee    History of Present Illness    CHIEF COMPLAINT:  - Knee pain follow-up.    HPI:  Adal presents for a follow-up visit regarding knee pain, particularly in the left knee. He reports his knees are feeling good overall. This morning in the shower, his left knee experienced some minor locking. He has learned to live with the condition.    His left knee is worse than the right, with significant arthritis and some loose bodies based on x-rays from 10/16/2024. He describes tenderness in certain areas of the knee, likening the sensation to rolling over a nerve.    He has a history of receiving knee injections, stating he has been receiving them for years and affirming their effectiveness. He expresses willingness to receive another injection.    PREVIOUS TREATMENTS:  - Adal has been receiving knee injections for years, which have been working well for him. He refers to these injections as "rooster comb". He has not been using a knee brace.    IMAGING:  - X-rays of the left knee: 10/16/2024, significant arthritis with some loose bodies in the left knee.      ROS:  Musculoskeletal: +joint pain, +limb pain, +pain with movement         Past Medical History:   Past Medical History:   Diagnosis Date    Anemia due to GI blood loss 2012    Arrhythmia requiring replacement of cardiac pacemaker     Brain tumor (benign) 1999    Meningioma    Encounter for blood transfusion     GI bleed due to NSAIDs 2012    Hyperlipidemia     Localized osteoarthritis of left knee 12/10/2020    PAF (paroxysmal atrial fibrillation) 6/16/2019    Renal cyst 2/19/2019    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    Seizures     because of brain tumor    Sleep apnea     Spinal cord disease     meningioma "     Past Surgical History:   Procedure Laterality Date    BRAIN SURGERY      CARDIAC PACEMAKER PLACEMENT      COLONOSCOPY N/A 8/25/2016    Procedure: COLONOSCOPY;  Surgeon: Morris Olguin MD;  Location: [facility];  Service: Endoscopy;  Laterality: N/A;    COLONOSCOPY N/A 4/4/2022    Procedure: COLONOSCOPY;  Surgeon: Marci Silva MD;  Location: [facility] ENDO;  Service: Endoscopy;  Laterality: N/A;    ESOPHAGEAL MANOMETRY WITH MEASUREMENT OF IMPEDANCE N/A 4/26/2022    Procedure: MANOMETRY, ESOPHAGUS, WITH IMPEDANCE MEASUREMENT;  Surgeon: Yoni Portillo RN;  Location: [facility];  Service: Endoscopy;  Laterality: N/A;    ESOPHAGOGASTRODUODENOSCOPY N/A 4/4/2022    Procedure: ESOPHAGOGASTRODUODENOSCOPY (EGD);  Surgeon: Marci Silva MD;  Location: [facility];  Service: Endoscopy;  Laterality: N/A;    ESOPHAGOGASTRODUODENOSCOPY N/A 6/6/2022    Procedure: EGD (ESOPHAGOGASTRODUODENOSCOPY);  Surgeon: Ryanne Phan DO;  Location: [facility];  Service: General;  Laterality: N/A;    FRACTURE SURGERY Right     right jaw    Gamma knife      for schwannoma    LAPAROSCOPIC TOUPET FUNDOPLICATION N/A 6/6/2022    Procedure: FUNDOPLICATION, LAPAROSCOPIC, TOUPET;  Surgeon: Ryanne Phan DO;  Location: [facility] OR;  Service: General;  Laterality: N/A;    REPLACEMENT OF PACEMAKER GENERATOR Left 2/13/2019    Procedure: REPLACEMENT, PULSE GENERATOR, CARDIAC PACEMAKER;  Surgeon: Gen Meza MD;  Location: [facility] CATH LAB;  Service: Cardiology;  Laterality: Left;  current device MDT/waiting for MD input    ROBOT-ASSISTED REPAIR OF HIATAL HERNIA USING DA CARMELO XI N/A 6/6/2022    Procedure: XI ROBOTIC REPAIR, HERNIA, HIATAL;  Surgeon: Ryanne Phan DO;  Location: [facility] OR;  Service: General;  Laterality: N/A;     Family History   Problem Relation Name Age of Onset    Colon cancer Father      Hypertension Unknown       Social History[1]  Medication List with Changes/Refills   Current Medications    CICLOPIROX (LOPROX) 0.77  % CREA    Apply topically 2 (two) times daily. To affected Area of skin.    FINASTERIDE (PROSCAR) 5 MG TABLET    Take 1 tablet (5 mg total) by mouth once daily.    GABAPENTIN (NEURONTIN) 300 MG CAPSULE    Take 1 capsule by mouth every morning.    HYDROCORTISONE 2.5 % CREAM    Apply topically 2 (two) times daily.    LACTOBACILLUS ACIDOPHILUS (PROBIOTIC ORAL)    Take by mouth once daily.    LEVETIRACETAM (KEPPRA) 500 MG TAB    Take 1 tablet (500 mg total) by mouth 2 (two) times daily.    MULTIVITAMIN (THERAGRAN) PER TABLET    Take 1 tablet by mouth once daily.    NEOMYCIN-POLYMYXIN-HYDROCORTISONE (CORTISPORIN) OTIC SOLUTION    Place 3 drops into the left ear 4 (four) times daily.    PRAVASTATIN (PRAVACHOL) 40 MG TABLET    Take 1 tablet (40 mg total) by mouth once daily.    TAMSULOSIN (FLOMAX) 0.4 MG CAP    Take 1 capsule (0.4 mg total) by mouth once daily.    TERBINAFINE HCL (LAMISIL) 1 % CREAM    Apply topically 2 (two) times daily. Anti-fungal cream     Review of patient's allergies indicates:   Allergen Reactions    Nsaids (non-steroidal anti-inflammatory drug)      Caused GI bleeding.    Aspirin     Celecoxib      Increased bleeding      ROS    Physical Exam:   There is no height or weight on file to calculate BMI.  There were no vitals filed for this visit.   GENERAL: Well appearing, appropriate for stated age, no acute distress.  CARDIOVASCULAR: Pulses regular by peripheral palpation.  PULMONARY: Respirations are even and non-labored.  NEURO: Awake, alert, and oriented x 3.  PSYCH: Mood & affect are appropriate.  HEENT: Head is normocephalic and atraumatic.  General    Nursing note and vitals reviewed.          Right Knee Exam   Right knee exam is normal.    Inspection   Effusion: absent    Tenderness   The patient is experiencing no tenderness.     Crepitus   The patient has crepitus of the patella.    Range of Motion   Extension:  0   Flexion:  120     Tests   Ligament Examination   Lachman: normal (-1 to 2mm)    PCL-Posterior Drawer: normal (0 to 2mm)     MCL - Valgus: normal (0 to 2mm)  LCL - Varus: normal    Other   Sensation: normal    Left Knee Exam   Left knee exam is normal.    Inspection   Effusion: absent    Tenderness   The patient tender to palpation of the medial joint line.    Crepitus   The patient has crepitus of the patella.    Range of Motion   Extension:  0   Flexion:  120     Tests   Stability   Lachman: normal (-1 to 2mm)   PCL-Posterior Drawer: normal (0 to 2mm)  MCL - Valgus: normal (0 to 2mm)  LCL - Varus: normal (0 to 2mm)    Other   Sensation: normal    Muscle Strength   Right Lower Extremity   Hip Abduction: 5/5   Quadriceps:  4/5   Hamstrin/5   Left Lower Extremity   Hip Abduction: 5/5   Quadriceps:  4/5   Hamstrin/5     Vascular Exam     Right Pulses  Dorsalis Pedis:      2+  Posterior Tibial:      2+        Left Pulses  Dorsalis Pedis:      2+  Posterior Tibial:      2+        All compartments are soft and compressible. Calf soft non-tender. Intact EHL, FHL, gastroc soleus, and tibialis anterior. Sensation intact to light touch in superficial peroneal, deep peroneal, tibial, sural, and saphenous nerve distributions. Foot warm and well perfused with capillary refill of less than 2 seconds and palpable pedal pulses.       Physical Exam    MSK: Knee - Left: Tenderness in left knee. No significant swelling in left knee.           Imaging:    X-ray Knee Ortho Left with Flexion  Narrative: EXAM: XR KNEE ORTHO LEFT WITH FLEXION    CLINICAL HISTORY: Arthritis    FINDINGS:  Compared to 2023.    No fracture or dislocation is visible.  There is a moderate left knee joint effusion which has appeared since 2023.  Stable advanced degenerative joint space narrowing in the medial tibiofemoral and patellofemoral compartments is again noted.  3 or 4 small calcified loose joint bodies are also visible in the joint space in the lateral tibiofemoral compartment.  Less severe joint space  narrowing is also visible in the medial tibiofemoral compartment of the right knee and moderate joint space narrowing is visible in the right patellofemoral compartment.  Impression: 1.  No evidence of osseous injury.  2.  New moderate left knee joint effusion compared to 11/21/2023.  3.  Chronic osteoarthritic changes, most severe in the medial tibiofemoral compartment and stable appearance of small calcified loose joint bodies in the left knee.    Finalized on: 10/16/2024 9:26 AM By:  Alok Mora  R# [id number]      2024-10-16 09:[id number]    [facility]        Relevant imaging results reviewed and interpreted by me, discussed with the patient and / or family today.     Other Tests:       Patient Instructions   Assessment:  Adal Stevenschaider Pineda is a  75 y.o. male   Data Unavailable with a chief complaint of Pain of the Left Knee  Patient presents today for a recheck on left knee pain with a hx of left knee OA.  Previous completed left knee visco injections on 11/8/24    Encounter Diagnoses   Name Primary?    Primary osteoarthritis of left knee Yes    Effusion of left knee     PAF (paroxysmal atrial fibrillation)     Left knee pain, unspecified chronicity     Cardiac pacemaker in situ     Primary localized osteoarthritis of left knee         Plan:  PT/OT:   Continue home exercise plan  Medications:    Continue oral ant-inflammatories as needed  Continue topical anti-inflammatories as needed   DME and weight bearing status:    Compressive knee brace  Injections:   Reorder visco injections left knee   Education:    I had a long discussion with the patient about the causes, treatments, and prognosis for knee arthritis. We discussed that although there is not a cure for arthritis, there are effective ways to improve symptoms. I recommended low impact activities such as elliptical and bicycle. I talked about the fact that aquatic and pool therapy is often helpful. I advised the patient to consider good quality stability  and cushioned shoes. We talked about the importance of knee motion in the health of the knee. The patient can also use a compression knee sleeve to help limit swelling and provide proprioceptive feedback. We recommend formal physical therapy or at minimum a home exercise program, which we discussed with the patient. I advised that over the counter solutions such as glucosamine and chondroitin may help with symptoms.    Return to clinic:    4 weeks to start visco in the left knee       Follow-up: 4 weeks to start visco in the left knee. Please reach out to us sooner if there are any problems between now and then.    About Dr. Rayne Mclain's Research & Publications    Give us Feedback:   Google: Leave Google Review  Healthgrades: Leave Healthgrades Review       Provider Note/Medical Decision Making:   MEDICAL NECESSITY FOR VISCOSUPPLEMENTATION: After thorough evaluation of the patient, I have determined that visco-supplementation is medically necessary. The patient has painful DJD of the knee with failure of conservative therapy including lifestyle modifications and rehabilitation exercises. Oral analgesis/NSAIDs have not adequately controlled symptoms and there is radiographic evidence of joint space narrowing, subchondral sclerosis, and some early osteophytic changes Kellgren- Renard grade 2 or greater, or in lack of radiographic evidence, there is arthroscopic or other evidence of chondrosis.     Today's visit is associated with current or anticipated ongoing medical care related to this patient's diagnosis of osteoarthritis. Currently there is no cure of osteoarthritis and the patient will require regular follow up to manage symptoms and progression. The patient is to return to the office within a minimum of 3-6 months to review symptoms and function at that time. CPT code       I discussed worrisome and red flag signs and symptoms with the patient. The patient expressed understanding and agreed  to alert me immediately or to go to the emergency room if they experience any of these.   Treatment plan was developed with input from the patient/family, and they expressed understanding and agreement with the plan. All questions were answered today.      Jesusita Aguilera PA-C  Sports Medicine Physician Assistant     Disclaimer: This note was prepared using a voice recognition system and is likely to have sound alike errors within the text.     This note was generated with the assistance of ambient listening technology. Verbal consent was obtained by the patient and accompanying visitor(s) for the recording of patient appointment to facilitate this note. I attest to having reviewed and edited the generated note for accuracy, though some syntax or spelling errors may persist. Please contact the author of this note for any clarification.             [1]   Social History  Socioeconomic History    Marital status:    Tobacco Use    Smoking status: Never    Smokeless tobacco: Never   Substance and Sexual Activity    Alcohol use: No     Comment: socially    Drug use: No

## 2025-04-07 RX ORDER — FINASTERIDE 5 MG/1
5 TABLET, FILM COATED ORAL
Qty: 30 TABLET | Refills: 0 | Status: SHIPPED | OUTPATIENT
Start: 2025-04-07

## 2025-04-09 ENCOUNTER — CLINICAL SUPPORT (OUTPATIENT)
Dept: CARDIOLOGY | Facility: HOSPITAL | Age: 75
End: 2025-04-09
Payer: MEDICARE

## 2025-04-09 DIAGNOSIS — R00.1 BRADYCARDIA, UNSPECIFIED: ICD-10-CM

## 2025-04-09 DIAGNOSIS — Z95.0 PRESENCE OF CARDIAC PACEMAKER: ICD-10-CM

## 2025-04-11 ENCOUNTER — LAB VISIT (OUTPATIENT)
Dept: LAB | Facility: HOSPITAL | Age: 75
End: 2025-04-11
Attending: UROLOGY
Payer: MEDICARE

## 2025-04-11 DIAGNOSIS — Z12.5 PROSTATE CANCER SCREENING: ICD-10-CM

## 2025-04-11 LAB — PSA SERPL-MCNC: 1.08 NG/ML

## 2025-04-11 PROCEDURE — 84153 ASSAY OF PSA TOTAL: CPT

## 2025-04-11 PROCEDURE — 36415 COLL VENOUS BLD VENIPUNCTURE: CPT

## 2025-04-14 ENCOUNTER — OFFICE VISIT (OUTPATIENT)
Dept: UROLOGY | Facility: CLINIC | Age: 75
End: 2025-04-14
Payer: MEDICARE

## 2025-04-14 VITALS
BODY MASS INDEX: 30.9 KG/M2 | SYSTOLIC BLOOD PRESSURE: 110 MMHG | HEIGHT: 70 IN | DIASTOLIC BLOOD PRESSURE: 75 MMHG | WEIGHT: 215.81 LBS | HEART RATE: 79 BPM

## 2025-04-14 DIAGNOSIS — N28.1 RENAL CYST: ICD-10-CM

## 2025-04-14 DIAGNOSIS — N40.1 BPH WITH OBSTRUCTION/LOWER URINARY TRACT SYMPTOMS: Primary | ICD-10-CM

## 2025-04-14 DIAGNOSIS — N13.8 BPH WITH OBSTRUCTION/LOWER URINARY TRACT SYMPTOMS: Primary | ICD-10-CM

## 2025-04-14 DIAGNOSIS — Z12.5 PROSTATE CANCER SCREENING: ICD-10-CM

## 2025-04-14 DIAGNOSIS — R33.9 URINARY RETENTION: ICD-10-CM

## 2025-04-14 DIAGNOSIS — N20.0 RENAL STONE: ICD-10-CM

## 2025-04-14 LAB
BILIRUBIN, UA POC OHS: NEGATIVE
BLOOD, UA POC OHS: NEGATIVE
CLARITY, UA POC OHS: CLEAR
COLOR, UA POC OHS: YELLOW
GLUCOSE, UA POC OHS: NEGATIVE
KETONES, UA POC OHS: NEGATIVE
LEUKOCYTES, UA POC OHS: NEGATIVE
NITRITE, UA POC OHS: NEGATIVE
PH, UA POC OHS: 5
POC RESIDUAL URINE VOLUME: 22 ML (ref 0–100)
PROTEIN, UA POC OHS: NEGATIVE
SPECIFIC GRAVITY, UA POC OHS: 1.02
UROBILINOGEN, UA POC OHS: 0.2

## 2025-04-14 PROCEDURE — 99214 OFFICE O/P EST MOD 30 MIN: CPT | Mod: PBBFAC | Performed by: UROLOGY

## 2025-04-14 PROCEDURE — 99999 PR PBB SHADOW E&M-EST. PATIENT-LVL IV: CPT | Mod: PBBFAC,,, | Performed by: UROLOGY

## 2025-04-14 PROCEDURE — 81003 URINALYSIS AUTO W/O SCOPE: CPT | Mod: PBBFAC | Performed by: UROLOGY

## 2025-04-14 PROCEDURE — 99999PBSHW POCT BLADDER SCAN: Mod: PBBFAC,,,

## 2025-04-14 PROCEDURE — 99214 OFFICE O/P EST MOD 30 MIN: CPT | Mod: S$PBB,,, | Performed by: UROLOGY

## 2025-04-14 PROCEDURE — 51798 US URINE CAPACITY MEASURE: CPT | Mod: PBBFAC | Performed by: UROLOGY

## 2025-04-14 PROCEDURE — 99999PBSHW POCT URINALYSIS(INSTRUMENT): Mod: PBBFAC,,,

## 2025-04-14 RX ORDER — TAMSULOSIN HYDROCHLORIDE 0.4 MG/1
0.4 CAPSULE ORAL DAILY
Qty: 90 CAPSULE | Refills: 4 | Status: SHIPPED | OUTPATIENT
Start: 2025-04-14 | End: 2026-04-14

## 2025-04-14 RX ORDER — FINASTERIDE 5 MG/1
5 TABLET, FILM COATED ORAL DAILY
Qty: 90 TABLET | Refills: 4 | Status: SHIPPED | OUTPATIENT
Start: 2025-04-14

## 2025-04-14 NOTE — PROGRESS NOTES
Chief Complaint   Patient presents with    Follow-up     6 months          History of Present Illness:   Adal Loaiza Jr. is a 75 y.o. male here for evaluation of Follow-up (6 months )    4/14/25- Pt is taking flomax and finasteride. Nocturia x 0-1. Drinking a lot of water. Denies weak stream. No stranguria. No flank pain. No abdominal pain. No recent stones.     4/1/24-more frequency lately, but he is drinking a lot. Nocturia x 0-1. No urgency. Stream is fair. Still on flomax and finasteride.   9/25/23-Pt on flomax and finasteride. No gross hematuria or dysuria. Nocturia x 1. No stranguria. No incontinence. No urgency.   3/27/23-here for f/u after CT scan. CT scan completed 3/9/23 personally reviewed, showing a right lower pole hyperdense cyst, measuring 2.5cm. Right simple renal cysts also seen. Stream is good. No gross hematuria. No Nocturia. Currently happy with his voiding dynamics on flomax and finasteride.   2/27/23-CT scan personally reviewed, showing no obstructive cause for the patient's bilateral varicoceles, but R lower pole renal cyst does appear to need further evaluation. No scrotal pain. No gross hematuria. He continues to take finasteride and flomax.   2/6/23-on finasteride and flomax. Having bilateral scrotal swelling, which comes and goes. It is not the testicle itself. Not painful. Feels like he is urinating well. Stream is fair. Urinates 8-10 times a day. Nocturia x 1. No gross hematuria.   8/8/22- Here for cysto. Urinating without difficulty. Good stream. Stopped flomax and finasteride a couple days ago due to joint pain.   7/5/22- Pt here for evaluation of recurrent urinary retention. He reports that he underwent Hiatal hernia surgery and hasn't been able to urinate since that time. He has had multiple voiding trials. Sometimes, he was distended over 1000cc.   Prior to surgery, felt like he emptied, stream was good.   Has h/o meningioma s/p surgery 22 years ago. Also had gamma knife  surgery following that.          Review of Systems   Constitutional:  Negative for fever.   Respiratory:  Negative for shortness of breath.    Cardiovascular:  Negative for chest pain.   Gastrointestinal:  Negative for abdominal pain.   Musculoskeletal:  Negative for back pain.   All other systems reviewed and are negative.      Past Medical History:   Diagnosis Date    Anemia due to GI blood loss 2012    Arrhythmia requiring replacement of cardiac pacemaker     Brain tumor (benign) 1999    Meningioma    Encounter for blood transfusion     GI bleed due to NSAIDs 2012    Hyperlipidemia     Localized osteoarthritis of left knee 12/10/2020    PAF (paroxysmal atrial fibrillation) 6/16/2019    Renal cyst 2/19/2019    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    Seizures     because of brain tumor    Sleep apnea     Spinal cord disease     meningioma       Past Surgical History:   Procedure Laterality Date    BRAIN SURGERY      CARDIAC PACEMAKER PLACEMENT      COLONOSCOPY N/A 8/25/2016    Procedure: COLONOSCOPY;  Surgeon: Morris Olguin MD;  Location: [facility];  Service: Endoscopy;  Laterality: N/A;    COLONOSCOPY N/A 4/4/2022    Procedure: COLONOSCOPY;  Surgeon: Marci Silva MD;  Location: [facility];  Service: Endoscopy;  Laterality: N/A;    ESOPHAGEAL MANOMETRY WITH MEASUREMENT OF IMPEDANCE N/A 4/26/2022    Procedure: MANOMETRY, ESOPHAGUS, WITH IMPEDANCE MEASUREMENT;  Surgeon: Yoni Portillo RN;  Location: [facility];  Service: Endoscopy;  Laterality: N/A;    ESOPHAGOGASTRODUODENOSCOPY N/A 4/4/2022    Procedure: ESOPHAGOGASTRODUODENOSCOPY (EGD);  Surgeon: Marci Silva MD;  Location: [facility];  Service: Endoscopy;  Laterality: N/A;    ESOPHAGOGASTRODUODENOSCOPY N/A 6/6/2022    Procedure: EGD (ESOPHAGOGASTRODUODENOSCOPY);  Surgeon: Ryanne Phan DO;  Location: [facility] OR;  Service: General;  Laterality: N/A;    FRACTURE SURGERY Right     right jaw    Gamma knife      for schwannoma    LAPAROSCOPIC TOUPET  FUNDOPLICATION N/A 6/6/2022    Procedure: FUNDOPLICATION, LAPAROSCOPIC, TOUPET;  Surgeon: Ryanne Phan DO;  Location: [facility] OR;  Service: General;  Laterality: N/A;    REPLACEMENT OF PACEMAKER GENERATOR Left 2/13/2019    Procedure: REPLACEMENT, PULSE GENERATOR, CARDIAC PACEMAKER;  Surgeon: Gen Meza MD;  Location: [facility] CATH LAB;  Service: Cardiology;  Laterality: Left;  current device MDT/waiting for MD input    ROBOT-ASSISTED REPAIR OF HIATAL HERNIA USING DA CARMELO XI N/A 6/6/2022    Procedure: XI ROBOTIC REPAIR, HERNIA, HIATAL;  Surgeon: Ryanne Phan DO;  Location: [facility] OR;  Service: General;  Laterality: N/A;       Family History   Problem Relation Name Age of Onset    Colon cancer Father      Hypertension Unknown         Social History     Tobacco Use    Smoking status: Never    Smokeless tobacco: Never   Substance Use Topics    Alcohol use: No     Comment: socially    Drug use: No       Current Outpatient Medications   Medication Sig Dispense Refill    ciclopirox (LOPROX) 0.77 % Crea Apply topically 2 (two) times daily. To affected Area of skin. 90 g 0    gabapentin (NEURONTIN) 300 MG capsule Take 1 capsule by mouth every morning.      hydrocortisone 2.5 % cream Apply topically 2 (two) times daily. 28 g 1    LACTOBACILLUS ACIDOPHILUS (PROBIOTIC ORAL) Take by mouth once daily.      levetiracetam (KEPPRA) 500 MG Tab Take 1 tablet (500 mg total) by mouth 2 (two) times daily. 60 tablet 11    multivitamin (THERAGRAN) per tablet Take 1 tablet by mouth once daily.      neomycin-polymyxin-hydrocortisone (CORTISPORIN) otic solution Place 3 drops into the left ear 4 (four) times daily. 10 mL 0    pravastatin (PRAVACHOL) 40 MG tablet Take 1 tablet (40 mg total) by mouth once daily. 90 tablet 3    terbinafine HCL (LAMISIL) 1 % cream Apply topically 2 (two) times daily. Anti-fungal cream 28 g 2    finasteride (PROSCAR) 5 mg tablet Take 1 tablet (5 mg total) by mouth once daily. 90 tablet 4     tamsulosin (FLOMAX) 0.4 mg Cap Take 1 capsule (0.4 mg total) by mouth once daily. 90 capsule 4     No current facility-administered medications for this visit.     Facility-Administered Medications Ordered in Other Visits   Medication Dose Route Frequency Provider Last Rate Last Admin    sodium hyaluronate (EUFLEXXA) 10 mg/mL(mw 2.4 -3.6 million) injection 20 mg  20 mg Intra-articular  Attila Mclain MD   20 mg at 04/27/22 1040    sodium hyaluronate (EUFLEXXA) 10 mg/mL(mw 2.4 -3.6 million) injection 20 mg  20 mg Intra-articular  Attila Mclain MD   20 mg at 05/04/22 1020       Review of patient's allergies indicates:   Allergen Reactions    Nsaids (non-steroidal anti-inflammatory drug)      Caused GI bleeding.    Aspirin     Celecoxib      Increased bleeding        Physical Exam  Vitals:    04/14/25 1406   BP: 110/75   Pulse: 79       General: Well-developed, well-nourished in no acute distress  HEENT: Normocephalic, atraumatic, Extraocular movements intact  Neck: supple, trachea midline, no cervical or supraclavicular lymphadenopathy  Respirations: even and unlabored  Back: midline spine, no CVA tenderness  Abdomen: soft, Non-tender, non-distended, no organomegaly or palpable masses, no rebound or guarding  : 2/6/23-bilateral varicoceles, L prominent epididymis, no scrotal mass  Rectal: 4/14/25->40g prostate, no nodules or tenderness. No gross blood  Extremities: atraumatic, moves all equally, no clubbing, cyanosis or edema  Psych: normal affect  Skin: warm and dry, no lesions  Neuro: Alert and oriented, Cranial nerves II-XII grossly intact    PVR: 22cc    UA: negative for blood, LE, nit    PSA  2/16/23: 1.0  3/1/24: 1.1  4/11/25: 1.08    Renal US 4/3/24 personally reviewed and agree with the official read:   FINDINGS:  The right kidney measures 11.6 cm in length.  Normal resistive index.  The left kidney measures 12.9 cm in length. Normal resistive index.     Right 5 mm interpolar region and 7 mm lower  pole nonobstructing renal stones.  Scattered simple renal cysts in the right kidney similar to 03/09/2023.  There is a minimally septated cyst at the medial right lower pole measuring 16 x 15 mm, also similar to 03/09/2023.  Left lower pole subcentimeter simple parapelvic cysts.  No solid mass, hydronephrosis, or perinephric fluid collection.  The urinary bladder is grossly normal.     Impression:     No acute abnormality.    Assessment:   1. BPH with obstruction/lower urinary tract symptoms  POCT Urinalysis(Instrument)    POCT Bladder Scan      2. Urinary retention        3. Renal cyst  US Retroperitoneal Complete    US Retroperitoneal Complete      4. Renal stone  US Retroperitoneal Complete    US Retroperitoneal Complete      5. Prostate cancer screening  PSA, Screening            Plan:  BPH with obstruction/lower urinary tract symptoms  -     POCT Urinalysis(Instrument)  -     POCT Bladder Scan    Urinary retention    Renal cyst  -     US Retroperitoneal Complete; Future; Expected date: 04/14/2025  -     US Retroperitoneal Complete; Future; Expected date: 04/14/2026    Renal stone  -     US Retroperitoneal Complete; Future; Expected date: 04/14/2025  -     US Retroperitoneal Complete; Future; Expected date: 04/14/2026    Prostate cancer screening  -     PSA, Screening; Future; Expected date: 04/14/2026    Other orders  -     tamsulosin (FLOMAX) 0.4 mg Cap; Take 1 capsule (0.4 mg total) by mouth once daily.  Dispense: 90 capsule; Refill: 4  -     finasteride (PROSCAR) 5 mg tablet; Take 1 tablet (5 mg total) by mouth once daily.  Dispense: 90 tablet; Refill: 4      Follow up in about 1 year (around 4/14/2026) for labs before visit.

## 2025-04-17 ENCOUNTER — HOSPITAL ENCOUNTER (OUTPATIENT)
Dept: RADIOLOGY | Facility: HOSPITAL | Age: 75
Discharge: HOME | End: 2025-04-17
Attending: UROLOGY
Payer: MEDICARE

## 2025-04-17 DIAGNOSIS — N28.1 RENAL CYST: ICD-10-CM

## 2025-04-17 DIAGNOSIS — N20.0 RENAL STONE: ICD-10-CM

## 2025-04-17 PROCEDURE — 76770 US EXAM ABDO BACK WALL COMP: CPT | Mod: 26,,, | Performed by: RADIOLOGY

## 2025-04-17 PROCEDURE — 76770 US EXAM ABDO BACK WALL COMP: CPT | Mod: TC

## 2025-04-18 ENCOUNTER — RESULTS FOLLOW-UP (OUTPATIENT)
Dept: UROLOGY | Facility: CLINIC | Age: 75
End: 2025-04-18

## 2025-04-28 ENCOUNTER — PATIENT MESSAGE (OUTPATIENT)
Dept: RESEARCH | Facility: HOSPITAL | Age: 75
End: 2025-04-28
Payer: MEDICARE

## 2025-04-30 DIAGNOSIS — B35.6 TINEA CRURIS: ICD-10-CM

## 2025-04-30 RX ORDER — CICLOPIROX OLAMINE 7.7 MG/G
CREAM TOPICAL 2 TIMES DAILY
Qty: 90 G | Refills: 0 | Status: SHIPPED | OUTPATIENT
Start: 2025-04-30

## 2025-05-02 ENCOUNTER — PROCEDURE VISIT (OUTPATIENT)
Dept: SPORTS MEDICINE | Facility: CLINIC | Age: 75
End: 2025-05-02
Payer: MEDICARE

## 2025-05-02 DIAGNOSIS — M17.12 PRIMARY OSTEOARTHRITIS OF LEFT KNEE: Primary | ICD-10-CM

## 2025-05-02 NOTE — PROCEDURES
Attempted to call patient to review E advice message on her rash but was unable to reach. Left generic voicemail with my name and call back number with request to call back at earliest convenience. Also sent reply on portal with recommendations.     Large Joint Aspiration/Injection: L knee    Date/Time: 5/2/2025 12:00 PM    Performed by: Jesusita Vogel PA-C  Authorized by: Jesusita Vogel PA-C    Consent Done?:  Yes (Verbal)  Indications:  Joint swelling and pain  Site marked: the procedure site was marked    Timeout: prior to procedure the correct patient, procedure, and site was verified    Prep: patient was prepped and draped in usual sterile fashion      Local anesthesia used?: Yes    Local anesthetic:  Topical anesthetic    Details:  Needle Size:  22 G  Ultrasonic Guidance for needle placement?: No    Approach:  Superior  Location:  Knee  Site:  L knee  Medications:  20 mg sodium hyaluronate (EUFLEXXA) 10 mg/mL(mw 2.4 -3.6 million)  Patient tolerance:  Patient tolerated the procedure well with no immediate complications     1/3

## 2025-05-02 NOTE — PROCEDURES
Large Joint Aspiration/Injection: L knee    Date/Time: 5/2/2025 12:00 PM    Performed by: Jesusita Vogel PA-C  Authorized by: Jesusita Vogel PA-C    Consent Done?:  Yes (Verbal)  Indications:  Joint swelling and pain  Site marked: the procedure site was marked    Timeout: prior to procedure the correct patient, procedure, and site was verified    Prep: patient was prepped and draped in usual sterile fashion      Local anesthesia used?: Yes    Anesthesia:  Local infiltration  Local anesthetic:  Lidocaine 1% without epinephrine    Details:  Needle Size:  25 G  Ultrasonic Guidance for needle placement?: No    Approach:  Anterolateral  Location:  Knee  Site:  L knee  Aspirate amount (mL):  30  Aspirate:  Serous     1% Lidocaine was used for local anesthetic  Aspiration was performed with an 18g needle.

## 2025-05-09 ENCOUNTER — PROCEDURE VISIT (OUTPATIENT)
Dept: SPORTS MEDICINE | Facility: CLINIC | Age: 75
End: 2025-05-09
Payer: MEDICARE

## 2025-05-09 DIAGNOSIS — M17.12 PRIMARY OSTEOARTHRITIS OF LEFT KNEE: Primary | ICD-10-CM

## 2025-05-09 NOTE — PROCEDURES
Large Joint Aspiration/Injection: L knee    Date/Time: 5/9/2025 8:00 AM    Performed by: Jesusita Vogel PA-C  Authorized by: Jesusita Vogel PA-C    Consent Done?:  Yes (Verbal)  Indications:  Joint swelling and pain  Site marked: the procedure site was marked    Timeout: prior to procedure the correct patient, procedure, and site was verified    Prep: patient was prepped and draped in usual sterile fashion      Local anesthesia used?: Yes    Local anesthetic:  Topical anesthetic    Details:  Needle Size:  22 G  Ultrasonic Guidance for needle placement?: No    Approach:  Superior  Location:  Knee  Site:  L knee  Medications:  20 mg sodium hyaluronate (EUFLEXXA) 10 mg/mL(mw 2.4 -3.6 million)  Patient tolerance:  Patient tolerated the procedure well with no immediate complications     2/3

## 2025-05-16 ENCOUNTER — PROCEDURE VISIT (OUTPATIENT)
Dept: SPORTS MEDICINE | Facility: CLINIC | Age: 75
End: 2025-05-16
Payer: MEDICARE

## 2025-05-16 DIAGNOSIS — M17.12 PRIMARY OSTEOARTHRITIS OF LEFT KNEE: Primary | ICD-10-CM

## 2025-05-16 NOTE — PROCEDURES
Large Joint Aspiration/Injection: L knee    Date/Time: 5/16/2025 9:00 AM    Performed by: Jesusita Vogel PA-C  Authorized by: Jesusita Vogel PA-C    Consent Done?:  Yes (Verbal)  Indications:  Joint swelling and pain  Site marked: the procedure site was marked    Timeout: prior to procedure the correct patient, procedure, and site was verified    Prep: patient was prepped and draped in usual sterile fashion      Local anesthesia used?: Yes    Local anesthetic:  Topical anesthetic    Details:  Needle Size:  22 G  Ultrasonic Guidance for needle placement?: No    Approach:  Superior  Location:  Knee  Site:  L knee  Medications:  20 mg sodium hyaluronate (EUFLEXXA) 10 mg/mL(mw 2.4 -3.6 million)  Patient tolerance:  Patient tolerated the procedure well with no immediate complications

## 2025-05-22 ENCOUNTER — TELEPHONE (OUTPATIENT)
Dept: CARDIOLOGY | Facility: CLINIC | Age: 75
End: 2025-05-22

## 2025-06-03 ENCOUNTER — OFFICE VISIT (OUTPATIENT)
Dept: DERMATOLOGY | Facility: CLINIC | Age: 75
End: 2025-06-03
Payer: MEDICARE

## 2025-06-03 DIAGNOSIS — B35.6 TINEA CRURIS: ICD-10-CM

## 2025-06-03 PROCEDURE — G2211 COMPLEX E/M VISIT ADD ON: HCPCS | Mod: S$PBB,,, | Performed by: STUDENT IN AN ORGANIZED HEALTH CARE EDUCATION/TRAINING PROGRAM

## 2025-06-03 PROCEDURE — 99212 OFFICE O/P EST SF 10 MIN: CPT | Mod: S$PBB,,, | Performed by: STUDENT IN AN ORGANIZED HEALTH CARE EDUCATION/TRAINING PROGRAM

## 2025-06-03 PROCEDURE — 99999 PR PBB SHADOW E&M-EST. PATIENT-LVL III: CPT | Mod: PBBFAC,,, | Performed by: STUDENT IN AN ORGANIZED HEALTH CARE EDUCATION/TRAINING PROGRAM

## 2025-06-03 PROCEDURE — 99213 OFFICE O/P EST LOW 20 MIN: CPT | Mod: PBBFAC | Performed by: STUDENT IN AN ORGANIZED HEALTH CARE EDUCATION/TRAINING PROGRAM

## 2025-06-03 RX ORDER — HYDROCORTISONE 25 MG/G
CREAM TOPICAL 2 TIMES DAILY
Qty: 28 G | Refills: 3 | Status: SHIPPED | OUTPATIENT
Start: 2025-06-03

## 2025-06-03 RX ORDER — PRENATAL VIT 91/IRON/FOLIC/DHA 28-975-200
COMBINATION PACKAGE (EA) ORAL 2 TIMES DAILY
Qty: 28 G | Refills: 3 | Status: SHIPPED | OUTPATIENT
Start: 2025-06-03

## 2025-06-11 DIAGNOSIS — R00.1 SEVERE SINUS BRADYCARDIA: Primary | ICD-10-CM

## 2025-06-11 DIAGNOSIS — I48.0 PAROXYSMAL ATRIAL FIBRILLATION: ICD-10-CM

## 2025-06-11 DIAGNOSIS — Z95.1 POSTSURGICAL AORTOCORONARY BYPASS STATUS: ICD-10-CM

## 2025-06-11 DIAGNOSIS — R00.1 BRADYCARDIA: ICD-10-CM

## 2025-06-11 DIAGNOSIS — Z95.0 CARDIAC PACEMAKER IN SITU: ICD-10-CM

## 2025-06-18 ENCOUNTER — OFFICE VISIT (OUTPATIENT)
Dept: CARDIOLOGY | Facility: CLINIC | Age: 75
End: 2025-06-18
Payer: MEDICARE

## 2025-06-18 ENCOUNTER — HOSPITAL ENCOUNTER (OUTPATIENT)
Dept: CARDIOLOGY | Facility: HOSPITAL | Age: 75
Discharge: HOME | End: 2025-06-18
Attending: NURSE PRACTITIONER
Payer: MEDICARE

## 2025-06-18 VITALS
DIASTOLIC BLOOD PRESSURE: 82 MMHG | HEART RATE: 70 BPM | OXYGEN SATURATION: 96 % | HEIGHT: 70 IN | BODY MASS INDEX: 31.25 KG/M2 | SYSTOLIC BLOOD PRESSURE: 130 MMHG | WEIGHT: 218.25 LBS

## 2025-06-18 DIAGNOSIS — R00.1 BRADYCARDIA: ICD-10-CM

## 2025-06-18 DIAGNOSIS — D50.9 IRON DEFICIENCY ANEMIA, UNSPECIFIED IRON DEFICIENCY ANEMIA TYPE: ICD-10-CM

## 2025-06-18 DIAGNOSIS — Z95.0 CARDIAC PACEMAKER IN SITU: ICD-10-CM

## 2025-06-18 DIAGNOSIS — I48.0 PAROXYSMAL ATRIAL FIBRILLATION: ICD-10-CM

## 2025-06-18 DIAGNOSIS — Z95.0 CARDIAC PACEMAKER IN SITU: Chronic | ICD-10-CM

## 2025-06-18 DIAGNOSIS — I48.0 PAF (PAROXYSMAL ATRIAL FIBRILLATION): Primary | ICD-10-CM

## 2025-06-18 DIAGNOSIS — I70.0 AORTIC ATHEROSCLEROSIS: ICD-10-CM

## 2025-06-18 DIAGNOSIS — Z95.0 PRESENCE OF CARDIAC PACEMAKER: Primary | ICD-10-CM

## 2025-06-18 DIAGNOSIS — G47.33 OBSTRUCTIVE SLEEP APNEA: Chronic | ICD-10-CM

## 2025-06-18 DIAGNOSIS — T82.110S FAILURE OF PACEMAKER LEAD, SEQUELA: ICD-10-CM

## 2025-06-18 PROCEDURE — 99214 OFFICE O/P EST MOD 30 MIN: CPT | Mod: S$PBB,,, | Performed by: NURSE PRACTITIONER

## 2025-06-18 PROCEDURE — 99213 OFFICE O/P EST LOW 20 MIN: CPT | Mod: PBBFAC | Performed by: NURSE PRACTITIONER

## 2025-06-18 PROCEDURE — 99999 PR PBB SHADOW E&M-EST. PATIENT-LVL III: CPT | Mod: PBBFAC,,, | Performed by: NURSE PRACTITIONER

## 2025-06-18 PROCEDURE — 93280 PM DEVICE PROGR EVAL DUAL: CPT

## 2025-06-18 NOTE — PROGRESS NOTES
Subjective:   Patient ID:  Adal Loaiza Jr. is a 75 y.o. male who presents for evaluation of Annual Exam      HPI    Adal Loaiza Jr. Is a 75 year old male who presents to Arrhythmia clinic with device check. His current medical conditions include PAF, HTN, HLP, Meningioma, SUSU on CpAP, MICHELLE.     Interval history:  Rare AF episodes,  longest episode 17 minutes, RA lead fractured and noise noted on lead. AF burden estimated per device is erroneous. PVC burden 5%    Denies any symptoms associated with PVCs.     Denies chest pain or anginal equivalents. No shortness of breath, COOPER or palpitations. Denies orthopnea, PND or abdominal bloating. Reports regular walking without any issues lately. NO leg swelling or claudications. No recent falls, syncope or near syncopal events. Reports compliance with medications and dietary restrictions. NO CNS complaints to suggest TIA or CVA today. No signs of abnormal bleeding.     Past Medical History:   Diagnosis Date    Anemia due to GI blood loss 2012    Arrhythmia requiring replacement of cardiac pacemaker     Brain tumor (benign) 1999    Meningioma    Encounter for blood transfusion     GI bleed due to NSAIDs 2012    Hyperlipidemia     Localized osteoarthritis of left knee 12/10/2020    PAF (paroxysmal atrial fibrillation) 6/16/2019    Renal cyst 2/19/2019    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    Seizures     because of brain tumor    Sleep apnea     Spinal cord disease     meningioma       Past Surgical History:   Procedure Laterality Date    BRAIN SURGERY      CARDIAC PACEMAKER PLACEMENT      COLONOSCOPY N/A 8/25/2016    Procedure: COLONOSCOPY;  Surgeon: Morris Olguin MD;  Location: [facility];  Service: Endoscopy;  Laterality: N/A;    COLONOSCOPY N/A 4/4/2022    Procedure: COLONOSCOPY;  Surgeon: Marci Silva MD;  Location: [facility];  Service: Endoscopy;  Laterality: N/A;    ESOPHAGEAL MANOMETRY WITH MEASUREMENT OF IMPEDANCE N/A 4/26/2022    Procedure: MANOMETRY,  ESOPHAGUS, WITH IMPEDANCE MEASUREMENT;  Surgeon: Yoni Portillo RN;  Location: [facility] ENDO;  Service: Endoscopy;  Laterality: N/A;    ESOPHAGOGASTRODUODENOSCOPY N/A 4/4/2022    Procedure: ESOPHAGOGASTRODUODENOSCOPY (EGD);  Surgeon: Marci Silva MD;  Location: [facility] ENDO;  Service: Endoscopy;  Laterality: N/A;    ESOPHAGOGASTRODUODENOSCOPY N/A 6/6/2022    Procedure: EGD (ESOPHAGOGASTRODUODENOSCOPY);  Surgeon: Ryanne Phan DO;  Location: [facility] OR;  Service: General;  Laterality: N/A;    FRACTURE SURGERY Right     right jaw    Gamma knife      for schwannoma    LAPAROSCOPIC TOUPET FUNDOPLICATION N/A 6/6/2022    Procedure: FUNDOPLICATION, LAPAROSCOPIC, TOUPET;  Surgeon: Ryanne Phan DO;  Location: [facility] OR;  Service: General;  Laterality: N/A;    REPLACEMENT OF PACEMAKER GENERATOR Left 2/13/2019    Procedure: REPLACEMENT, PULSE GENERATOR, CARDIAC PACEMAKER;  Surgeon: Gen Meza MD;  Location: [facility] CATH LAB;  Service: Cardiology;  Laterality: Left;  current device MDT/waiting for MD input    ROBOT-ASSISTED REPAIR OF HIATAL HERNIA USING DA CARMELO XI N/A 6/6/2022    Procedure: XI ROBOTIC REPAIR, HERNIA, HIATAL;  Surgeon: Ryanne Phan DO;  Location: [facility] OR;  Service: General;  Laterality: N/A;       Social History[1]    Family History   Problem Relation Name Age of Onset    Colon cancer Father      Hypertension Unknown         Wt Readings from Last 3 Encounters:   06/18/25 99 kg (218 lb 4.1 oz)   04/14/25 97.9 kg (215 lb 13.3 oz)   11/08/24 97.8 kg (215 lb 9.8 oz)     Temp Readings from Last 3 Encounters:   06/26/24 98.2 °F (36.8 °C) (Tympanic)   06/26/23 98.2 °F (36.8 °C) (Tympanic)   03/27/23 97.3 °F (36.3 °C) (Oral)     BP Readings from Last 3 Encounters:   06/18/25 130/82   04/14/25 110/75   08/06/24 134/78     Pulse Readings from Last 3 Encounters:   06/18/25 70   04/14/25 79   08/06/24 84       Medications Ordered Prior to Encounter[2]    No cardiac monitor results found for the past 12  months    No results found for this or any previous visit.    No results found for this or any previous visit.        Results for orders placed or performed during the hospital encounter of 05/17/24   SCHEDULED EKG 12-LEAD (to Muse)    Collection Time: 05/17/24 10:57 AM   Result Value Ref Range    QRS Duration 100 ms    [facility] QTC Calculation 385 ms    Narrative    Test Reason : R00.1,Z95.0,I48.0,    Vent. Rate : 069 BPM     Atrial Rate : 069 BPM     P-R Int : 210 ms          QRS Dur : 100 ms      QT Int : 360 ms       P-R-T Axes : 069 014 074 degrees     QTc Int : 385 ms    Sinus rhythm with 1st degree A-V block  Septal infarct (cited on or before 13-MAY-2022)  Abnormal ECG  When compared with ECG of 13-MAY-2022 11:15,  No significant change was found  Confirmed by STEPHAN CONCEPCION MD ([id number]) on 5/17/2024 4:18:00 PM    Referred By: TATI FULLER           Confirmed By:STEPHAN CONCEPCION MD         Review of Systems   HENT:  Negative for hearing loss and hoarse voice.    Eyes:  Negative for visual disturbance.   Cardiovascular:  Negative for chest pain, claudication, dyspnea on exertion, irregular heartbeat, leg swelling, near-syncope, orthopnea, palpitations, paroxysmal nocturnal dyspnea and syncope.   Respiratory:  Negative for cough, hemoptysis, shortness of breath, sleep disturbances due to breathing, snoring and wheezing.    Hematologic/Lymphatic: Does not bruise/bleed easily.   Skin:  Negative for color change, dry skin and nail changes.   Musculoskeletal:  Positive for back pain, joint pain and myalgias.   Gastrointestinal:  Negative for bloating, abdominal pain, constipation, nausea and vomiting.   Genitourinary:  Negative for dysuria, flank pain, hematuria and hesitancy.   Neurological:  Negative for headaches, light-headedness, loss of balance, numbness, paresthesias and weakness.   Psychiatric/Behavioral:  Negative for altered mental status.    Allergic/Immunologic: Positive for environmental allergies.  "        Objective:/82 (BP Location: Left arm, Patient Position: Sitting)   Pulse 70   Ht 5' 10" (1.778 m)   Wt 99 kg (218 lb 4.1 oz)   SpO2 96%   BMI 31.32 kg/m²      Physical Exam  Vitals and nursing note reviewed.   Constitutional:       General: He is not in acute distress.     Appearance: Normal appearance. He is well-developed. He is not ill-appearing.   HENT:      Head: Normocephalic and atraumatic.      Nose: Nose normal.      Mouth/Throat:      Mouth: Mucous membranes are moist.   Eyes:      Pupils: Pupils are equal, round, and reactive to light.   Neck:      Thyroid: No thyromegaly.      Vascular: JVD present.      Trachea: No tracheal deviation.   Cardiovascular:      Rate and Rhythm: Normal rate and regular rhythm.      Chest Wall: PMI is not displaced.      Pulses: Intact distal pulses.           Radial pulses are 2+ on the right side and 2+ on the left side.        Dorsalis pedis pulses are 2+ on the right side and 2+ on the left side.      Heart sounds: S1 normal and S2 normal. Heart sounds are distant. No murmur heard.  Pulmonary:      Effort: Pulmonary effort is normal. No respiratory distress.      Breath sounds: Normal breath sounds. No wheezing.   Abdominal:      General: Bowel sounds are normal.      Palpations: Abdomen is soft.   Musculoskeletal:         General: No swelling. Normal range of motion.      Cervical back: Full passive range of motion without pain, normal range of motion and neck supple.      Right ankle: Swelling present.      Left ankle: Swelling present.   Skin:     General: Skin is warm and dry.      Capillary Refill: Capillary refill takes less than 2 seconds.      Nails: There is no clubbing.   Neurological:      General: No focal deficit present.      Mental Status: He is alert and oriented to person, place, and time.   Psychiatric:         Speech: Speech normal.         Behavior: Behavior normal.         Thought Content: Thought content normal.         Judgment: " Judgment normal.         Lab Results   Component Value Date    CHOL 161 06/26/2024    CHOL 147 03/21/2023    CHOL 134 06/21/2022     Lab Results   Component Value Date    HDL 47 06/26/2024    HDL 46 03/21/2023    HDL 42 06/21/2022     Lab Results   Component Value Date    LDLCALC 100.6 06/26/2024    LDLCALC 87.6 03/21/2023    LDLCALC 76.8 06/21/2022     Lab Results   Component Value Date    TRIG 67 06/26/2024    TRIG 67 03/21/2023    TRIG 76 06/21/2022     Lab Results   Component Value Date    CHOLHDL 29.2 06/26/2024    CHOLHDL 31.3 03/21/2023    CHOLHDL 31.3 06/21/2022       Chemistry        Component Value Date/Time     06/26/2024 1030    K 4.7 06/26/2024 1030     06/26/2024 1030    CO2 25 06/26/2024 1030    BUN 16 06/26/2024 1030    CREATININE 1.3 06/26/2024 1030    GLU 99 06/26/2024 1030        Component Value Date/Time    CALCIUM 9.6 06/26/2024 1030    ALKPHOS 57 06/26/2024 1030    AST 30 06/26/2024 1030    ALT 27 06/26/2024 1030    BILITOT 1.0 06/26/2024 1030    ESTGFRAFRICA >60 06/16/2022 2243    EGFRNONAA >60 06/16/2022 2243          Lab Results   Component Value Date    TSH 2.989 06/26/2024     Lab Results   Component Value Date    INR 1.0 02/13/2019    INR 1.0 10/12/2012    INR 1.0 10/11/2012     Lab Results   Component Value Date    WBC 7.14 06/26/2024    HGB 15.9 06/26/2024    HCT 47.4 06/26/2024    MCV 87 06/26/2024     06/26/2024       Assessment:      1. PAF (paroxysmal atrial fibrillation)    2. Aortic atherosclerosis    3. Cardiac pacemaker in situ    4. Failure of pacemaker lead, sequela    5. Iron deficiency anemia, unspecified iron deficiency anemia type    6. Obstructive sleep apnea        Plan:     Rare AF per Device interrogation  Continue home device monitoring and annual in office interrogation  RTC in 1 y with device check  Call if any issues prior to next visit.    Nicole May, FNP-C [facility]         [1]   Social History  Tobacco Use    Smoking status: Never     Smokeless tobacco: Never   Substance Use Topics    Alcohol use: No     Comment: socially    Drug use: No   [2]   Current Outpatient Medications on File Prior to Visit   Medication Sig Dispense Refill    ciclopirox (LOPROX) 0.77 % Crea Apply topically 2 (two) times daily. To affected Area of skin. 90 g 0    finasteride (PROSCAR) 5 mg tablet Take 1 tablet (5 mg total) by mouth once daily. 90 tablet 4    gabapentin (NEURONTIN) 300 MG capsule Take 1 capsule by mouth every morning.      hydrocortisone 2.5 % cream Apply topically 2 (two) times daily. 28 g 3    LACTOBACILLUS ACIDOPHILUS (PROBIOTIC ORAL) Take by mouth once daily.      levetiracetam (KEPPRA) 500 MG Tab Take 1 tablet (500 mg total) by mouth 2 (two) times daily. 60 tablet 11    multivitamin (THERAGRAN) per tablet Take 1 tablet by mouth once daily.      neomycin-polymyxin-hydrocortisone (CORTISPORIN) otic solution Place 3 drops into the left ear 4 (four) times daily. 10 mL 0    pravastatin (PRAVACHOL) 40 MG tablet Take 1 tablet (40 mg total) by mouth once daily. 90 tablet 3    tamsulosin (FLOMAX) 0.4 mg Cap Take 1 capsule (0.4 mg total) by mouth once daily. 90 capsule 4    terbinafine HCL (LAMISIL) 1 % cream Apply topically 2 (two) times daily. Anti-fungal cream 28 g 3     Current Facility-Administered Medications on File Prior to Visit   Medication Dose Route Frequency Provider Last Rate Last Admin    sodium hyaluronate (EUFLEXXA) 10 mg/mL(mw 2.4 -3.6 million) injection 20 mg  20 mg Intra-articular  Attila Mclain MD   20 mg at 04/27/22 1040    sodium hyaluronate (EUFLEXXA) 10 mg/mL(mw 2.4 -3.6 million) injection 20 mg  20 mg Intra-articular  Attila Mclain MD   20 mg at 05/04/22 1020

## 2025-06-25 ENCOUNTER — LAB VISIT (OUTPATIENT)
Dept: LAB | Facility: HOSPITAL | Age: 75
End: 2025-06-25
Attending: INTERNAL MEDICINE
Payer: MEDICARE

## 2025-06-25 ENCOUNTER — OFFICE VISIT (OUTPATIENT)
Dept: INTERNAL MEDICINE | Facility: CLINIC | Age: 75
End: 2025-06-25
Payer: MEDICARE

## 2025-06-25 VITALS
WEIGHT: 217.63 LBS | TEMPERATURE: 98 F | OXYGEN SATURATION: 95 % | DIASTOLIC BLOOD PRESSURE: 82 MMHG | HEART RATE: 76 BPM | BODY MASS INDEX: 31.16 KG/M2 | SYSTOLIC BLOOD PRESSURE: 124 MMHG | HEIGHT: 70 IN

## 2025-06-25 DIAGNOSIS — E78.2 MIXED HYPERLIPIDEMIA: Primary | ICD-10-CM

## 2025-06-25 DIAGNOSIS — G40.209 PARTIAL SYMPTOMATIC EPILEPSY WITH COMPLEX PARTIAL SEIZURES, NOT INTRACTABLE, WITHOUT STATUS EPILEPTICUS: ICD-10-CM

## 2025-06-25 DIAGNOSIS — D32.9 MENINGIOMA: ICD-10-CM

## 2025-06-25 DIAGNOSIS — E78.2 MIXED HYPERLIPIDEMIA: ICD-10-CM

## 2025-06-25 DIAGNOSIS — G25.0 ESSENTIAL TREMOR: ICD-10-CM

## 2025-06-25 DIAGNOSIS — R97.20 ELEVATED PSA: ICD-10-CM

## 2025-06-25 DIAGNOSIS — E66.9 OBESITY (BMI 30-39.9): ICD-10-CM

## 2025-06-25 DIAGNOSIS — Z00.00 PREVENTATIVE HEALTH CARE: ICD-10-CM

## 2025-06-25 LAB
ABSOLUTE EOSINOPHIL (OHS): 0.16 K/UL
ABSOLUTE MONOCYTE (OHS): 0.63 K/UL (ref 0.3–1)
ABSOLUTE NEUTROPHIL COUNT (OHS): 3.32 K/UL (ref 1.8–7.7)
ALBUMIN SERPL BCP-MCNC: 3.9 G/DL (ref 3.5–5.2)
ALP SERPL-CCNC: 58 UNIT/L (ref 40–150)
ALT SERPL W/O P-5'-P-CCNC: 23 UNIT/L (ref 10–44)
ANION GAP (OHS): 7 MMOL/L (ref 8–16)
AST SERPL-CCNC: 30 UNIT/L (ref 11–45)
BASOPHILS # BLD AUTO: 0.03 K/UL
BASOPHILS NFR BLD AUTO: 0.5 %
BILIRUB SERPL-MCNC: 1.1 MG/DL (ref 0.1–1)
BUN SERPL-MCNC: 16 MG/DL (ref 8–23)
CALCIUM SERPL-MCNC: 9.1 MG/DL (ref 8.7–10.5)
CHLORIDE SERPL-SCNC: 110 MMOL/L (ref 95–110)
CHOLEST SERPL-MCNC: 158 MG/DL (ref 120–199)
CHOLEST/HDLC SERPL: 3.7 {RATIO} (ref 2–5)
CO2 SERPL-SCNC: 24 MMOL/L (ref 23–29)
CREAT SERPL-MCNC: 1.1 MG/DL (ref 0.5–1.4)
ERYTHROCYTE [DISTWIDTH] IN BLOOD BY AUTOMATED COUNT: 13.4 % (ref 11.5–14.5)
GFR SERPLBLD CREATININE-BSD FMLA CKD-EPI: >60 ML/MIN/1.73/M2
GLUCOSE SERPL-MCNC: 100 MG/DL (ref 70–110)
HCT VFR BLD AUTO: 46.5 % (ref 40–54)
HDLC SERPL-MCNC: 43 MG/DL (ref 40–75)
HDLC SERPL: 27.2 % (ref 20–50)
HGB BLD-MCNC: 15.5 GM/DL (ref 14–18)
IMM GRANULOCYTES # BLD AUTO: 0.03 K/UL (ref 0–0.04)
IMM GRANULOCYTES NFR BLD AUTO: 0.5 % (ref 0–0.5)
LDLC SERPL CALC-MCNC: 97.4 MG/DL (ref 63–159)
LYMPHOCYTES # BLD AUTO: 1.4 K/UL (ref 1–4.8)
MCH RBC QN AUTO: 29.8 PG (ref 27–31)
MCHC RBC AUTO-ENTMCNC: 33.3 G/DL (ref 32–36)
MCV RBC AUTO: 89 FL (ref 82–98)
NONHDLC SERPL-MCNC: 115 MG/DL
NUCLEATED RBC (/100WBC) (OHS): 0 /100 WBC
PLATELET # BLD AUTO: 187 K/UL (ref 150–450)
PMV BLD AUTO: 10.9 FL (ref 9.2–12.9)
POTASSIUM SERPL-SCNC: 4.5 MMOL/L (ref 3.5–5.1)
PROT SERPL-MCNC: 6.9 GM/DL (ref 6–8.4)
RBC # BLD AUTO: 5.2 M/UL (ref 4.6–6.2)
RELATIVE EOSINOPHIL (OHS): 2.9 %
RELATIVE LYMPHOCYTE (OHS): 25.1 % (ref 18–48)
RELATIVE MONOCYTE (OHS): 11.3 % (ref 4–15)
RELATIVE NEUTROPHIL (OHS): 59.7 % (ref 38–73)
SODIUM SERPL-SCNC: 141 MMOL/L (ref 136–145)
TRIGL SERPL-MCNC: 88 MG/DL (ref 30–150)
TSH SERPL-ACNC: 2.4 UIU/ML (ref 0.4–4)
WBC # BLD AUTO: 5.57 K/UL (ref 3.9–12.7)

## 2025-06-25 PROCEDURE — 99214 OFFICE O/P EST MOD 30 MIN: CPT | Mod: PBBFAC | Performed by: INTERNAL MEDICINE

## 2025-06-25 PROCEDURE — 36415 COLL VENOUS BLD VENIPUNCTURE: CPT

## 2025-06-25 PROCEDURE — 84443 ASSAY THYROID STIM HORMONE: CPT

## 2025-06-25 PROCEDURE — 82247 BILIRUBIN TOTAL: CPT

## 2025-06-25 PROCEDURE — 82465 ASSAY BLD/SERUM CHOLESTEROL: CPT

## 2025-06-25 PROCEDURE — 99999 PR PBB SHADOW E&M-EST. PATIENT-LVL IV: CPT | Mod: PBBFAC,,, | Performed by: INTERNAL MEDICINE

## 2025-06-25 PROCEDURE — 99214 OFFICE O/P EST MOD 30 MIN: CPT | Mod: S$PBB,,, | Performed by: INTERNAL MEDICINE

## 2025-06-25 PROCEDURE — 85025 COMPLETE CBC W/AUTO DIFF WBC: CPT

## 2025-06-25 RX ORDER — PRAVASTATIN SODIUM 40 MG/1
40 TABLET ORAL DAILY
Qty: 90 TABLET | Refills: 3 | Status: SHIPPED | OUTPATIENT
Start: 2025-06-25

## 2025-06-25 NOTE — PROGRESS NOTES
"Subjective:      Patient ID: Adal Loaiza Jr. is a 75 y.o. male.    Chief Complaint: Annual Exam    HPI  History of Present Illness                 76 yo with Problem List[1]  Past Medical History:   Diagnosis Date    Anemia due to GI blood loss 2012    Arrhythmia requiring replacement of cardiac pacemaker     Brain tumor (benign) 1999    Meningioma    Encounter for blood transfusion     GI bleed due to NSAIDs 2012    Hyperlipidemia     Localized osteoarthritis of left knee 12/10/2020    PAF (paroxysmal atrial fibrillation) 6/16/2019    Renal cyst 2/19/2019    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    Seizures     because of brain tumor    Sleep apnea     Spinal cord disease     meningioma     Here today for management of mult med problems as outlined below.  Compliant with meds without significant side effects. Energy and appetite good.     Review of Systems   Constitutional:  Negative for chills and fever.   HENT:  Negative for ear pain and sore throat.    Respiratory:  Negative for cough.    Cardiovascular:  Negative for chest pain.   Gastrointestinal:  Negative for abdominal pain and blood in stool.   Genitourinary:  Negative for dysuria and hematuria.   Neurological:  Negative for seizures and syncope.     Objective:   /82   Pulse 76   Temp 97.6 °F (36.4 °C)   Ht 5' 10" (1.778 m)   Wt 98.7 kg (217 lb 9.5 oz)   SpO2 95%   BMI 31.22 kg/m²     Physical Exam  Constitutional:       General: He is not in acute distress.     Appearance: He is well-developed.   HENT:      Head: Normocephalic and atraumatic.   Eyes:      Extraocular Movements: Extraocular movements intact.   Neck:      Thyroid: No thyromegaly.   Cardiovascular:      Rate and Rhythm: Normal rate and regular rhythm.   Pulmonary:      Breath sounds: Normal breath sounds. No wheezing or rales.   Abdominal:      General: Bowel sounds are normal.      Palpations: Abdomen is soft.      Tenderness: There is no abdominal tenderness.   Musculoskeletal:    "      General: No swelling.      Cervical back: Neck supple. No rigidity.   Lymphadenopathy:      Cervical: No cervical adenopathy.   Skin:     General: Skin is warm and dry.   Neurological:      Mental Status: He is alert and oriented to person, place, and time.   Psychiatric:         Behavior: Behavior normal.         Lab Results   Component Value Date    WBC 5.57 06/25/2025    HGB 15.5 06/25/2025    HGB 15.9 06/26/2024    HGB 15.7 03/21/2023    HCT 46.5 06/25/2025    MCV 89 06/25/2025    MCV 87 06/26/2024    MCV 90 03/21/2023     06/25/2025    CHOL 158 06/25/2025    TRIG 88 06/25/2025    HDL 43 06/25/2025    LDLCALC 97.4 06/25/2025    LDLCALC 100.6 06/26/2024    LDLCALC 87.6 03/21/2023    ALT 23 06/25/2025    AST 30 06/25/2025     06/25/2025    K 4.5 06/25/2025    CALCIUM 9.1 06/25/2025     06/25/2025    CO2 24 06/25/2025    BUN 16 06/25/2025    CREATININE 1.1 06/25/2025    CREATININE 1.3 06/26/2024    CREATININE 1.0 03/21/2023    EGFRNORACEVR >60 06/25/2025    EGFRNORACEVR 57.6 (A) 06/26/2024    EGFRNORACEVR >60.0 03/21/2023    TSH 2.403 06/25/2025    TSH 2.989 06/26/2024    TSH 3.734 03/21/2023    PSA 1.08 04/11/2025    PSA 1.1 03/01/2024    PSA 0.90 03/21/2023    PSADIAG 1.0 02/16/2023    PSADIAG 4.1 (H) 01/04/2021     06/25/2025    BNP 79 01/22/2019          The 10-year ASCVD risk score (Sabine KELLY, et al., 2019) is: 23%    Values used to calculate the score:      Age: 75 years      Sex: Male      Is Non- : No      Diabetic: No      Tobacco smoker: No      Systolic Blood Pressure: 124 mmHg      Is BP treated: No      HDL Cholesterol: 43 mg/dL      Total Cholesterol: 158 mg/dL     Assessment:     1. Mixed hyperlipidemia    2. Partial symptomatic epilepsy with complex partial seizures, not intractable, without status epilepticus    3. Meningioma    4. Essential tremor    5. Elevated PSA    6. Preventative health care    7. Obesity (BMI 30-39.9)      Plan:   1.  Mixed hyperlipidemia  Overview:  Stable.  Continue statin    Orders:  -     Comprehensive Metabolic Panel; Future; Expected date: 06/25/2025  -     CBC Auto Differential; Future; Expected date: 06/25/2025  -     TSH; Future; Expected date: 06/25/2025  -     Lipid Panel; Future; Expected date: 06/25/2025  -     pravastatin (PRAVACHOL) 40 MG tablet; Take 1 tablet (40 mg total) by mouth once daily.  Dispense: 90 tablet; Refill: 3    2. Partial symptomatic epilepsy with complex partial seizures, not intractable, without status epilepticus    3. Meningioma  Overview:  Stable.  Continue recommendations and follow-up as per Neurology.      4. Essential tremor  Overview:  Stable.       5. Elevated PSA  Overview:  Continue recommendations and follow-up as per Urology.      6. Preventative health care    7. Obesity (BMI 30-39.9)  -     Ambulatory referral/consult to [facility] Lifestyle and Wellness; Future; Expected date: 07/02/2025        There are no Patient Instructions on file for this visit.    Future Appointments   Date Time Provider Department Center   8/5/2025  1:00 PM Lejeune, Elizabeth B, NP [facility] SLEEP [facility]   8/11/2025 10:40 AM Flory Rivera FNP [facility] LIFE [facility]   10/17/2025 11:30 [facility] [place]   4/10/2026  9:40 AM [facility]   4/13/2026 10:45 AM Violeta Edmond MD [facility] UROLOGY [facility]   6/16/2026 10:30 AM Amie Arias FNP-C [facility]   6/16/2026 10:30 AM [facility]AKER CLINIC, ON ARRHYTHMIA ON [facility]   6/25/2026 10:00 AM Huseyin Marrero MD [facility]       Lab Frequency Next Occurrence   Ambulatory referral/consult to Pulmonology Once 07/03/2024   Ambulatory Referral/Consult to Enhanced Annual Wellness Visit (eAWV) Once 01/20/2025   US Retroperitoneal Complete Once 04/14/2025   PSA, Screening Once 04/14/2026   Ambulatory referral/consult to [facility] Wellness Once 07/02/2025   Cardiac device check - In  Clinic & Hospital     Cardiac device check - In Clinic [facility] Hospital     Cardiac device check - In Clinic [facility] Hospital         Follow up in about 1 year (around 6/25/2026), or if symptoms worsen or fail to improve, for fasting labs today.                  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)    Cardiac pacemaker in situ    Fatty liver    Obstructive sleep apnea    Myofascial pain    Hyperlipidemia    Pacemaker lead fracture    Family history of colon cancer    Colon polyps    Internal hemorrhoids    Sensorineural hearing loss (SNHL) of both ears    Essential tremor    Meningioma    Renal cyst    Inadequate sleep hygiene    PAF (paroxysmal atrial fibrillation)    Left knee pain    Partial symptomatic epilepsy with complex partial seizures, not intractable, without status epilepticus    SSS (sick sinus syndrome)    Primary osteoarthritis of left knee    Trigeminal neuralgia    Anemia    Paraesophageal hernia    Elevated PSA    Iron deficiency anemia    Aortic atherosclerosis    Myopia of both eyes with regular astigmatism and presbyopia    Nuclear sclerosis of both eyes

## 2025-06-27 ENCOUNTER — RESULTS FOLLOW-UP (OUTPATIENT)
Dept: INTERNAL MEDICINE | Facility: CLINIC | Age: 75
End: 2025-06-27

## 2025-07-09 ENCOUNTER — CLINICAL SUPPORT (OUTPATIENT)
Dept: CARDIOLOGY | Facility: HOSPITAL | Age: 75
End: 2025-07-09
Payer: MEDICARE

## 2025-07-09 DIAGNOSIS — Z95.0 PRESENCE OF CARDIAC PACEMAKER: ICD-10-CM

## 2025-07-09 DIAGNOSIS — R00.1 BRADYCARDIA, UNSPECIFIED: ICD-10-CM

## 2025-08-05 ENCOUNTER — OFFICE VISIT (OUTPATIENT)
Dept: SLEEP MEDICINE | Facility: CLINIC | Age: 75
End: 2025-08-05
Payer: MEDICARE

## 2025-08-05 VITALS
HEIGHT: 70 IN | WEIGHT: 206.81 LBS | RESPIRATION RATE: 13 BRPM | BODY MASS INDEX: 29.61 KG/M2 | HEART RATE: 80 BPM | SYSTOLIC BLOOD PRESSURE: 120 MMHG | DIASTOLIC BLOOD PRESSURE: 84 MMHG | OXYGEN SATURATION: 96 %

## 2025-08-05 DIAGNOSIS — G47.33 OBSTRUCTIVE SLEEP APNEA: Chronic | ICD-10-CM

## 2025-08-05 PROCEDURE — 99214 OFFICE O/P EST MOD 30 MIN: CPT | Mod: PBBFAC | Performed by: NURSE PRACTITIONER

## 2025-08-05 PROCEDURE — 99999 PR PBB SHADOW E&M-EST. PATIENT-LVL IV: CPT | Mod: PBBFAC,,, | Performed by: NURSE PRACTITIONER

## 2025-08-05 PROCEDURE — 99213 OFFICE O/P EST LOW 20 MIN: CPT | Mod: S$PBB,,, | Performed by: NURSE PRACTITIONER

## 2025-08-05 NOTE — PROGRESS NOTES
"Patient ID: Adal Loaiza Jr. is a 75 y.o. male.    Chief Complaint: CPAP      Subjective:   HPI  Presents for sleep apnea on AutoPAP therapy. Patient states improved symptoms with use of AutoPAP. Sleeping more soundly. Waking up feeling more refreshed. Improved daytime sleepiness. Patient states he is benefiting from use of the AutoPAP.  He sleeps nightly with APAP    Problem List[1]  /84   Pulse 80   Resp 13   Ht 5' 10" (1.778 m)   Wt 93.8 kg (206 lb 12.7 oz)   SpO2 96%   BMI 29.67 kg/m²   Body mass index is 29.67 kg/m².    Review of Systems   Constitutional: Negative.    HENT: Negative.     Respiratory: Negative.     Cardiovascular: Negative.    Musculoskeletal: Negative.    Gastrointestinal: Negative.    Neurological: Negative.    Psychiatric/Behavioral: Negative.       Objective:      Physical Exam  Constitutional:       Appearance: Normal appearance.   HENT:      Head: Normocephalic and atraumatic.      Nose: Nose normal.   Cardiovascular:      Rate and Rhythm: Normal rate and regular rhythm.   Pulmonary:      Effort: Pulmonary effort is normal.      Breath sounds: Normal breath sounds.   Abdominal:      Palpations: Abdomen is soft.      Tenderness: There is no abdominal tenderness.   Musculoskeletal:         General: Normal range of motion.      Cervical back: Normal range of motion and neck supple.   Skin:     General: Skin is warm and dry.   Neurological:      General: No focal deficit present.      Mental Status: He is alert and oriented to person, place, and time.   Psychiatric:         Mood and Affect: Mood normal.         Behavior: Behavior normal.       Personal Diagnostic Review      8/5/2025     1:06 PM   EPWORTH SLEEPINESS SCALE   Sitting and reading 0   Watching TV 0   Sitting, inactive in a public place (e.g. a theatre or a meeting) 0   As a passenger in a car for an hour without a break 0   Lying down to rest in the afternoon when circumstances permit 0   Sitting and talking to " someone 0   Sitting quietly after a lunch without alcohol 0   In a car, while stopped for a few minutes in traffic 0   Total score 0      Dreamstation modem not updated.   He may need to bring in machine for download.  Unplug and plug machine back in to see if that reboots modem.       Assessment:       1. Obstructive sleep apnea        Encounter Medications[2]  Orders Placed This Encounter   Procedures    CPAP/BIPAP SUPPLIES     Length of need (1-99 months)::   99     Choose ONE mask type and its corresponding cushions and/or pillows::    Nasal Mask, 1 per 90 days:  Nasal Cushions, (6 per 90 days):  Nasal Pillows, (6 per 90 days)     Choose EITHER Heated or Non-Heated Tubing::    Non-Heated Tubing, 1 per 90 days     All other supplies as needed as listed below::    Headgear, 1 per 180 days     All other supplies as needed as listed below::    Disposable Filter, 6 per 90 days     All other supplies as needed as listed below::    Non-Disposable Filter, 1 per 180 days     All other supplies as needed as listed below::    Humidifier Chamber, 1 per 180 days     Plan:       1. Obstructive sleep apnea  Overview:  Dx 26.6/hr. On Autopap. Doing well on PAP settings. Patient is compliant. 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)    Cardiac pacemaker in situ    Fatty liver    Obstructive sleep apnea    Myofascial pain    Hyperlipidemia    Pacemaker lead fracture    Family history of colon cancer    Colon polyps    Internal hemorrhoids    Sensorineural hearing loss (SNHL) of both ears    Essential tremor    Meningioma    Renal cyst    Inadequate sleep hygiene    PAF (paroxysmal atrial fibrillation)    Left knee pain    Partial symptomatic epilepsy with complex partial seizures, not intractable,  without status epilepticus    SSS (sick sinus syndrome)    Primary osteoarthritis of left knee    Trigeminal neuralgia    Anemia    Paraesophageal hernia    Elevated PSA    Iron deficiency anemia    Aortic atherosclerosis    Myopia of both eyes with regular astigmatism and presbyopia    Nuclear sclerosis of both eyes   [2]   Outpatient Encounter Medications as of 8/5/2025   Medication Sig Dispense Refill    ciclopirox (LOPROX) 0.77 % Crea Apply topically 2 (two) times daily. To affected Area of skin. 90 g 0    finasteride (PROSCAR) 5 mg tablet Take 1 tablet (5 mg total) by mouth once daily. 90 tablet 4    gabapentin (NEURONTIN) 300 MG capsule Take 1 capsule by mouth every morning.      hydrocortisone 2.5 % cream Apply topically 2 (two) times daily. 28 g 3    LACTOBACILLUS ACIDOPHILUS (PROBIOTIC ORAL) Take by mouth once daily.      levetiracetam (KEPPRA) 500 MG Tab Take 1 tablet (500 mg total) by mouth 2 (two) times daily. 60 tablet 11    multivitamin (THERAGRAN) per tablet Take 1 tablet by mouth once daily.      neomycin-polymyxin-hydrocortisone (CORTISPORIN) otic solution Place 3 drops into the left ear 4 (four) times daily. (Patient not taking: Reported on 6/25/2025) 10 mL 0    pravastatin (PRAVACHOL) 40 MG tablet Take 1 tablet (40 mg total) by mouth once daily. 90 tablet 3    tamsulosin (FLOMAX) 0.4 mg Cap Take 1 capsule (0.4 mg total) by mouth once daily. 90 capsule 4    terbinafine HCL (LAMISIL) 1 % cream Apply topically 2 (two) times daily. Anti-fungal cream 28 g 3     Facility-Administered Encounter Medications as of 8/5/2025   Medication Dose Route Frequency Provider Last Rate Last Admin    sodium hyaluronate (EUFLEXXA) 10 mg/mL(mw 2.4 -3.6 million) injection 20 mg  20 mg Intra-articular  Attila Mclain MD   20 mg at 04/27/22 1040    sodium hyaluronate (EUFLEXXA) 10 mg/mL(mw 2.4 -3.6 million) injection 20 mg  20 mg Intra-articular  Attila Mclain MD   20 mg at 05/04/22 1020

## 2025-08-11 ENCOUNTER — PATIENT MESSAGE (OUTPATIENT)
Dept: PRIMARY CARE CLINIC | Facility: CLINIC | Age: 75
End: 2025-08-11

## 2025-08-11 ENCOUNTER — OFFICE VISIT (OUTPATIENT)
Dept: PRIMARY CARE CLINIC | Facility: CLINIC | Age: 75
End: 2025-08-11
Payer: MEDICARE

## 2025-08-11 VITALS
SYSTOLIC BLOOD PRESSURE: 138 MMHG | BODY MASS INDEX: 31.24 KG/M2 | HEART RATE: 77 BPM | RESPIRATION RATE: 18 BRPM | DIASTOLIC BLOOD PRESSURE: 82 MMHG | OXYGEN SATURATION: 97 % | HEIGHT: 68 IN | WEIGHT: 206.13 LBS

## 2025-08-11 DIAGNOSIS — I48.0 PAF (PAROXYSMAL ATRIAL FIBRILLATION): ICD-10-CM

## 2025-08-11 DIAGNOSIS — G47.33 OBSTRUCTIVE SLEEP APNEA: Chronic | ICD-10-CM

## 2025-08-11 DIAGNOSIS — E66.811 CLASS 1 OBESITY DUE TO EXCESS CALORIES WITH SERIOUS COMORBIDITY AND BODY MASS INDEX (BMI) OF 31.0 TO 31.9 IN ADULT: Chronic | ICD-10-CM

## 2025-08-11 DIAGNOSIS — E66.09 CLASS 1 OBESITY DUE TO EXCESS CALORIES WITH SERIOUS COMORBIDITY AND BODY MASS INDEX (BMI) OF 31.0 TO 31.9 IN ADULT: Chronic | ICD-10-CM

## 2025-08-11 DIAGNOSIS — E78.2 MIXED HYPERLIPIDEMIA: Primary | Chronic | ICD-10-CM

## 2025-08-11 PROCEDURE — 99999 PR PBB SHADOW E&M-EST. PATIENT-LVL V: CPT | Mod: PBBFAC,,, | Performed by: NURSE PRACTITIONER

## 2025-08-11 PROCEDURE — 99215 OFFICE O/P EST HI 40 MIN: CPT | Mod: PBBFAC | Performed by: NURSE PRACTITIONER

## 2025-08-11 PROCEDURE — 99205 OFFICE O/P NEW HI 60 MIN: CPT | Mod: S$PBB,,, | Performed by: NURSE PRACTITIONER

## 2025-09-04 ENCOUNTER — HOSPITAL ENCOUNTER (OUTPATIENT)
Dept: RADIOLOGY | Facility: HOSPITAL | Age: 75
Discharge: HOME | End: 2025-09-04
Attending: PHYSICIAN ASSISTANT
Payer: MEDICARE

## 2025-09-04 ENCOUNTER — OFFICE VISIT (OUTPATIENT)
Dept: SPORTS MEDICINE | Facility: CLINIC | Age: 75
End: 2025-09-04
Payer: MEDICARE

## 2025-09-04 VITALS — WEIGHT: 206.13 LBS | BODY MASS INDEX: 31.34 KG/M2

## 2025-09-04 DIAGNOSIS — G89.11 ACUTE PAIN DUE TO INJURY: ICD-10-CM

## 2025-09-04 DIAGNOSIS — S39.012A LUMBAR STRAIN, INITIAL ENCOUNTER: ICD-10-CM

## 2025-09-04 DIAGNOSIS — S79.912A HIP INJURY, LEFT, INITIAL ENCOUNTER: ICD-10-CM

## 2025-09-04 DIAGNOSIS — S79.912A HIP INJURY, LEFT, INITIAL ENCOUNTER: Primary | ICD-10-CM

## 2025-09-04 PROCEDURE — 99214 OFFICE O/P EST MOD 30 MIN: CPT | Mod: PBBFAC,25,PN | Performed by: PHYSICIAN ASSISTANT

## 2025-09-04 PROCEDURE — 99999 PR PBB SHADOW E&M-EST. PATIENT-LVL IV: CPT | Mod: PBBFAC,,, | Performed by: PHYSICIAN ASSISTANT

## 2025-09-04 PROCEDURE — 72100 X-RAY EXAM L-S SPINE 2/3 VWS: CPT | Mod: 26,,, | Performed by: RADIOLOGY

## 2025-09-04 PROCEDURE — 99214 OFFICE O/P EST MOD 30 MIN: CPT | Mod: S$PBB,,, | Performed by: PHYSICIAN ASSISTANT

## 2025-09-04 PROCEDURE — 73502 X-RAY EXAM HIP UNI 2-3 VIEWS: CPT | Mod: 26,LT,, | Performed by: RADIOLOGY

## 2025-09-04 PROCEDURE — 73502 X-RAY EXAM HIP UNI 2-3 VIEWS: CPT | Mod: TC,PN,LT

## 2025-09-04 PROCEDURE — 72100 X-RAY EXAM L-S SPINE 2/3 VWS: CPT | Mod: TC,PN

## 2025-09-04 RX ORDER — METHYLPREDNISOLONE 4 MG/1
TABLET ORAL
Qty: 21 EACH | Refills: 0 | Status: SHIPPED | OUTPATIENT
Start: 2025-09-04

## 2025-09-04 RX ORDER — METHOCARBAMOL 500 MG/1
500 TABLET, FILM COATED ORAL 3 TIMES DAILY
Qty: 45 TABLET | Refills: 1 | Status: SHIPPED | OUTPATIENT
Start: 2025-09-04

## (undated) DEVICE — ELECTRODE REM PLYHSV RETURN 9

## (undated) DEVICE — SEE MEDLINE ITEM 157027

## (undated) DEVICE — SEALER VESSEL EXTEND

## (undated) DEVICE — TIP GRASPER FENESTRATED DISP

## (undated) DEVICE — TOWEL OR DISP STRL BLUE 4/PK

## (undated) DEVICE — TROCAR ENDOPATH XCEL 5X100MM

## (undated) DEVICE — SOL NS 1000CC

## (undated) DEVICE — MANIFOLD 4 PORT

## (undated) DEVICE — SUT 2/0 30IN SILK BLK BRAI

## (undated) DEVICE — ADHESIVE DERMABOND ADVANCED

## (undated) DEVICE — IRRIGATOR ENDOSCOPY DISP.

## (undated) DEVICE — SEE MEDLINE ITEM 157117

## (undated) DEVICE — DRAPE ABDOMINAL TIBURON 14X11

## (undated) DEVICE — TOWEL OR NONABSORB ADH 17X26

## (undated) DEVICE — TRAY CATH FOL SIL URIMTR 16FR

## (undated) DEVICE — SUT VLOC 2 0 BI 12 V12

## (undated) DEVICE — DRAIN PENRS STERILE LTX 18X1/2

## (undated) DEVICE — DRAPE ARM DAVINCI XI

## (undated) DEVICE — SEAL UNIVERSAL 5MM-8MM XI

## (undated) DEVICE — SUT VLOC BLU GS-22 SZ0 18IN

## (undated) DEVICE — NDL PNEUMO INSUFFLATI 120MM

## (undated) DEVICE — BAG TISSUE RETRIEVAL 5MM

## (undated) DEVICE — TUBING MEDI-VAC 20FT .25IN

## (undated) DEVICE — OBTURATOR BLADELESS 8MM XI CLR

## (undated) DEVICE — NDL SAFETY 22G X 1.5 ECLIPSE

## (undated) DEVICE — DRAPE COLUMN DAVINCI XI

## (undated) DEVICE — GLOVE SURGICAL LATEX SZ 6.5

## (undated) DEVICE — SUT MONOCRYL 4.0 PS2 CP496G

## (undated) DEVICE — COVER LIGHT HANDLE 80/CA

## (undated) DEVICE — KIT ANTIFOG W/SPONG & FLUID

## (undated) DEVICE — APPLICATOR CHLORAPREP ORN 26ML